# Patient Record
Sex: FEMALE | Race: WHITE | NOT HISPANIC OR LATINO | Employment: UNEMPLOYED | URBAN - METROPOLITAN AREA
[De-identification: names, ages, dates, MRNs, and addresses within clinical notes are randomized per-mention and may not be internally consistent; named-entity substitution may affect disease eponyms.]

---

## 2017-08-31 ENCOUNTER — HOSPITAL ENCOUNTER (OUTPATIENT)
Facility: MEDICAL CENTER | Age: 22
End: 2017-08-31
Attending: OBSTETRICS & GYNECOLOGY
Payer: COMMERCIAL

## 2017-08-31 PROCEDURE — 83520 IMMUNOASSAY QUANT NOS NONAB: CPT

## 2017-09-02 LAB — MIS SERPL-MCNC: 1.5 NG/ML (ref 0.4–16.02)

## 2018-04-24 ENCOUNTER — EH NON-PROVIDER (OUTPATIENT)
Dept: OCCUPATIONAL MEDICINE | Facility: CLINIC | Age: 23
End: 2018-04-24

## 2018-04-24 DIAGNOSIS — Z02.1 PRE-EMPLOYMENT DRUG SCREENING: ICD-10-CM

## 2018-04-24 LAB
AMP AMPHETAMINE: NORMAL
BAR BARBITURATES: NORMAL
BZO BENZODIAZEPINES: NORMAL
COC COCAINE: NORMAL
INT CON NEG: NORMAL
INT CON POS: NORMAL
MDMA ECSTASY: NORMAL
MET METHAMPHETAMINES: NORMAL
MTD METHADONE: NORMAL
OPI OPIATES: NORMAL
OXY OXYCODONE: NORMAL
PCP PHENCYCLIDINE: NORMAL
POC URINE DRUG SCREEN OCDRS: NORMAL
THC: NORMAL

## 2018-04-24 PROCEDURE — 80305 DRUG TEST PRSMV DIR OPT OBS: CPT | Performed by: PREVENTIVE MEDICINE

## 2018-05-04 ENCOUNTER — EMPLOYEE HEALTH (OUTPATIENT)
Dept: OCCUPATIONAL MEDICINE | Facility: CLINIC | Age: 23
End: 2018-05-04

## 2018-05-04 ENCOUNTER — HOSPITAL ENCOUNTER (OUTPATIENT)
Facility: MEDICAL CENTER | Age: 23
End: 2018-05-04
Attending: PREVENTIVE MEDICINE
Payer: COMMERCIAL

## 2018-05-04 ENCOUNTER — EH NON-PROVIDER (OUTPATIENT)
Dept: OCCUPATIONAL MEDICINE | Facility: CLINIC | Age: 23
End: 2018-05-04

## 2018-05-04 VITALS
BODY MASS INDEX: 35.31 KG/M2 | HEIGHT: 67 IN | OXYGEN SATURATION: 98 % | DIASTOLIC BLOOD PRESSURE: 78 MMHG | RESPIRATION RATE: 14 BRPM | SYSTOLIC BLOOD PRESSURE: 116 MMHG | WEIGHT: 225 LBS | HEART RATE: 84 BPM | TEMPERATURE: 98 F

## 2018-05-04 DIAGNOSIS — Z02.1 PHYSICAL EXAM, PRE-EMPLOYMENT: ICD-10-CM

## 2018-05-04 DIAGNOSIS — Z02.89 ENCOUNTER FOR OCCUPATIONAL HEALTH EXAMINATION: Primary | ICD-10-CM

## 2018-05-04 DIAGNOSIS — Z02.89 ENCOUNTER FOR OCCUPATIONAL HEALTH EXAMINATION: ICD-10-CM

## 2018-05-04 PROCEDURE — 86480 TB TEST CELL IMMUN MEASURE: CPT | Performed by: PREVENTIVE MEDICINE

## 2018-05-04 PROCEDURE — 90716 VAR VACCINE LIVE SUBQ: CPT | Performed by: PREVENTIVE MEDICINE

## 2018-05-04 PROCEDURE — 8915 PR COMPREHENSIVE PHYSICAL: Performed by: PREVENTIVE MEDICINE

## 2018-05-04 ASSESSMENT — VISUAL ACUITY
OS_CC: 20/25
OD_CC: 20/15

## 2018-05-06 LAB
M TB TUBERC IFN-G BLD QL: NEGATIVE
M TB TUBERC IFN-G/MITOGEN IGNF BLD: 0.03
M TB TUBERC IGNF/MITOGEN IGNF CONTROL: 46.84 [IU]/ML
MITOGEN IGNF BCKGRD COR BLD-ACNC: 0.08 [IU]/ML

## 2018-05-07 ENCOUNTER — DOCUMENTATION (OUTPATIENT)
Dept: OCCUPATIONAL MEDICINE | Facility: CLINIC | Age: 23
End: 2018-05-07

## 2018-06-16 ENCOUNTER — HOSPITAL ENCOUNTER (EMERGENCY)
Facility: MEDICAL CENTER | Age: 23
End: 2018-06-17
Attending: EMERGENCY MEDICINE
Payer: COMMERCIAL

## 2018-06-16 DIAGNOSIS — R10.11 RIGHT UPPER QUADRANT ABDOMINAL PAIN: ICD-10-CM

## 2018-06-16 PROCEDURE — 85025 COMPLETE CBC W/AUTO DIFF WBC: CPT

## 2018-06-16 PROCEDURE — 83690 ASSAY OF LIPASE: CPT

## 2018-06-16 PROCEDURE — 80053 COMPREHEN METABOLIC PANEL: CPT

## 2018-06-16 PROCEDURE — 36415 COLL VENOUS BLD VENIPUNCTURE: CPT

## 2018-06-16 PROCEDURE — 99285 EMERGENCY DEPT VISIT HI MDM: CPT

## 2018-06-16 PROCEDURE — 81003 URINALYSIS AUTO W/O SCOPE: CPT

## 2018-06-16 PROCEDURE — 84703 CHORIONIC GONADOTROPIN ASSAY: CPT

## 2018-06-16 PROCEDURE — 85379 FIBRIN DEGRADATION QUANT: CPT

## 2018-06-16 RX ORDER — ONDANSETRON 4 MG/1
4 TABLET, ORALLY DISINTEGRATING ORAL ONCE
Status: COMPLETED | OUTPATIENT
Start: 2018-06-17 | End: 2018-06-17

## 2018-06-16 RX ORDER — OXYCODONE HYDROCHLORIDE AND ACETAMINOPHEN 5; 325 MG/1; MG/1
1 TABLET ORAL ONCE
Status: COMPLETED | OUTPATIENT
Start: 2018-06-17 | End: 2018-06-17

## 2018-06-16 ASSESSMENT — PAIN SCALES - GENERAL: PAINLEVEL_OUTOF10: 7

## 2018-06-17 ENCOUNTER — HOSPITAL ENCOUNTER (OUTPATIENT)
Dept: RADIOLOGY | Facility: MEDICAL CENTER | Age: 23
End: 2018-06-17
Attending: EMERGENCY MEDICINE

## 2018-06-17 ENCOUNTER — APPOINTMENT (OUTPATIENT)
Dept: RADIOLOGY | Facility: MEDICAL CENTER | Age: 23
End: 2018-06-17
Attending: EMERGENCY MEDICINE
Payer: COMMERCIAL

## 2018-06-17 VITALS
HEART RATE: 72 BPM | RESPIRATION RATE: 19 BRPM | WEIGHT: 230 LBS | HEIGHT: 67 IN | DIASTOLIC BLOOD PRESSURE: 62 MMHG | SYSTOLIC BLOOD PRESSURE: 110 MMHG | OXYGEN SATURATION: 100 % | BODY MASS INDEX: 36.1 KG/M2 | TEMPERATURE: 96.8 F

## 2018-06-17 LAB
ALBUMIN SERPL BCP-MCNC: 3.4 G/DL (ref 3.2–4.9)
ALBUMIN/GLOB SERPL: 0.9 G/DL
ALP SERPL-CCNC: 51 U/L (ref 30–99)
ALT SERPL-CCNC: 20 U/L (ref 2–50)
ANION GAP SERPL CALC-SCNC: 6 MMOL/L (ref 0–11.9)
APPEARANCE UR: CLEAR
AST SERPL-CCNC: 19 U/L (ref 12–45)
BASOPHILS # BLD AUTO: 0.5 % (ref 0–1.8)
BASOPHILS # BLD: 0.04 K/UL (ref 0–0.12)
BILIRUB SERPL-MCNC: 0.3 MG/DL (ref 0.1–1.5)
BILIRUB UR QL STRIP.AUTO: NEGATIVE
BUN SERPL-MCNC: 12 MG/DL (ref 8–22)
CALCIUM SERPL-MCNC: 8.7 MG/DL (ref 8.4–10.2)
CHLORIDE SERPL-SCNC: 106 MMOL/L (ref 96–112)
CO2 SERPL-SCNC: 22 MMOL/L (ref 20–33)
COLOR UR: YELLOW
CREAT SERPL-MCNC: 0.6 MG/DL (ref 0.5–1.4)
DEPRECATED D DIMER PPP IA-ACNC: <200 NG/ML(D-DU)
EOSINOPHIL # BLD AUTO: 0.2 K/UL (ref 0–0.51)
EOSINOPHIL NFR BLD: 2.5 % (ref 0–6.9)
ERYTHROCYTE [DISTWIDTH] IN BLOOD BY AUTOMATED COUNT: 47.3 FL (ref 35.9–50)
GLOBULIN SER CALC-MCNC: 3.7 G/DL (ref 1.9–3.5)
GLUCOSE SERPL-MCNC: 85 MG/DL (ref 65–99)
GLUCOSE UR STRIP.AUTO-MCNC: NEGATIVE MG/DL
HCG SERPL QL: NEGATIVE
HCT VFR BLD AUTO: 37.7 % (ref 37–47)
HGB BLD-MCNC: 11.9 G/DL (ref 12–16)
IMM GRANULOCYTES # BLD AUTO: 0.01 K/UL (ref 0–0.11)
IMM GRANULOCYTES NFR BLD AUTO: 0.1 % (ref 0–0.9)
KETONES UR STRIP.AUTO-MCNC: NEGATIVE MG/DL
LEUKOCYTE ESTERASE UR QL STRIP.AUTO: NEGATIVE
LIPASE SERPL-CCNC: 28 U/L (ref 7–58)
LYMPHOCYTES # BLD AUTO: 3.93 K/UL (ref 1–4.8)
LYMPHOCYTES NFR BLD: 48.6 % (ref 22–41)
MCH RBC QN AUTO: 26 PG (ref 27–33)
MCHC RBC AUTO-ENTMCNC: 31.6 G/DL (ref 33.6–35)
MCV RBC AUTO: 82.5 FL (ref 81.4–97.8)
MICRO URNS: NORMAL
MONOCYTES # BLD AUTO: 0.61 K/UL (ref 0–0.85)
MONOCYTES NFR BLD AUTO: 7.5 % (ref 0–13.4)
NEUTROPHILS # BLD AUTO: 3.3 K/UL (ref 2–7.15)
NEUTROPHILS NFR BLD: 40.8 % (ref 44–72)
NITRITE UR QL STRIP.AUTO: NEGATIVE
NRBC # BLD AUTO: 0 K/UL
NRBC BLD-RTO: 0 /100 WBC
PH UR STRIP.AUTO: 5 [PH]
PLATELET # BLD AUTO: 292 K/UL (ref 164–446)
PMV BLD AUTO: 10.3 FL (ref 9–12.9)
POTASSIUM SERPL-SCNC: 3.5 MMOL/L (ref 3.6–5.5)
PROT SERPL-MCNC: 7.1 G/DL (ref 6–8.2)
PROT UR QL STRIP: NEGATIVE MG/DL
RBC # BLD AUTO: 4.57 M/UL (ref 4.2–5.4)
RBC UR QL AUTO: NEGATIVE
SODIUM SERPL-SCNC: 134 MMOL/L (ref 135–145)
SP GR UR REFRACTOMETRY: 1.03
WBC # BLD AUTO: 8.1 K/UL (ref 4.8–10.8)

## 2018-06-17 PROCEDURE — 700102 HCHG RX REV CODE 250 W/ 637 OVERRIDE(OP): Performed by: EMERGENCY MEDICINE

## 2018-06-17 PROCEDURE — 700111 HCHG RX REV CODE 636 W/ 250 OVERRIDE (IP): Performed by: EMERGENCY MEDICINE

## 2018-06-17 PROCEDURE — 74018 RADEX ABDOMEN 1 VIEW: CPT

## 2018-06-17 PROCEDURE — 76705 ECHO EXAM OF ABDOMEN: CPT

## 2018-06-17 PROCEDURE — A9270 NON-COVERED ITEM OR SERVICE: HCPCS | Performed by: EMERGENCY MEDICINE

## 2018-06-17 RX ORDER — POLYETHYLENE GLYCOL 3350 17 G/17G
17 POWDER, FOR SOLUTION ORAL DAILY
Qty: 10 EACH | Refills: 0 | Status: SHIPPED | OUTPATIENT
Start: 2018-06-17 | End: 2018-09-10

## 2018-06-17 RX ADMIN — OXYCODONE HYDROCHLORIDE AND ACETAMINOPHEN 1 TABLET: 5; 325 TABLET ORAL at 00:20

## 2018-06-17 RX ADMIN — ONDANSETRON 4 MG: 4 TABLET, ORALLY DISINTEGRATING ORAL at 00:00

## 2018-06-17 ASSESSMENT — PAIN SCALES - GENERAL: PAINLEVEL_OUTOF10: 7

## 2018-06-17 NOTE — ED NOTES
Pt resting mildly comfortably  Pain continued 4-5/10 after PO med   Aware of POC  Waiting for re-evaluation from MD

## 2018-06-17 NOTE — ED PROVIDER NOTES
ED Provider Note    CHIEF COMPLAINT  Chief Complaint   Patient presents with   • RUQ Pain     Started as back pain on right side on Thursday, became worst and more localized to RUQ today. Pt denies fevers, denies vomiting or diarrhea.    • Nausea       HPI  Diana Hill is a 22 y.o. female who presents with a chief complaint of right upper quadrant pain. The patient reports that she was in her normal, baseline state of health until approximately 4 days ago when she developed sharp pain in her right axilla. The pain radiated to her right upper back. It was constant. Nothing made it better or worse although she did try Tylenol and ibuprofen. Today, the pain radiated down into her right upper quadrant and then diffusely throughout her entire abdomen. It was associated with nausea but no vomiting. No dysuria, hematuria, vaginal discharge. A shoulder diarrhea. Denies chest pain or shortness of breath. No fevers. The patient reports that eating makes her more nauseated but does nothing for her pain. There is no pleuritic component to the pain. No hemoptysis. No lower extremity edema. She is on birth control pills. No history of DVT or PE. Denies recent surgeries or long travels.    REVIEW OF SYSTEMS  See HPI for further details. Back pain. Right axilla pain. Right upper quadrant pain. Nausea. All other systems are negative.     PAST MEDICAL HISTORY   has a past medical history of Celiac disease and Insulin resistance.    SOCIAL HISTORY  Social History     Social History Main Topics   • Smoking status: Never Smoker   • Smokeless tobacco: Never Used   • Alcohol use No   • Drug use: No   • Sexual activity: Not on file       SURGICAL HISTORY  patient denies any surgical history    CURRENT MEDICATIONS  Home Medications    **Home medications have not yet been reviewed for this encounter**         ALLERGIES  Allergies   Allergen Reactions   • Gluten Meal        PHYSICAL EXAM  VITAL SIGNS: /70   Pulse (!) 59   Temp 36.7  "°C (98 °F)   Resp 18   Ht 1.702 m (5' 7\")   Wt 104.3 kg (230 lb)   LMP 05/16/2018 (Approximate)   SpO2 99%   BMI 36.02 kg/m²    Pulse ox interpretation:  interpret this pulse ox as normal.  Constitutional: Alert in no apparent distress.  HENT: No signs of trauma, Bilateral external ears normal, Nose normal.   Eyes: Pupils are equal and reactive, Conjunctiva normal, Non-icteric.   Neck: Normal range of motion, No tenderness, Supple, No stridor.   Lymphatic: No lymphadenopathy noted.   Cardiovascular: Regular rate and rhythm, no murmurs.   Thorax & Lungs: Normal breath sounds, No respiratory distress, No wheezing, right axillary tenderness to palpation.   Abdomen: Bowel sounds normal, Soft, right upper quadrant tenderness to palpation with a negative Villasenor sign, negative Rovsing's No masses, No pulsatile masses. No peritoneal signs.  Skin: Warm, Dry, No erythema, No rash. Tenderness to light touch over the right back, right axilla, right breast, and along the right mid axillary line. No erythema along the breast. No obvious signs of infection surrounding bilateral nipple piercings.  Back: No bony tenderness, No CVA tenderness.   Extremities: Intact distal pulses, No edema, No tenderness, No cyanosis.  Musculoskeletal: Good range of motion in all major joints. No tenderness to palpation or major deformities noted.   Neurologic: Alert , Normal motor function, Normal sensory function, No focal deficits noted.   Psychiatric: Affect normal, Judgment normal, Mood normal.       DIAGNOSTIC STUDIES / PROCEDURES    LABS  Lipase  HCG  Urinalysis  CMP  CBC    RADIOLOGY  AXR  Gallbladder ultrasound    COURSE & MEDICAL DECISION MAKING  Pertinent Labs & Imaging studies reviewed. (See chart for details)  This is a 22-year-old female here with right asked for pain radiating into the right back which is now moved to the right upper quadrant. Differential diagnosis includes, but is not limited to, cholecystitis, cholelithiasis, " choledocholithiasis, gastritis, pancreatitis, hepatitis, GERD, muscle strain, constipation, shingles, less likely PE or pregnancy. Patient arrives afebrile with normal vital signs. She appears well-hydrated and nontoxic. Physical exam reveals tenderness to palpation in the right axilla, right back, and right upper quadrant along with hyperesthesia of the skin concerning for potential shingles infection. There is no obvious rash nor is there erythema to suggest a cellulitis. She has a negative Villasenor sign. Negative McBurney's sign, negative Rovsing sign. Abdomen is soft without peritoneal signs. I have a low suspicion for intra-abdominal infection especially given her normal vital signs and lack of fever. No lower extremity edema but the patient currently is on birth control pills. There is no pleuritic component to her pain, there is no chest pain, there is no shortness of breath, she is not tachycardic or hypoxic, she has no lower extremity edema, no hemoptysis, no history of DVT or PE, no recent travel or surgeries. Very low suspicion for PE. My greatest suspicion at this time is symptomatic cholelithiasis. Plan for by mouth pain and nausea control with Percocet and Zofran. We will draw labs, get abdominal x-ray to evaluate stool burden, and ultrasound of the gallbladder.    Labs without leukocytosis. Very mild hyponatremia and hypokalemia. Blood glucose within normal limits. D-dimer is reassuring less than 200. Urinalysis does not suggest infection. HCG is negative.    Abdominal x-ray reveals moderate stool burden consistent with constipation. Ultrasound of the gallbladder revealed sludge and does not suggest cholecystitis.    On reevaluation, patient reports continued but mild pain in the right upper quadrant and right axilla. I feel at this point additional imaging is not indicated. I have an incredibly low suspicion for PE. No tachycardia, O2 sats 99% on room air. Plan for discharge with close outpatient  follow-up. Will provide prescription for MiraLAX, Tylenol and ibuprofen as directed on the bottle for pain control. Increase water intake as well as fruits and vegetables. Follow-up with primary care physician within the next 24-48 hours for recheck. Patient discharged in improved condition with strict return precautions.    The patient will return for worsening symptoms and is stable at the time of discharge. The patient verbalizes understanding and will comply.    FINAL IMPRESSION  1. Abdominal pain  2. Constipation  3. Gallbladder sludge         Electronically signed by: Issa Batista, 6/16/2018 11:23 PM

## 2018-06-17 NOTE — ED NOTES
"Chief Complaint   Patient presents with   • RUQ Pain     Started as back pain on right side on Thursday, became worst and more localized to RUQ today. Pt denies fevers, denies vomiting or diarrhea.    • Nausea     /70   Pulse (!) 59   Temp 36.7 °C (98 °F)   Resp 18   Ht 1.702 m (5' 7\")   Wt 104.3 kg (230 lb)   SpO2 99%   BMI 36.02 kg/m²     "

## 2018-06-17 NOTE — DISCHARGE INSTRUCTIONS
You were seen in the ER for abdominal pain. Your labs and imaging suggests constipation as the cause of your pain. I have given you a prescription for MiraLAX which is a stool softener and I would like you to take 1 every day until you start having soft stools. I would also like you to increase your fruit and vegetable intake as well as water which can help with constipation. Please follow up with her primary care physician within the next 24-48 hours for a recheck. You may take Tylenol and ibuprofen as directed on the bottle for pain control. Return to the ER with any new or worsening symptoms.    Abdominal Pain (Nonspecific)  Your exam might not show the exact reason you have abdominal pain. Since there are many different causes of abdominal pain, another checkup and more tests may be needed. It is very important to follow up for lasting (persistent) or worsening symptoms. A possible cause of abdominal pain in any person who still has his or her appendix is acute appendicitis. Appendicitis is often hard to diagnose. Normal blood tests, urine tests, ultrasound, and CT scans do not completely rule out early appendicitis or other causes of abdominal pain. Sometimes, only the changes that happen over time will allow appendicitis and other causes of abdominal pain to be determined. Other potential problems that may require surgery may also take time to become more apparent. Because of this, it is important that you follow all of the instructions below.  HOME CARE INSTRUCTIONS   · Rest as much as possible.   · Do not eat solid food until your pain is gone.   · While adults or children have pain: A diet of water, weak decaffeinated tea, broth or bouillon, gelatin, oral rehydration solutions (ORS), frozen ice pops, or ice chips may be helpful.   · When pain is gone in adults or children: Start a light diet (dry toast, crackers, applesauce, or white rice). Increase the diet slowly as long as it does not bother you. Eat no  dairy products (including cheese and eggs) and no spicy, fatty, fried, or high-fiber foods.   · Use no alcohol, caffeine, or cigarettes.   · Take your regular medicines unless your caregiver told you not to.   · Take any prescribed medicine as directed.   · Only take over-the-counter or prescription medicines for pain, discomfort, or fever as directed by your caregiver. Do not give aspirin to children.   If your caregiver has given you a follow-up appointment, it is very important to keep that appointment. Not keeping the appointment could result in a permanent injury and/or lasting (chronic) pain and/or disability. If there is any problem keeping the appointment, you must call to reschedule.   SEEK IMMEDIATE MEDICAL CARE IF:   · Your pain is not gone in 24 hours.   · Your pain becomes worse, changes location, or feels different.   · You or your child has an oral temperature above 102° F (38.9° C), not controlled by medicine.   · Your baby is older than 3 months with a rectal temperature of 102° F (38.9° C) or higher.   · Your baby is 3 months old or younger with a rectal temperature of 100.4° F (38° C) or higher.   · You have shaking chills.   · You keep throwing up (vomiting) or cannot drink liquids.   · There is blood in your vomit or you see blood in your bowel movements.   · Your bowel movements become dark or black.   · You have frequent bowel movements.   · Your bowel movements stop (become blocked) or you cannot pass gas.   · You have bloody, frequent, or painful urination.   · You have yellow discoloration in the skin or whites of the eyes.   · Your stomach becomes bloated or bigger.   · You have dizziness or fainting.   · You have chest or back pain.   MAKE SURE YOU:   · Understand these instructions.   · Will watch your condition.   · Will get help right away if you are not doing well or get worse.   Document Released: 12/18/2006 Document Revised: 03/11/2013 Document Reviewed: 11/15/2010  ExitCare®  Patient Information ©2013 [facility].

## 2018-06-17 NOTE — ED NOTES
Pt cleared for d/c  dischg instructions given to pt  Verbally understands  D/c'ed to home in NAD w/ Rx miralax given aware to get fill later today  D/c'ed to home in NAD w/ mother driving home

## 2018-08-28 ENCOUNTER — HOSPITAL ENCOUNTER (OUTPATIENT)
Dept: LAB | Facility: MEDICAL CENTER | Age: 23
End: 2018-08-28
Attending: FAMILY MEDICINE
Payer: COMMERCIAL

## 2018-08-28 ENCOUNTER — HOSPITAL ENCOUNTER (OUTPATIENT)
Dept: LAB | Facility: MEDICAL CENTER | Age: 23
End: 2018-08-28
Attending: OBSTETRICS & GYNECOLOGY
Payer: COMMERCIAL

## 2018-08-28 LAB
25(OH)D3 SERPL-MCNC: 30 NG/ML (ref 30–100)
ALBUMIN SERPL BCP-MCNC: 3.7 G/DL (ref 3.2–4.9)
ALBUMIN/GLOB SERPL: 1.1 G/DL
ALP SERPL-CCNC: 57 U/L (ref 30–99)
ALT SERPL-CCNC: 15 U/L (ref 2–50)
ANION GAP SERPL CALC-SCNC: 10 MMOL/L (ref 0–11.9)
AST SERPL-CCNC: 15 U/L (ref 12–45)
BASOPHILS # BLD AUTO: 0.6 % (ref 0–1.8)
BASOPHILS # BLD: 0.04 K/UL (ref 0–0.12)
BILIRUB SERPL-MCNC: 0.2 MG/DL (ref 0.1–1.5)
BUN SERPL-MCNC: 14 MG/DL (ref 8–22)
CALCIUM SERPL-MCNC: 8.9 MG/DL (ref 8.5–10.5)
CHLORIDE SERPL-SCNC: 105 MMOL/L (ref 96–112)
CO2 SERPL-SCNC: 24 MMOL/L (ref 20–33)
CREAT SERPL-MCNC: 0.75 MG/DL (ref 0.5–1.4)
CRP SERPL HS-MCNC: 13.3 MG/L (ref 0–7.5)
DHEA-S SERPL-MCNC: 88.6 UG/DL (ref 148–407)
EOSINOPHIL # BLD AUTO: 0.16 K/UL (ref 0–0.51)
EOSINOPHIL NFR BLD: 2.4 % (ref 0–6.9)
ERYTHROCYTE [DISTWIDTH] IN BLOOD BY AUTOMATED COUNT: 46.1 FL (ref 35.9–50)
EST. AVERAGE GLUCOSE BLD GHB EST-MCNC: 117 MG/DL
ESTRADIOL SERPL-MCNC: 33 PG/ML
FSH SERPL-ACNC: 3 MIU/ML
GLOBULIN SER CALC-MCNC: 3.5 G/DL (ref 1.9–3.5)
GLUCOSE SERPL-MCNC: 92 MG/DL (ref 65–99)
HBA1C MFR BLD: 5.7 % (ref 0–5.6)
HCT VFR BLD AUTO: 40 % (ref 37–47)
HGB BLD-MCNC: 12.3 G/DL (ref 12–16)
IMM GRANULOCYTES # BLD AUTO: 0.02 K/UL (ref 0–0.11)
IMM GRANULOCYTES NFR BLD AUTO: 0.3 % (ref 0–0.9)
LH SERPL-ACNC: 1 IU/L
LYMPHOCYTES # BLD AUTO: 2.93 K/UL (ref 1–4.8)
LYMPHOCYTES NFR BLD: 43.2 % (ref 22–41)
MCH RBC QN AUTO: 26.1 PG (ref 27–33)
MCHC RBC AUTO-ENTMCNC: 30.8 G/DL (ref 33.6–35)
MCV RBC AUTO: 84.7 FL (ref 81.4–97.8)
MONOCYTES # BLD AUTO: 0.42 K/UL (ref 0–0.85)
MONOCYTES NFR BLD AUTO: 6.2 % (ref 0–13.4)
NEUTROPHILS # BLD AUTO: 3.22 K/UL (ref 2–7.15)
NEUTROPHILS NFR BLD: 47.3 % (ref 44–72)
NRBC # BLD AUTO: 0 K/UL
NRBC BLD-RTO: 0 /100 WBC
PLATELET # BLD AUTO: 303 K/UL (ref 164–446)
PMV BLD AUTO: 11.3 FL (ref 9–12.9)
POTASSIUM SERPL-SCNC: 4 MMOL/L (ref 3.6–5.5)
PROGEST SERPL-MCNC: 0.27 NG/ML
PROLACTIN SERPL-MCNC: 7.84 NG/ML (ref 2.8–26)
PROT SERPL-MCNC: 7.2 G/DL (ref 6–8.2)
RBC # BLD AUTO: 4.72 M/UL (ref 4.2–5.4)
SODIUM SERPL-SCNC: 139 MMOL/L (ref 135–145)
T3FREE SERPL-MCNC: 3.16 PG/ML (ref 2.4–4.2)
T4 FREE SERPL-MCNC: 0.61 NG/DL (ref 0.53–1.43)
THYROPEROXIDASE AB SERPL-ACNC: 0.5 IU/ML (ref 0–9)
TSH SERPL DL<=0.005 MIU/L-ACNC: 2.91 UIU/ML (ref 0.38–5.33)
WBC # BLD AUTO: 6.8 K/UL (ref 4.8–10.8)

## 2018-08-28 PROCEDURE — 86663 EPSTEIN-BARR ANTIBODY: CPT

## 2018-08-28 PROCEDURE — 86800 THYROGLOBULIN ANTIBODY: CPT

## 2018-08-28 PROCEDURE — 82627 DEHYDROEPIANDROSTERONE: CPT

## 2018-08-28 PROCEDURE — 80053 COMPREHEN METABOLIC PANEL: CPT

## 2018-08-28 PROCEDURE — 83036 HEMOGLOBIN GLYCOSYLATED A1C: CPT

## 2018-08-28 PROCEDURE — 84443 ASSAY THYROID STIM HORMONE: CPT

## 2018-08-28 PROCEDURE — 85025 COMPLETE CBC W/AUTO DIFF WBC: CPT

## 2018-08-28 PROCEDURE — 86376 MICROSOMAL ANTIBODY EACH: CPT

## 2018-08-28 PROCEDURE — 83090 ASSAY OF HOMOCYSTEINE: CPT

## 2018-08-28 PROCEDURE — 84270 ASSAY OF SEX HORMONE GLOBUL: CPT

## 2018-08-28 PROCEDURE — 83001 ASSAY OF GONADOTROPIN (FSH): CPT

## 2018-08-28 PROCEDURE — 83002 ASSAY OF GONADOTROPIN (LH): CPT

## 2018-08-28 PROCEDURE — 82679 ASSAY OF ESTRONE: CPT

## 2018-08-28 PROCEDURE — 84481 FREE ASSAY (FT-3): CPT

## 2018-08-28 PROCEDURE — 83525 ASSAY OF INSULIN: CPT

## 2018-08-28 PROCEDURE — 86664 EPSTEIN-BARR NUCLEAR ANTIGEN: CPT

## 2018-08-28 PROCEDURE — 86141 C-REACTIVE PROTEIN HS: CPT

## 2018-08-28 PROCEDURE — 84144 ASSAY OF PROGESTERONE: CPT

## 2018-08-28 PROCEDURE — 84146 ASSAY OF PROLACTIN: CPT

## 2018-08-28 PROCEDURE — 36415 COLL VENOUS BLD VENIPUNCTURE: CPT

## 2018-08-28 PROCEDURE — 84482 T3 REVERSE: CPT

## 2018-08-28 PROCEDURE — 84140 ASSAY OF PREGNENOLONE: CPT

## 2018-08-28 PROCEDURE — 86665 EPSTEIN-BARR CAPSID VCA: CPT | Mod: 91

## 2018-08-28 PROCEDURE — 82670 ASSAY OF TOTAL ESTRADIOL: CPT

## 2018-08-28 PROCEDURE — 84439 ASSAY OF FREE THYROXINE: CPT

## 2018-08-28 PROCEDURE — 82306 VITAMIN D 25 HYDROXY: CPT

## 2018-08-28 PROCEDURE — 84305 ASSAY OF SOMATOMEDIN: CPT

## 2018-08-28 PROCEDURE — 84403 ASSAY OF TOTAL TESTOSTERONE: CPT

## 2018-08-29 LAB — HCYS SERPL-SCNC: 9.42 UMOL/L

## 2018-08-30 LAB
EBV EA-D IGG SER-ACNC: <5 U/ML (ref 0–10.9)
EBV NA IGG SER IA-ACNC: 167 U/ML (ref 0–21.9)
EBV VCA IGG SER IA-ACNC: 18.8 U/ML (ref 0–21.9)
EBV VCA IGM SER IA-ACNC: <10 U/ML (ref 0–43.9)
IGF-I SERPL-MCNC: 188 NG/ML (ref 107–351)
IGF-I Z-SCORE SERPL: -0.5
INSULIN P FAST SERPL-ACNC: 29 UIU/ML (ref 3–19)
THYROGLOB AB SERPL-ACNC: <0.9 IU/ML (ref 0–4)

## 2018-09-01 LAB
ESTRONE SERPL-MCNC: 27.8 PG/ML
SHBG SERPL-SCNC: 354 NMOL/L (ref 30–135)
T3REVERSE SERPL-MCNC: 10.5 NG/DL (ref 9–27)
TESTOST FREE SERPL-MCNC: 0.9 PG/ML (ref 0.8–7.4)
TESTOST SERPL-MCNC: 34 NG/DL (ref 9–55)

## 2018-09-02 LAB — PREG SERPL-MCNC: 39 NG/DL (ref 15–132)

## 2018-09-07 ENCOUNTER — APPOINTMENT (OUTPATIENT)
Dept: ADMISSIONS | Facility: MEDICAL CENTER | Age: 23
End: 2018-09-07
Payer: COMMERCIAL

## 2018-09-10 ENCOUNTER — APPOINTMENT (OUTPATIENT)
Dept: ADMISSIONS | Facility: MEDICAL CENTER | Age: 23
End: 2018-09-10
Attending: SURGERY
Payer: COMMERCIAL

## 2018-09-10 RX ORDER — MULTIVITAMIN WITH IRON
TABLET ORAL DAILY
COMMUNITY
End: 2019-10-24

## 2018-09-11 ENCOUNTER — DOCUMENTATION (OUTPATIENT)
Dept: OCCUPATIONAL MEDICINE | Facility: CLINIC | Age: 23
End: 2018-09-11

## 2018-09-13 ENCOUNTER — HOSPITAL ENCOUNTER (OUTPATIENT)
Facility: MEDICAL CENTER | Age: 23
End: 2018-09-13
Attending: SURGERY | Admitting: SURGERY
Payer: COMMERCIAL

## 2018-09-13 VITALS
HEIGHT: 67 IN | TEMPERATURE: 97.9 F | WEIGHT: 249.21 LBS | RESPIRATION RATE: 16 BRPM | BODY MASS INDEX: 39.11 KG/M2 | OXYGEN SATURATION: 96 % | SYSTOLIC BLOOD PRESSURE: 112 MMHG | HEART RATE: 100 BPM | DIASTOLIC BLOOD PRESSURE: 72 MMHG

## 2018-09-13 DIAGNOSIS — K80.50 BILIARY COLIC: ICD-10-CM

## 2018-09-13 LAB
B-HCG FREE SERPL-ACNC: <5 MIU/ML
IHCGL IHCGL: NEGATIVE MIU/ML
PATHOLOGY CONSULT NOTE: NORMAL

## 2018-09-13 PROCEDURE — 160002 HCHG RECOVERY MINUTES (STAT): Performed by: SURGERY

## 2018-09-13 PROCEDURE — 160048 HCHG OR STATISTICAL LEVEL 1-5: Performed by: SURGERY

## 2018-09-13 PROCEDURE — 302135 SEQUENTIAL COMPRESSION MACHINE: Performed by: SURGERY

## 2018-09-13 PROCEDURE — 500800 HCHG LAPAROSCOPIC J/L HOOK: Performed by: SURGERY

## 2018-09-13 PROCEDURE — 160035 HCHG PACU - 1ST 60 MINS PHASE I: Performed by: SURGERY

## 2018-09-13 PROCEDURE — A6402 STERILE GAUZE <= 16 SQ IN: HCPCS | Performed by: SURGERY

## 2018-09-13 PROCEDURE — A9270 NON-COVERED ITEM OR SERVICE: HCPCS

## 2018-09-13 PROCEDURE — 160028 HCHG SURGERY MINUTES - 1ST 30 MINS LEVEL 3: Performed by: SURGERY

## 2018-09-13 PROCEDURE — 160046 HCHG PACU - 1ST 60 MINS PHASE II: Performed by: SURGERY

## 2018-09-13 PROCEDURE — 700111 HCHG RX REV CODE 636 W/ 250 OVERRIDE (IP)

## 2018-09-13 PROCEDURE — 84702 CHORIONIC GONADOTROPIN TEST: CPT

## 2018-09-13 PROCEDURE — 501838 HCHG SUTURE GENERAL: Performed by: SURGERY

## 2018-09-13 PROCEDURE — 160039 HCHG SURGERY MINUTES - EA ADDL 1 MIN LEVEL 3: Performed by: SURGERY

## 2018-09-13 PROCEDURE — 700105 HCHG RX REV CODE 258: Performed by: SURGERY

## 2018-09-13 PROCEDURE — 501577 HCHG TROCAR, STEP 11MM: Performed by: SURGERY

## 2018-09-13 PROCEDURE — 502571 HCHG PACK, LAP CHOLE: Performed by: SURGERY

## 2018-09-13 PROCEDURE — 160036 HCHG PACU - EA ADDL 30 MINS PHASE I: Performed by: SURGERY

## 2018-09-13 PROCEDURE — 501583 HCHG TROCAR, THRD CAN&SEAL 5X100: Performed by: SURGERY

## 2018-09-13 PROCEDURE — 501570 HCHG TROCAR, SEPARATOR: Performed by: SURGERY

## 2018-09-13 PROCEDURE — 501399 HCHG SPECIMAN BAG, ENDO CATC: Performed by: SURGERY

## 2018-09-13 PROCEDURE — 160025 RECOVERY II MINUTES (STATS): Performed by: SURGERY

## 2018-09-13 PROCEDURE — 700102 HCHG RX REV CODE 250 W/ 637 OVERRIDE(OP)

## 2018-09-13 PROCEDURE — 160009 HCHG ANES TIME/MIN: Performed by: SURGERY

## 2018-09-13 PROCEDURE — 88304 TISSUE EXAM BY PATHOLOGIST: CPT

## 2018-09-13 PROCEDURE — 700101 HCHG RX REV CODE 250

## 2018-09-13 RX ORDER — HYDROCODONE BITARTRATE AND ACETAMINOPHEN 5; 325 MG/1; MG/1
1-2 TABLET ORAL EVERY 6 HOURS PRN
Qty: 10 TAB | Refills: 0 | Status: SHIPPED | OUTPATIENT
Start: 2018-09-13 | End: 2018-09-20

## 2018-09-13 RX ORDER — SODIUM CHLORIDE, SODIUM LACTATE, POTASSIUM CHLORIDE, CALCIUM CHLORIDE 600; 310; 30; 20 MG/100ML; MG/100ML; MG/100ML; MG/100ML
1000 INJECTION, SOLUTION INTRAVENOUS
Status: DISCONTINUED | OUTPATIENT
Start: 2018-09-13 | End: 2018-09-13 | Stop reason: HOSPADM

## 2018-09-13 RX ORDER — BUPIVACAINE HYDROCHLORIDE AND EPINEPHRINE 5; 5 MG/ML; UG/ML
INJECTION, SOLUTION EPIDURAL; INTRACAUDAL; PERINEURAL
Status: DISCONTINUED | OUTPATIENT
Start: 2018-09-13 | End: 2018-09-13 | Stop reason: HOSPADM

## 2018-09-13 RX ORDER — OXYCODONE HYDROCHLORIDE AND ACETAMINOPHEN 5; 325 MG/1; MG/1
TABLET ORAL
Status: COMPLETED
Start: 2018-09-13 | End: 2018-09-13

## 2018-09-13 RX ORDER — HYDROCODONE BITARTRATE AND ACETAMINOPHEN 5; 325 MG/1; MG/1
1-2 TABLET ORAL EVERY 6 HOURS PRN
Qty: 10 TAB | Refills: 0 | Status: SHIPPED | OUTPATIENT
Start: 2018-09-13 | End: 2018-09-13

## 2018-09-13 RX ORDER — HYDROCODONE BITARTRATE AND ACETAMINOPHEN 5; 325 MG/1; MG/1
1-2 TABLET ORAL EVERY 6 HOURS PRN
Status: DISCONTINUED | OUTPATIENT
Start: 2018-09-13 | End: 2018-09-13 | Stop reason: HOSPADM

## 2018-09-13 RX ORDER — MEPERIDINE HYDROCHLORIDE 25 MG/ML
INJECTION INTRAMUSCULAR; INTRAVENOUS; SUBCUTANEOUS
Status: COMPLETED
Start: 2018-09-13 | End: 2018-09-13

## 2018-09-13 RX ADMIN — FENTANYL CITRATE 50 MCG: 50 INJECTION, SOLUTION INTRAMUSCULAR; INTRAVENOUS at 08:58

## 2018-09-13 RX ADMIN — FENTANYL CITRATE 25 MCG: 50 INJECTION, SOLUTION INTRAMUSCULAR; INTRAVENOUS at 09:34

## 2018-09-13 RX ADMIN — MEPERIDINE HYDROCHLORIDE 12.5 MG: 25 INJECTION INTRAMUSCULAR; INTRAVENOUS; SUBCUTANEOUS at 09:10

## 2018-09-13 RX ADMIN — OXYCODONE HYDROCHLORIDE AND ACETAMINOPHEN 1 TABLET: 5; 325 TABLET ORAL at 09:20

## 2018-09-13 RX ADMIN — SODIUM CHLORIDE, POTASSIUM CHLORIDE, SODIUM LACTATE AND CALCIUM CHLORIDE 1000 ML: 600; 310; 30; 20 INJECTION, SOLUTION INTRAVENOUS at 07:00

## 2018-09-13 RX ADMIN — FENTANYL CITRATE 50 MCG: 50 INJECTION, SOLUTION INTRAMUSCULAR; INTRAVENOUS at 09:05

## 2018-09-13 ASSESSMENT — PAIN SCALES - GENERAL
PAINLEVEL_OUTOF10: 8
PAINLEVEL_OUTOF10: 3
PAINLEVEL_OUTOF10: 4
PAINLEVEL_OUTOF10: 4
PAINLEVEL_OUTOF10: 0
PAINLEVEL_OUTOF10: 4

## 2018-09-13 NOTE — DISCHARGE INSTRUCTIONS
ACTIVITY: Rest and take it easy for the first 24 hours.  A responsible adult is recommended to remain with you during that time.  It is normal to feel sleepy.  We encourage you to not do anything that requires balance, judgment or coordination.    MILD FLU-LIKE SYMPTOMS ARE NORMAL. YOU MAY EXPERIENCE GENERALIZED MUSCLE ACHES, THROAT IRRITATION, HEADACHE AND/OR SOME NAUSEA.    FOR 24 HOURS DO NOT:  Drive, operate machinery or run household appliances.  Drink beer or alcoholic beverages.   Make important decisions or sign legal documents.    SPECIAL INSTRUCTIONS:   . DIET: Upon discharge from the hospital you may resume your normal preoperative diet. Depending on how you are feeling and whether you have nausea or not, you may wish to stay with a bland diet for the first few days. However, you can advance this as quickly as you feel ready.     2. ACTIVITIES: After discharge from the hospital, you may resume full routine activities. However, there should be no heavy lifting (greater than 15 pounds) and no strenuous activities until after your follow-up visit. Otherwise, routine activities of daily living are acceptable.     3. DRIVING: You may drive whenever you are off pain medications and are able to perform the activities needed to drive, i.e. turning, bending, twisting, etc.     4. BATHING: You may get the wound wet at any time after leaving the hospital. You may shower, but do not submerge in a bath for at least a week. Dressings may come off after 48 hours.     5. BOWEL FUNCTION: A few patients, after this operation, will develop either frequent or loose stools after meals. This usually corrects itself after a few days, to a few weeks. If this occurs, do not worry; it is not unusual and will resolve. Much more common than loose stools, is constipation. The combination of pain medication and decreased activity level can cause constipation in otherwise normal patients. If you feel this is occurring, take a  laxative (Milk of Magnesia, Ex-Lax, Senokot, etc.) until the problem has resolved.     6. PAIN MEDICATION: You will be given a prescription for pain medication at discharge. Please take these as directed. It is important to remember not to take medications on an empty stomach as this may cause nausea.     7.CALL IF YOU HAVE: (1) Fevers to more than 1010 F, (2) Unusual chest or leg pain, (3) Drainage or fluid from incision that may be foul smelling, increased tenderness or soreness at the wound or the wound edges are no longer together, redness or swelling at the incision site. Please do not hesitate to call with any other questions.     8. APPOINTMENT: Contact our office at [phone] for a follow-up appointment in 1 to 2 weeks following your procedure.     If you have any additional questions, please do not hesitate to call the office and speak to either myself or the physician on call.     Office addresses:   [address] [place] [address], [address] [place] [address]     Hayden Wilhelm MD   [facility]   [phone]    DIET: To avoid nausea, slowly advance diet as tolerated, avoiding spicy or greasy foods for the first day.  Add more substantial food to your diet according to your physician's instructions.  Babies can be fed formula or breast milk as soon as they are hungry.  INCREASE FLUIDS AND FIBER TO AVOID CONSTIPATION.      You should CALL YOUR PHYSICIAN if you develop:  Fever greater than 101 degrees F.  Pain not relieved by medication, or persistent nausea or vomiting.  Excessive bleeding (blood soaking through dressing) or unexpected drainage from the wound.  Extreme redness or swelling around the incision site, drainage of pus or foul smelling drainage.  Inability to urinate or empty your bladder within 8 hours.  Problems with breathing or chest pain.    You should call 911 if you develop problems with breathing or chest pain.  If you are unable to contact your doctor or surgical center, you should go to  the nearest emergency room or urgent care center.  Dr Wilhelm's telephone #: [phone]    If any questions arise, call your doctor.  If your doctor is not available, please feel free to call the Surgical Center at [phone].  The Center is open Monday through Friday from 7AM to 7PM.  You can also call the HEALTH HOTLINE open 24 hours/day, 7 days/week and speak to a nurse at [phone], or toll free at [phone].    A registered nurse may call you a few days after your surgery to see how you are doing after your procedure.    MEDICATIONS: Resume taking daily medication.  Take prescribed pain medication with food.  If no medication is prescribed, you may take non-aspirin pain medication if needed.  PAIN MEDICATION CAN BE VERY CONSTIPATING.  Take a stool softener or laxative such as senokot, pericolace, or milk of magnesia if needed.    Prescription given for Norco.  Last pain medication given at 0920. Percocet 5/325mg    If your physician has prescribed pain medication that includes Acetaminophen (Tylenol), do not take additional Acetaminophen (Tylenol) while taking the prescribed medication.    Depression / Suicide Risk    As you are discharged from this [facility], it is important to learn how to keep safe from harming yourself.    Recognize the warning signs:  · Abrupt changes in personality, positive or negative- including increase in energy   · Giving away possessions  · Change in eating patterns- significant weight changes-  positive or negative  · Change in sleeping patterns- unable to sleep or sleeping all the time   · Unwillingness or inability to communicate  · Depression  · Unusual sadness, discouragement and loneliness  · Talk of wanting to die  · Neglect of personal appearance   · Rebelliousness- reckless behavior  · Withdrawal from people/activities they love  · Confusion- inability to concentrate     If you or a loved one observes any of these behaviors or has concerns about  self-harm, here's what you can do:  · Talk about it- your feelings and reasons for harming yourself  · Remove any means that you might use to hurt yourself (examples: pills, rope, extension cords, firearm)  · Get professional help from the community (Mental Health, Substance Abuse, psychological counseling)  · Do not be alone:Call your Safe Contact- someone whom you trust who will be there for you.  · Call your local CRISIS HOTLINE 237-0472 or 553-769-3827  · Call your local Children's Mobile Crisis Response Team [place] (782) 231-2698 or www.[facility]  · Call the toll free National Suicide Prevention Hotlines   · National Suicide Prevention Lifeline 949-787-BAWH (3612)  · National Hope Line Network 800-SUICIDE (281-1148)

## 2018-09-13 NOTE — OR NURSING
0930 Patients mom notified of patients disposition.  0934 Patient medicated intravenously for pain.  0951 Patient meets criteria for stage 2.

## 2018-09-13 NOTE — OR NURSING
1005- patient to stage II. Minimal pain noted by patient and patient denies nausea. Dressings to abdomen clean and intact.     1033- Discharge instructions discussed with patient and family at chair side and each state that discharge instruction understood.

## 2018-09-13 NOTE — OR NURSING
0848 Patient to recovery via cart. Placed on monitors. Oral airway remains in place. Spontaneous respirations.  0853 Oral airway removed. Patient awake.  0858 Patient complains of pain. Medicated intravenously.  0905 Patient medicated intravenously for pain.  0907 Report to April RN to assume care of patient.

## 2018-09-13 NOTE — OP REPORT
Operative Report    Date: 9/13/2018    Surgeon: Hayden Wilhelm MD    Assistant: JOURDAN Aviles    Preoperative Diagnosis: biliary colic    Postoperative Diagnosis: biliary colic, chlolelithiaisis    Procedure Performed: Laparoscopic cholecystectomy     Indications: 23 year old female with chronic abdominal pain and an ultrasound showing gallbladder sludge.    An extensive PARQ conference was held with the patient, in regard to the surgical treatment of biliary colic. The patient was counseled regarding the benefits of the operation, namely, laparoscopic VS open cholecystectomy. The patient was made aware of the alternatives, including operative and non-operative management. The risks of bleeding, infection, damage to surrounding structures, need for reoperation, bile duct injury, stroke, MI, and death were discussed with the patient. The patient was given a chance to ask questions, and all his questions were answered. The patient demonstrated adequate understanding, seemed pleased with the plan, and wished to proceed.     Findings: thin walled gallbladder. High take off of right hepatic artery.     Procedure in detail: The patient was brought to the operating room and was placed in the supine position where general endotracheal anesthesia was induced. SCDs were in place and functioning. Preoperative antibiotics of ancef were given before incision time. The patient's abdomen was prepped and draped in the usual sterile fashion.    After infiltration of local anesthetic, a skin incision was made to accommodate a 5 mm port infra-umbilically. We then used the Veress needle to access the peritoneum, and after saline drop test, we insufflated to 15 mmHg. We then placed the 5mm 30 degree camera and inspected the abdomen for evidence of trauma secondary to Veress needle or port placement, and found none.    Utilizing the 30-degree laparoscope with the patient in the reverse Trendelenburg position, and after  infiltration of local anesthetic, an additional 11-mm port was inserted into the epigastrium just to the right of the midline. Then two 5-mm ports were inserted in the midclavicular and anterior axillary lines, in the right upper quadrant, all under direct visualization.    The gallbladder was identified, it appeared thin walled. The gallbladder fundus was retracted cephalad and the infundibulum was retracted laterally using gallbladder graspers. Using the [place], we were able to peel the overlying peritoneum off the cystic duct, and circumferentially dissect it. We used electrocautery to futher remove the peritoneum off the gallbladder. We then were able to further dissect Calot's triangle until we identified the cystic artery, which was circumferrentially dissected. We were careful to dissect the cystic artery away from the right hepatic artery, which bifurcated from the cystic artery high in Calot's triangle.    We then doubly clipped the cystic duct distally and divided it. We then doubly clipped the cystic artery  and divided it.Then, using electrocautery, we removed the gallbladder from the liver bed. After ensuring gallbladder bed hemostasis with cautery, we removed the gallbladder and placed it in an endocatch bag, and removed it from the epigastric port site.    We inspected the cystic duct and artery stumps, and found them to be intact. The liver bed was hemostatic.    The trocars were removed under direct visualization.    The fascia on the all port sites >10 mm were closed with Vicryl sutures. The skin of all incisions was closed with running subcuticular suture.    All sponge, needle, and instrument counts were reported as correct at the end of the procedure. The patient tolerated the procedure well and left the operating room for the recovery room in stable and satisfactory condition.    Estimated Blood Loss: 5 mLs     Specimens: gallbladder for permanent pathology    Complications: none apparent      Drains: none     Disposition: home     Hayden Wilhelm MD  [facility]  [phone]

## 2018-10-02 ENCOUNTER — IMMUNIZATION (OUTPATIENT)
Dept: OCCUPATIONAL MEDICINE | Facility: CLINIC | Age: 23
End: 2018-10-02

## 2018-10-02 DIAGNOSIS — Z23 NEED FOR VACCINATION: ICD-10-CM

## 2018-10-02 PROCEDURE — 90686 IIV4 VACC NO PRSV 0.5 ML IM: CPT | Performed by: PREVENTIVE MEDICINE

## 2019-08-10 ENCOUNTER — OFFICE VISIT (OUTPATIENT)
Dept: URGENT CARE | Facility: MEDICAL CENTER | Age: 24
End: 2019-08-10
Payer: COMMERCIAL

## 2019-08-10 VITALS
OXYGEN SATURATION: 98 % | DIASTOLIC BLOOD PRESSURE: 88 MMHG | RESPIRATION RATE: 16 BRPM | TEMPERATURE: 97.8 F | BODY MASS INDEX: 39.31 KG/M2 | HEART RATE: 86 BPM | SYSTOLIC BLOOD PRESSURE: 120 MMHG | WEIGHT: 251 LBS
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
## 2019-08-10 PROCEDURE — 99203 OFFICE O/P NEW LOW 30 MIN: CPT | Performed by: PHYSICIAN ASSISTANT

## 2019-08-10 RX ORDER — METHYLPREDNISOLONE 4 MG/1
TABLET ORAL
Qty: 21 TAB | Refills: 0 | Status: SHIPPED | OUTPATIENT
Start: 2019-08-10 | End: 2019-08-20

## 2019-08-10 ASSESSMENT — ENCOUNTER SYMPTOMS
CHILLS: 0
DIZZINESS: 0
SHORTNESS OF BREATH: 0
HEADACHES: 0
VOMITING: 0
FEVER: 0
COUGH: 0
NAUSEA: 0
MYALGIAS: 0
DIARRHEA: 0
PALPITATIONS: 0

## 2019-08-10 NOTE — PROGRESS NOTES
Subjective:      Diana Hill is a 23 y.o. female who presents with Bug Bite (poss misquitos, swelling bites, itchy, over last weekend- [place])            The patient is here today with complaints of bug bites all over her legs. She believes she was bit by mosquitoes about 1 week ago. She has noticed increased swelling and itching over the last week. She denies fever or chills. She has no body aches,  Nausea, vomiting or diarrhea. She has tried OTC Benadryl and hydrocortisone cream with little relief.    Past Medical History:   Diagnosis Date   • Anemia     hx of borderline anemia   • Bowel habit changes 09/10/2018    constipation   • Celiac disease    • Heart burn    • Insulin resistance    • Psychiatric problem 09/10/2018    depression/anxiety       Past Surgical History:   Procedure Laterality Date   • MATTHIAS BY LAPAROSCOPY N/A 9/13/2018    Procedure: MATTHIAS BY LAPAROSCOPY;  Surgeon: Hayden Wilhelm M.D.;  Location: SURGERY [facility];  Service: General       History reviewed. No pertinent family history.\    Allergies   Allergen Reactions   • Gluten Meal        Medications, Allergies, and current problem list reviewed today in Epic    Review of Systems   Constitutional: Negative for chills, fever and malaise/fatigue.   Respiratory: Negative for cough and shortness of breath.    Cardiovascular: Negative for chest pain, palpitations and leg swelling.   Gastrointestinal: Negative for diarrhea, nausea and vomiting.   Musculoskeletal: Negative for myalgias.   Skin: Positive for itching.        Bug bites with swelling all over legs   Neurological: Negative for dizziness and headaches.   All other systems reviewed and are negative.        Objective:     /88 (BP Location: Left arm, Patient Position: Sitting, BP Cuff Size: Adult)   Pulse 86   Temp 36.6 °C (97.8 °F) (Temporal)   Resp 16   Wt 113.9 kg (251 lb)   SpO2 98%   BMI 39.31 kg/m²      Physical Exam   Constitutional: She is oriented  to person, place, and time. She appears well-developed and well-nourished. No distress.   HENT:   Head: Normocephalic.   Mouth/Throat: Oropharynx is clear and moist.   Eyes: Conjunctivae are normal. No scleral icterus.   Cardiovascular: Normal rate.   Pulmonary/Chest: Effort normal. No respiratory distress.   Musculoskeletal: Normal range of motion.   Neurological: She is alert and oriented to person, place, and time. No cranial nerve deficit.   Skin: Skin is warm and dry.   Several large welts with moderate edema noted all over legs bilaterally. Skin intact. No streaking or drainage.   Psychiatric: She has a normal mood and affect. Her behavior is normal. Judgment and thought content normal.               Assessment/Plan:     1. Bug bite, initial encounter      Current Outpatient Medications:   •  methylPREDNISolone (MEDROL DOSEPAK) 4 MG Tablet Therapy Pack, Take as directed on package. 1 pack., Disp: 21 Tab, Rfl: 0     Differential diagnoses, Supportive care, and indications for immediate follow-up discussed with patient.   Instructed to return to clinic or nearest emergency department for any change in condition, further concerns, or worsening of symptoms.    The patient demonstrated a good understanding and agreed with the treatment plan.    Verónica Denis P.A.-C.

## 2019-08-20 ENCOUNTER — OFFICE VISIT (OUTPATIENT)
Dept: URGENT CARE | Facility: CLINIC | Age: 24
End: 2019-08-20
Payer: COMMERCIAL

## 2019-08-20 VITALS
TEMPERATURE: 97.6 F | WEIGHT: 253 LBS | RESPIRATION RATE: 16 BRPM | HEIGHT: 67 IN | BODY MASS INDEX: 39.71 KG/M2 | OXYGEN SATURATION: 97 % | DIASTOLIC BLOOD PRESSURE: 80 MMHG | HEART RATE: 94 BPM | SYSTOLIC BLOOD PRESSURE: 124 MMHG

## 2019-08-20 DIAGNOSIS — R30.0 DYSURIA: ICD-10-CM

## 2019-08-20 LAB
APPEARANCE UR: CLEAR
BILIRUB UR STRIP-MCNC: NEGATIVE MG/DL
COLOR UR AUTO: YELLOW
GLUCOSE UR STRIP.AUTO-MCNC: NEGATIVE MG/DL
KETONES UR STRIP.AUTO-MCNC: NEGATIVE MG/DL
LEUKOCYTE ESTERASE UR QL STRIP.AUTO: NEGATIVE
NITRITE UR QL STRIP.AUTO: NEGATIVE
PH UR STRIP.AUTO: 6 [PH] (ref 5–8)
PROT UR QL STRIP: NEGATIVE MG/DL
RBC UR QL AUTO: NEGATIVE
SP GR UR STRIP.AUTO: 1.03
UROBILINOGEN UR STRIP-MCNC: NEGATIVE MG/DL

## 2019-08-20 PROCEDURE — 81002 URINALYSIS NONAUTO W/O SCOPE: CPT | Performed by: FAMILY MEDICINE

## 2019-08-20 PROCEDURE — 99214 OFFICE O/P EST MOD 30 MIN: CPT | Performed by: FAMILY MEDICINE

## 2019-08-20 RX ORDER — PHENAZOPYRIDINE HYDROCHLORIDE 200 MG/1
200 TABLET, FILM COATED ORAL 3 TIMES DAILY
Qty: 6 TAB | Refills: 0 | Status: SHIPPED | OUTPATIENT
Start: 2019-08-20 | End: 2019-08-22

## 2019-08-20 RX ORDER — NITROFURANTOIN 25; 75 MG/1; MG/1
100 CAPSULE ORAL EVERY 12 HOURS
Qty: 10 CAP | Refills: 0 | Status: SHIPPED | OUTPATIENT
Start: 2019-08-20 | End: 2019-08-25

## 2019-08-21 NOTE — PROGRESS NOTES
"Subjective:      Diana Hill is a 24 y.o. female who presents with Urinary Frequency      - This is a pleasant and non toxic appearing 24 y.o. female with c/o urinary freq/burn x 1 day, no NVFC            ALLERGIES:  Gluten meal     PMH:  Past Medical History:   Diagnosis Date   • Anemia     hx of borderline anemia   • Bowel habit changes 09/10/2018    constipation   • Celiac disease    • Heart burn    • Insulin resistance    • Psychiatric problem 09/10/2018    depression/anxiety        PSH:  Past Surgical History:   Procedure Laterality Date   • MATTHIAS BY LAPAROSCOPY N/A 9/13/2018    Procedure: MATTHIAS BY LAPAROSCOPY;  Surgeon: Hayden Wilhelm M.D.;  Location: SURGERY [facility];  Service: General       MEDS:    Current Outpatient Medications:   •  phenazopyridine (PYRIDIUM) 200 MG Tab, Take 1 Tab by mouth 3 times a day for 2 days., Disp: 6 Tab, Rfl: 0  •  nitrofurantoin monohyd macro (MACROBID) 100 MG Cap, Take 1 Cap by mouth every 12 hours for 5 days., Disp: 10 Cap, Rfl: 0  •  Magnesium 250 MG Tab, Take  by mouth every day., Disp: , Rfl:   •  Cholecalciferol (VITAMIN D-3) 5000 units Tab, Take  by mouth every 48 hours., Disp: , Rfl:   •  citalopram (CELEXA) 20 MG Tab, Take 30 mg by mouth every day., Disp: , Rfl:   •  Norethindrone-Eth Estradiol (ZENCHENT PO), Take  by mouth., Disp: , Rfl:     ** I have documented what I find to be significant in regards to past medical, social, family and surgical history  in my HPI or under PMH/PSH/FH review section, otherwise it is contributory **           HPI    Review of Systems   Genitourinary: Positive for dysuria and frequency.   All other systems reviewed and are negative.         Objective:     /80   Pulse 94   Temp 36.4 °C (97.6 °F)   Resp 16   Ht 1.702 m (5' 7\")   Wt 114.8 kg (253 lb)   SpO2 97%   BMI 39.63 kg/m²      Physical Exam   Constitutional: She appears well-developed and well-nourished. No distress.   HENT:   Head: Normocephalic " and atraumatic.   Eyes: Conjunctivae are normal.   Cardiovascular: Normal heart sounds.   No murmur heard.  Pulmonary/Chest: Effort normal. No respiratory distress.   Abdominal: Soft. There is no tenderness.   Neurological: She is alert. She exhibits normal muscle tone.   Skin: Skin is warm and dry. She is not diaphoretic.   Psychiatric: She has a normal mood and affect. Judgment normal.   Nursing note and vitals reviewed.              Assessment/Plan:         1. Dysuria  POCT Urinalysis [[id number]]    phenazopyridine (PYRIDIUM) 200 MG Tab    nitrofurantoin monohyd macro (MACROBID) 100 MG Cap       - rest/hydrate       Dx & d/c instructions discussed w/ patient and/or family members.     Follow up with PCP (or here if PCP unavailable) in 2-3 days if symptoms not improving, ER if feeling/getting worse.    Any realistic and/or common medication side effects that may have been given today(i.e. Rash, GI upset/constipation, sedation, elevation of BP or blood sugars) reviewed.     Patient left in stable condition

## 2019-09-26 ENCOUNTER — IMMUNIZATION (OUTPATIENT)
Dept: OCCUPATIONAL MEDICINE | Facility: CLINIC | Age: 24
End: 2019-09-26

## 2019-09-26 DIAGNOSIS — Z23 NEED FOR VACCINATION: ICD-10-CM

## 2019-09-26 PROCEDURE — 90686 IIV4 VACC NO PRSV 0.5 ML IM: CPT | Performed by: PREVENTIVE MEDICINE

## 2019-10-18 ENCOUNTER — EH NON-PROVIDER (OUTPATIENT)
Dept: OCCUPATIONAL MEDICINE | Facility: CLINIC | Age: 24
End: 2019-10-18

## 2019-10-18 DIAGNOSIS — Z02.89 ENCOUNTER FOR OCCUPATIONAL HEALTH EXAMINATION INVOLVING RESPIRATOR: Primary | ICD-10-CM

## 2019-10-18 PROCEDURE — 94375 RESPIRATORY FLOW VOLUME LOOP: CPT | Performed by: NURSE PRACTITIONER

## 2019-10-24 ENCOUNTER — OFFICE VISIT (OUTPATIENT)
Dept: URGENT CARE | Facility: CLINIC | Age: 24
End: 2019-10-24
Payer: COMMERCIAL

## 2019-10-24 VITALS
HEART RATE: 84 BPM | TEMPERATURE: 97.8 F | RESPIRATION RATE: 16 BRPM | DIASTOLIC BLOOD PRESSURE: 68 MMHG | WEIGHT: 260 LBS | SYSTOLIC BLOOD PRESSURE: 120 MMHG | BODY MASS INDEX: 40.81 KG/M2 | OXYGEN SATURATION: 98 % | HEIGHT: 67 IN

## 2019-10-24 DIAGNOSIS — L30.9 DERMATITIS: ICD-10-CM

## 2019-10-24 DIAGNOSIS — Z87.2 HISTORY OF ECZEMA: ICD-10-CM

## 2019-10-24 PROCEDURE — 99214 OFFICE O/P EST MOD 30 MIN: CPT | Performed by: NURSE PRACTITIONER

## 2019-10-24 RX ORDER — TRIAMCINOLONE ACETONIDE 5 MG/G
1 OINTMENT TOPICAL 2 TIMES DAILY
Qty: 1 TUBE | Refills: 1 | Status: SHIPPED | OUTPATIENT
Start: 2019-10-24 | End: 2019-12-03

## 2019-10-24 ASSESSMENT — ENCOUNTER SYMPTOMS
PALPITATIONS: 0
FEVER: 0
NAIL CHANGES: 0
CHILLS: 0
WHEEZING: 0
SHORTNESS OF BREATH: 0

## 2019-10-25 NOTE — PROGRESS NOTES
"Subjective:   Diana Hill is a 24 y.o. female who presents for Rash (skin problem, itchy, blisters on the right middle finger started started over a month ago)        Rash   This is a new problem. The current episode started more than 1 month ago. The problem is unchanged. The affected locations include the left hand and right hand. The rash is characterized by dryness, redness and itchiness. Associated with: States with hx of gluten allergy and will make a rash appear on her hands. Hx of eczema. Has been using hand  at work and makes it worse. Pertinent negatives include no fever, nail changes or shortness of breath. Past treatments include anti-itch cream and topical steroids (Hydrocortisone and lotions). The treatment provided mild relief. Her past medical history is significant for eczema.        Review of Systems   Constitutional: Negative for chills and fever.   Respiratory: Negative for shortness of breath and wheezing.    Cardiovascular: Negative for chest pain and palpitations.   Skin: Positive for itching and rash. Negative for nail changes.     Patient's PMH, SocHx, SurgHx, FamHx, Drug allergies and medications reviewed.     Objective:   /68   Pulse 84   Temp 36.6 °C (97.8 °F)   Resp 16   Ht 1.702 m (5' 7\")   Wt 117.9 kg (260 lb)   SpO2 98%   BMI 40.72 kg/m²   Physical Exam   Constitutional: She appears well-developed and well-nourished. No distress.   HENT:   Head: Normocephalic.   Right Ear: Hearing normal.   Left Ear: Hearing normal.   Nose: Nose normal.   Eyes: Pupils are equal, round, and reactive to light. Conjunctivae, EOM and lids are normal.   Neck: Normal range of motion.   Cardiovascular: Normal rate, regular rhythm and normal heart sounds.   Pulmonary/Chest: Effort normal and breath sounds normal. No respiratory distress. She has no decreased breath sounds. She has no wheezes.   Neurological: She is alert.   Skin: Skin is warm. She is not diaphoretic. There is " erythema.   Bilateral hands - right middle finger worsened - with flaking, dryness and peeling. Mild erythema. No vesicles or pustules noted.   Psychiatric: She has a normal mood and affect. Her speech is normal and behavior is normal. Judgment and thought content normal.   Vitals reviewed.        Assessment/Plan:   Assessment    1. Dermatitis  - triamcinolone (ARISTOCORT) 0.5 % ointment; Apply 1 Application to affected area(s) 2 Times a Day.  Dispense: 1 Tube; Refill: 1    2. History of eczema  - triamcinolone (ARISTOCORT) 0.5 % ointment; Apply 1 Application to affected area(s) 2 Times a Day.  Dispense: 1 Tube; Refill: 1    May apply OTC lotions to the area PRN.    Differential diagnosis, natural history, supportive care, and indications for immediate follow-up discussed.     **Please note that all invasive procedures during this visit were performed by myself and/or the Medical Assistant under the supervision of the PA or MD in office**

## 2019-11-04 ENCOUNTER — HOSPITAL ENCOUNTER (OUTPATIENT)
Dept: LAB | Facility: MEDICAL CENTER | Age: 24
End: 2019-11-04
Attending: PEDIATRICS
Payer: COMMERCIAL

## 2019-11-04 LAB
25(OH)D3 SERPL-MCNC: 15 NG/ML (ref 30–100)
BASOPHILS # BLD AUTO: 0.5 % (ref 0–1.8)
BASOPHILS # BLD: 0.03 K/UL (ref 0–0.12)
EOSINOPHIL # BLD AUTO: 0.37 K/UL (ref 0–0.51)
EOSINOPHIL NFR BLD: 6 % (ref 0–6.9)
ERYTHROCYTE [DISTWIDTH] IN BLOOD BY AUTOMATED COUNT: 46.3 FL (ref 35.9–50)
HCT VFR BLD AUTO: 40 % (ref 37–47)
HGB BLD-MCNC: 12.5 G/DL (ref 12–16)
IMM GRANULOCYTES # BLD AUTO: 0.01 K/UL (ref 0–0.11)
IMM GRANULOCYTES NFR BLD AUTO: 0.2 % (ref 0–0.9)
LYMPHOCYTES # BLD AUTO: 2.29 K/UL (ref 1–4.8)
LYMPHOCYTES NFR BLD: 37.1 % (ref 22–41)
MCH RBC QN AUTO: 27.3 PG (ref 27–33)
MCHC RBC AUTO-ENTMCNC: 31.3 G/DL (ref 33.6–35)
MCV RBC AUTO: 87.3 FL (ref 81.4–97.8)
MONOCYTES # BLD AUTO: 0.53 K/UL (ref 0–0.85)
MONOCYTES NFR BLD AUTO: 8.6 % (ref 0–13.4)
NEUTROPHILS # BLD AUTO: 2.94 K/UL (ref 2–7.15)
NEUTROPHILS NFR BLD: 47.6 % (ref 44–72)
NRBC # BLD AUTO: 0 K/UL
NRBC BLD-RTO: 0 /100 WBC
PLATELET # BLD AUTO: 286 K/UL (ref 164–446)
PMV BLD AUTO: 10.9 FL (ref 9–12.9)
RBC # BLD AUTO: 4.58 M/UL (ref 4.2–5.4)
T4 FREE SERPL-MCNC: 0.67 NG/DL (ref 0.53–1.43)
TSH SERPL DL<=0.005 MIU/L-ACNC: 1.65 UIU/ML (ref 0.38–5.33)
WBC # BLD AUTO: 6.2 K/UL (ref 4.8–10.8)

## 2019-11-04 PROCEDURE — 85025 COMPLETE CBC W/AUTO DIFF WBC: CPT

## 2019-11-04 PROCEDURE — 80061 LIPID PANEL: CPT

## 2019-11-04 PROCEDURE — 83525 ASSAY OF INSULIN: CPT

## 2019-11-04 PROCEDURE — 80053 COMPREHEN METABOLIC PANEL: CPT

## 2019-11-04 PROCEDURE — 82306 VITAMIN D 25 HYDROXY: CPT

## 2019-11-04 PROCEDURE — 36415 COLL VENOUS BLD VENIPUNCTURE: CPT

## 2019-11-04 PROCEDURE — 84439 ASSAY OF FREE THYROXINE: CPT

## 2019-11-04 PROCEDURE — 83036 HEMOGLOBIN GLYCOSYLATED A1C: CPT

## 2019-11-04 PROCEDURE — 84443 ASSAY THYROID STIM HORMONE: CPT

## 2019-11-05 LAB
ALBUMIN SERPL BCP-MCNC: 3.6 G/DL (ref 3.2–4.9)
ALBUMIN/GLOB SERPL: 1.2 G/DL
ALP SERPL-CCNC: 65 U/L (ref 30–99)
ALT SERPL-CCNC: 20 U/L (ref 2–50)
ANION GAP SERPL CALC-SCNC: 10 MMOL/L (ref 0–11.9)
AST SERPL-CCNC: 20 U/L (ref 12–45)
BILIRUB SERPL-MCNC: 0.3 MG/DL (ref 0.1–1.5)
BUN SERPL-MCNC: 12 MG/DL (ref 8–22)
CALCIUM SERPL-MCNC: 9 MG/DL (ref 8.5–10.5)
CHLORIDE SERPL-SCNC: 107 MMOL/L (ref 96–112)
CHOLEST SERPL-MCNC: 185 MG/DL (ref 100–199)
CO2 SERPL-SCNC: 23 MMOL/L (ref 20–33)
CREAT SERPL-MCNC: 0.69 MG/DL (ref 0.5–1.4)
EST. AVERAGE GLUCOSE BLD GHB EST-MCNC: 117 MG/DL
FASTING STATUS PATIENT QL REPORTED: NORMAL
GLOBULIN SER CALC-MCNC: 3.1 G/DL (ref 1.9–3.5)
GLUCOSE SERPL-MCNC: 85 MG/DL (ref 65–99)
HBA1C MFR BLD: 5.7 % (ref 0–5.6)
HDLC SERPL-MCNC: 67 MG/DL
LDLC SERPL CALC-MCNC: 86 MG/DL
POTASSIUM SERPL-SCNC: 4.2 MMOL/L (ref 3.6–5.5)
PROT SERPL-MCNC: 6.7 G/DL (ref 6–8.2)
SODIUM SERPL-SCNC: 140 MMOL/L (ref 135–145)
TRIGL SERPL-MCNC: 161 MG/DL (ref 0–149)

## 2019-11-06 LAB
FASTING STATUS PATIENT QL REPORTED: NORMAL
INSULIN P FAST SERPL-ACNC: 20 UIU/ML (ref 3–19)

## 2019-11-18 ENCOUNTER — TELEPHONE (OUTPATIENT)
Dept: SCHEDULING | Facility: IMAGING CENTER | Age: 24
End: 2019-11-18

## 2019-11-19 ENCOUNTER — OFFICE VISIT (OUTPATIENT)
Dept: MEDICAL GROUP | Facility: PHYSICIAN GROUP | Age: 24
End: 2019-11-19
Payer: COMMERCIAL

## 2019-11-19 VITALS
RESPIRATION RATE: 16 BRPM | BODY MASS INDEX: 40.34 KG/M2 | HEART RATE: 80 BPM | TEMPERATURE: 98.1 F | DIASTOLIC BLOOD PRESSURE: 76 MMHG | SYSTOLIC BLOOD PRESSURE: 124 MMHG | WEIGHT: 257 LBS | HEIGHT: 67 IN | OXYGEN SATURATION: 98 %

## 2019-11-19 DIAGNOSIS — E78.1 HIGH TRIGLYCERIDES: ICD-10-CM

## 2019-11-19 DIAGNOSIS — Z23 NEED FOR VACCINATION: ICD-10-CM

## 2019-11-19 DIAGNOSIS — E28.2 PCOS (POLYCYSTIC OVARIAN SYNDROME): ICD-10-CM

## 2019-11-19 DIAGNOSIS — E55.9 VITAMIN D DEFICIENCY: ICD-10-CM

## 2019-11-19 PROBLEM — F41.9 ANXIETY AND DEPRESSION: Status: ACTIVE | Noted: 2019-11-19

## 2019-11-19 PROBLEM — F32.A ANXIETY AND DEPRESSION: Status: ACTIVE | Noted: 2019-11-19

## 2019-11-19 PROBLEM — K80.50 BILIARY COLIC: Status: RESOLVED | Noted: 2018-09-13 | Resolved: 2019-11-19

## 2019-11-19 PROCEDURE — 90471 IMMUNIZATION ADMIN: CPT | Performed by: NURSE PRACTITIONER

## 2019-11-19 PROCEDURE — 90621 MENB-FHBP VACC 2/3 DOSE IM: CPT | Performed by: NURSE PRACTITIONER

## 2019-11-19 PROCEDURE — 99214 OFFICE O/P EST MOD 30 MIN: CPT | Mod: 25 | Performed by: NURSE PRACTITIONER

## 2019-11-19 SDOH — HEALTH STABILITY: MENTAL HEALTH
STRESS IS WHEN SOMEONE FEELS TENSE, NERVOUS, ANXIOUS, OR CAN'T SLEEP AT NIGHT BECAUSE THEIR MIND IS TROUBLED. HOW STRESSED ARE YOU?: TO SOME EXTENT

## 2019-11-19 ASSESSMENT — ENCOUNTER SYMPTOMS
SHORTNESS OF BREATH: 0
CHILLS: 0
FEVER: 0
BLOOD IN STOOL: 0
ABDOMINAL PAIN: 0

## 2019-11-19 ASSESSMENT — PATIENT HEALTH QUESTIONNAIRE - PHQ9
CLINICAL INTERPRETATION OF PHQ2 SCORE: 3
SUM OF ALL RESPONSES TO PHQ QUESTIONS 1-9: 12
5. POOR APPETITE OR OVEREATING: 2 - MORE THAN HALF THE DAYS

## 2019-11-19 NOTE — PROGRESS NOTES
Diana Hill is a 24 y.o. female here to establish care. Her previous PCP was [facility]. She presents with the following concerns:    HPI:      PCOS (polycystic ovarian syndrome)  This is a chronic issue.  She is managed by [facility] Dr. Markell Rubio.    High triglycerides  Labs drawn on 11/4/2019 showed triglyceride level of 161.  All remaining lipid panel levels were within normal limits.  Patient does admit to regularly consuming desserts and fried foods.  She does not engage in regular physical activity.    Vitamin D deficiency  Lab results drawn on 11/4/2019 show vitamin D level of 15.  She is not currently taking supplementation    Current medicines (including changes today)  Current Outpatient Medications   Medication Sig Dispense Refill   • tetanus-dipth-acell pertussis (ADACEL) 5-2-15.5 LF-MCG/0.5 Suspension 0.5 mL by Intramuscular route Once PRN for up to 1 dose. 0.5 mL 0   • citalopram (CELEXA) 20 MG Tab Take 30 mg by mouth every day.     • triamcinolone (ARISTOCORT) 0.5 % ointment Apply 1 Application to affected area(s) 2 Times a Day. (Patient not taking: Reported on 11/19/2019) 1 Tube 1   • Norethindrone-Eth Estradiol (ZENCHENT PO) Take  by mouth.       No current facility-administered medications for this visit.      She  has a past medical history of Anemia, Anxiety, Celiac disease, Depression, Insulin resistance, Migraine, and PCOS (polycystic ovarian syndrome).  She  has a past surgical history that includes nadeen by laparoscopy (N/A, 9/13/2018).  Social History     Tobacco Use   • Smoking status: Never Smoker   • Smokeless tobacco: Never Used   Substance Use Topics   • Alcohol use: Yes     Comment: occasionally   • Drug use: No     Social History     Patient does not qualify to have social determinant information on file (likely too young).   Social History Narrative   • Not on file     Family History   Problem Relation Age of Onset   • Thyroid Mother    •  "Depression Father    • Alcohol abuse Father    • No Known Problems Maternal Grandmother    • Heart Attack Maternal Grandfather 66   • Cancer Paternal Grandmother         Breast , esophageal   • Alcohol abuse Paternal Grandfather    • Lung Disease Paternal Grandfather    • Sleep Apnea Brother      No family status information on file.       Review of Systems   Constitutional: Negative for chills, fever and malaise/fatigue.   Respiratory: Negative for shortness of breath.    Cardiovascular: Negative for chest pain.   Gastrointestinal: Negative for abdominal pain and blood in stool.       All other systems reviewed and are negative       Objective:     /76   Pulse 80   Temp 36.7 °C (98.1 °F) (Temporal)   Resp 16   Ht 1.702 m (5' 7\")   Wt 116.6 kg (257 lb)   SpO2 98%  Body mass index is 40.25 kg/m².    Physical Exam:  Constitutional: Oriented to person, place, and time and well-developed, well-nourished, and in no distress.   HENT:   Head: Normocephalic and atraumatic.   Right Ear: Tympanic membrane and external ear normal.   Left Ear: Tympanic membrane and external ear normal.   Mouth/Throat: Oropharynx is clear and moist and mucous membranes are normal. No oropharyngeal exudate or posterior oropharyngeal erythema.   Eyes: Conjunctivae and EOM are normal. Pupils are equal, round, and reactive to light.   Neck: Normal range of motion. Neck supple. No thyromegaly present.   Cardiovascular: Normal rate, regular rhythm, normal heart sounds. Radial pulses intact. Exam reveals no friction rub. No murmur heard.  Pulmonary/Chest: Effort normal and breath sounds normal. No respiratory distress or use of accessory muscles. No wheezes, rhonchi, or rales.   Abdominal: Soft. Bowel sounds are normal. Exhibits no distension and no mass. There is no tenderness. No hepatosplenomegaly.    Musculoskeletal: Full range of motion. No deformity or swelling of joints. DTRs intact.   Neurological: Alert and oriented to person, " place, and time. Gait normal.   Skin: Skin is warm and dry. No cyanosis. No edema.  Psychiatric: Mood, memory, affect and judgment normal.     Assessment and Plan:   The following treatment plan was discussed    1. Vitamin D deficiency  I recommend OTC Vit D3 supplement of 2000 units daily. Advised to take this supplement with healthy fat to aid in absorption.    2. High triglycerides  Encouraged low in saturated fats and high in fiber, as well as regular physical activity. I also recommend OTC Omega 3 fatty acid 1000-2000mg daily.    3. PCOS (polycystic ovarian syndrome)  She is managed by OB/GYN specialist.    4. Need for vaccination  I have placed the below orders and discussed them with an approved delegating provider. The MA is performing the below orders under the direction of Dr. Cancino.  - tetanus-dipth-acell pertussis (ADACEL) 5-2-15.5 LF-MCG/0.5 Suspension; 0.5 mL by Intramuscular route Once PRN for up to 1 dose.  Dispense: 0.5 mL; Refill: 0  - Meningococcal (IM) Group B    Records requested.    Followup: Return in about 2 weeks (around 12/3/2019), or if symptoms worsen or fail to improve, for For follow-up on headaches.

## 2019-11-19 NOTE — ASSESSMENT & PLAN NOTE
Labs drawn on 11/4/2019 showed triglyceride level of 161.  All remaining lipid panel levels were within normal limits.  Patient does admit to regularly consuming desserts and fried foods.  She does not engage in regular physical activity.

## 2019-11-19 NOTE — ASSESSMENT & PLAN NOTE
Lab results drawn on 11/4/2019 show vitamin D level of 15.  She is not currently taking supplementation

## 2019-11-19 NOTE — ASSESSMENT & PLAN NOTE
This is a chronic issue.  She is managed by [facility] Dr. Markell Rubio.

## 2019-12-03 ENCOUNTER — OCCUPATIONAL MEDICINE (OUTPATIENT)
Dept: OCCUPATIONAL MEDICINE | Facility: CLINIC | Age: 24
End: 2019-12-03
Payer: COMMERCIAL

## 2019-12-03 ENCOUNTER — OFFICE VISIT (OUTPATIENT)
Dept: MEDICAL GROUP | Facility: PHYSICIAN GROUP | Age: 24
End: 2019-12-03
Payer: COMMERCIAL

## 2019-12-03 VITALS
SYSTOLIC BLOOD PRESSURE: 110 MMHG | HEART RATE: 84 BPM | OXYGEN SATURATION: 98 % | BODY MASS INDEX: 41.91 KG/M2 | WEIGHT: 267 LBS | HEIGHT: 67 IN | DIASTOLIC BLOOD PRESSURE: 84 MMHG | TEMPERATURE: 98.7 F

## 2019-12-03 VITALS
SYSTOLIC BLOOD PRESSURE: 110 MMHG | TEMPERATURE: 98.4 F | WEIGHT: 257 LBS | OXYGEN SATURATION: 97 % | HEART RATE: 102 BPM | HEIGHT: 67 IN | RESPIRATION RATE: 19 BRPM | DIASTOLIC BLOOD PRESSURE: 82 MMHG | BODY MASS INDEX: 40.34 KG/M2

## 2019-12-03 DIAGNOSIS — Z02.1 PRE-EMPLOYMENT DRUG SCREENING: ICD-10-CM

## 2019-12-03 DIAGNOSIS — G43.109 MIGRAINE WITH AURA AND WITHOUT STATUS MIGRAINOSUS, NOT INTRACTABLE: ICD-10-CM

## 2019-12-03 DIAGNOSIS — L24.89 IRRITANT CONTACT DERMATITIS DUE TO OTHER AGENTS: Primary | ICD-10-CM

## 2019-12-03 LAB
AMP AMPHETAMINE: NORMAL
BAR BARBITURATES: NORMAL
BREATH ALCOHOL COMMENT: NORMAL
BZO BENZODIAZEPINES: NORMAL
COC COCAINE: NORMAL
INT CON NEG: NORMAL
INT CON POS: NORMAL
MDMA ECSTASY: NORMAL
MET METHAMPHETAMINES: NORMAL
MTD METHADONE: NORMAL
OPI OPIATES: NORMAL
OXY OXYCODONE: NORMAL
PCP PHENCYCLIDINE: NORMAL
POC BREATHALIZER: 0 PERCENT (ref 0–0.01)
POC URINE DRUG SCREEN OCDRS: NEGATIVE
THC: NORMAL

## 2019-12-03 PROCEDURE — 82075 ASSAY OF BREATH ETHANOL: CPT | Performed by: PREVENTIVE MEDICINE

## 2019-12-03 PROCEDURE — 80305 DRUG TEST PRSMV DIR OPT OBS: CPT | Performed by: PREVENTIVE MEDICINE

## 2019-12-03 PROCEDURE — 99214 OFFICE O/P EST MOD 30 MIN: CPT | Performed by: NURSE PRACTITIONER

## 2019-12-03 PROCEDURE — 99204 OFFICE O/P NEW MOD 45 MIN: CPT | Performed by: PREVENTIVE MEDICINE

## 2019-12-03 RX ORDER — SUMATRIPTAN 25 MG/1
25-100 TABLET, FILM COATED ORAL
Qty: 10 TAB | Refills: 1 | Status: SHIPPED | OUTPATIENT
Start: 2019-12-03 | End: 2020-03-03 | Stop reason: SDUPTHER

## 2019-12-03 RX ORDER — TRIAMCINOLONE ACETONIDE 1 MG/G
1 OINTMENT TOPICAL 3 TIMES DAILY PRN
Qty: 80 G | Refills: 0 | Status: SHIPPED | OUTPATIENT
Start: 2019-12-03 | End: 2020-03-03 | Stop reason: SDUPTHER

## 2019-12-03 ASSESSMENT — ENCOUNTER SYMPTOMS
VOMITING: 1
SHORTNESS OF BREATH: 0
PHOTOPHOBIA: 1
BLURRED VISION: 0
NAUSEA: 1
MIGRAINE HEADACHES: 1
HEADACHES: 1

## 2019-12-03 NOTE — PROGRESS NOTES
"Subjective:      Diana Hill is a 24 y.o. female who presents with Other (WC DOI 10/10/19 rt hand allergy, room 18)      DOI 10/10/2019: 25 yo female presents with bilateral rash on the hands.  She states that around October she developed a rash on the hands.  She noted worsening symptoms after using hand .  She does have a history of gluten sensitivity with associated rash as well as eczema.  She was seen in urgent care about a month ago and received triamcinolone ointment for treatment.  Patient states that overall rash is been improving over last few weeks and just has a little bit of a rash left.  Overall happy with improvement.  She states she has been using the triamcinolone ointment at night.  Denies any numbness or tingling.  Denies open wounds.     HPI    ROS  ROS: All systems were reviewed on intake form, form was reviewed and signed. See scanned documents in media. Pertinent positives and negatives included in HPI.    PMH: History of eczema on the hands  MEDS: Medications were reviewed in Epic  ALLERGIES:   Allergies   Allergen Reactions   • Gluten Meal      SOCHX: Works as a CNA apprentice at [employer]   FH: No pertinent family history to this problem     Objective:     /84   Pulse 84   Temp 37.1 °C (98.7 °F)   Ht 1.702 m (5' 7\")   Wt 121.1 kg (267 lb)   SpO2 98%   BMI 41.82 kg/m²      Physical Exam  Constitutional:       Appearance: Normal appearance.   HENT:      Head: Normocephalic and atraumatic.      Nose: Nose normal.   Eyes:      Extraocular Movements: Extraocular movements intact.      Conjunctiva/sclera: Conjunctivae normal.   Cardiovascular:      Rate and Rhythm: Normal rate.   Pulmonary:      Effort: Pulmonary effort is normal.   Skin:     General: Skin is warm and dry.   Neurological:      General: No focal deficit present.      Mental Status: She is alert and oriented to person, place, and time.   Psychiatric:         Mood and Affect: Mood normal.         Thought " Content: Thought content normal.         Judgment: Judgment normal.         Hands: Small amount of eczematous rash the dorsum of the third MCP bilaterally as well as on the web spacing's of the third and fourth digit on the left.  No open wounds, swelling or drainage.  No heat to the lesions.       Assessment/Plan:       1. Irritant contact dermatitis due to other agents  - triamcinolone acetonide (KENALOG) 0.1 % Ointment; Apply 1 Application to affected area(s) 3 times a day as needed.  Dispense: 80 g; Refill: 0    Prescribed triamcinolone 0.1% ointment, was given 0.5% triamcinolone.  May use the 0.5% ointment at night as needed for flareups in the 0.1% ointment 2-3 times per day as needed for flareups  Recommend Aquaphor or other thick lotion for eczema after most handwashing's  Released from care  Full duty  Follow-up as needed

## 2019-12-03 NOTE — LETTER
"EMPLOYEE’S CLAIM FOR COMPENSATION/ REPORT OF INITIAL TREATMENT  FORM C-4    EMPLOYEE’S CLAIM - PROVIDE ALL INFORMATION REQUESTED   First Name  Diana Last Name  Sergio Birthdate                    1995                Sex  female Claim Number   Home Address  [address] Age  24 y.o. Height  1.702 m (5' 7\") Weight  121.1 kg (267 lb) [facility]     [facility] Zip  [address] Telephone  [phone] (home)    Mailing Address  [address] 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 (if applicable)  CNA Apprentice    How did this injury or occupational disease occur? (Be specific an answer in detail. Use additional sheet if necessary)  Hand  causing blistering, skin splitting, irritation went to urgent care in Oct.   If you believe that you have an occupational disease, when did you first have knowledge of the disability and it relationship to your employment?  n/a Witnesses to the Accident  n/a      Nature of Injury or Occupational Disease  Dermatitis  Part(s) of Body Injured or Affected  Hand (L), Hand (R), Finger (L)    I certify that the above is true and correct to the best of my knowledge and that I have provided this information in order to obtain the benefits of [place] " [place]  I hereby authorize any physician, chiropractor, surgeon, practitioner, or other person, any hospital, including [facility] or [facility] hospital, any medical service organization, any insurance company, or other institution or organization to release to each other, any medical or other information, including benefits paid or payable, pertinent to this injury or disease, except information relative to diagnosis, treatment and/or counseling for AIDS, psychological conditions, alcohol or controlled substances, for which I must give specific authorization.  A Photostat of this authorization shall be as valid as the original.     Date   Place   Employee’s Signature   THIS REPORT MUST BE COMPLETED AND MAILED WITHIN 3 WORKING DAYS OF TREATMENT   Place  [facility]  Name of [facility]   Date  12/3/2019 Diagnosis  (L24.89) Irritant contact dermatitis due to other agents  (primary encounter diagnosis)  (Z02.1) Pre-employment drug screening Is there evidence the injured employee was under the influence of alcohol and/or another controlled substance at the time of accident?   Hour  2:50 PM Description of Injury or Disease  The primary encounter diagnosis was Irritant contact dermatitis due to other agents. A diagnosis of Pre-employment drug screening was also pertinent to this visit. No   Treatment  Triamcinolone ointment  Have you advised the patient to remain off work five days or more? No   X-Ray Findings      If Yes   From Date  To Date      From information given by the employee, together with medical evidence, can you directly connect this injury or occupational disease as job incurred?  No If No Full Duty Yes Modified Duty      Is additional medical care by a physician indicated?  No If Modified Duty, Specify any Limitations / Restrictions      Do you know of any previous injury or  "disease contributing to this condition or occupational disease?                            Yes  Comments:Pre-existing eczema and gluten sensitivity   Date  12/4/2019 Print Doctor’s Name Julian Adler D.O. I certify the employer’s copy of  this form was mailed on:   Address  [address] [place] [address],   [address] Insurer’s Use Only     [facility] Zip  [address]    Provider’s Tax ID Number  [id number] Telephone  Dept: [phone]        e-SignTAYLOR, JULIAN PEREZ D.O.   e-Signature: Dr. Emil Arias,   Medical Director Degree  MD        ORIGINAL-TREATING PHYSICIAN OR CHIROPRACTOR    PAGE 2-INSURER/TPA    PAGE 3-EMPLOYER    PAGE 4-EMPLOYEE             Form C-4 (rev.10/07)              BRIEF DESCRIPTION OF RIGHTS AND BENEFITS  (Pursuant to [place]RS 616C.050)    Notice of Injury or Occupational Disease (Incident Report Form C-1): If an injury or occupational disease (OD) arises out of and in the course of employment, you must provide written notice to your employer as soon as practicable, but no later than 7 days after the accident or OD. Your employer shall maintain a sufficient supply of the required forms.    Claim for Compensation (Form C-4): If medical treatment is sought, the form C-4 is available at the place of initial treatment. A completed \"Claim for Compensation\" (Form C-4) must be filed within 90 days after an accident or OD. The treating physician or chiropractor must, within 3 working days after treatment, complete and mail to the employer, the employer's insurer and third-party , the Claim for Compensation.    Medical Treatment: If you require medical treatment for your on-the-job injury or OD, you may be required to select a physician or chiropractor from a list provided by your workers’ compensation insurer, if it has contracted with an Organization for Managed Care (MCO) or Preferred Provider Organization (PPO) or providers of health care. If your employer has not entered into a contract with an MCO " or PPO, you may select a physician or chiropractor from the Panel of Physicians and Chiropractors. Any medical costs related to your industrial injury or OD will be paid by your insurer.    Temporary Total Disability (TTD): If your doctor has certified that you are unable to work for a period of at least 5 consecutive days, or 5 cumulative days in a 20-day period, or places restrictions on you that your employer does not accommodate, you may be entitled to TTD compensation.    Temporary Partial Disability (TPD): If the wage you receive upon reemployment is less than the compensation for TTD to which you are entitled, the insurer may be required to pay you TPD compensation to make up the difference. TPD can only be paid for a maximum of 24 months.    Permanent Partial Disability (PPD): When your medical condition is stable and there is an indication of a PPD as a result of your injury or OD, within 30 days, your insurer must arrange for an evaluation by a rating physician or chiropractor to determine the degree of your PPD. The amount of your PPD award depends on the date of injury, the results of the PPD evaluation and your age and wage.    Permanent Total Disability (PTD): If you are medically certified by a treating physician or chiropractor as permanently and totally disabled and have been granted a PTD status by your insurer, you are entitled to receive monthly benefits not to exceed 66 2/3% of your average monthly wage. The amount of your PTD payments is subject to reduction if you previously received a PPD award.    Vocational Rehabilitation Services: You may be eligible for vocational rehabilitation services if you are unable to return to the job due to a permanent physical impairment or permanent restrictions as a result of your injury or occupational disease.    Transportation and Per Nargis Reimbursement: You may be eligible for travel expenses and per nargis associated with medical treatment.    Reopening:  You may be able to reopen your claim if your condition worsens after claim closure.    Appeal Process: If you disagree with a written determination issued by the insurer or the insurer does not respond to your request, you may appeal to the Department of Administration, , by following the instructions contained in your determination letter. You must appeal the determination within 70 days from the date of the determination letter at [address], [address], [place], [place] [address], or [address], [address], [place], [place] [address]. If you disagree with the  decision, you may appeal to the Department of Administration, . You must file your appeal within 30 days from the date of the  decision letter at [address], [address], [place], [place] [address], or [address], [address], [place], [place] [address]. If you disagree with a decision of an , you may file a petition for judicial review with the District Court. You must do so within 30 days of the Appeal Officer’s decision. You may be represented by an  at your own expense or you may contact the [facility] for possible representation.    [place]  for Injured Workers (NAIW): If you disagree with a  decision, you may request that NAIW represent you without charge at an  Hearing. For information regarding denial of benefits, you may contact the [facility] at: [address], [address], [place] [address], [phone], or [address], [address], [place] [address], [phone]    To File a Complaint with the Division: If you wish to file a complaint with the  of the Division of Industrial Relations (DIR),  please contact the Workers’ Compensation Section, [address], [address], [place], [place] [address], telephone [phone]) [phone], or [address] [address], [facility]  [place], [place] [address], telephone [phone].    For assistance with Workers’ Compensation Issues: You may contact the Office of the Governor Consumer Health Assistance, [address] [place] [address], [address], [place], [place] [address], Toll Free 1-340.322.2089, Web site: [email or web address]., E-mail [email or web address]                   __________________________________________________________________                                                     _________        Employee Name / Signature                                                                                                                                              Date                                                                                                                                                                                                     D-2 (rev. 06/18)

## 2019-12-03 NOTE — LETTER
[address],   [address] [place] [address]  Phone:  [phone] - Fax:  [phone]   Occupational Health [facility] Progress Report and Disability Certification  Date of Service: 12/3/2019   No Show:  No  Date / Time of Next Visit:  Release from care   Claim Information   Patient Name: Diana Hill  Claim Number:     Employer: [employer]  Date of Injury: 10/10/2019     Insurer / TPA: Workers Choice  ID / SSN:     Occupation: CNA apprentice  Diagnosis: The encounter diagnosis was Irritant contact dermatitis due to other agents.    Medical Information   Related to Industrial Injury? No    Subjective Complaints:  DOI 10/10/2019: 25 yo female presents with bilateral rash on the hands.  She states that around October she developed a rash on the hands.  She noted worsening symptoms after using hand .  She does have a history of gluten sensitivity with associated rash as well as eczema.  She was seen in urgent care about a month ago and received triamcinolone ointment for treatment.  Patient states that overall rash is been improving over last few weeks and just has a little bit of a rash left.  Overall happy with improvement.  She states she has been using the triamcinolone ointment at night.  Denies any numbness or tingling.  Denies open wounds.   Objective Findings: Hands: Small amount of eczematous rash the dorsum of the third MCP bilaterally as well as on the web spacing's of the third and fourth digit on the left.  No open wounds, swelling or drainage.  No heat to the lesions.   Pre-Existing Condition(s):     Assessment:   Initial Visit    Status: Discharged /  MMI  Permanent Disability:No    Plan:      Diagnostics:      Comments:  Prescribed triamcinolone 0.1% ointment, was given 0.5% triamcinolone.  May use the 0.5% ointment at night as needed for flareups in the 0.1% ointment 2-3 times per day as needed for flareups  Recommend Aquaphor or  other thick lotion for eczema after   most handwashing's  Released from care  Full duty  Follow-up as needed    Disability Information   Status: Released to Full Duty    From:  12/3/2019  Through:   Restrictions are:     Physical Restrictions   Sitting:    Standing:    Stooping:    Bending:      Squatting:    Walking:    Climbing:    Pushing:      Pulling:    Other:    Reaching Above Shoulder (L):   Reaching Above Shoulder (R):       Reaching Below Shoulder (L):    Reaching Below Shoulder (R):      Not to exceed Weight Limits   Carrying(hrs):   Weight Limit(lb):   Lifting(hrs):   Weight  Limit(lb):     Comments:      Repetitive Actions   Hands: i.e. Fine Manipulations from Grasping:     Feet: i.e. Operating Foot Controls:     Driving / Operate Machinery:     Physician Name: Julian Adler D.O. Physician Signature: JULIAN Ashton D.O. e-Signature: Dr. Emil Arias, Medical Director   Clinic Name / Location: [address],   [address] [place] [address] Clinic Phone Number: Dept: [phone]   Appointment Time: 2:45 Pm Visit Start Time: 2:50 PM   Check-In Time:  2:40 Pm Visit Discharge Time: 3:32 pm    Original-Treating Physician or Chiropractor    Page 2-Insurer/TPA    Page 3-Employer    Page 4-Employee

## 2019-12-03 NOTE — PROGRESS NOTES
Subjective:   Diana Hill is a 24 y.o. female here today for concerns of migraines.     Headache    This is a chronic problem. The current episode started more than 1 year ago. The problem occurs intermittently. The problem has been gradually worsening (Symptoms are occuring more frequently; 1-2 episodes per week which last 24 hours). The pain is located in the retro-orbital, left unilateral and parietal region. The pain radiates to the right neck and left neck. The pain quality is similar to prior headaches. The quality of the pain is described as sharp and throbbing. The pain is at a severity of 8/10. Associated symptoms include nausea, phonophobia, photophobia and vomiting. Pertinent negatives include no blurred vision. Exacerbated by: eating gluten foods or missing dose of antidepressant, weather. She has tried acetaminophen and NSAIDs for the symptoms. The treatment provided mild relief. Her past medical history is significant for migraine headaches.       Current medicines (including changes today)  Current Outpatient Medications   Medication Sig Dispense Refill   • SUMAtriptan (IMITREX) 25 MG Tab tablet Take 1-4 Tabs by mouth Once PRN for Migraine (May repeat dose after 2 hours, max daily dose 200mg) for up to 1 dose. 10 Tab 1   • citalopram (CELEXA) 20 MG Tab Take 30 mg by mouth every day.       No current facility-administered medications for this visit.      She  has a past medical history of Anemia, Anxiety, Celiac disease, Depression, Insulin resistance, Migraine, and PCOS (polycystic ovarian syndrome).    Social History     Socioeconomic History   • Marital status: Single     Spouse name: Not on file   • Number of children: Not on file   • Years of education: Not on file   • Highest education level: Not on file   Occupational History     Comment: CNA ; Nursing student   Social Needs   • Financial resource strain: Not on file   • Food insecurity:     Worry: Not on file     Inability: Not on file  "  • Transportation needs:     Medical: Not on file     Non-medical: Not on file   Tobacco Use   • Smoking status: Never Smoker   • Smokeless tobacco: Never Used   Substance and Sexual Activity   • Alcohol use: Yes     Comment: occasionally   • Drug use: No   • Sexual activity: Never     Partners: Male, Female   Lifestyle   • Physical activity:     Days per week: Not on file     Minutes per session: Not on file   • Stress: To some extent   Relationships   • Social connections:     Talks on phone: Not on file     Gets together: Not on file     Attends Gnosticism service: Not on file     Active member of club or organization: Not on file     Attends meetings of clubs or organizations: Not on file     Relationship status: Not on file   • Intimate partner violence:     Fear of current or ex partner: Not on file     Emotionally abused: Not on file     Physically abused: Not on file     Forced sexual activity: Not on file   Other Topics Concern   • Not on file   Social History Narrative   • Not on file       Review of Systems   Eyes: Positive for photophobia. Negative for blurred vision.   Respiratory: Negative for shortness of breath.    Cardiovascular: Negative for chest pain.   Gastrointestinal: Positive for nausea and vomiting.   Neurological: Positive for headaches.      Objective:     /82   Pulse (!) 102   Temp 36.9 °C (98.4 °F) (Temporal)   Resp 19   Ht 1.702 m (5' 7\")   Wt 116.6 kg (257 lb)   SpO2 97%  Body mass index is 40.25 kg/m².     Physical Exam:  Constitutional: Oriented to person, place, and time and well-developed, well-nourished, and in no distress.   HENT:   Head: Normocephalic and atraumatic.   Eyes: Conjunctivae and EOM are normal. Pupils are equal, round, and reactive to light.   Neck: Normal range of motion. Neck supple.   Cardiovascular: Normal rate, regular rhythm, normal heart sounds. Exam reveals no friction rub. No murmur heard.  Pulmonary/Chest: Effort normal and breath sounds " normal. No respiratory distress or use of accessory muscles. No wheezes, rhonchi, or rales.     Musculoskeletal: Full range of motion. No deformity or swelling of joints.   Neurological: Alert and oriented to person, place, and time. Gait normal. CN I- CN XII intact.   Skin: Skin is warm and dry. No cyanosis. No edema.  Psychiatric: Mood, memory, affect and judgment normal.       Assessment and Plan:   The following treatment plan was discussed    1. Migraine with aura and without status migrainosus, not intractable  Uncontrolled. Will treat with Imitrex prn. She was referred to neurology for further evaluation of her symptoms. I did encourage that she follow anti-inflammatory diet and avoid gluten. I also suggest keeping journal to identify triggers. I also suggest taking OTC magnesium supplement 500 mg daily.   - REFERRAL TO NEUROLOGY  - SUMAtriptan (IMITREX) 25 MG Tab tablet; Take 1-4 Tabs by mouth Once PRN for Migraine (May repeat dose after 2 hours, max daily dose 200mg) for up to 1 dose.  Dispense: 10 Tab; Refill: 1      Followup: Return if symptoms worsen or fail to improve.

## 2019-12-04 ENCOUNTER — TELEPHONE (OUTPATIENT)
Dept: OCCUPATIONAL MEDICINE | Facility: CLINIC | Age: 24
End: 2019-12-04

## 2019-12-05 ENCOUNTER — APPOINTMENT (OUTPATIENT)
Dept: RADIOLOGY | Facility: IMAGING CENTER | Age: 24
End: 2019-12-05
Attending: INTERNAL MEDICINE
Payer: COMMERCIAL

## 2019-12-05 ENCOUNTER — OFFICE VISIT (OUTPATIENT)
Dept: URGENT CARE | Facility: CLINIC | Age: 24
End: 2019-12-05
Payer: COMMERCIAL

## 2019-12-05 VITALS
WEIGHT: 267 LBS | TEMPERATURE: 98 F | SYSTOLIC BLOOD PRESSURE: 112 MMHG | BODY MASS INDEX: 41.91 KG/M2 | RESPIRATION RATE: 16 BRPM | OXYGEN SATURATION: 97 % | HEART RATE: 97 BPM | HEIGHT: 67 IN | DIASTOLIC BLOOD PRESSURE: 72 MMHG

## 2019-12-05 DIAGNOSIS — J22 LOWER RESPIRATORY INFECTION (E.G., BRONCHITIS, PNEUMONIA, PNEUMONITIS, PULMONITIS): ICD-10-CM

## 2019-12-05 PROCEDURE — 71046 X-RAY EXAM CHEST 2 VIEWS: CPT | Mod: TC | Performed by: INTERNAL MEDICINE

## 2019-12-05 PROCEDURE — 99214 OFFICE O/P EST MOD 30 MIN: CPT | Performed by: INTERNAL MEDICINE

## 2019-12-05 RX ORDER — AZITHROMYCIN 250 MG/1
TABLET, FILM COATED ORAL
Qty: 6 TAB | Refills: 0 | Status: SHIPPED | OUTPATIENT
Start: 2019-12-05 | End: 2020-03-03

## 2019-12-05 ASSESSMENT — ENCOUNTER SYMPTOMS
IRREGULAR HEARTBEAT: 0
SHORTNESS OF BREATH: 1
COUGH: 0
FOCAL WEAKNESS: 0
NECK PAIN: 1
SEIZURES: 0
SENSORY CHANGE: 0
MYALGIAS: 1
DIAPHORESIS: 0
FEVER: 0
HEADACHES: 1
SPEECH CHANGE: 0
BACK PAIN: 0
DIZZINESS: 0
WEAKNESS: 1
ABDOMINAL PAIN: 0

## 2019-12-05 NOTE — PROGRESS NOTES
"Subjective:   Diana Hill is a 24 y.o. female who presents for Chest Pain (tightness of chest, pain, body aches going on for a month now.)        Chest Pain    This is a new problem. The current episode started yesterday. The onset quality is gradual. The problem occurs constantly. The problem has been waxing and waning. The pain is present in the substernal region. The pain is mild. The quality of the pain is described as stabbing. Associated symptoms include headaches (mild), malaise/fatigue, shortness of breath and weakness (generalised). Pertinent negatives include no abdominal pain, back pain, cough, diaphoresis, dizziness, fever or irregular heartbeat. The pain is aggravated by coughing and deep breathing.   Pertinent negatives for past medical history include no seizures.     Review of Systems   Constitutional: Positive for malaise/fatigue. Negative for diaphoresis and fever.   Respiratory: Positive for shortness of breath. Negative for cough.    Cardiovascular: Positive for chest pain.   Gastrointestinal: Negative for abdominal pain.   Musculoskeletal: Positive for myalgias and neck pain. Negative for back pain.   Neurological: Positive for weakness (generalised) and headaches (mild). Negative for dizziness, sensory change, speech change, focal weakness and seizures.   All other systems reviewed and are negative.    Allergies   Allergen Reactions   • Gluten Meal       Objective:   /72   Pulse 97   Temp 36.7 °C (98 °F)   Resp 16   Ht 1.702 m (5' 7\")   Wt 121.1 kg (267 lb)   SpO2 97%   BMI 41.82 kg/m²   Physical Exam  Constitutional:       General: She is not in acute distress.     Appearance: She is well-developed.   HENT:      Head: Normocephalic and atraumatic.   Eyes:      Conjunctiva/sclera: Conjunctivae normal.      Pupils: Pupils are equal, round, and reactive to light.   Neck:      Musculoskeletal: Normal range of motion. Neck rigidity (mild) and muscular tenderness present. "   Cardiovascular:      Rate and Rhythm: Normal rate and regular rhythm.      Heart sounds: No murmur.   Pulmonary:      Effort: Pulmonary effort is normal. No respiratory distress.      Breath sounds: Normal breath sounds.   Abdominal:      General: There is no distension.      Palpations: Abdomen is soft.      Tenderness: There is no tenderness.   Skin:     General: Skin is warm and dry.      Capillary Refill: Capillary refill takes less than 2 seconds.   Neurological:      Mental Status: She is alert and oriented to person, place, and time.      Sensory: No sensory deficit.      Deep Tendon Reflexes: Reflexes are normal and symmetric.           Assessment/Plan:   1. Lower respiratory infection (e.g., bronchitis, pneumonia, pneumonitis, pulmonitis)  - DX-CHEST-2 VIEWS; Future  - azithromycin (ZITHROMAX) 250 MG Tab; 2 tab on day one and 1 tab for next 4 days  Dispense: 6 Tab; Refill: 0    Chest x-ray was negative and flu was negative and patient's neck stiffness was better she still has pain when she moves her neck but is not as stiff as before but patient was advised to go to the ER to make sure start meningitis to get some blood work done in and and if needed a LP she was also given a prescription for a Z-Sheng and told to take it after she has been to the ER and  after they have ruled out meningitis  Differential diagnosis, natural history, supportive care, and indications for immediate follow-up discussed.

## 2019-12-05 NOTE — LETTER
December 5, 2019       Patient: Diana Hill   YOB: 1995   Date of Visit: 12/5/2019         To Whom It May Concern:    It is my medical opinion that Diana Hill remain out of work until 12/9/19.    If you have any questions or concerns, please don't hesitate to call [phone]          Sincerely,          Sha Wheeler M.D.  Electronically Signed

## 2020-02-13 RX ORDER — CITALOPRAM 20 MG/1
30 TABLET ORAL DAILY
Qty: 30 TAB | OUTPATIENT
Start: 2020-02-13

## 2020-03-03 ENCOUNTER — OFFICE VISIT (OUTPATIENT)
Dept: MEDICAL GROUP | Facility: PHYSICIAN GROUP | Age: 25
End: 2020-03-03
Payer: COMMERCIAL

## 2020-03-03 VITALS
WEIGHT: 260 LBS | SYSTOLIC BLOOD PRESSURE: 122 MMHG | OXYGEN SATURATION: 98 % | HEART RATE: 78 BPM | TEMPERATURE: 97.8 F | RESPIRATION RATE: 16 BRPM | BODY MASS INDEX: 40.81 KG/M2 | HEIGHT: 67 IN | DIASTOLIC BLOOD PRESSURE: 82 MMHG

## 2020-03-03 DIAGNOSIS — F32.A ANXIETY AND DEPRESSION: ICD-10-CM

## 2020-03-03 DIAGNOSIS — F41.9 ANXIETY AND DEPRESSION: ICD-10-CM

## 2020-03-03 DIAGNOSIS — R42 VERTIGO: ICD-10-CM

## 2020-03-03 DIAGNOSIS — G43.109 MIGRAINE WITH AURA AND WITHOUT STATUS MIGRAINOSUS, NOT INTRACTABLE: ICD-10-CM

## 2020-03-03 DIAGNOSIS — L24.89 IRRITANT CONTACT DERMATITIS DUE TO OTHER AGENTS: ICD-10-CM

## 2020-03-03 PROCEDURE — 99214 OFFICE O/P EST MOD 30 MIN: CPT | Performed by: NURSE PRACTITIONER

## 2020-03-03 RX ORDER — MECLIZINE HYDROCHLORIDE 25 MG/1
25 TABLET ORAL 3 TIMES DAILY PRN
Qty: 90 TAB | Refills: 0 | Status: SHIPPED | OUTPATIENT
Start: 2020-03-03 | End: 2020-04-28

## 2020-03-03 RX ORDER — SUMATRIPTAN 25 MG/1
25-100 TABLET, FILM COATED ORAL
Qty: 10 TAB | Refills: 1 | Status: SHIPPED | OUTPATIENT
Start: 2020-03-03 | End: 2020-04-30

## 2020-03-03 RX ORDER — TRIAMCINOLONE ACETONIDE 1 MG/G
1 OINTMENT TOPICAL 3 TIMES DAILY PRN
Qty: 80 G | Refills: 1 | Status: SHIPPED | OUTPATIENT
Start: 2020-03-03 | End: 2020-08-13

## 2020-03-03 RX ORDER — CITALOPRAM 20 MG/1
30 TABLET ORAL DAILY
Qty: 135 TAB | Refills: 1 | Status: SHIPPED | OUTPATIENT
Start: 2020-03-03 | End: 2020-08-26

## 2020-03-03 RX ORDER — FLUTICASONE PROPIONATE 50 MCG
1 SPRAY, SUSPENSION (ML) NASAL DAILY
Qty: 16 G | Refills: 1 | Status: SHIPPED | OUTPATIENT
Start: 2020-03-03 | End: 2020-04-07

## 2020-03-03 ASSESSMENT — ENCOUNTER SYMPTOMS
FEVER: 0
CHILLS: 0
DIZZINESS: 1
PALPITATIONS: 0
SHORTNESS OF BREATH: 0

## 2020-03-03 ASSESSMENT — FIBROSIS 4 INDEX: FIB4 SCORE: 0.34

## 2020-03-03 NOTE — ASSESSMENT & PLAN NOTE
This is a recurrent issue. Typically occur once per year, however she has been experiencing more frequent of symptoms over the past month. Her symptoms include spinning sensation at rest.  Associated symptoms include sinus congestion. She has not tried anything over-the-counter to treat her symptoms.  She does have history of seasonal allergies which she is not currently treating with medication.

## 2020-03-03 NOTE — PROGRESS NOTES
Subjective:   Diana Hill is a 24 y.o. female here today for symptoms of vertigo.    Vertigo  This is a recurrent issue. Typically occur once per year, however she has been experiencing more frequent of symptoms over the past month. Her symptoms include spinning sensation at rest.  Associated symptoms include sinus congestion. She has not tried anything over-the-counter to treat her symptoms.  She does have history of seasonal allergies which she is not currently treating with medication.         Current medicines (including changes today)  Current Outpatient Medications   Medication Sig Dispense Refill   • citalopram (CELEXA) 20 MG Tab Take 1.5 Tabs by mouth every day. 135 Tab 1   • SUMAtriptan (IMITREX) 25 MG Tab tablet Take 1-4 Tabs by mouth Once PRN for Migraine (May repeat dose after 2 hours, max daily dose 200mg) for up to 1 dose. 10 Tab 1   • triamcinolone acetonide (KENALOG) 0.1 % Ointment Apply 1 Application to affected area(s) 3 times a day as needed. 80 g 1   • meclizine (ANTIVERT) 25 MG Tab Take 1 Tab by mouth 3 times a day as needed. 90 Tab 0   • fluticasone (FLONASE) 50 MCG/ACT nasal spray Spray 1 Spray in nose every day. 16 g 1     No current facility-administered medications for this visit.      She  has a past medical history of Anemia, Anxiety, Celiac disease, Depression, Insulin resistance, Migraine, and PCOS (polycystic ovarian syndrome).    Social History     Socioeconomic History   • Marital status: Single     Spouse name: Not on file   • Number of children: Not on file   • Years of education: Not on file   • Highest education level: Not on file   Occupational History     Comment: CNA ; Nursing student   Social Needs   • Financial resource strain: Not on file   • Food insecurity     Worry: Not on file     Inability: Not on file   • Transportation needs     Medical: Not on file     Non-medical: Not on file   Tobacco Use   • Smoking status: Never Smoker   • Smokeless tobacco: Never Used  "  Substance and Sexual Activity   • Alcohol use: Yes     Comment: occasionally   • Drug use: No   • Sexual activity: Never     Partners: Male, Female   Lifestyle   • Physical activity     Days per week: Not on file     Minutes per session: Not on file   • Stress: To some extent   Relationships   • Social connections     Talks on phone: Not on file     Gets together: Not on file     Attends Oriental orthodox service: Not on file     Active member of club or organization: Not on file     Attends meetings of clubs or organizations: Not on file     Relationship status: Not on file   • Intimate partner violence     Fear of current or ex partner: Not on file     Emotionally abused: Not on file     Physically abused: Not on file     Forced sexual activity: Not on file   Other Topics Concern   • Not on file   Social History Narrative   • Not on file       Review of Systems   Constitutional: Negative for chills and fever.   HENT: Positive for congestion.    Respiratory: Negative for shortness of breath.    Cardiovascular: Negative for chest pain and palpitations.   Neurological: Positive for dizziness.          Objective:     /82 (BP Location: Right arm, Patient Position: Sitting)   Pulse 78   Temp 36.6 °C (97.8 °F)   Resp 16   Ht 1.702 m (5' 7\")   Wt 117.9 kg (260 lb)   SpO2 98%  Body mass index is 40.72 kg/m².     Physical Exam:  Constitutional: Oriented to person, place, and time and well-developed, well-nourished, and in no distress.   HENT:   Head: Normocephalic and atraumatic.   Right Ear: Tympanic membrane bulging, no erythema or purulent drainage noted.  External ear normal.   Left Ear: Tympanic membrane and external ear normal.   Eyes: Conjunctivae and EOM are normal. Pupils are equal, round, and reactive to light.   Neck: Normal range of motion. Neck supple.   Cardiovascular: Normal rate, regular rhythm, normal heart sounds. Exam reveals no friction rub. No murmur heard.  Pulmonary/Chest: Effort normal and " breath sounds normal. No respiratory distress or use of accessory muscles. No wheezes, rhonchi, or rales.   Neurological: Alert and oriented to person, place, and time. Gait normal.   Psychiatric: Mood, memory, affect and judgment normal.       Assessment and Plan:   The following treatment plan was discussed    1. Vertigo  She was referred to ENT specialist for further evaluation of her symptoms. Treat with meclizine prn. Her symptoms may be exacerbated by uncontrolled allergies. I did suggest taking flonase and OTC non-drowsy antihistamine to help with symptoms.  - meclizine (ANTIVERT) 25 MG Tab; Take 1 Tab by mouth 3 times a day as needed.  Dispense: 90 Tab; Refill: 0  - REFERRAL TO ENT  - fluticasone (FLONASE) 50 MCG/ACT nasal spray; Spray 1 Spray in nose every day.  Dispense: 16 g; Refill: 1    2. Migraine with aura and without status migrainosus, not intractable  Refill medication.  - SUMAtriptan (IMITREX) 25 MG Tab tablet; Take 1-4 Tabs by mouth Once PRN for Migraine (May repeat dose after 2 hours, max daily dose 200mg) for up to 1 dose.  Dispense: 10 Tab; Refill: 1    3. Irritant contact dermatitis due to other agents  Refill medication.  - triamcinolone acetonide (KENALOG) 0.1 % Ointment; Apply 1 Application to affected area(s) 3 times a day as needed.  Dispense: 80 g; Refill: 1    4. Anxiety and depression  Refill medication.  - citalopram (CELEXA) 20 MG Tab; Take 1.5 Tabs by mouth every day.  Dispense: 135 Tab; Refill: 1        Followup: Return in about 6 months (around 9/3/2020), or if symptoms worsen or fail to improve.

## 2020-03-03 NOTE — LETTER
March 3, 2020         Patient: Diana Hill   YOB: 1995   Date of Visit: 3/3/2020           To Whom it May Concern:    Diana Hill was seen in my clinic on 3/3/2020. Please excuse her absence from work on 2/25/20.    If you have any questions or concerns, please don't hesitate to call.        Sincerely,           MARKY Oro.  Electronically Signed

## 2020-03-19 ENCOUNTER — OFFICE VISIT (OUTPATIENT)
Dept: URGENT CARE | Facility: CLINIC | Age: 25
End: 2020-03-19
Payer: COMMERCIAL

## 2020-03-19 VITALS
HEART RATE: 103 BPM | SYSTOLIC BLOOD PRESSURE: 132 MMHG | DIASTOLIC BLOOD PRESSURE: 80 MMHG | OXYGEN SATURATION: 97 % | HEIGHT: 67 IN | WEIGHT: 260 LBS | TEMPERATURE: 98.7 F | BODY MASS INDEX: 40.81 KG/M2 | RESPIRATION RATE: 16 BRPM

## 2020-03-19 DIAGNOSIS — J06.9 VIRAL URI: ICD-10-CM

## 2020-03-19 DIAGNOSIS — R05.9 COUGH: ICD-10-CM

## 2020-03-19 LAB
FLUAV+FLUBV AG SPEC QL IA: NORMAL
INT CON NEG: NORMAL
INT CON POS: NORMAL

## 2020-03-19 PROCEDURE — 99214 OFFICE O/P EST MOD 30 MIN: CPT | Performed by: NURSE PRACTITIONER

## 2020-03-19 PROCEDURE — 87804 INFLUENZA ASSAY W/OPTIC: CPT | Performed by: NURSE PRACTITIONER

## 2020-03-19 ASSESSMENT — ENCOUNTER SYMPTOMS
FEVER: 1
DIARRHEA: 0
EYE DISCHARGE: 0
SPUTUM PRODUCTION: 0
SHORTNESS OF BREATH: 1
COUGH: 1
ABDOMINAL PAIN: 0
CHILLS: 1
HEADACHES: 0
NAUSEA: 0
WHEEZING: 0
EYE REDNESS: 0
SORE THROAT: 1
MYALGIAS: 1
VOMITING: 0
STRIDOR: 0

## 2020-03-19 ASSESSMENT — FIBROSIS 4 INDEX: FIB4 SCORE: 0.34

## 2020-03-19 NOTE — LETTER
March 19, 2020         Patient: Diana Hill   YOB: 1995   Date of Visit: 3/19/2020           To Whom it May Concern:    Diana Hill was seen in my clinic on 3/19/2020. She {Return to school/sport/work:92389}.    If you have any questions or concerns, please don't hesitate to call.        Sincerely,           MARKY Leone.  Electronically Signed

## 2020-03-19 NOTE — PROGRESS NOTES
"Subjective:   Diana Hill is a 24 y.o. female who presents for Generalized Body Aches (cough, sore throat)        Cough   This is a new problem. The current episode started yesterday. The problem has been unchanged. The cough is non-productive. Associated symptoms include chills, a fever, myalgias, nasal congestion, postnasal drip, a sore throat and shortness of breath. Pertinent negatives include no chest pain, ear pain, eye redness, headaches, rash or wheezing. The symptoms are aggravated by lying down. Treatments tried: Ibuprofen. The treatment provided mild relief. There is no history of asthma or pneumonia.      Denies recent travel history or contact with COVID-19 positive patient.    Review of Systems   Constitutional: Positive for chills, fever and malaise/fatigue.   HENT: Positive for congestion, postnasal drip and sore throat. Negative for ear discharge and ear pain.    Eyes: Negative for discharge and redness.   Respiratory: Positive for cough and shortness of breath. Negative for sputum production, wheezing and stridor.    Cardiovascular: Negative for chest pain.   Gastrointestinal: Negative for abdominal pain, diarrhea, nausea and vomiting.   Musculoskeletal: Positive for myalgias.   Skin: Negative for itching and rash.   Neurological: Negative for headaches.   All other systems reviewed and are negative.    PMH:  has a past medical history of Anemia, Anxiety, Celiac disease, Depression, Insulin resistance, Migraine, and PCOS (polycystic ovarian syndrome).  ALLERGIES:   Allergies   Allergen Reactions   • Gluten Meal        Patient's PMH, SocHx, SurgHx, FamHx, Drug allergies and medications reviewed.     Objective:   /80 (BP Location: Right arm, Patient Position: Sitting)   Pulse (!) 103   Temp 37.1 °C (98.7 °F)   Resp 16   Ht 1.702 m (5' 7\")   Wt 117.9 kg (260 lb)   SpO2 97%   BMI 40.72 kg/m²   Physical Exam  Vitals signs reviewed.   Constitutional:       Appearance: She is " well-developed.   HENT:      Head: Normocephalic.      Right Ear: Tympanic membrane and ear canal normal. No middle ear effusion. Tympanic membrane is not perforated or erythematous.      Left Ear: Tympanic membrane and ear canal normal.  No middle ear effusion. Tympanic membrane is not perforated or erythematous.      Nose: Congestion present. No rhinorrhea.      Right Sinus: No maxillary sinus tenderness or frontal sinus tenderness.      Left Sinus: No maxillary sinus tenderness or frontal sinus tenderness.      Mouth/Throat:      Lips: Pink.      Mouth: Mucous membranes are moist.      Pharynx: Oropharynx is clear. Uvula midline. Posterior oropharyngeal erythema present. No oropharyngeal exudate or uvula swelling.      Tonsils: No tonsillar exudate.   Eyes:      General: Lids are normal.      Extraocular Movements: Extraocular movements intact.      Conjunctiva/sclera: Conjunctivae normal.      Pupils: Pupils are equal, round, and reactive to light.   Neck:      Musculoskeletal: Normal range of motion.      Thyroid: No thyromegaly.   Cardiovascular:      Rate and Rhythm: Normal rate and regular rhythm.      Heart sounds: Normal heart sounds.   Pulmonary:      Effort: Pulmonary effort is normal. No tachypnea, bradypnea, accessory muscle usage, prolonged expiration or respiratory distress.      Breath sounds: Normal breath sounds. No wheezing.   Lymphadenopathy:      Head:      Right side of head: No submandibular or tonsillar adenopathy.      Left side of head: Submandibular and tonsillar adenopathy present.   Skin:     General: Skin is warm and dry.      Findings: No rash.   Neurological:      Mental Status: She is alert and oriented to person, place, and time.   Psychiatric:         Mood and Affect: Mood normal.         Speech: Speech normal.         Behavior: Behavior normal. Behavior is cooperative.         Thought Content: Thought content normal.         Judgment: Judgment normal.           Assessment/Plan:    Assessment    1. Cough  POCT Influenza A/B   2. Viral URI       Flu negative  It was explained to the patient today that due to the viral nature of the patient's illness, we will treat symptomatically. Encouraged OTC supportive meds PRN. Humidification and increase fluids.     Differential diagnosis, natural history, supportive care, and indications for immediate follow-up discussed.     **Please note that all invasive procedures during this visit were performed by myself and/or the Medical Assistant under the supervision of the PA or MD in office**

## 2020-03-19 NOTE — LETTER
March 19, 2020         Patient: Diana Hill   YOB: 1995   Date of Visit: 3/19/2020           To Whom it May Concern:    Diana Hill was seen in my clinic on 3/19/2020. Please excuse her from work 3/19/2020 through 3/21/2020. She may return on 3/22/2020 if she is without a fever.    If you have any questions or concerns, please don't hesitate to call.        Sincerely,           MARKY Leone.  Electronically Signed

## 2020-03-25 ENCOUNTER — OFFICE VISIT (OUTPATIENT)
Dept: URGENT CARE | Facility: CLINIC | Age: 25
End: 2020-03-25
Payer: COMMERCIAL

## 2020-03-25 ENCOUNTER — APPOINTMENT (OUTPATIENT)
Dept: RADIOLOGY | Facility: IMAGING CENTER | Age: 25
End: 2020-03-25
Attending: FAMILY MEDICINE
Payer: COMMERCIAL

## 2020-03-25 VITALS
HEIGHT: 67 IN | RESPIRATION RATE: 14 BRPM | DIASTOLIC BLOOD PRESSURE: 76 MMHG | SYSTOLIC BLOOD PRESSURE: 104 MMHG | HEART RATE: 99 BPM | OXYGEN SATURATION: 96 % | BODY MASS INDEX: 40.49 KG/M2 | WEIGHT: 258 LBS | TEMPERATURE: 98.8 F

## 2020-03-25 DIAGNOSIS — R06.02 SHORTNESS OF BREATH: ICD-10-CM

## 2020-03-25 DIAGNOSIS — R05.9 COUGH: ICD-10-CM

## 2020-03-25 PROCEDURE — 99214 OFFICE O/P EST MOD 30 MIN: CPT | Performed by: FAMILY MEDICINE

## 2020-03-25 PROCEDURE — 71046 X-RAY EXAM CHEST 2 VIEWS: CPT | Mod: TC | Performed by: FAMILY MEDICINE

## 2020-03-25 RX ORDER — ALBUTEROL SULFATE 90 UG/1
2 AEROSOL, METERED RESPIRATORY (INHALATION) EVERY 4 HOURS PRN
Qty: 1 INHALER | Refills: 0 | Status: SHIPPED | OUTPATIENT
Start: 2020-03-25 | End: 2024-02-27 | Stop reason: SDUPTHER

## 2020-03-25 ASSESSMENT — ENCOUNTER SYMPTOMS
EYE DISCHARGE: 0
EYE REDNESS: 0
VOMITING: 0
MYALGIAS: 0
WEIGHT LOSS: 0
NAUSEA: 0

## 2020-03-25 ASSESSMENT — FIBROSIS 4 INDEX: FIB4 SCORE: 0.34

## 2020-03-25 NOTE — LETTER
March 25, 2020         Patient: Diana Hill   YOB: 1995   Date of Visit: 3/25/2020           To Whom it May Concern:    Diana Hill was seen in my clinic on 3/25/2020. Please excuse per sef-isolation protocol handout.     Sincerely,           Shun Sánchez M.D.  Electronically Signed

## 2020-03-26 NOTE — PROGRESS NOTES
"Subjective:      Diana Hill is a 24 y.o. female who presents with Shortness of Breath (body aches)            1 mostly dry cough, chills, SOB. Possible mild wheeze. No PMH asthma/copd/pneumonia. +nasal congestion. Mild ST. OTC tylenol with improved symptoms. Seen here previously with negative influenza. No other aggravating or alleviating factors.        Review of Systems   Constitutional: Negative for malaise/fatigue and weight loss.   Eyes: Negative for discharge and redness.   Gastrointestinal: Negative for nausea and vomiting.   Musculoskeletal: Negative for joint pain and myalgias.   Skin: Negative for itching and rash.   .  Medications, Allergies, and current problem list reviewed today in Epic         Objective:     /76 (BP Location: Right arm, Patient Position: Sitting)   Pulse 99   Temp 37.1 °C (98.8 °F)   Resp 14   Ht 1.702 m (5' 7\")   Wt 117 kg (258 lb)   SpO2 96%   BMI 40.41 kg/m²      Physical Exam  Constitutional:       General: She is not in acute distress.     Appearance: She is well-developed.   HENT:      Head: Normocephalic and atraumatic.      Right Ear: Tympanic membrane normal.      Left Ear: Tympanic membrane normal.      Nose: Congestion present. No rhinorrhea.      Mouth/Throat:      Pharynx: No oropharyngeal exudate or posterior oropharyngeal erythema.   Eyes:      Conjunctiva/sclera: Conjunctivae normal.   Cardiovascular:      Rate and Rhythm: Normal rate and regular rhythm.      Heart sounds: Normal heart sounds. No murmur.   Pulmonary:      Effort: Pulmonary effort is normal.      Breath sounds: Normal breath sounds. No wheezing.   Musculoskeletal:      Right lower leg: No edema.      Left lower leg: No edema.   Skin:     General: Skin is warm and dry.      Findings: No rash.   Neurological:      Mental Status: She is alert and oriented to person, place, and time.                 Assessment/Plan:   CXR: no acute cardiopulmonary process per radiology    1. Cough  " DX-CHEST-2 VIEWS   2. Shortness of breath  DX-CHEST-2 VIEWS    albuterol 108 (90 Base) MCG/ACT Aero Soln inhalation aerosol       Differential diagnosis, natural history, supportive care, and indications for immediate follow-up discussed at length.     Self isolation per covid19 protocol

## 2020-04-07 ENCOUNTER — TELEPHONE (OUTPATIENT)
Dept: HEALTH INFORMATION MANAGEMENT | Facility: OTHER | Age: 25
End: 2020-04-07

## 2020-04-07 ENCOUNTER — OFFICE VISIT (OUTPATIENT)
Dept: URGENT CARE | Facility: CLINIC | Age: 25
End: 2020-04-07
Payer: COMMERCIAL

## 2020-04-07 VITALS
DIASTOLIC BLOOD PRESSURE: 62 MMHG | RESPIRATION RATE: 18 BRPM | SYSTOLIC BLOOD PRESSURE: 124 MMHG | BODY MASS INDEX: 40.59 KG/M2 | HEART RATE: 76 BPM | TEMPERATURE: 97.6 F | HEIGHT: 67 IN | WEIGHT: 258.6 LBS | OXYGEN SATURATION: 97 %

## 2020-04-07 DIAGNOSIS — J02.9 PHARYNGITIS, UNSPECIFIED ETIOLOGY: Primary | ICD-10-CM

## 2020-04-07 DIAGNOSIS — J06.9 URI WITH COUGH AND CONGESTION: ICD-10-CM

## 2020-04-07 LAB
HETEROPH AB SER QL LA: NEGATIVE
INT CON NEG: NORMAL
INT CON NEG: NORMAL
INT CON POS: NORMAL
INT CON POS: NORMAL
S PYO AG THROAT QL: NEGATIVE

## 2020-04-07 PROCEDURE — 86308 HETEROPHILE ANTIBODY SCREEN: CPT | Performed by: PHYSICIAN ASSISTANT

## 2020-04-07 PROCEDURE — 87880 STREP A ASSAY W/OPTIC: CPT | Performed by: PHYSICIAN ASSISTANT

## 2020-04-07 PROCEDURE — 99214 OFFICE O/P EST MOD 30 MIN: CPT | Performed by: PHYSICIAN ASSISTANT

## 2020-04-07 RX ORDER — METHYLPREDNISOLONE 4 MG/1
TABLET ORAL
Qty: 21 TAB | Refills: 0 | Status: SHIPPED | OUTPATIENT
Start: 2020-04-07 | End: 2020-04-28

## 2020-04-07 RX ORDER — AZITHROMYCIN 250 MG/1
TABLET, FILM COATED ORAL
Qty: 6 TAB | Refills: 0 | Status: SHIPPED | OUTPATIENT
Start: 2020-04-07 | End: 2020-04-28

## 2020-04-07 ASSESSMENT — FIBROSIS 4 INDEX: FIB4 SCORE: 0.34

## 2020-04-07 NOTE — PROGRESS NOTES
Subjective:      Pt is a 24 y.o. female who presents with Pharyngitis (x3 weeks, sore throat, fatigue, headache)            HPI  This is a new problem. PT presents to UC clinic today complaining of sore throat, pressure in ears, cough, fatigue, runny nose. PT denies CP, SOB, NVD, abdominal pain, joint pain. PT states these symptoms began around 3 weeks ago. Pt has been seen in the UC once a week x 3 weeks and noted all times viral. PT states the pain is a 5/10, aching in nature and worse at night.  Pt has not taken any RX medications for this condition. The pt's medication list, problem list, and allergies have been evaluated and reviewed during today's visit.      PMH:  Past Medical History:   Diagnosis Date   • Anemia     hx of borderline anemia   • Anxiety    • Celiac disease    • Depression    • Insulin resistance    • Migraine    • PCOS (polycystic ovarian syndrome)        PSH:  Past Surgical History:   Procedure Laterality Date   • MATTHIAS BY LAPAROSCOPY N/A 2018    Procedure: MATTHIAS BY LAPAROSCOPY;  Surgeon: Hayden Wilhelm M.D.;  Location: SURGERY [facility];  Service: General       Fam Hx:    family history includes Alcohol abuse in her father and paternal grandfather; Cancer in her paternal grandmother; Depression in her father; Heart Attack (age of onset: 66) in her maternal grandfather; Lung Disease in her paternal grandfather; No Known Problems in her maternal grandmother; Sleep Apnea in her brother; Thyroid in her mother.  Family Status   Relation Name Status   • Mo  Alive   • Fa  Alive   • Bro  Alive   • MGMo     • MGFa     • PGMo     • PGFa     • Bro  Alive   • Bro  Alive       Soc HX:  Social History     Socioeconomic History   • Marital status: Single     Spouse name: Not on file   • Number of children: Not on file   • Years of education: Not on file   • Highest education level: Not on file   Occupational History     Comment: CNA ; Nursing student    Social Needs   • Financial resource strain: Not on file   • Food insecurity     Worry: Not on file     Inability: Not on file   • Transportation needs     Medical: Not on file     Non-medical: Not on file   Tobacco Use   • Smoking status: Never Smoker   • Smokeless tobacco: Never Used   Substance and Sexual Activity   • Alcohol use: Yes     Comment: occasionally   • Drug use: No   • Sexual activity: Never     Partners: Male, Female   Lifestyle   • Physical activity     Days per week: Not on file     Minutes per session: Not on file   • Stress: To some extent   Relationships   • Social connections     Talks on phone: Not on file     Gets together: Not on file     Attends Christian service: Not on file     Active member of club or organization: Not on file     Attends meetings of clubs or organizations: Not on file     Relationship status: Not on file   • Intimate partner violence     Fear of current or ex partner: Not on file     Emotionally abused: Not on file     Physically abused: Not on file     Forced sexual activity: Not on file   Other Topics Concern   • Not on file   Social History Narrative   • Not on file         Medications:    Current Outpatient Medications:   •  azithromycin (ZITHROMAX) 250 MG Tab, Z-pack: use as directed, Disp: 6 Tab, Rfl: 0  •  methylPREDNISolone (MEDROL DOSEPAK) 4 MG Tablet Therapy Pack, Follow schedule on package instructions., Disp: 21 Tab, Rfl: 0  •  albuterol 108 (90 Base) MCG/ACT Aero Soln inhalation aerosol, Inhale 2 Puffs by mouth every four hours as needed for Shortness of Breath., Disp: 1 Inhaler, Rfl: 0  •  SUMAtriptan (IMITREX) 25 MG Tab tablet, Take 1-4 Tabs by mouth Once PRN for Migraine (May repeat dose after 2 hours, max daily dose 200mg) for up to 1 dose., Disp: 10 Tab, Rfl: 1  •  citalopram (CELEXA) 20 MG Tab, Take 1.5 Tabs by mouth every day., Disp: 135 Tab, Rfl: 1  •  triamcinolone acetonide (KENALOG) 0.1 % Ointment, Apply 1 Application to affected area(s) 3 times  "a day as needed., Disp: 80 g, Rfl: 1  •  meclizine (ANTIVERT) 25 MG Tab, Take 1 Tab by mouth 3 times a day as needed., Disp: 90 Tab, Rfl: 0      Allergies:  Gluten meal    ROS    Review of Systems   Constitutional: Positive for malaise/fatigue. Negative for fever.   HENT: Positive for congestion and sore throat from postnasal drip with associated sinus pressure and fullness.    Eyes: Negative for blurred vision, double vision and photophobia.   Respiratory: Positive for cough and sputum production. Negative for hemoptysis, shortness of breath and wheezing.    Cardiovascular: Negative for chest pain and palpitations.   Gastrointestinal: Negative for nausea, vomiting, abdominal pain, diarrhea and constipation.   Genitourinary: Negative for dysuria and flank pain.   Musculoskeletal: Negative for joint pain and myalgias.   Skin: Negative for itching and rash.   Neurological: Positive for headaches. Negative for dizziness and tingling.   Endo/Heme/Allergies: Does not bruise/bleed easily.   Psychiatric/Behavioral: Negative for depression. The patient is not nervous/anxious.         Objective:     /62   Pulse 76   Temp 36.4 °C (97.6 °F) (Temporal)   Resp 18   Ht 1.702 m (5' 7\")   Wt 117.3 kg (258 lb 9.6 oz)   SpO2 97%   BMI 40.50 kg/m²      Physical Exam         Physical Exam   Constitutional: Pt is oriented to person, place, and time. Pt appears well-developed and well-nourished. No distress.   HENT:   Head: Normocephalic and atraumatic.   Right Ear: Hearing, tympanic membrane, external ear and ear canal normal.   Left Ear: Hearing, tympanic membrane, external ear and ear canal normal.   Nose: Mucosal edema, rhinorrhea and sinus tenderness present. No nose lacerations, nasal deformity, septal deviation or nasal septal hematoma. No epistaxis.  No foreign bodies. Right sinus exhibits maxillary sinus tenderness and frontal sinus tenderness. Left sinus exhibits maxillary sinus tenderness and frontal sinus " tenderness.   Mouth/Throat: Oropharynx is clear and moist. No oropharyngeal exudate.   Eyes: Conjunctivae normal and EOM are normal. Pupils are equal, round, and reactive to light. Right eye exhibits no discharge. Left eye exhibits no discharge.   Neck: Normal range of motion. Neck supple. No tracheal deviation present. No thyromegaly present.   Cardiovascular: Normal rate, regular rhythm, normal heart sounds and intact distal pulses.  Exam reveals no gallop and no friction rub.    No murmur heard.  Pulmonary/Chest: Effort normal and breath sounds normal. No respiratory distress. Pt has no wheezes. Pt has no rales. Pt exhibits no tenderness.   Abdominal: Soft. Bowel sounds are normal. Pt exhibits no distension and no mass. There is no tenderness. There is no rebound and no guarding.   Musculoskeletal: Normal range of motion. Pt exhibits no edema and no tenderness.   Lymphadenopathy:     Pt has no cervical adenopathy.   Neurological: Pt is alert and oriented to person, place, and time. Pt has normal reflexes. Pt displays normal reflexes. No cranial nerve deficit. Pt exhibits normal muscle tone. Coordination normal.   Skin: Skin is warm and dry. No rash noted. No erythema.   Psychiatric: Pt has a normal mood and affect. Pt behavior is normal. Judgment and thought content normal.          Assessment/Plan:       1. Pharyngitis, unspecified etiology    - POCT Rapid Strep A--.NEG  - POCT Mononucleosis (mono)-->NEG  - azithromycin (ZITHROMAX) 250 MG Tab; Z-pack: use as directed  Dispense: 6 Tab; Refill: 0  - methylPREDNISolone (MEDROL DOSEPAK) 4 MG Tablet Therapy Pack; Follow schedule on package instructions.  Dispense: 21 Tab; Refill: 0    2. URI with cough and congestion    - methylPREDNISolone (MEDROL DOSEPAK) 4 MG Tablet Therapy Pack; Follow schedule on package instructions.  Dispense: 21 Tab; Refill: 0      Concern for worsening symptoms of URI which shortly could transition to pneumonia and worsening sinus congestion  and infection with powerful cough keeping pt up at night as they must sleep upright to avoid coughing fits.  Diff DX: Bronchitis, Sinusitis, Pneumonia, Influenza, Viral URI, Allergies  Rest, fluids encouraged.  OTC decongestant for congestion/cough  Note given for work.  AVS with medical info given.  Pt was in full understanding and agreement with the plan.  Differential diagnosis, natural history, supportive care, and indications for immediate follow-up discussed. All questions answered. Patient agrees with the plan of care.  Follow-up as needed if symptoms worsen or fail to improve to PCP, Urgent care or Emergency Room.

## 2020-04-07 NOTE — TELEPHONE ENCOUNTER
1. Caller Name: Diana                        Call Back Number: cell  [facility] PCP or Specialty Provider: Yes Candy Randolph        2.  Does patient have any active symptoms of respiratory illness (fever OR cough OR shortness of breath OR sore throat)? Yes, the patient reports the following respiratory symptoms: Sore throat and headache, going on for weeks, tested negative for Covid, no cough, no fever, no sob.    3.  Does patient have any comoribidities? None  Celiac, Insulin Resistant    4.  Has the patient traveled in the last 14 days OR had any known contact with someone who is suspected or confirmed to have COVID-19?  No.    5. Disposition: Advised to go to  for rapid mono test. Patient was making an appointment with PCP for a mono test. Unable to clear due to sore throat.    Note routed to [facility] Provider: FREDA only.

## 2020-04-07 NOTE — LETTER
April 7, 2020       Patient: Diana Hill   YOB: 1995   Date of Visit: 4/7/2020         To Whom It May Concern:    It is my medical opinion that Diana Hill may be excused from work for the dates of 4/7/20-4/21/20 due to URI quarantine of 14 days.      If you have any questions or concerns, please don't hesitate to call [phone]          Sincerely,          Aden Arias P.A.-C.  Electronically Signed

## 2020-04-28 ENCOUNTER — OFFICE VISIT (OUTPATIENT)
Dept: MEDICAL GROUP | Facility: PHYSICIAN GROUP | Age: 25
End: 2020-04-28
Payer: COMMERCIAL

## 2020-04-28 ENCOUNTER — TELEPHONE (OUTPATIENT)
Dept: MEDICAL GROUP | Facility: PHYSICIAN GROUP | Age: 25
End: 2020-04-28

## 2020-04-28 VITALS
BODY MASS INDEX: 40.62 KG/M2 | HEIGHT: 67 IN | OXYGEN SATURATION: 97 % | RESPIRATION RATE: 20 BRPM | HEART RATE: 94 BPM | SYSTOLIC BLOOD PRESSURE: 106 MMHG | WEIGHT: 258.8 LBS | DIASTOLIC BLOOD PRESSURE: 70 MMHG | TEMPERATURE: 98.7 F

## 2020-04-28 DIAGNOSIS — R42 VERTIGO: ICD-10-CM

## 2020-04-28 PROCEDURE — 99213 OFFICE O/P EST LOW 20 MIN: CPT | Performed by: NURSE PRACTITIONER

## 2020-04-28 ASSESSMENT — ENCOUNTER SYMPTOMS
FEVER: 0
CHILLS: 0
DIZZINESS: 1
SHORTNESS OF BREATH: 0

## 2020-04-28 ASSESSMENT — FIBROSIS 4 INDEX: FIB4 SCORE: 0.34

## 2020-04-28 NOTE — TELEPHONE ENCOUNTER
Phone Number Called: [phone] (home)       Call outcome: Left detailed message for patient. Informed to call back with any additional questions.    Message: Left a message for the pt to call us back with the name of the ENT that she is seeing currently

## 2020-04-28 NOTE — PROGRESS NOTES
Subjective:   Diana Hill is a 24 y.o. female here today for follow up on vertigo.     Vertigo  This is a chronic issue. Her symptoms are intermittent in nature, however occur weekly. Describes as dizziness with spinning sensation. She was referred to ENT specialist at last OV March 2020. She has EEG study scheduled for next Tuesday. She has tried Imitrex and meclizine which have provided no relief. She reports having to call out from work due to her symptoms. She is requesting [facility] paperwork.      Current medicines (including changes today)  Current Outpatient Medications   Medication Sig Dispense Refill   • albuterol 108 (90 Base) MCG/ACT Aero Soln inhalation aerosol Inhale 2 Puffs by mouth every four hours as needed for Shortness of Breath. 1 Inhaler 0   • citalopram (CELEXA) 20 MG Tab Take 1.5 Tabs by mouth every day. 135 Tab 1   • SUMAtriptan (IMITREX) 25 MG Tab tablet Take 1-4 Tabs by mouth Once PRN for Migraine (May repeat dose after 2 hours, max daily dose 200mg) for up to 1 dose. 10 Tab 1   • triamcinolone acetonide (KENALOG) 0.1 % Ointment Apply 1 Application to affected area(s) 3 times a day as needed. 80 g 1     No current facility-administered medications for this visit.      She  has a past medical history of Anemia, Anxiety, Celiac disease, Depression, Insulin resistance, Migraine, and PCOS (polycystic ovarian syndrome).    Social History     Socioeconomic History   • Marital status: Single     Spouse name: Not on file   • Number of children: Not on file   • Years of education: Not on file   • Highest education level: Not on file   Occupational History     Comment: CNA ; Nursing student   Social Needs   • Financial resource strain: Not on file   • Food insecurity     Worry: Not on file     Inability: Not on file   • Transportation needs     Medical: Not on file     Non-medical: Not on file   Tobacco Use   • Smoking status: Never Smoker   • Smokeless tobacco: Never Used   Substance and Sexual  "Activity   • Alcohol use: Not Currently     Comment: occasionally   • Drug use: No   • Sexual activity: Never     Partners: Male, Female   Lifestyle   • Physical activity     Days per week: Not on file     Minutes per session: Not on file   • Stress: To some extent   Relationships   • Social connections     Talks on phone: Not on file     Gets together: Not on file     Attends Zoroastrianism service: Not on file     Active member of club or organization: Not on file     Attends meetings of clubs or organizations: Not on file     Relationship status: Not on file   • Intimate partner violence     Fear of current or ex partner: Not on file     Emotionally abused: Not on file     Physically abused: Not on file     Forced sexual activity: Not on file   Other Topics Concern   • Not on file   Social History Narrative   • Not on file       Review of Systems   Constitutional: Negative for chills and fever.   HENT: Negative for ear pain, hearing loss and tinnitus.    Respiratory: Negative for shortness of breath.    Cardiovascular: Negative for chest pain.   Neurological: Positive for dizziness.        Objective:     /70 (BP Location: Left arm, Patient Position: Sitting, BP Cuff Size: Adult)   Pulse 94   Temp 37.1 °C (98.7 °F) (Temporal)   Resp 20   Ht 1.702 m (5' 7\")   Wt 117.4 kg (258 lb 12.8 oz)   SpO2 97%  Body mass index is 40.53 kg/m².     Physical Exam:  Constitutional: Oriented to person, place, and time and well-developed, well-nourished, and in no distress.   HENT:   Head: Normocephalic and atraumatic.   Eyes: Conjunctivae and EOM are normal. Pupils are equal, round, and reactive to light.   Neck: Normal range of motion. Neck supple.   Neurological: Alert and oriented to person, place, and time. Gait normal.   Skin: Skin is warm and dry. No cyanosis. No edema.  Psychiatric: Mood, memory, affect and judgment normal.       Assessment and Plan:   The following treatment plan was discussed    1. " Vertigo  Uncontrolled. Request records from ENT specialist to determine necessity of short-disability/FMLA paperwork.       Followup: Return in about 1 month (around 5/28/2020), or if symptoms worsen or fail to improve, for Establish care.

## 2020-04-28 NOTE — ASSESSMENT & PLAN NOTE
This is a chronic issue. Her symptoms are intermittent in nature, however occur weekly. Describes as dizziness with spinning sensation. She was referred to ENT specialist at last OV March 2020. She has EEG study scheduled for next Tuesday. She has tried Imitrex and meclizine which have provided no relief. She reports having to call out from work due to her symptoms. She is requesting [facility] paperwork.

## 2020-04-30 DIAGNOSIS — G43.109 MIGRAINE WITH AURA AND WITHOUT STATUS MIGRAINOSUS, NOT INTRACTABLE: ICD-10-CM

## 2020-04-30 RX ORDER — SUMATRIPTAN 25 MG/1
TABLET, FILM COATED ORAL
Qty: 9 TAB | Refills: 2 | Status: SHIPPED | OUTPATIENT
Start: 2020-04-30 | End: 2021-04-27

## 2020-05-04 ENCOUNTER — OFFICE VISIT (OUTPATIENT)
Dept: URGENT CARE | Facility: CLINIC | Age: 25
End: 2020-05-04
Payer: COMMERCIAL

## 2020-05-04 VITALS
RESPIRATION RATE: 16 BRPM | HEART RATE: 100 BPM | SYSTOLIC BLOOD PRESSURE: 118 MMHG | WEIGHT: 252 LBS | TEMPERATURE: 99.2 F | HEIGHT: 67 IN | DIASTOLIC BLOOD PRESSURE: 82 MMHG | OXYGEN SATURATION: 96 % | BODY MASS INDEX: 39.55 KG/M2

## 2020-05-04 DIAGNOSIS — L30.9 HAND DERMATITIS: ICD-10-CM

## 2020-05-04 PROCEDURE — 99213 OFFICE O/P EST LOW 20 MIN: CPT | Performed by: PHYSICIAN ASSISTANT

## 2020-05-04 ASSESSMENT — ENCOUNTER SYMPTOMS
CHILLS: 0
CARDIOVASCULAR NEGATIVE: 1
FEVER: 0
NEUROLOGICAL NEGATIVE: 1

## 2020-05-04 ASSESSMENT — FIBROSIS 4 INDEX: FIB4 SCORE: 0.34

## 2020-05-05 NOTE — PROGRESS NOTES
"Subjective:      Diana Hill is a 24 y.o. female who presents with Rash (bilateral hand)        Rash   This is a new problem. Pertinent negatives include no fever.   Patient thinks she may have contact dermatitis.     Patient is a 24-year-old female who presents with an irritation and dermatitis to her bilateral hands.  This is a chronic intermittent or waxing and waning complaint that has been occurring since October 2019.  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  She states she also washes her hands a lot at work.  She describes as an irritated, cracking, and dry.  She states she has not tried using different gloves such as nitrile gloves.  She works 3 days a week.  She did go to occupational health in October 2019 and prescribed triamcinolone cream a low-dose and high-dose.  She rarely uses the high-dose.  She has been using the low-dose 4-5 times per day while she is working, and 1 or 2 times when she is not working.  She denies any pain to the area.  Occasionally they itch.  She does have a history of eczema.  History of gluten allergy.  She denies any other pertinent past medical history        Review of Systems   Constitutional: Negative for chills and fever.   HENT: Negative.    Cardiovascular: Negative.    Skin: Positive for itching and rash.   Neurological: Negative.           Objective:     /82 (BP Location: Right arm, Patient Position: Sitting)   Pulse 100   Temp 37.3 °C (99.2 °F)   Resp 16   Ht 1.702 m (5' 7\")   Wt 114.3 kg (252 lb)   SpO2 96%   BMI 39.47 kg/m²      Physical Exam  Vitals signs reviewed.   Constitutional:       General: She is not in acute distress.     Appearance: Normal appearance. She is not ill-appearing or toxic-appearing.   HENT:      Mouth/Throat:      Mouth: Mucous membranes are moist.      Pharynx: Oropharynx is clear. No oropharyngeal exudate or posterior oropharyngeal erythema.   Eyes: "      Conjunctiva/sclera: Conjunctivae normal.   Cardiovascular:      Rate and Rhythm: Normal rate and regular rhythm.      Heart sounds: Normal heart sounds.   Pulmonary:      Effort: Pulmonary effort is normal. No respiratory distress.      Breath sounds: Normal breath sounds. No wheezing, rhonchi or rales.   Skin:     General: Skin is warm and dry.      Comments: Bilateral hands: Mild flesh-colored to light brown/pinkish dry macular rash with occasional fissuring located throughout hands. Minimal swelling. No pain, fluctuance, drainage, or erythema.   Well-defined border area of discontinuation of rash at the wrist.   Sensation intact.   Cap refill < 2 sec.    Neurological:      General: No focal deficit present.      Mental Status: She is alert and oriented to person, place, and time.   Psychiatric:         Mood and Affect: Mood normal.         Behavior: Behavior normal.       Past Medical History:   Diagnosis Date   • Anemia     hx of borderline anemia   • Anxiety    • Celiac disease    • Depression    • Insulin resistance    • Migraine    • PCOS (polycystic ovarian syndrome)       Past Surgical History:   Procedure Laterality Date   • MATTHIAS BY LAPAROSCOPY N/A 9/13/2018    Procedure: MATTHIAS BY LAPAROSCOPY;  Surgeon: Hayden Wilhelm M.D.;  Location: SURGERY [facility];  Service: General      Social History     Socioeconomic History   • Marital status: Single     Spouse name: Not on file   • Number of children: Not on file   • Years of education: Not on file   • Highest education level: Not on file   Occupational History     Comment: CNA ; Nursing student   Social Needs   • Financial resource strain: Not on file   • Food insecurity     Worry: Not on file     Inability: Not on file   • Transportation needs     Medical: Not on file     Non-medical: Not on file   Tobacco Use   • Smoking status: Never Smoker   • Smokeless tobacco: Never Used   Substance and Sexual Activity   • Alcohol use: Not Currently      Comment: occasionally   • Drug use: No   • Sexual activity: Never     Partners: Male, Female   Lifestyle   • Physical activity     Days per week: Not on file     Minutes per session: Not on file   • Stress: To some extent   Relationships   • Social connections     Talks on phone: Not on file     Gets together: Not on file     Attends Amish service: Not on file     Active member of club or organization: Not on file     Attends meetings of clubs or organizations: Not on file     Relationship status: Not on file   • Intimate partner violence     Fear of current or ex partner: Not on file     Emotionally abused: Not on file     Physically abused: Not on file     Forced sexual activity: Not on file   Other Topics Concern   • Not on file   Social History Narrative   • Not on file    Gluten meal          Assessment/Plan:     1. Hand dermatitis    - REFERRAL TO DERMATOLOGY    Discussed with patient her signs and symptoms most likely a irritant contact dermatitis versus allergic contact dermatitis.     Patient states she does not need a refill on her triamcinolone cream.  Continue high-dose triamcinolone cream topical twice per day.  Discontinue low-dose for now.  Cetaphil emollient for hydration of hands.  Keep hands clean.  Apply lotion especially after drying hands after washing them.      Patient's hand dermatitis has well-defined borders especially at the wrist.  Discussed possibility of irritant or allergic dermatitis caused by latex gloves she has been using.  Avoid using latex gloves and try nitrile gloves while at work.   Offered short course of oral steroids, however, patient declined because she does not like the way that they make her feel.  Agreed not to prescribe oral steroids.  Discharge instructions on dermatitis and rash handed to patient.    Patient would like referral to dermatology due to this chronic issue and also history of eczema.    Supportive care, differential diagnoses, and indications for  immediate follow-up discussed with patient.    Pathogenesis of diagnosis discussed including typical length and natural progression. Patient expresses understanding and agrees to plan.    Please note that this dictation was created using voice recognition software. I have made every reasonable attempt to correct obvious errors, but I expect that there are errors of grammar and possibly content that I did not discover before finalizing the note.

## 2020-05-05 NOTE — PATIENT INSTRUCTIONS
Rash  Introduction  A rash is a change in the color of the skin. A rash can also change the way your skin feels. There are many different conditions and factors that can cause a rash.  Follow these instructions at home:  Pay attention to any changes in your symptoms. Follow these instructions to help with your condition:  Medicine   Take or apply over-the-counter and prescription medicines only as told by your doctor. These may include:  · Corticosteroid cream.  · Anti-itch lotions.  · Oral antihistamines.  Skin Care  · Put cool compresses on the affected areas.  · Try taking a bath with:  ¨ Epsom salts. Follow the instructions on the packaging. You can get these at your local pharmacy or grocery store.  ¨ Baking soda. Pour a small amount into the bath as told by your doctor.  ¨ Colloidal oatmeal. Follow the instructions on the packaging. You can get this at your local pharmacy or grocery store.  · Try putting baking soda paste onto your skin. Stir water into baking soda until it gets like a paste.  · Do not scratch or rub your skin.  · Avoid covering the rash. Make sure the rash is exposed to air as much as possible.  General instructions  · Avoid hot showers or baths, which can make itching worse. A cold shower may help.  · Avoid scented soaps, detergents, and perfumes. Use gentle soaps, detergents, perfumes, and other cosmetic products.  · Avoid anything that causes your rash. Keep a journal to help track what causes your rash. Write down:  ¨ What you eat.  ¨ What cosmetic products you use.  ¨ What you drink.  ¨ What you wear. This includes jewelry.  · Keep all follow-up visits as told by your doctor. This is important.  Contact a doctor if:  · You sweat at night.  · You lose weight.  · You pee (urinate) more than normal.  · You feel weak.  · You throw up (vomit).  · Your skin or the whites of your eyes look yellow (jaundice).  · Your skin:  ¨ Tingles.  ¨ Is numb.  · Your rash:  ¨ Does not go away after a few  days.  ¨ Gets worse.  · You are:  ¨ More thirsty than normal.  ¨ More tired than normal.  · You have:  ¨ New symptoms.  ¨ Pain in your belly (abdomen).  ¨ A fever.  ¨ Watery poop (diarrhea).  Get help right away if:  · Your rash covers all or most of your body. The rash may or may not be painful.  · You have blisters that:  ¨ Are on top of the rash.  ¨ Grow larger.  ¨ Grow together.  ¨ Are painful.  ¨ Are inside your nose or mouth.  · You have a rash that:  ¨ Looks like purple pinprick-sized spots all over your body.  ¨ Has a “bull's eye” or looks like a target.  ¨ Is red and painful, causes your skin to peel, and is not from being in the sun too long.  This information is not intended to replace advice given to you by your health care provider. Make sure you discuss any questions you have with your health care provider.  Document Released: 06/05/2009 Document Revised: 05/25/2017 Document Reviewed: 05/04/2016  © 2017 [employer]  Contact Dermatitis  Introduction  Dermatitis is redness, soreness, and swelling (inflammation) of the skin. Contact dermatitis is a reaction to certain substances that touch the skin. There are two types of contact dermatitis:  · Irritant contact dermatitis. This type is caused by something that irritates your skin, such as dry hands from washing them too much. This type does not require previous exposure to the substance for a reaction to occur. This type is more common.  · Allergic contact dermatitis. This type is caused by a substance that you are allergic to, such as a nickel allergy or poison ivy. This type only occurs if you have been exposed to the substance (allergen) before. Upon a repeat exposure, your body reacts to the substance. This type is less common.  What are the causes?  Many different substances can cause contact dermatitis. Irritant contact dermatitis is most commonly caused by exposure to:  · Makeup.  · Soaps.  · Detergents.  · Bleaches.  · Acids.  · Metal salts, such as  nickel.  Allergic contact dermatitis is most commonly caused by exposure to:  · Poisonous plants.  · Chemicals.  · Jewelry.  · Latex.  · Medicines.  · Preservatives in products, such as clothing.  What increases the risk?  This condition is more likely to develop in:  · People who have jobs that expose them to irritants or allergens.  · People who have certain medical conditions, such as asthma or eczema.  What are the signs or symptoms?  Symptoms of this condition may occur anywhere on your body where the irritant has touched you or is touched by you. Symptoms include:  · Dryness or flaking.  · Redness.  · Cracks.  · Itching.  · Pain or a burning feeling.  · Blisters.  · Drainage of small amounts of blood or clear fluid from skin cracks.  With allergic contact dermatitis, there may also be swelling in areas such as the eyelids, mouth, or genitals.  How is this diagnosed?  This condition is diagnosed with a medical history and physical exam. A patch skin test may be performed to help determine the cause. If the condition is related to your job, you may need to see an occupational medicine specialist.  How is this treated?  Treatment for this condition includes figuring out what caused the reaction and protecting your skin from further contact. Treatment may also include:  · Steroid creams or ointments. Oral steroid medicines may be needed in more severe cases.  · Antibiotics or antibacterial ointments, if a skin infection is present.  · Antihistamine lotion or an antihistamine taken by mouth to ease itching.  · A bandage (dressing).  Follow these instructions at home:  Skin Care  · Moisturize your skin as needed.  · Apply cool compresses to the affected areas.  · Try taking a bath with:  ¨ Epsom salts. Follow the instructions on the packaging. You can get these at your local pharmacy or grocery store.  ¨ Baking soda. Pour a small amount into the bath as directed by your health care provider.  ¨ Colloidal oatmeal.  Follow the instructions on the packaging. You can get this at your local pharmacy or grocery store.  · Try applying baking soda paste to your skin. Stir water into baking soda until it reaches a paste-like consistency.  · Do not scratch your skin.  · Bathe less frequently, such as every other day.  · Bathe in lukewarm water. Avoid using hot water.  Medicines  · Take or apply over-the-counter and prescription medicines only as told by your health care provider.  · If you were prescribed an antibiotic medicine, take or apply your antibiotic as told by your health care provider. Do not stop using the antibiotic even if your condition starts to improve.  General instructions  · Keep all follow-up visits as told by your health care provider. This is important.  · Avoid the substance that caused your reaction. If you do not know what caused it, keep a journal to try to track what caused it. Write down:  ¨ What you eat.  ¨ What cosmetic products you use.  ¨ What you drink.  ¨ What you wear in the affected area. This includes jewelry.  · If you were given a dressing, take care of it as told by your health care provider. This includes when to change and remove it.  Contact a health care provider if:  · Your condition does not improve with treatment.  · Your condition gets worse.  · You have signs of infection such as swelling, tenderness, redness, soreness, or warmth in the affected area.  · You have a fever.  · You have new symptoms.  Get help right away if:  · You have a severe headache, neck pain, or neck stiffness.  · You vomit.  · You feel very sleepy.  · You notice red streaks coming from the affected area.  · Your bone or joint underneath the affected area becomes painful after the skin has healed.  · The affected area turns darker.  · You have difficulty breathing.  This information is not intended to replace advice given to you by your health care provider. Make sure you discuss any questions you have with your  health care provider.  Document Released: 12/15/2001 Document Revised: 05/25/2017 Document Reviewed: 05/04/2016  © 2017 [employer]  Hand Dermatitis  Introduction  Hand dermatitis is a skin condition. It causes small, itchy, raised dots or fluid-filled blisters to form on the palms of the hands. This condition may also be called hand eczema.  Follow these instructions at home:  · Take or apply over-the-counter and prescription medicines only as told by your doctor.  · If you were prescribed an antibiotic medicine, use it as told by your doctor. Do not stop using the antibiotic even if you start to feel better.  · Avoid washing your hands more often than you need to.  · Avoid using harsh chemicals on your hands.  · Wear gloves that protect your hands when you handle products that can bother (irritate) your skin.  · Keep all follow-up visits as told by your doctor. This is important.  Contact a doctor if:  · Your rash is not better after one week of treatment.  · Your rash is red.  · Your rash is tender.  · Your rash has pus coming from it.  · Your rash spreads.  This information is not intended to replace advice given to you by your health care provider. Make sure you discuss any questions you have with your health care provider.  Document Released: 03/14/2011 Document Revised: 05/25/2017 Document Reviewed: 07/01/2016  © 2017 [employer]

## 2020-05-19 ENCOUNTER — HOSPITAL ENCOUNTER (EMERGENCY)
Facility: MEDICAL CENTER | Age: 25
End: 2020-05-19
Attending: EMERGENCY MEDICINE
Payer: COMMERCIAL

## 2020-05-19 ENCOUNTER — APPOINTMENT (OUTPATIENT)
Dept: RADIOLOGY | Facility: MEDICAL CENTER | Age: 25
End: 2020-05-19
Attending: EMERGENCY MEDICINE
Payer: COMMERCIAL

## 2020-05-19 VITALS
DIASTOLIC BLOOD PRESSURE: 88 MMHG | HEIGHT: 67 IN | SYSTOLIC BLOOD PRESSURE: 126 MMHG | BODY MASS INDEX: 40.51 KG/M2 | TEMPERATURE: 97.9 F | WEIGHT: 258.1 LBS | HEART RATE: 61 BPM | RESPIRATION RATE: 16 BRPM | OXYGEN SATURATION: 98 %

## 2020-05-19 DIAGNOSIS — R10.31 RIGHT LOWER QUADRANT ABDOMINAL PAIN: ICD-10-CM

## 2020-05-19 LAB
ALBUMIN SERPL BCP-MCNC: 3.9 G/DL (ref 3.2–4.9)
ALBUMIN/GLOB SERPL: 1.4 G/DL
ALP SERPL-CCNC: 72 U/L (ref 30–99)
ALT SERPL-CCNC: 22 U/L (ref 2–50)
AMORPH CRY #/AREA URNS HPF: PRESENT /HPF
ANION GAP SERPL CALC-SCNC: 11 MMOL/L (ref 7–16)
APPEARANCE UR: ABNORMAL
AST SERPL-CCNC: 17 U/L (ref 12–45)
BACTERIA #/AREA URNS HPF: ABNORMAL /HPF
BASOPHILS # BLD AUTO: 0.5 % (ref 0–1.8)
BASOPHILS # BLD: 0.04 K/UL (ref 0–0.12)
BILIRUB SERPL-MCNC: 0.2 MG/DL (ref 0.1–1.5)
BILIRUB UR QL STRIP.AUTO: NEGATIVE
BUN SERPL-MCNC: 9 MG/DL (ref 8–22)
CALCIUM SERPL-MCNC: 8.7 MG/DL (ref 8.4–10.2)
CHLORIDE SERPL-SCNC: 110 MMOL/L (ref 96–112)
CO2 SERPL-SCNC: 23 MMOL/L (ref 20–33)
COLOR UR: YELLOW
CREAT SERPL-MCNC: 0.63 MG/DL (ref 0.5–1.4)
EOSINOPHIL # BLD AUTO: 0.15 K/UL (ref 0–0.51)
EOSINOPHIL NFR BLD: 1.9 % (ref 0–6.9)
EPI CELLS #/AREA URNS HPF: ABNORMAL /HPF
ERYTHROCYTE [DISTWIDTH] IN BLOOD BY AUTOMATED COUNT: 47.2 FL (ref 35.9–50)
GLOBULIN SER CALC-MCNC: 2.7 G/DL (ref 1.9–3.5)
GLUCOSE SERPL-MCNC: 99 MG/DL (ref 65–99)
GLUCOSE UR STRIP.AUTO-MCNC: NEGATIVE MG/DL
HCG UR QL: NEGATIVE
HCT VFR BLD AUTO: 40.7 % (ref 37–47)
HGB BLD-MCNC: 12.8 G/DL (ref 12–16)
IMM GRANULOCYTES # BLD AUTO: 0.02 K/UL (ref 0–0.11)
IMM GRANULOCYTES NFR BLD AUTO: 0.3 % (ref 0–0.9)
KETONES UR STRIP.AUTO-MCNC: NEGATIVE MG/DL
LEUKOCYTE ESTERASE UR QL STRIP.AUTO: NEGATIVE
LIPASE SERPL-CCNC: 25 U/L (ref 7–58)
LYMPHOCYTES # BLD AUTO: 2.82 K/UL (ref 1–4.8)
LYMPHOCYTES NFR BLD: 35.8 % (ref 22–41)
MCH RBC QN AUTO: 26.8 PG (ref 27–33)
MCHC RBC AUTO-ENTMCNC: 31.4 G/DL (ref 33.6–35)
MCV RBC AUTO: 85.1 FL (ref 81.4–97.8)
MICRO URNS: ABNORMAL
MONOCYTES # BLD AUTO: 0.7 K/UL (ref 0–0.85)
MONOCYTES NFR BLD AUTO: 8.9 % (ref 0–13.4)
MUCOUS THREADS #/AREA URNS HPF: ABNORMAL /HPF
NEUTROPHILS # BLD AUTO: 4.14 K/UL (ref 2–7.15)
NEUTROPHILS NFR BLD: 52.6 % (ref 44–72)
NITRITE UR QL STRIP.AUTO: NEGATIVE
NRBC # BLD AUTO: 0 K/UL
NRBC BLD-RTO: 0 /100 WBC
PH UR STRIP.AUTO: 7.5 [PH] (ref 5–8)
PLATELET # BLD AUTO: 258 K/UL (ref 164–446)
PMV BLD AUTO: 10.6 FL (ref 9–12.9)
POTASSIUM SERPL-SCNC: 4.3 MMOL/L (ref 3.6–5.5)
PROT SERPL-MCNC: 6.6 G/DL (ref 6–8.2)
PROT UR QL STRIP: NEGATIVE MG/DL
RBC # BLD AUTO: 4.78 M/UL (ref 4.2–5.4)
RBC # URNS HPF: ABNORMAL /HPF
RBC UR QL AUTO: ABNORMAL
SODIUM SERPL-SCNC: 144 MMOL/L (ref 135–145)
SP GR UR STRIP.AUTO: 1.02
WBC # BLD AUTO: 7.9 K/UL (ref 4.8–10.8)
WBC #/AREA URNS HPF: ABNORMAL /HPF

## 2020-05-19 PROCEDURE — 81025 URINE PREGNANCY TEST: CPT

## 2020-05-19 PROCEDURE — 96374 THER/PROPH/DIAG INJ IV PUSH: CPT

## 2020-05-19 PROCEDURE — 81001 URINALYSIS AUTO W/SCOPE: CPT

## 2020-05-19 PROCEDURE — 99284 EMERGENCY DEPT VISIT MOD MDM: CPT

## 2020-05-19 PROCEDURE — 700111 HCHG RX REV CODE 636 W/ 250 OVERRIDE (IP)

## 2020-05-19 PROCEDURE — 85025 COMPLETE CBC W/AUTO DIFF WBC: CPT

## 2020-05-19 PROCEDURE — 80053 COMPREHEN METABOLIC PANEL: CPT

## 2020-05-19 PROCEDURE — 76856 US EXAM PELVIC COMPLETE: CPT

## 2020-05-19 PROCEDURE — 83690 ASSAY OF LIPASE: CPT

## 2020-05-19 RX ORDER — KETOROLAC TROMETHAMINE 30 MG/ML
15 INJECTION, SOLUTION INTRAMUSCULAR; INTRAVENOUS ONCE
Status: COMPLETED | OUTPATIENT
Start: 2020-05-19 | End: 2020-05-19

## 2020-05-19 RX ORDER — KETOROLAC TROMETHAMINE 30 MG/ML
INJECTION, SOLUTION INTRAMUSCULAR; INTRAVENOUS
Status: COMPLETED
Start: 2020-05-19 | End: 2020-05-19

## 2020-05-19 RX ADMIN — KETOROLAC TROMETHAMINE 15 MG: 30 INJECTION, SOLUTION INTRAMUSCULAR at 22:06

## 2020-05-19 RX ADMIN — KETOROLAC TROMETHAMINE 15 MG: 30 INJECTION, SOLUTION INTRAMUSCULAR; INTRAVENOUS at 22:06

## 2020-05-19 ASSESSMENT — ENCOUNTER SYMPTOMS
NECK PAIN: 0
FEVER: 0
BACK PAIN: 0
SEIZURES: 0
NAUSEA: 1
HEADACHES: 0
FOCAL WEAKNESS: 0
SORE THROAT: 0
BLURRED VISION: 0
DIARRHEA: 1
ABDOMINAL PAIN: 1
VOMITING: 0
SHORTNESS OF BREATH: 0
EYE REDNESS: 0
COUGH: 0
CHILLS: 0

## 2020-05-19 ASSESSMENT — FIBROSIS 4 INDEX: FIB4 SCORE: 0.34

## 2020-05-20 NOTE — ED NOTES
Pt report acute onset of right lower abd pain. Pt reports no urinary issues. Pt took tylenol and motrin pta

## 2020-05-20 NOTE — ED TRIAGE NOTES
Pt. Educated on triage process, instructed to notify staff of any changes or needs while waiting.

## 2020-05-20 NOTE — DISCHARGE INSTRUCTIONS
You were seen in the Emergency Department for pelvic pain.    Labs, urine test, ultrasound were completed without significant acute abnormalities.    Please use 1,000mg of tylenol or 600mg of ibuprofen every 6 hours as needed for pain.    Please follow up with your primary care physician.    Return to the Emergency Department with worsening pain, fevers, vomiting, or other concerns.

## 2020-05-20 NOTE — ED PROVIDER NOTES
"ED Provider Note    CHIEF COMPLAINT  Chief Complaint   Patient presents with   • Abdominal Pain     Pt. reports \"I was getting out of the car today and I felt like something popped, like my ovary, and it's been really painful since\". Denies vomiting or fevers.   • Diarrhea     Reports appro 3-4 episodes today. COVID screen negative.       HPI  Diana Hill is a 24 y.o. female with past medical history of PCOS, depression/anxiety, celiac disease who presents with abdominal pain.  The patient states that she started her menstrual cycle earlier this morning and she typically does have painful periods.  However states that this afternoon she developed acute onset of severe right lower quadrant pain.  She states that she felt a \"coughing\" inside of her.  She has had similar pain in the right lower quadrant before I however has not had diagnosis of ovarian cyst.  She states that she has never been sexually active therefore denies chance of pregnancy or sexually transmitted disease.  Denies associated fevers, vomiting, dysuria.  She has had a few episodes of diarrhea today however states that is normal with her periods.  She has had a history of cholecystectomy however no other abdominal surgeries.      REVIEW OF SYSTEMS  See HPI for further details.   Review of Systems   Constitutional: Negative for chills and fever.   HENT: Negative for sore throat.    Eyes: Negative for blurred vision and redness.   Respiratory: Negative for cough and shortness of breath.    Cardiovascular: Negative for chest pain and leg swelling.   Gastrointestinal: Positive for abdominal pain, diarrhea and nausea. Negative for vomiting.   Genitourinary: Negative for dysuria and urgency.        Vaginal bleeding   Musculoskeletal: Negative for back pain and neck pain.   Skin: Negative for rash.   Neurological: Negative for focal weakness, seizures and headaches.   Psychiatric/Behavioral: Negative for suicidal ideas.         PAST MEDICAL " "HISTORY   has a past medical history of Anemia, Anxiety, Celiac disease, Depression, Insulin resistance, Migraine, PCOS (polycystic ovarian syndrome), and Vertigo.    SOCIAL HISTORY  Social History     Tobacco Use   • Smoking status: Never Smoker   • Smokeless tobacco: Never Used   Substance and Sexual Activity   • Alcohol use: Not Currently     Comment: occasionally   • Drug use: No   • Sexual activity: Never     Partners: Male, Female       SURGICAL HISTORY   has a past surgical history that includes nadeen by laparoscopy (N/A, 9/13/2018).    CURRENT MEDICATIONS  Home Medications    **Home medications have not yet been reviewed for this encounter**         ALLERGIES  Allergies   Allergen Reactions   • Gluten Meal        PHYSICAL EXAM   VITAL SIGNS: /88   Pulse 61   Temp 36.6 °C (97.9 °F) (Temporal)   Resp 16   Ht 1.702 m (5' 7\")   Wt 117.1 kg (258 lb 1.6 oz)   SpO2 98%   BMI 40.42 kg/m²      Physical Exam   Constitutional: She is oriented to person, place, and time and well-developed, well-nourished, and in no distress. No distress.   Nontoxic-appearing obese female   HENT:   Head: Normocephalic and atraumatic.   Eyes: Pupils are equal, round, and reactive to light. Conjunctivae are normal.   Neck: Normal range of motion. Neck supple.   Cardiovascular: Normal rate, regular rhythm and normal heart sounds.   Pulmonary/Chest: Effort normal and breath sounds normal. No respiratory distress.   Abdominal: Soft. She exhibits no distension. There is abdominal tenderness.   RLQ tenderness to palpation, no rebound or guarding   Musculoskeletal: Normal range of motion.         General: No tenderness or edema.   Neurological: She is alert and oriented to person, place, and time.   Moving all extremities spontaneously   Skin: Skin is warm and dry.   Psychiatric: Affect normal.         DIAGNOSTIC STUDIES    LABS  Personally reviewed by me  Labs Reviewed   URINALYSIS,CULTURE IF INDICATED - Abnormal; Notable for the " "following components:       Result Value    Character Hazy (*)     Occult Blood Large (*)     All other components within normal limits   URINE MICROSCOPIC (W/UA) - Abnormal; Notable for the following components:    RBC 10-20 (*)     Bacteria Few (*)     All other components within normal limits   CBC WITH DIFFERENTIAL - Abnormal; Notable for the following components:    MCH 26.8 (*)     MCHC 31.4 (*)     All other components within normal limits   HCG QUALITATIVE UR   COMP METABOLIC PANEL   LIPASE   ESTIMATED GFR           RADIOLOGY  Personally reviewed by me  US-PELVIC COMPLETE (TRANSABDOMINAL/TRANSVAGINAL) (COMBO)   Final Result      Unremarkable transvaginal appearance of the pelvis.          ED COURSE  Vitals:    05/19/20 2114 05/19/20 2250   BP: 126/88    Pulse: 83 61   Resp: 16    Temp: 36.6 °C (97.9 °F)    TempSrc: Temporal    SpO2: 97% 98%   Weight: 117.1 kg (258 lb 1.6 oz)    Height: 1.702 m (5' 7\")          Medications administered:  Medications   ketorolac (TORADOL) injection 15 mg (15 mg Intravenous Given 5/19/20 2206)       MEDICAL DECISION MAKING  Patient with history of PCOS presents with right lower quadrant pain in the setting of starting her menstrual cycle today.  She is afebrile with normal vital signs on arrival and nontoxic-appearing.  Abdominal exam demonstrates tenderness in the right lower quadrant however no concern for underlying surgical process such as obstruction or perforation.  Appendicitis is less likely due to acute onset of pain.  She does have a history of prior cholecystectomy.  Labs are reassuring without leukocytosis, transaminitis, or elevated lipase concerning for pancreatitis.  Patient is not pregnant.  Urinalysis is negative for infection.  She has never been sexually active therefore STDs is not a concern.  Pelvic ultrasound shows follicles on bilateral ovaries which is consistent with PCOS however no signs of ovarian torsion or cyst.    11:45 PM - Upon reassessment, " patient is resting comfortably with normal vital signs.  No new complaints at this time.  Discussed results with patient and/or family as well as importance of primary care and OB/GYN follow up.  She has been off of birth control for the last year therefore we will try again to see if it helps with dysmenorrhea.  Patient understands plan of care and strict return precautions for new or changing symptoms.       The patient is referred to a primary physician for blood pressure management, diabetic screening, and for all other preventative health concerns.      DISPOSITION:  Patient will be discharged home in stable condition.    FOLLOW UP:  DANIELE Oro  [address] [facility] [address]  [facility] [id number] [phone]    Schedule an appointment as soon as possible for a visit       [facility], Emergency Dept  [address]  [place] [id number] [phone]    If symptoms worsen      OUTPATIENT MEDICATIONS:  Discharge Medication List as of 5/19/2020 11:48 PM      START taking these medications    Details   norethindrone-ethinyl estradiol (OVCON) 0.4-35 MG-MCG per tablet Take 1 Tab by mouth every day., Disp-28 Tab,R-6, Print Rx Paper               IMPRESSION  (R10.31) Right lower quadrant abdominal pain      Results, diagnoses, and treatment options were discussed with the patient and/or family. Patient verbalized understanding of plan of care.    Patient referred to primary care provider for monitoring and treatment of blood pressure.      Electronically signed by: Lady White M.D., 5/19/2020 10:03 PM

## 2020-06-17 ENCOUNTER — OFFICE VISIT (OUTPATIENT)
Dept: URGENT CARE | Facility: CLINIC | Age: 25
End: 2020-06-17
Payer: COMMERCIAL

## 2020-06-17 VITALS
OXYGEN SATURATION: 98 % | HEIGHT: 67 IN | HEART RATE: 81 BPM | TEMPERATURE: 98 F | DIASTOLIC BLOOD PRESSURE: 78 MMHG | BODY MASS INDEX: 39.85 KG/M2 | SYSTOLIC BLOOD PRESSURE: 122 MMHG | WEIGHT: 253.9 LBS

## 2020-06-17 DIAGNOSIS — H92.03 OTALGIA OF BOTH EARS: ICD-10-CM

## 2020-06-17 PROCEDURE — 99213 OFFICE O/P EST LOW 20 MIN: CPT | Performed by: PHYSICIAN ASSISTANT

## 2020-06-17 ASSESSMENT — ENCOUNTER SYMPTOMS
SHORTNESS OF BREATH: 0
NAUSEA: 0
FEVER: 0
SORE THROAT: 1
RHINORRHEA: 0
MYALGIAS: 0
VOMITING: 0
COUGH: 0
EYE REDNESS: 0
HEADACHES: 0
EYE DISCHARGE: 0

## 2020-06-17 ASSESSMENT — FIBROSIS 4 INDEX: FIB4 SCORE: 0.31

## 2020-06-17 NOTE — PROGRESS NOTES
Subjective:      Diana Hill is a 24 y.o. female who presents with Ear Pain ((R) ear pain x 3 days; (L) pounding like a drum x 3 days )        Otalgia    There is pain in both ears. This is a new problem. Episode onset: x 3 days ago. The problem occurs constantly. The problem has been unchanged. There has been no fever. The pain is mild. Associated symptoms include a sore throat (The patient reports a slight sore throat at the onset of symptoms, now resolved.). Pertinent negatives include no coughing, ear discharge, headaches, rash, rhinorrhea or vomiting. She has tried NSAIDs (and Claritin) for the symptoms.     PMH:  has a past medical history of Anemia, Anxiety, Celiac disease, Depression, Insulin resistance, Migraine, PCOS (polycystic ovarian syndrome), and Vertigo.  MEDS:   Current Outpatient Medications:   •  norethindrone-ethinyl estradiol (OVCON) 0.4-35 MG-MCG per tablet, Take 1 Tab by mouth every day., Disp: 28 Tab, Rfl: 6  •  SUMAtriptan (IMITREX) 25 MG Tab tablet, TAKE 1 TO 4 TABLETS BY MOUTH ONCE AS NEEDED FOR MIGRAINE (MAY REPEAT DOSE AFTER 2 HOURS, MAX DAILY DOSE 200MG) FOR UP TO 1 DOSE., Disp: 9 Tab, Rfl: 2  •  albuterol 108 (90 Base) MCG/ACT Aero Soln inhalation aerosol, Inhale 2 Puffs by mouth every four hours as needed for Shortness of Breath., Disp: 1 Inhaler, Rfl: 0  •  citalopram (CELEXA) 20 MG Tab, Take 1.5 Tabs by mouth every day., Disp: 135 Tab, Rfl: 1  •  triamcinolone acetonide (KENALOG) 0.1 % Ointment, Apply 1 Application to affected area(s) 3 times a day as needed., Disp: 80 g, Rfl: 1  ALLERGIES:   Allergies   Allergen Reactions   • Gluten Meal      SURGHX:   Past Surgical History:   Procedure Laterality Date   • MATTHIAS BY LAPAROSCOPY N/A 9/13/2018    Procedure: MATTHIAS BY LAPAROSCOPY;  Surgeon: Hayden Wilhelm M.D.;  Location: SURGERY [facility];  Service: General     SOCHX:  reports that she has never smoked. She has never used smokeless tobacco. She reports previous  "alcohol use. She reports that she does not use drugs.  FH: Family history was reviewed, no pertinent findings to report    Review of Systems   Constitutional: Negative for fever.   HENT: Positive for congestion, ear pain and sore throat (The patient reports a slight sore throat at the onset of symptoms, now resolved.). Negative for ear discharge and rhinorrhea.    Eyes: Negative for discharge and redness.   Respiratory: Negative for cough and shortness of breath.    Cardiovascular: Negative for chest pain and leg swelling.   Gastrointestinal: Negative for nausea and vomiting.   Musculoskeletal: Negative for myalgias.   Skin: Negative for rash.   Neurological: Negative for headaches.          Objective:     /78 (BP Location: Right arm, Patient Position: Sitting)   Pulse 81   Temp 36.7 °C (98 °F) (Temporal)   Ht 1.702 m (5' 7\")   Wt 115.2 kg (253 lb 14.4 oz)   SpO2 98%   BMI 39.77 kg/m²      Physical Exam  Constitutional:       General: She is not in acute distress.     Appearance: Normal appearance. She is not ill-appearing.   HENT:      Head: Normocephalic and atraumatic.      Right Ear: Tympanic membrane, ear canal and external ear normal.      Left Ear: Tympanic membrane, ear canal and external ear normal.      Nose: Nose normal.      Mouth/Throat:      Mouth: Mucous membranes are moist.      Pharynx: Oropharynx is clear. Uvula midline. No posterior oropharyngeal erythema.      Tonsils: No tonsillar exudate.   Eyes:      Extraocular Movements: Extraocular movements intact.      Conjunctiva/sclera: Conjunctivae normal.   Neck:      Musculoskeletal: Normal range of motion and neck supple.   Cardiovascular:      Rate and Rhythm: Normal rate and regular rhythm.      Heart sounds: Normal heart sounds.   Pulmonary:      Effort: Pulmonary effort is normal. No respiratory distress.      Breath sounds: Normal breath sounds. No wheezing.   Musculoskeletal: Normal range of motion.   Skin:     General: Skin is " warm and dry.   Neurological:      Mental Status: She is alert and oriented to person, place, and time.                 Assessment/Plan:     1. Otalgia of both ears    The patient's presenting symptoms and physical exam findings are consistent with otalgia of the bilateral ears.  On physical exam, the patient's bilateral TMs were clear without erythema or bulging, indicating the patient symptoms are unlikely due to an acute otitis media.  The remainder the patient's physical exam today in clinic was normal.  The patient's bilateral ear pain may be related to seasonal allergies and/or eustachian tube dysfunction.  Recommend OTC medications and supportive care for symptomatic management.  Recommend the patient follow-up with her PCP.  Discussed return precautions with the patient, and she verbalized understanding.    Differential diagnoses, supportive care, and indications for immediate follow-up discussed with patient.   Instructed to return to clinic or nearest emergency department for any change in condition, further concerns, or worsening of symptoms.    OTC Tylenol or Motrin for fever/discomfort.  OTC antihistamines for symptomatic relief  OTC Flonase for symptomatic relief  OTC oral decongestant for symptomatic relief  Drink plenty of fluids  Follow-up with PCP  AVS printed  Return to clinic or go to the ED if symptoms worsen or fail to improve, or if the patient did develop worsening/increasing ear pain, drainage from the affected ear, cough, congestion, sore throat, fever/chills, and/or any concerning symptoms.    Discussed plan with the patient, and she agrees to the above.     Please note that this dictation was created using voice recognition software. I have made every reasonable attempt to correct obvious errors, but I expect that there may be errors of grammar and possibly content that I did not discover before finalizing the note.

## 2020-08-12 ENCOUNTER — OFFICE VISIT (OUTPATIENT)
Dept: MEDICAL GROUP | Facility: PHYSICIAN GROUP | Age: 25
End: 2020-08-12
Payer: COMMERCIAL

## 2020-08-12 VITALS
SYSTOLIC BLOOD PRESSURE: 114 MMHG | WEIGHT: 258 LBS | HEIGHT: 67 IN | BODY MASS INDEX: 40.49 KG/M2 | HEART RATE: 88 BPM | DIASTOLIC BLOOD PRESSURE: 78 MMHG | OXYGEN SATURATION: 97 % | TEMPERATURE: 97.2 F

## 2020-08-12 DIAGNOSIS — R21 RASH OF HANDS: ICD-10-CM

## 2020-08-12 DIAGNOSIS — R06.83 SNORING: ICD-10-CM

## 2020-08-12 PROCEDURE — 99214 OFFICE O/P EST MOD 30 MIN: CPT | Performed by: NURSE PRACTITIONER

## 2020-08-12 RX ORDER — DIAZEPAM 5 MG/1
TABLET ORAL
COMMUNITY
Start: 2020-05-15 | End: 2021-11-17

## 2020-08-12 RX ORDER — RIZATRIPTAN BENZOATE 10 MG/1
TABLET ORAL
COMMUNITY
Start: 2020-06-26 | End: 2020-08-31

## 2020-08-12 ASSESSMENT — FIBROSIS 4 INDEX: FIB4 SCORE: 0.32

## 2020-08-12 NOTE — ASSESSMENT & PLAN NOTE
New to me.  New problem.  Rash on hands for the last 2 months, comes and goes.  Worse after wearing gloves at work.  Has tried OTC medications, rest does not go away completely.  No rash on any other parts of the body.  No bleeding, itching or pain.

## 2020-08-12 NOTE — ASSESSMENT & PLAN NOTE
New to me.  This is a chronic problem.  Experiencing daytime fatigue.  She denies CP, palpitations, dyspnea.

## 2020-08-13 DIAGNOSIS — L24.89 IRRITANT CONTACT DERMATITIS DUE TO OTHER AGENTS: ICD-10-CM

## 2020-08-13 RX ORDER — TRIAMCINOLONE ACETONIDE 1 MG/G
1 OINTMENT TOPICAL 3 TIMES DAILY PRN
Qty: 80 G | Refills: 1 | Status: SHIPPED | OUTPATIENT
Start: 2020-08-13 | End: 2020-09-15 | Stop reason: SDUPTHER

## 2020-08-21 ENCOUNTER — EH NON-PROVIDER (OUTPATIENT)
Dept: OCCUPATIONAL MEDICINE | Facility: CLINIC | Age: 25
End: 2020-08-21

## 2020-08-21 DIAGNOSIS — Z02.89 ENCOUNTER FOR OCCUPATIONAL HEALTH EXAMINATION INVOLVING RESPIRATOR: ICD-10-CM

## 2020-08-21 PROCEDURE — 94375 RESPIRATORY FLOW VOLUME LOOP: CPT | Performed by: NURSE PRACTITIONER

## 2020-08-26 DIAGNOSIS — F32.A ANXIETY AND DEPRESSION: ICD-10-CM

## 2020-08-26 DIAGNOSIS — F41.9 ANXIETY AND DEPRESSION: ICD-10-CM

## 2020-08-26 RX ORDER — CITALOPRAM 20 MG/1
TABLET ORAL
Qty: 135 TAB | Refills: 1 | Status: SHIPPED | OUTPATIENT
Start: 2020-08-26 | End: 2021-03-11

## 2020-08-31 ENCOUNTER — HOSPITAL ENCOUNTER (EMERGENCY)
Facility: MEDICAL CENTER | Age: 25
End: 2020-08-31
Attending: EMERGENCY MEDICINE
Payer: COMMERCIAL

## 2020-08-31 VITALS
BODY MASS INDEX: 40.73 KG/M2 | HEART RATE: 61 BPM | DIASTOLIC BLOOD PRESSURE: 66 MMHG | HEIGHT: 67 IN | RESPIRATION RATE: 16 BRPM | OXYGEN SATURATION: 99 % | WEIGHT: 259.48 LBS | TEMPERATURE: 98.5 F | SYSTOLIC BLOOD PRESSURE: 111 MMHG

## 2020-08-31 DIAGNOSIS — R25.1 TREMULOUSNESS: ICD-10-CM

## 2020-08-31 DIAGNOSIS — R53.1 WEAKNESS: ICD-10-CM

## 2020-08-31 DIAGNOSIS — R42 LIGHTHEADEDNESS: ICD-10-CM

## 2020-08-31 LAB
ALBUMIN SERPL BCP-MCNC: 3.9 G/DL (ref 3.2–4.9)
ALBUMIN/GLOB SERPL: 1.3 G/DL
ALP SERPL-CCNC: 75 U/L (ref 30–99)
ALT SERPL-CCNC: 20 U/L (ref 2–50)
ANION GAP SERPL CALC-SCNC: 7 MMOL/L (ref 7–16)
APPEARANCE UR: ABNORMAL
AST SERPL-CCNC: 18 U/L (ref 12–45)
BACTERIA #/AREA URNS HPF: ABNORMAL /HPF
BASOPHILS # BLD AUTO: 0.5 % (ref 0–1.8)
BASOPHILS # BLD: 0.04 K/UL (ref 0–0.12)
BILIRUB SERPL-MCNC: <0.2 MG/DL (ref 0.1–1.5)
BILIRUB UR QL STRIP.AUTO: NEGATIVE
BUN SERPL-MCNC: 11 MG/DL (ref 8–22)
CALCIUM SERPL-MCNC: 9 MG/DL (ref 8.4–10.2)
CHLORIDE SERPL-SCNC: 105 MMOL/L (ref 96–112)
CO2 SERPL-SCNC: 26 MMOL/L (ref 20–33)
COLOR UR: YELLOW
CREAT SERPL-MCNC: 0.66 MG/DL (ref 0.5–1.4)
EKG IMPRESSION: NORMAL
EOSINOPHIL # BLD AUTO: 0.27 K/UL (ref 0–0.51)
EOSINOPHIL NFR BLD: 3.2 % (ref 0–6.9)
EPI CELLS #/AREA URNS HPF: ABNORMAL /HPF
ERYTHROCYTE [DISTWIDTH] IN BLOOD BY AUTOMATED COUNT: 42.8 FL (ref 35.9–50)
GLOBULIN SER CALC-MCNC: 3.1 G/DL (ref 1.9–3.5)
GLUCOSE SERPL-MCNC: 84 MG/DL (ref 65–99)
GLUCOSE UR STRIP.AUTO-MCNC: NEGATIVE MG/DL
HCG UR QL: NEGATIVE
HCT VFR BLD AUTO: 38.1 % (ref 37–47)
HGB BLD-MCNC: 11.9 G/DL (ref 12–16)
IMM GRANULOCYTES # BLD AUTO: 0.03 K/UL (ref 0–0.11)
IMM GRANULOCYTES NFR BLD AUTO: 0.4 % (ref 0–0.9)
KETONES UR STRIP.AUTO-MCNC: NEGATIVE MG/DL
LEUKOCYTE ESTERASE UR QL STRIP.AUTO: NEGATIVE
LYMPHOCYTES # BLD AUTO: 3.38 K/UL (ref 1–4.8)
LYMPHOCYTES NFR BLD: 40.4 % (ref 22–41)
MCH RBC QN AUTO: 25.9 PG (ref 27–33)
MCHC RBC AUTO-ENTMCNC: 31.2 G/DL (ref 33.6–35)
MCV RBC AUTO: 82.8 FL (ref 81.4–97.8)
MICRO URNS: ABNORMAL
MONOCYTES # BLD AUTO: 0.69 K/UL (ref 0–0.85)
MONOCYTES NFR BLD AUTO: 8.2 % (ref 0–13.4)
MUCOUS THREADS #/AREA URNS HPF: ABNORMAL /HPF
NEUTROPHILS # BLD AUTO: 3.96 K/UL (ref 2–7.15)
NEUTROPHILS NFR BLD: 47.3 % (ref 44–72)
NITRITE UR QL STRIP.AUTO: NEGATIVE
NRBC # BLD AUTO: 0 K/UL
NRBC BLD-RTO: 0 /100 WBC
PH UR STRIP.AUTO: 7 [PH] (ref 5–8)
PLATELET # BLD AUTO: 310 K/UL (ref 164–446)
PMV BLD AUTO: 10.5 FL (ref 9–12.9)
POTASSIUM SERPL-SCNC: 4.5 MMOL/L (ref 3.6–5.5)
PROT SERPL-MCNC: 7 G/DL (ref 6–8.2)
PROT UR QL STRIP: NEGATIVE MG/DL
RBC # BLD AUTO: 4.6 M/UL (ref 4.2–5.4)
RBC # URNS HPF: ABNORMAL /HPF
RBC UR QL AUTO: ABNORMAL
SODIUM SERPL-SCNC: 138 MMOL/L (ref 135–145)
SP GR UR STRIP.AUTO: 1.02
T4 FREE SERPL-MCNC: 0.87 NG/DL (ref 0.93–1.7)
TSH SERPL DL<=0.005 MIU/L-ACNC: 2.25 UIU/ML (ref 0.38–5.33)
UNIDENT CRYS URNS QL MICRO: ABNORMAL /HPF
WBC # BLD AUTO: 8.4 K/UL (ref 4.8–10.8)
WBC #/AREA URNS HPF: ABNORMAL /HPF

## 2020-08-31 PROCEDURE — 85025 COMPLETE CBC W/AUTO DIFF WBC: CPT

## 2020-08-31 PROCEDURE — 93005 ELECTROCARDIOGRAM TRACING: CPT | Performed by: EMERGENCY MEDICINE

## 2020-08-31 PROCEDURE — 81025 URINE PREGNANCY TEST: CPT

## 2020-08-31 PROCEDURE — 84439 ASSAY OF FREE THYROXINE: CPT

## 2020-08-31 PROCEDURE — 84443 ASSAY THYROID STIM HORMONE: CPT

## 2020-08-31 PROCEDURE — 80053 COMPREHEN METABOLIC PANEL: CPT

## 2020-08-31 PROCEDURE — 81001 URINALYSIS AUTO W/SCOPE: CPT

## 2020-08-31 PROCEDURE — 99283 EMERGENCY DEPT VISIT LOW MDM: CPT

## 2020-08-31 RX ORDER — AMITRIPTYLINE HYDROCHLORIDE 10 MG/1
10 TABLET, FILM COATED ORAL NIGHTLY
Status: SHIPPED | COMMUNITY
End: 2020-09-18

## 2020-08-31 ASSESSMENT — FIBROSIS 4 INDEX: FIB4 SCORE: 0.32

## 2020-09-01 NOTE — ED TRIAGE NOTES
"Pt comes in c/o \"not feeling right\"  The last couple of days has been feeling \"shakey and heart beats irregular\"  Feels like ''something isn't right \" c/o lightheadedness and weakness  This is not normal for pt    "

## 2020-09-01 NOTE — ED PROVIDER NOTES
"ED Provider Note    CHIEF COMPLAINT  Chief Complaint   Patient presents with   • Weakness   • Shaking     it feels like it's shaking inside    • Lightheadedness   • Irregular Heart Beat     at times feels weird          HPI  Diana Hill is a 25 y.o. female who presents to the ED secondary to generally \"not feeling good.  The patient states she has been feeling tremulous over the last 4 days, lightheaded.  Patient was started on verapamil about a month ago.  Patient does have multiple medical issues including vertigo.  She does not feel like her mood is decreased.  Patient is having a difficulty sleeping but this is been an extended period time.  Generally the patient is just not feeling well.  She attempted take amitriptyline for her migraine headaches, she took it 3 times however she got tingling on her face.  This happened about 10 days ago, last dose 10 days ago.  The reason the patient came in tonight is because she was feeling lightheaded.    REVIEW OF SYSTEMS  See HPI for further details. All other systems reviewed and are negative.     PAST MEDICAL HISTORY  Past Medical History:   Diagnosis Date   • Anemia     hx of borderline anemia   • Anxiety    • Celiac disease    • Depression    • Insulin resistance    • Migraine    • PCOS (polycystic ovarian syndrome)    • Vertigo        FAMILY HISTORY  Family History   Problem Relation Age of Onset   • Thyroid Mother    • Depression Father    • Alcohol abuse Father    • No Known Problems Maternal Grandmother    • Heart Attack Maternal Grandfather 66   • Cancer Paternal Grandmother         Breast , esophageal   • Stroke Paternal Grandmother 66   • Alcohol abuse Paternal Grandfather    • Lung Disease Paternal Grandfather    • Sleep Apnea Brother        SOCIAL HISTORY  Social History     Socioeconomic History   • Marital status: Single     Spouse name: Not on file   • Number of children: Not on file   • Years of education: Not on file   • Highest education level: " Not on file   Occupational History   • Occupation: [employer]     Comment: CNA ; Nursing student   Social Needs   • Financial resource strain: Not on file   • Food insecurity     Worry: Not on file     Inability: Not on file   • Transportation needs     Medical: Not on file     Non-medical: Not on file   Tobacco Use   • Smoking status: Never Smoker   • Smokeless tobacco: Never Used   Substance and Sexual Activity   • Alcohol use: Not Currently     Comment: occasionally   • Drug use: No   • Sexual activity: Never     Partners: Male, Female   Lifestyle   • Physical activity     Days per week: Not on file     Minutes per session: Not on file   • Stress: To some extent   Relationships   • Social connections     Talks on phone: Not on file     Gets together: Not on file     Attends Latter day service: Not on file     Active member of club or organization: Not on file     Attends meetings of clubs or organizations: Not on file     Relationship status: Not on file   • Intimate partner violence     Fear of current or ex partner: Not on file     Emotionally abused: Not on file     Physically abused: Not on file     Forced sexual activity: Not on file   Other Topics Concern   • Not on file   Social History Narrative   • Not on file       SURGICAL HISTORY  Past Surgical History:   Procedure Laterality Date   • MATTHIAS BY LAPAROSCOPY N/A 9/13/2018    Procedure: MATTHIAS BY LAPAROSCOPY;  Surgeon: Hayden Wilhelm M.D.;  Location: SURGERY [facility];  Service: General       CURRENT MEDICATIONS  Home Medications     Reviewed by Stacy Squires R.N. (Registered Nurse) on 08/31/20 at 2116  Med List Status: Partial   Medication Last Dose Status   albuterol 108 (90 Base) MCG/ACT Aero Soln inhalation aerosol prn Active   amitriptyline (ELAVIL) 10 MG Tab 8/22/2020 Active   citalopram (CELEXA) 20 MG Tab 8/30/2020 Active   diazePAM (VALIUM) 5 MG Tab prn Active   SUMAtriptan (IMITREX) 25 MG Tab tablet prn Active   triamcinolone  "acetonide (KENALOG) 0.1 % Ointment prn Active   verapamil SR (CALAN-SR) 120 MG CR tablet 8/30/2020 Active                ALLERGIES  Allergies   Allergen Reactions   • Gluten Meal        PHYSICAL EXAM  VITAL SIGNS: /66   Pulse 61   Temp 36.9 °C (98.5 °F) (Temporal)   Resp 16   Ht 1.702 m (5' 7\")   Wt 117.7 kg (259 lb 7.7 oz)   LMP 08/28/2020   SpO2 99%   BMI 40.64 kg/m²   Constitutional: Well developed, Well nourished, in mild distress, Non-toxic appearance.   HENT: Normocephalic, Atraumatic, Bilateral external ears normal, Oropharynx moist, No oral exudates.   Eyes: PERRLA, EOMI, Conjunctiva normal, No discharge.   Neck: No tenderness, Supple, No stridor.   Lymphatic: No lymphadenopathy noted.   Cardiovascular: Normal heart rate, Normal rhythm.   Thorax & Lungs: Clear to auscultation bilaterally, No respiratory distress, No wheezing, No crackles.   Abdomen: Soft, No tenderness, No masses, No pulsatile masses.   Skin: Warm, Dry, No erythema, No rash.   Back: No tenderness, No CVA tenderness.   Extremities: Intact distal pulses, No edema, No tenderness, No cyanosis.   Musculoskeletal: No tenderness to palpation or major deformities noted.   Neurologic: Awake, alert. Moves all extremities spontaneously.  Psychiatric: Affect normal, Judgment normal, Mood normal.     Results for orders placed or performed during the hospital encounter of 08/31/20   CBC w/ Differential   Result Value Ref Range    WBC 8.4 4.8 - 10.8 K/uL    RBC 4.60 4.20 - 5.40 M/uL    Hemoglobin 11.9 (L) 12.0 - 16.0 g/dL    Hematocrit 38.1 37.0 - 47.0 %    MCV 82.8 81.4 - 97.8 fL    MCH 25.9 (L) 27.0 - 33.0 pg    MCHC 31.2 (L) 33.6 - 35.0 g/dL    RDW 42.8 35.9 - 50.0 fL    Platelet Count 310 164 - 446 K/uL    MPV 10.5 9.0 - 12.9 fL    Neutrophils-Polys 47.30 44.00 - 72.00 %    Lymphocytes 40.40 22.00 - 41.00 %    Monocytes 8.20 0.00 - 13.40 %    Eosinophils 3.20 0.00 - 6.90 %    Basophils 0.50 0.00 - 1.80 %    Immature Granulocytes 0.40 0.00 " - 0.90 %    Nucleated RBC 0.00 /100 WBC    Neutrophils (Absolute) 3.96 2.00 - 7.15 K/uL    Lymphs (Absolute) 3.38 1.00 - 4.80 K/uL    Monos (Absolute) 0.69 0.00 - 0.85 K/uL    Eos (Absolute) 0.27 0.00 - 0.51 K/uL    Baso (Absolute) 0.04 0.00 - 0.12 K/uL    Immature Granulocytes (abs) 0.03 0.00 - 0.11 K/uL    NRBC (Absolute) 0.00 K/uL   Complete Metabolic Panel (CMP)   Result Value Ref Range    Sodium 138 135 - 145 mmol/L    Potassium 4.5 3.6 - 5.5 mmol/L    Chloride 105 96 - 112 mmol/L    Co2 26 20 - 33 mmol/L    Anion Gap 7.0 7.0 - 16.0    Glucose 84 65 - 99 mg/dL    Bun 11 8 - 22 mg/dL    Creatinine 0.66 0.50 - 1.40 mg/dL    Calcium 9.0 8.4 - 10.2 mg/dL    AST(SGOT) 18 12 - 45 U/L    ALT(SGPT) 20 2 - 50 U/L    Alkaline Phosphatase 75 30 - 99 U/L    Total Bilirubin <0.2 0.1 - 1.5 mg/dL    Albumin 3.9 3.2 - 4.9 g/dL    Total Protein 7.0 6.0 - 8.2 g/dL    Globulin 3.1 1.9 - 3.5 g/dL    A-G Ratio 1.3 g/dL   URINALYSIS,CULTURE IF INDICATED    Specimen: Urine   Result Value Ref Range    Color Yellow     Character Hazy (A)     Specific Gravity 1.020 <1.035    Ph 7.0 5.0 - 8.0    Glucose Negative Negative mg/dL    Ketones Negative Negative mg/dL    Protein Negative Negative mg/dL    Bilirubin Negative Negative    Nitrite Negative Negative    Leukocyte Esterase Negative Negative    Occult Blood Large (A) Negative    Micro Urine Req Microscopic    BETA-HCG QUALITATIVE URINE   Result Value Ref Range    Beta-Hcg Urine Negative Negative   TSH   Result Value Ref Range    TSH 2.250 0.380 - 5.330 uIU/mL   FREE THYROXINE   Result Value Ref Range    Free T-4 0.87 (L) 0.93 - 1.70 ng/dL   ESTIMATED GFR   Result Value Ref Range    GFR If African American >60 >60 mL/min/1.73 m 2    GFR If Non African American >60 >60 mL/min/1.73 m 2   URINE MICROSCOPIC (W/UA)   Result Value Ref Range    WBC 0-2 /hpf    RBC 10-20 (A) /hpf    Bacteria Few (A) None /hpf    Epithelial Cells Few Few /hpf    Mucous Threads Moderate /hpf    Urine Crystals Few  Amorphous /hpf   EKG   Result Value Ref Range    Report       [facility]    Test Date:  2020  Pt Name:    JOSE MÁRQUEZ                 Department: [facility]  MRN:        [id number]                      Room:       -ROOM 13  Gender:     Female                       Technician: JENNIFER  :        [id number]                   Requested By:WALLY MELO  Order #:    [id number]                    Reading MD: WALLY MELO MD    Measurements  Intervals                                Axis  Rate:       63                           P:          62  MS:         132                          QRS:        50  QRSD:       89                           T:          52  QT:         403  QTc:        413    Interpretive Statements  Sinus rhythm  No previous ECG available for comparison  Electronically Signed On 2020 22:16:29 PDT by WALLY MELO MD            COURSE & MEDICAL DECISION MAKING  Pertinent Labs & Imaging studies reviewed. (See chart for details)  Patient is coming in secondary to nondescript symptoms of feeling shaky and lightheaded.  To the patient's recent medication changes I will check electrolytes, CBC, differential includes low potassium, anemia, abnormal heart rhythm, thyroid  Laboratory tests are unremarkable, the patient's thyroid is slightly low but her TSH is normal, I will not replace it at this point time.  I have referred the patient to neurology, believe a second opinion from a neurologist whether the patient needs to get an MRI of her brain again to determine if this could possibly be MS would be appropriate.  Patient will follow-up with a primary care physician, return with any other concerns.    FINAL IMPRESSION  1. Tremulousness    2. Weakness    3. Lightheadedness        Patient referred to primary care provider for blood pressure management     This dictation was created using voice recognition software. The accuracy of the dictation is limited  to the abilities of the software. I expect there may be some errors of grammar and possibly content. The nursing notes were reviewed and certain aspects of this information were incorporated into this note.     Electronically signed by: Subhash Cesar M.D., 8/31/2020 10:16 PM

## 2020-09-01 NOTE — ED NOTES
Pt given discharge instructions; verbalized understanding of instructions.  Ambulated out with mother

## 2020-09-02 ENCOUNTER — OFFICE VISIT (OUTPATIENT)
Dept: MEDICAL GROUP | Facility: PHYSICIAN GROUP | Age: 25
End: 2020-09-02
Payer: COMMERCIAL

## 2020-09-02 VITALS
OXYGEN SATURATION: 99 % | TEMPERATURE: 98.2 F | DIASTOLIC BLOOD PRESSURE: 72 MMHG | WEIGHT: 259 LBS | RESPIRATION RATE: 12 BRPM | HEART RATE: 89 BPM | BODY MASS INDEX: 40.65 KG/M2 | SYSTOLIC BLOOD PRESSURE: 110 MMHG | HEIGHT: 67 IN

## 2020-09-02 DIAGNOSIS — R79.89 DECREASED THYROXINE (T4) LEVEL: ICD-10-CM

## 2020-09-02 DIAGNOSIS — F32.A ANXIETY AND DEPRESSION: ICD-10-CM

## 2020-09-02 DIAGNOSIS — Z09 HOSPITAL DISCHARGE FOLLOW-UP: ICD-10-CM

## 2020-09-02 DIAGNOSIS — R42 VERTIGO: ICD-10-CM

## 2020-09-02 DIAGNOSIS — Z83.49 FAMILY HISTORY OF HASHIMOTO THYROIDITIS: ICD-10-CM

## 2020-09-02 DIAGNOSIS — R53.1 WEAKNESS: ICD-10-CM

## 2020-09-02 DIAGNOSIS — F41.9 ANXIETY AND DEPRESSION: ICD-10-CM

## 2020-09-02 PROCEDURE — 99214 OFFICE O/P EST MOD 30 MIN: CPT | Performed by: NURSE PRACTITIONER

## 2020-09-02 ASSESSMENT — FIBROSIS 4 INDEX: FIB4 SCORE: 0.3

## 2020-09-02 NOTE — ASSESSMENT & PLAN NOTE
Chronic problem.  Currently on citalopram 20 mg, helpful.  However due to multiple physical symptoms without definitive diagnosis, patient is requesting referral to therapy, states wants somebody to talk to.  Denies suicidal ideation.

## 2020-09-02 NOTE — ASSESSMENT & PLAN NOTE
On 8/31/2020 evaluated at [facility] for weakness, lightheadedness, migraines and just not feeling good.  She was referred to neurology for prefers to see Dr. Murray.  Labs unremarkable except for slightly decreased T4.  Urine pregnancy test was negative and transvaginal ultrasound was negative as well.  Patient was referred to neurology for further evaluation, repeat MRI and work-up for MS will be considered.  Patient is also requesting referral to endocrinology and neurotology evaluation.  She would like a work-up for lupus.  She does not have a classic facial butterfly, but she does struggle with fatigue.  She has family history of Hashimoto's, will obtain antibody immunology work-up.

## 2020-09-02 NOTE — PROGRESS NOTES
Chief Complaint   Patient presents with   • Follow-Up       HISTORY OF PRESENT ILLNESS: Patient is a 25 y.o. female, established patient who presents today to discuss medical problems as listed below:    Hospital discharge follow-up  On 8/31/2020 evaluated at [facility] for weakness, lightheadedness, migraines and just not feeling good.  She was referred to neurology for prefers to see Dr. Murray.  Labs unremarkable except for slightly decreased T4.  Urine pregnancy test was negative and transvaginal ultrasound was negative as well.  Patient was referred to neurology for further evaluation, repeat MRI and work-up for MS will be considered.  Patient is also requesting referral to endocrinology and neurotology evaluation.  She would like a work-up for lupus.  She does not have a classic facial butterfly, but she does struggle with fatigue.  She has family history of Hashimoto's, will obtain antibody immunology work-up.      Anxiety and depression  Chronic problem.  Currently on citalopram 20 mg, helpful.  However due to multiple physical symptoms without definitive diagnosis, patient is requesting referral to therapy, states wants somebody to talk to.  Denies suicidal ideation.      Patient Active Problem List    Diagnosis Date Noted   • Hospital discharge follow-up 09/02/2020   • Rash of hands 08/12/2020   • Snoring 08/12/2020   • Vertigo 03/03/2020   • PCOS (polycystic ovarian syndrome) 11/19/2019   • Vitamin D deficiency 11/19/2019   • High triglycerides 11/19/2019   • Anxiety and depression 11/19/2019        Allergies: Gluten meal    Current Outpatient Medications   Medication Sig Dispense Refill   • amitriptyline (ELAVIL) 10 MG Tab Take 10 mg by mouth every evening.     • citalopram (CELEXA) 20 MG Tab TAKE 1 AND 1/2 TABLETS BY MOUTH EVERY  Tab 1   • triamcinolone acetonide (KENALOG) 0.1 % Ointment APPLY 1 APPLICATION TO AFFECTED AREA(S) 3 TIMES A DAY AS NEEDED. 80 g 1   • verapamil SR (CALAN-SR) 120 MG CR  tablet Take 120 mg by mouth every day.     • diazePAM (VALIUM) 5 MG Tab TAKE 1 TABLET BY MOUTH TWICE A DAY AS NEEDED FOR VERTIGO FOR 30 DAYS     • SUMAtriptan (IMITREX) 25 MG Tab tablet TAKE 1 TO 4 TABLETS BY MOUTH ONCE AS NEEDED FOR MIGRAINE (MAY REPEAT DOSE AFTER 2 HOURS, MAX DAILY DOSE 200MG) FOR UP TO 1 DOSE. 9 Tab 2   • albuterol 108 (90 Base) MCG/ACT Aero Soln inhalation aerosol Inhale 2 Puffs by mouth every four hours as needed for Shortness of Breath. 1 Inhaler 0     No current facility-administered medications for this visit.        Social History     Tobacco Use   • Smoking status: Never Smoker   • Smokeless tobacco: Never Used   Substance Use Topics   • Alcohol use: Not Currently     Comment: occasionally   • Drug use: No     Social History     Social History Narrative   • Not on file       Family History   Problem Relation Age of Onset   • Thyroid Mother    • Depression Father    • Alcohol abuse Father    • No Known Problems Maternal Grandmother    • Heart Attack Maternal Grandfather 66   • Cancer Paternal Grandmother         Breast , esophageal   • Stroke Paternal Grandmother 66   • Alcohol abuse Paternal Grandfather    • Lung Disease Paternal Grandfather    • Sleep Apnea Brother        Allergies, past medical history, past surgical history, family history, social history reviewed and updated.    Review of Systems:     - Constitutional: Intermittent weakness and lightheadedness.  Negative for fever, chills, unexpected weight change,      - HEENT: Migraines    - Respiratory: Negative for cough, sputum production, chest congestion, dyspnea, wheezing, and crackles.      - Cardiovascular: Negative for chest pain, palpitations, orthopnea, and bilateral lower extremity edema.     - Psychiatric/Behavioral: Negative for depression, suicidal/homicidal ideation and memory loss.      All other systems reviewed and are negative    Exam:    /72 (BP Location: Left arm, Patient Position: Sitting, BP Cuff Size:  "Large adult)   Pulse 89   Temp 36.8 °C (98.2 °F) (Temporal)   Resp 12   Ht 1.702 m (5' 7\")   Wt 117.5 kg (259 lb)   LMP 08/28/2020   SpO2 99%   BMI 40.57 kg/m²  Body mass index is 40.57 kg/m².    Physical Exam:  Constitutional: Well-developed and well-nourished. Not diaphoretic. No distress.   Cardiovascular: Regular rate and rhythm, S1 and S2 without murmur, rubs, or gallops.    Chest: Effort normal. Clear to auscultation throughout. No adventitious sounds.  Neurological: Alert and oriented x 3.   Psychiatric:  Behavior, mood, and affect are appropriate.  MA/nursing note and vitals reviewed.    LABS: 8/31/20  results reviewed and discussed with the patient, questions answered.    Patient was seen for 25 minutes face to face of which > 50% of appointment time was spent on counseling and coordination of care regarding the above.     Assessment/Plan:  1. Vertigo  - REFERRAL TO ENDOCRINOLOGY  - JALEN REFLEXIVE PROFILE; Future  - DSDNA AB, IGG W/RFLX TO IFA TITER; Future  - LUPUS COMPREHENSIVE PANEL; Future    2. Weakness  - REFERRAL TO ENDOCRINOLOGY  - ANTITHYROGLOBULIN AB; Future  - THYROID PEROXIDASE  (TPO) AB; Future  - TRIIDOTHYRONINE; Future  - TSI; Future  - JALEN REFLEXIVE PROFILE; Future  - DSDNA AB, IGG W/RFLX TO IFA TITER; Future  - LUPUS COMPREHENSIVE PANEL; Future    3. Family history of Hashimoto thyroiditis  - ANTITHYROGLOBULIN AB; Future  - THYROID PEROXIDASE  (TPO) AB; Future  - TRIIDOTHYRONINE; Future  - TSI; Future    4. Decreased thyroxine (T4) level  - ANTITHYROGLOBULIN AB; Future  - THYROID PEROXIDASE  (TPO) AB; Future  - TRIIDOTHYRONINE; Future  - TSI; Future    5. Anxiety and depression  Uncontrolled, stable.  Believe patient with significantly benefit from CBT therapy.  Also discussed nonpharmacological stress anxiety control such as meditation, therapeutic journaling, distraction with steroid activities, deep breathing etc.  Referral to behavioral health placed.  - REFERRAL TO BEHAVIORAL " HEALTH    6. Hospital discharge follow-up       Discussed with patient possible alternative diagnoses, pt is to take all medications as prescribed. If symptoms persist FU w/PCP, if symptoms worsen go to emergency room. If experiencing any side effects from prescribed medications reports to the office immediately or go to emergency room.  Reviewed indication, dosage, usage and potential adverse effects of prescribed medications. Reviewed risks and benefits of treatment plan. Patient verbalizes understanding of all instruction and verbally agrees to plan.    Return if symptoms worsen or fail to improve.

## 2020-09-15 ENCOUNTER — OFFICE VISIT (OUTPATIENT)
Dept: MEDICAL GROUP | Facility: PHYSICIAN GROUP | Age: 25
End: 2020-09-15
Payer: COMMERCIAL

## 2020-09-15 ENCOUNTER — HOSPITAL ENCOUNTER (OUTPATIENT)
Dept: LAB | Facility: MEDICAL CENTER | Age: 25
End: 2020-09-15
Attending: NURSE PRACTITIONER
Payer: COMMERCIAL

## 2020-09-15 VITALS
RESPIRATION RATE: 20 BRPM | HEIGHT: 67 IN | OXYGEN SATURATION: 97 % | BODY MASS INDEX: 40.24 KG/M2 | TEMPERATURE: 98.5 F | SYSTOLIC BLOOD PRESSURE: 118 MMHG | HEART RATE: 88 BPM | DIASTOLIC BLOOD PRESSURE: 60 MMHG | WEIGHT: 256.4 LBS

## 2020-09-15 DIAGNOSIS — R21 RASH OF HANDS: ICD-10-CM

## 2020-09-15 DIAGNOSIS — Z83.49 FAMILY HISTORY OF HASHIMOTO THYROIDITIS: ICD-10-CM

## 2020-09-15 DIAGNOSIS — L23.9 ALLERGIC DERMATITIS: ICD-10-CM

## 2020-09-15 DIAGNOSIS — R79.89 DECREASED THYROXINE (T4) LEVEL: ICD-10-CM

## 2020-09-15 DIAGNOSIS — L24.89 IRRITANT CONTACT DERMATITIS DUE TO OTHER AGENTS: ICD-10-CM

## 2020-09-15 DIAGNOSIS — R21 SKIN RASH: ICD-10-CM

## 2020-09-15 DIAGNOSIS — Z02.9 ADMINISTRATIVE ENCOUNTER: ICD-10-CM

## 2020-09-15 DIAGNOSIS — R53.1 WEAKNESS: ICD-10-CM

## 2020-09-15 DIAGNOSIS — R42 VERTIGO: ICD-10-CM

## 2020-09-15 LAB
T3 SERPL-MCNC: 121 NG/DL (ref 60–181)
THYROPEROXIDASE AB SERPL-ACNC: <9 IU/ML (ref 0–9)

## 2020-09-15 PROCEDURE — 86225 DNA ANTIBODY NATIVE: CPT

## 2020-09-15 PROCEDURE — 84480 ASSAY TRIIODOTHYRONINE (T3): CPT

## 2020-09-15 PROCEDURE — 84445 ASSAY OF TSI GLOBULIN: CPT

## 2020-09-15 PROCEDURE — 86800 THYROGLOBULIN ANTIBODY: CPT

## 2020-09-15 PROCEDURE — 86160 COMPLEMENT ANTIGEN: CPT | Mod: 91

## 2020-09-15 PROCEDURE — 86376 MICROSOMAL ANTIBODY EACH: CPT

## 2020-09-15 PROCEDURE — 36415 COLL VENOUS BLD VENIPUNCTURE: CPT

## 2020-09-15 PROCEDURE — 99214 OFFICE O/P EST MOD 30 MIN: CPT | Performed by: NURSE PRACTITIONER

## 2020-09-15 PROCEDURE — 86431 RHEUMATOID FACTOR QUANT: CPT

## 2020-09-15 PROCEDURE — 86038 ANTINUCLEAR ANTIBODIES: CPT

## 2020-09-15 RX ORDER — TRIAMCINOLONE ACETONIDE 1 MG/G
1 OINTMENT TOPICAL 3 TIMES DAILY PRN
Qty: 80 G | Refills: 1 | Status: SHIPPED | OUTPATIENT
Start: 2020-09-15 | End: 2020-10-19

## 2020-09-15 ASSESSMENT — FIBROSIS 4 INDEX: FIB4 SCORE: 0.3

## 2020-09-15 NOTE — ASSESSMENT & PLAN NOTE
Pt requiring a letter from PCP stating she is taking personal leave from work for the dates of September 9, 2020 - December 9, 2020. As pt is taking this time to follow up with specialists for her current problems.

## 2020-09-15 NOTE — ASSESSMENT & PLAN NOTE
Chronic problem.  Acute on chronic intermittent itching rash on both hands.  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  Rash is not spreading to any part of the body.  Has tried OTC emollients, somewhat improved condition.  She is not sure if she is allergic to gluten, but has noticed worsening skin after consuming gluten.  No other symptoms.

## 2020-09-15 NOTE — PROGRESS NOTES
Chief Complaint   Patient presents with   • Paperwork   • Hand Burn     blisters; red; itchy; x1 week        HISTORY OF PRESENT ILLNESS: Patient is a 25 y.o. female, established patient who presents today to discuss medical problems as listed below:    Administrative encounter  Pt requiring a letter from PCP stating she is taking personal leave from work for the dates of September 9, 2020 - December 9, 2020. As pt is taking this time to follow up with specialists for her current problems.    Rash of hands  Chronic problem.  Acute on chronic intermittent itching rash on both hands.  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  Rash is not spreading to any part of the body.  Has tried OTC emollients, somewhat improved condition.  She is not sure if she is allergic to gluten, but has noticed worsening skin after consuming gluten.  No other symptoms.      Patient Active Problem List    Diagnosis Date Noted   • Skin rash 09/15/2020   • Administrative encounter 09/15/2020   • Hospital discharge follow-up 09/02/2020   • Rash of hands 08/12/2020   • Snoring 08/12/2020   • Vertigo 03/03/2020   • PCOS (polycystic ovarian syndrome) 11/19/2019   • Vitamin D deficiency 11/19/2019   • High triglycerides 11/19/2019   • Anxiety and depression 11/19/2019        Allergies: Gluten meal    Current Outpatient Medications   Medication Sig Dispense Refill   • nystatin/triamcinolone (MYCOLOG) 317065-3.1 UNIT/GM-% Cream Apply on effected skin twice per day. Dispense 1 tube 1 g 1   • citalopram (CELEXA) 20 MG Tab TAKE 1 AND 1/2 TABLETS BY MOUTH EVERY  Tab 1   • triamcinolone acetonide (KENALOG) 0.1 % Ointment APPLY 1 APPLICATION TO AFFECTED AREA(S) 3 TIMES A DAY AS NEEDED. 80 g 1   • verapamil SR (CALAN-SR) 120 MG CR tablet Take 120 mg by mouth every day.     • diazePAM (VALIUM) 5 MG Tab TAKE 1 TABLET BY MOUTH TWICE A DAY AS NEEDED FOR VERTIGO FOR 30 DAYS     • albuterol 108 (90 Base)  MCG/ACT Aero Soln inhalation aerosol Inhale 2 Puffs by mouth every four hours as needed for Shortness of Breath. 1 Inhaler 0   • amitriptyline (ELAVIL) 10 MG Tab Take 10 mg by mouth every evening.     • SUMAtriptan (IMITREX) 25 MG Tab tablet TAKE 1 TO 4 TABLETS BY MOUTH ONCE AS NEEDED FOR MIGRAINE (MAY REPEAT DOSE AFTER 2 HOURS, MAX DAILY DOSE 200MG) FOR UP TO 1 DOSE. (Patient not taking: Reported on 9/15/2020) 9 Tab 2     No current facility-administered medications for this visit.        Social History     Tobacco Use   • Smoking status: Never Smoker   • Smokeless tobacco: Never Used   Substance Use Topics   • Alcohol use: Not Currently     Comment: occasionally   • Drug use: No     Social History     Social History Narrative   • Not on file       Family History   Problem Relation Age of Onset   • Thyroid Mother    • Depression Father    • Alcohol abuse Father    • No Known Problems Maternal Grandmother    • Heart Attack Maternal Grandfather 66   • Cancer Paternal Grandmother         Breast , esophageal   • Stroke Paternal Grandmother 66   • Alcohol abuse Paternal Grandfather    • Lung Disease Paternal Grandfather    • Sleep Apnea Brother        Allergies, past medical history, past surgical history, family history, social history reviewed and updated.    Review of Systems:     - Constitutional: Negative for fever, chills, unexpected weight change, and fatigue/generalized weakness.     - Respiratory: Negative for cough, sputum production, chest congestion, dyspnea, wheezing, and crackles.      - Cardiovascular: Negative for chest pain, palpitations, orthopnea, and bilateral lower extremity edema.    - Skin: skin rash on both hands    - Psychiatric/Behavioral: Negative for depression, suicidal/homicidal ideation and memory loss.      All other systems reviewed and are negative    Exam:    /60 (BP Location: Left arm, Patient Position: Sitting, BP Cuff Size: Adult)   Pulse 88   Temp 36.9 °C (98.5 °F)  "(Temporal)   Resp 20   Ht 1.702 m (5' 7\")   Wt 116.3 kg (256 lb 6.4 oz)   LMP 08/28/2020   SpO2 97%   BMI 40.16 kg/m²  Body mass index is 40.16 kg/m².    Physical Exam:  Constitutional: Well-developed and well-nourished. Not diaphoretic. No distress.   Skin: scattered red rash on top of both hands, non bleeding.   Cardiovascular: Regular rate and rhythm, S1 and S2 without murmur, rubs, or gallops.    Chest: Effort normal. Clear to auscultation throughout. No adventitious sounds.   Neurological: Alert and oriented x 3.  Psychiatric:  Behavior, mood, and affect are appropriate.  MA/nursing note and vitals reviewed.    Patient was seen for  minutes face to face of which > 50% of appointment time was spent on counseling and coordination of care regarding the above.     Assessment/Plan:  1. Skin rash  Uncontrolled, stable.  Trial of triamcinolone.  Also recommend soaking hands and lukewarm water with 1 scoop of baking soda, okay to add small cup of bleach to the mixture.  Keep both hands clean and moisturized.  Find a telemetry unit aging, recommend aloe gel.  Pending referral to dermatology.  - nystatin/triamcinolone (MYCOLOG) 854688-3.1 UNIT/GM-% Cream; Apply on effected skin twice per day. Dispense 1 tube  Dispense: 1 g; Refill: 1    2. Administrative encounter  Letter written and given to pt    3. Rash of hands  As discussed in 1       Discussed with patient possible alternative diagnoses, pt is to take all medications as prescribed. If symptoms persist FU w/PCP, if symptoms worsen go to emergency room. If experiencing any side effects from prescribed medications reports to the office immediately or go to emergency room.  Reviewed indication, dosage, usage and potential adverse effects of prescribed medications. Reviewed risks and benefits of treatment plan. Patient verbalizes understanding of all instruction and verbally agrees to plan.    Return if symptoms worsen or fail to improve.  "

## 2020-09-15 NOTE — LETTER
September 15, 2020       Patient: Diana Hill   YOB: 1995   Date of Visit: 9/15/2020         To Whom It May Concern:    This letter is in response to patient's request from PCP stating patient is taking personal leave from work from September 9, 2020 through December 9, 2020.  Patient would like to utilize this time to follow-up with medical specialist for her medical issues.      If you have any questions or concerns, please don't hesitate to call [phone]          Sincerely,          DANIELE Resendiz  Electronically Signed

## 2020-09-17 LAB
C3 SERPL-MCNC: 152 MG/DL (ref 88–201)
C4 SERPL-MCNC: 27 MG/DL (ref 10–40)
DSDNA AB TITR SER CLIF: NORMAL {TITER}
NUCLEAR IGG SER QL IA: NORMAL
RHEUMATOID FACT SER NEPH-ACNC: <10 IU/ML (ref 0–14)
THYROGLOB AB SERPL-ACNC: <0.9 IU/ML (ref 0–4)

## 2020-09-17 NOTE — PROGRESS NOTES
"[facility] NEUROLOGY  NEW PATIENT VISIT    Referral source: Subhash Cesar MD    CC: weakness    HISTORY OF ILLNESS:  Diana Hill is a 25 y.o. woman with a history most notable for PCOS and vertigo.  Today, she was accompanied by her mother.  Diana provided the following history:    2006/2007:  Diana first experienced symptoms of \"vertigo\" when she was 11 or 12 years old.  She recalls feeling as if she were \"spinning\" while the room was stationary.  Later, she experienced similar episodes when the room was spinning and she was stationary.  She recalls that symptoms worsen when she moves her head.    ~2008:  Diana's brother shook her head, and this caused a particularly severe bout of vertigo.    ~2010:  At age 15 MRI that was normal.    For many years there were very few symptoms.    2019:  About a year ago the symptoms returned.  A Federico-Hallpike maneuver was reportedly positive.  She was given maneuvers to perform at home.  Since incorporating the Epley maneuver she has not had a severe episode of vertigo.    ENG reportedly showed that the right ear was \"weaker\" than the left.    Lately they have become more frequent.  Sometimes the symptoms are so severe that they wake her up from sleep.  Last night for example she noticed dyspnea sensation when she lay down.  Rolling over in bed will provoke symptoms.  Usually she will sleep sitting propped up against pillows.  Vertigo sensations will be an issue for \"days,\" but during this time there are periods of symptom relief.  There has been no ringing in the ears, no hearing loss, no ear pain, and no headaches.    8/2020:  In August 2020 Diana developed a sensation of \"internal trembling.\"  She was working at the time when she felt very shaky.  This symptom was generalized and did not affect any particular part of the body.  She ate some food, but that did not seem to help.    Lately her upper and lower extremities will feel \"heavy.\"  This seems to " "fluctuate along with the trembling sensation.    She also developed a \"tingling\" sensation involving the cheeks, mouth, arms, and legs.  There was no loss of sensation.  She was prescribed amitriptyline but had a \"weird reaction\" to this.  She also developed a \"burning\" sensation over the anterior and lateral thighs.  This occurs sporadically, and episodes last a few hours at a time.    On Sunday she went to work and her whole body started tingling.  She also felt faint as if she might pass out.  She has had random bouts of lightheadedness which lasted to 15 minutes of time and then resolved with rest.    MDICAL AND SURGICAL HISTORY:  Past Medical History:   Diagnosis Date   • Anemia     hx of borderline anemia   • Anxiety    • Celiac disease    • Depression    • Insulin resistance    • Migraine    • PCOS (polycystic ovarian syndrome)    • Vertigo      Past Surgical History:   Procedure Laterality Date   • MATTHIAS BY LAPAROSCOPY N/A 9/13/2018    Procedure: MATTHIAS BY LAPAROSCOPY;  Surgeon: Hayden Wilhelm M.D.;  Location: SURGERY [facility];  Service: General     MEDICATIONS:  Current Outpatient Medications   Medication Sig   • triamcinolone acetonide (KENALOG) 0.1 % Ointment Apply 1 Application to affected area(s) 3 times a day as needed.   • amitriptyline (ELAVIL) 10 MG Tab Take 10 mg by mouth every evening.   • citalopram (CELEXA) 20 MG Tab TAKE 1 AND 1/2 TABLETS BY MOUTH EVERY DAY   • verapamil SR (CALAN-SR) 120 MG CR tablet Take 120 mg by mouth every day.   • diazePAM (VALIUM) 5 MG Tab TAKE 1 TABLET BY MOUTH TWICE A DAY AS NEEDED FOR VERTIGO FOR 30 DAYS   • SUMAtriptan (IMITREX) 25 MG Tab tablet TAKE 1 TO 4 TABLETS BY MOUTH ONCE AS NEEDED FOR MIGRAINE (MAY REPEAT DOSE AFTER 2 HOURS, MAX DAILY DOSE 200MG) FOR UP TO 1 DOSE. (Patient not taking: Reported on 9/15/2020)   • albuterol 108 (90 Base) MCG/ACT Aero Soln inhalation aerosol Inhale 2 Puffs by mouth every four hours as needed for Shortness of Breath. "     SOCIAL HISTORY:  Social History     Tobacco Use   • Smoking status: Never Smoker   • Smokeless tobacco: Never Used   Substance Use Topics   • Alcohol use: Not Currently     Comment: occasionally     Social History     Social History Narrative   • Not on file     FAMILY HISTORY:  Family History   Problem Relation Age of Onset   • Thyroid Mother    • Depression Father    • Alcohol abuse Father    • No Known Problems Maternal Grandmother    • Heart Attack Maternal Grandfather 66   • Cancer Paternal Grandmother         Breast , esophageal   • Stroke Paternal Grandmother 66   • Alcohol abuse Paternal Grandfather    • Lung Disease Paternal Grandfather    • Sleep Apnea Brother      REVIEW OF SYSTEMS:  A ROS was completed.  Pertinent positives and negatives were included in the HPI, above.  All other systems were reviewed and are negative.    PHYSICAL EXAM:  General/Medical:  - NAD  - eczematous rash over the dorsal hands  - neck was supple without Lhermitte's phenomenon  - heart rate and rhythm were regular    Neuro:  MENTAL STATUS: awake and alert; no deficits of speech or language; fully oriented; affect was appropriate to situation; pleasant, cooperative    CRANIAL NERVES:    II: acuity was: J1+/J1+; discs sharp; pupils 3/3 to 2/2 without a relative afferent pupillary defect; fields intact to confrontation    III/IV/VI: versions intact without nystagmus; pursuits were smooth    V: facial sensation symmetric to light touch    VII: facial expression symmetric    VIII: hearing intact to finger rub    IX/X: palate elevates symmetrically    XI: shoulder shrug symmetric    XII: tongue midline    MOTOR:  - bulk and tone were normal throughout  Upper Extremity Strength  (R/L)    5/5   Elbow flexion 5/5   Elbow extension 5/5   Shoulder abduction 5/5     Lower Extremity Strength  (R/L)   Hip flexion 5/5   Knee extension 5/5   Knee flexion 5/5   Ankle plantarflexion 5/5   Ankle dorsiflexion 5/5     - can walk on toes and  heels  - can stand from a seated position with arms crossed  - no pronator drift; no abnormal movements    SENSATION:  - light touch: intact over the upper and lower extremities  - vibration (R/L, seconds): 15/16 at the great toes  - pinprick: NT  - proprioception: NT  - Romberg was: NT    COORDINATION:  - finger to nose was normal, no ataxia on exam  - finger tapping was rapid and accurate, bilaterally    REFLEXES:  Reflex Right Left   BR 3+ 3+   Biceps 3+ 3+   Triceps 3+ 3+   Patellae 3+ 3+   Achilles 2+ 2+   Toes down down     GAIT:  - narrow base and normal; intact heel-raised, toe-raised, and tandem gait    REVIEW OF IMAGING STUDIES:  No neuroimaging available for review.    REVIEW OF LABORATORY STUDIES:  8/31/2020:  - UA: unremarkable  - thyroid studies: WNL  - CBC W/ diff: WNL  - CMP: WNL    ASSESSMENT:  Diana Hill is a 25 y.o. woman with a history most notable for PCOS and vertigo.  The description of vertigo is fairly consistent with benign paroxysmal positional vertigo (BPPV).  I reeducated Diana and her mother about the pathogenesis of this symptom and the recommended treatment.  She still has the instructions at home on how to perform the modified Epley maneuver.  She will reincorporate this into her routine if and when symptoms return.  The remainder of Diana's symptoms do not seem to suggest any specific underlying neurologic diagnosis.  Her neurologic exam was completely normal.  The generalized trembling, tingling, and lightheadedness are probably attributable to an underlying metabolic disturbance; however, blood work from late August was within normal limits.  I considered imaging the head; however, in light of the nonspecific history and nonfocal exam we agreed to defer this for now.  García will follow up with her endocrinologist.  She will contact the clinic if her symptoms worsen or if new neurologic symptoms develop.    PLAN:  BPPV:  - home Epley maneuver PRN  - no additional workup  recommended at this time    - contact the clinic if symptoms worsen or if new neurologic symptoms develop    Signed: Luther Fang M.D. at 4:39 PM on 09/17/20    BILLING DOCUMENTATION:   This visit lasted 60 minutes.  Over 50% of this time was spent face-to-face with the patient counseling and/or coordination of care wtih the patient and/or family.  Please see assessment above for details of our discussion.

## 2020-09-18 ENCOUNTER — OFFICE VISIT (OUTPATIENT)
Dept: NEUROLOGY | Facility: MEDICAL CENTER | Age: 25
End: 2020-09-18
Payer: COMMERCIAL

## 2020-09-18 VITALS
WEIGHT: 258.6 LBS | OXYGEN SATURATION: 97 % | HEART RATE: 98 BPM | BODY MASS INDEX: 40.5 KG/M2 | DIASTOLIC BLOOD PRESSURE: 78 MMHG | TEMPERATURE: 96.8 F | SYSTOLIC BLOOD PRESSURE: 120 MMHG

## 2020-09-18 DIAGNOSIS — R42 VERTIGO: ICD-10-CM

## 2020-09-18 LAB — TSI SER-ACNC: <0.1 IU/L

## 2020-09-18 PROCEDURE — 99205 OFFICE O/P NEW HI 60 MIN: CPT | Performed by: PSYCHIATRY & NEUROLOGY

## 2020-09-18 ASSESSMENT — FIBROSIS 4 INDEX: FIB4 SCORE: 0.3

## 2020-10-07 ENCOUNTER — OFFICE VISIT (OUTPATIENT)
Dept: MEDICAL GROUP | Facility: PHYSICIAN GROUP | Age: 25
End: 2020-10-07
Payer: COMMERCIAL

## 2020-10-07 VITALS
HEIGHT: 67 IN | OXYGEN SATURATION: 93 % | BODY MASS INDEX: 40.49 KG/M2 | WEIGHT: 258 LBS | TEMPERATURE: 97.7 F | SYSTOLIC BLOOD PRESSURE: 102 MMHG | DIASTOLIC BLOOD PRESSURE: 78 MMHG | RESPIRATION RATE: 14 BRPM | HEART RATE: 85 BPM

## 2020-10-07 DIAGNOSIS — F41.9 ANXIETY AND DEPRESSION: ICD-10-CM

## 2020-10-07 DIAGNOSIS — F32.A ANXIETY AND DEPRESSION: ICD-10-CM

## 2020-10-07 DIAGNOSIS — E66.01 MORBID OBESITY (HCC): ICD-10-CM

## 2020-10-07 DIAGNOSIS — Z23 NEED FOR VACCINATION: ICD-10-CM

## 2020-10-07 DIAGNOSIS — Z02.9 ADMINISTRATIVE ENCOUNTER: ICD-10-CM

## 2020-10-07 PROCEDURE — 99214 OFFICE O/P EST MOD 30 MIN: CPT | Mod: 25 | Performed by: NURSE PRACTITIONER

## 2020-10-07 PROCEDURE — 90471 IMMUNIZATION ADMIN: CPT | Performed by: NURSE PRACTITIONER

## 2020-10-07 PROCEDURE — 90686 IIV4 VACC NO PRSV 0.5 ML IM: CPT | Performed by: NURSE PRACTITIONER

## 2020-10-07 ASSESSMENT — FIBROSIS 4 INDEX: FIB4 SCORE: 0.3

## 2020-10-07 NOTE — PROGRESS NOTES
Chief Complaint   Patient presents with   • Paperwork       HISTORY OF PRESENT ILLNESS: Patient is a 25 y.o. female, established patient who presents today to discuss medical problems as listed below:    Administrative encounter  Patient is here requiring administrative paperwork, abnormally filled out for 9/1/2020 through the end of December of this year.  Patient has referral to neurology and endocrinology.  Patient has a scheduled appointment with endocrinology on 11/23/2020.  She is also scheduled to see pulmonology to evaluate for sleep apnea on 12/23/2020.  Appointment with neurology is pending.  He is also been referred to behavioral health for which she has to read a few months to be seen.     Anxiety and depression  Chronic problem.  Symptoms are well controlled on citalopram 20 mg.  Referral to behavioral health was placed last visit, he is going to be few months before patient will be able to get an appointment.  She wants someone to talk to about her physical symptoms she is experiencing.  Otherwise she denies excessive anxiety or depression symptoms, denies SI.    Morbid obesity (HCC)  Current BMI 40.41.  Patient has been consistent.  Has had weight issues all her life.  Tried lifestyle modifications without success.      Patient Active Problem List    Diagnosis Date Noted   • Morbid obesity (HCC) 10/07/2020   • Skin rash 09/15/2020   • Administrative encounter 09/15/2020   • Hospital discharge follow-up 09/02/2020   • Rash of hands 08/12/2020   • Snoring 08/12/2020   • Vertigo 03/03/2020   • PCOS (polycystic ovarian syndrome) 11/19/2019   • Vitamin D deficiency 11/19/2019   • High triglycerides 11/19/2019   • Anxiety and depression 11/19/2019        Allergies: Gluten meal    Current Outpatient Medications   Medication Sig Dispense Refill   • triamcinolone acetonide (KENALOG) 0.1 % Ointment Apply 1 Application to affected area(s) 3 times a day as needed. 80 g 1   • citalopram (CELEXA) 20 MG Tab TAKE 1  AND 1/2 TABLETS BY MOUTH EVERY  Tab 1   • verapamil SR (CALAN-SR) 120 MG CR tablet Take 120 mg by mouth every day.     • diazePAM (VALIUM) 5 MG Tab TAKE 1 TABLET BY MOUTH TWICE A DAY AS NEEDED FOR VERTIGO FOR 30 DAYS     • SUMAtriptan (IMITREX) 25 MG Tab tablet TAKE 1 TO 4 TABLETS BY MOUTH ONCE AS NEEDED FOR MIGRAINE (MAY REPEAT DOSE AFTER 2 HOURS, MAX DAILY DOSE 200MG) FOR UP TO 1 DOSE. 9 Tab 2   • albuterol 108 (90 Base) MCG/ACT Aero Soln inhalation aerosol Inhale 2 Puffs by mouth every four hours as needed for Shortness of Breath. 1 Inhaler 0     No current facility-administered medications for this visit.        Social History     Tobacco Use   • Smoking status: Never Smoker   • Smokeless tobacco: Never Used   Substance Use Topics   • Alcohol use: Not Currently     Comment: occasionally   • Drug use: No     Social History     Social History Narrative   • Not on file       Family History   Problem Relation Age of Onset   • Thyroid Mother    • Depression Father    • Alcohol abuse Father    • No Known Problems Maternal Grandmother    • Heart Attack Maternal Grandfather 66   • Cancer Paternal Grandmother         Breast , esophageal   • Stroke Paternal Grandmother 66   • Alcohol abuse Paternal Grandfather    • Lung Disease Paternal Grandfather    • Sleep Apnea Brother        Allergies, past medical history, past surgical history, family history, social history reviewed and updated.    Review of Systems:     - Constitutional: Negative for fever, chills, unexpected weight change, and fatigue/generalized weakness.     - Respiratory: Negative for cough, sputum production, chest congestion, dyspnea, wheezing, and crackles.      - Cardiovascular: Negative for chest pain, palpitations, orthopnea, and bilateral lower extremity edema.     - Psychiatric/Behavioral: Negative for depression, suicidal/homicidal ideation and memory loss.      All other systems reviewed and are negative    Exam:    /78 (BP Location:  "Left arm, Patient Position: Sitting, BP Cuff Size: Large adult)   Pulse 85   Temp 36.5 °C (97.7 °F) (Temporal)   Resp 14   Ht 1.702 m (5' 7\")   Wt 117 kg (258 lb)   SpO2 93%   BMI 40.41 kg/m²  Body mass index is 40.41 kg/m².    Physical Exam:  Constitutional: Well-developed and well-nourished. Not diaphoretic. No distress.   Cardiovascular: Regular rate and rhythm, S1 and S2 without murmur, rubs, or gallops.    Chest: Effort normal. Clear to auscultation throughout. No adventitious sounds.   Neurological: Alert and oriented x 3.   Psychiatric:  Behavior, mood, and affect are appropriate.  MA/nursing note and vitals reviewed.    Patient was seen for 25 minutes face to face of which > 50% of appointment time was spent on counseling and coordination of care regarding the above.     Assessment/Plan:  1. Need for vaccination  - Influenza Vaccine Quad Injection (PF)    2. Administrative encounter  For work filled out, scanned into the chart and given to patient.    3. Anxiety and depression  Stable on current regimen.  No refills needed today.    4. Morbid obesity (HCC)  Uncontrolled.  Discussed etiology and risk factors associated with increased BMI.  Discussed healthy lifestyle such as daily exercise and healthy nutrition, avoiding excessive simple carbohydrates.  Recommend adequate daily fiber, protein and healthy fats.  Nutrition choices discussed in great detail.  Patient reports feeling fatigued which impairs her ability to exercise at this time.  Declines HIP referral at this time.  - OBESITY COUNSELING (No Charge): Patient identified as having weight management issue.  Appropriate orders and counseling given.       Discussed with patient possible alternative diagnoses, pt is to take all medications as prescribed. If symptoms persist FU w/PCP, if symptoms worsen go to emergency room. If experiencing any side effects from prescribed medications reports to the office immediately or go to emergency " room.  Reviewed indication, dosage, usage and potential adverse effects of prescribed medications. Reviewed risks and benefits of treatment plan. Patient verbalizes understanding of all instruction and verbally agrees to plan.    Return if symptoms worsen or fail to improve.

## 2020-10-07 NOTE — ASSESSMENT & PLAN NOTE
Patient is here requiring administrative paperwork, abnormally filled out for 9/1/2020 through the end of December of this year.  Patient has referral to neurology and endocrinology.  Patient has a scheduled appointment with endocrinology on 11/23/2020.  She is also scheduled to see pulmonology to evaluate for sleep apnea on 12/23/2020.  Appointment with neurology is pending.  He is also been referred to behavioral health for which she has to read a few months to be seen.

## 2020-10-07 NOTE — ASSESSMENT & PLAN NOTE
Chronic problem.  Symptoms are well controlled on citalopram 20 mg.  Referral to behavioral health was placed last visit, he is going to be few months before patient will be able to get an appointment.  She wants someone to talk to about her physical symptoms she is experiencing.  Otherwise she denies excessive anxiety or depression symptoms, denies SI.

## 2020-10-07 NOTE — ASSESSMENT & PLAN NOTE
Current BMI 40.41.  Patient has been consistent.  Has had weight issues all her life.  Tried lifestyle modifications without success.

## 2020-10-19 ENCOUNTER — OFFICE VISIT (OUTPATIENT)
Dept: URGENT CARE | Facility: CLINIC | Age: 25
End: 2020-10-19
Payer: COMMERCIAL

## 2020-10-19 VITALS
HEART RATE: 70 BPM | HEIGHT: 67 IN | RESPIRATION RATE: 14 BRPM | WEIGHT: 261 LBS | DIASTOLIC BLOOD PRESSURE: 68 MMHG | TEMPERATURE: 98.7 F | SYSTOLIC BLOOD PRESSURE: 102 MMHG | BODY MASS INDEX: 40.97 KG/M2 | OXYGEN SATURATION: 99 %

## 2020-10-19 DIAGNOSIS — L30.1 DYSHIDROTIC ECZEMA: Primary | ICD-10-CM

## 2020-10-19 PROCEDURE — 99214 OFFICE O/P EST MOD 30 MIN: CPT | Performed by: PHYSICIAN ASSISTANT

## 2020-10-19 RX ORDER — PREDNISONE 20 MG/1
TABLET ORAL
Qty: 23 TAB | Refills: 0 | Status: SHIPPED | OUTPATIENT
Start: 2020-10-19 | End: 2021-03-06

## 2020-10-19 RX ORDER — TRIAMCINOLONE ACETONIDE 5 MG/G
2 OINTMENT TOPICAL 2 TIMES DAILY
Qty: 60 G | Refills: 0 | Status: SHIPPED | OUTPATIENT
Start: 2020-10-19 | End: 2020-12-01

## 2020-10-19 ASSESSMENT — FIBROSIS 4 INDEX: FIB4 SCORE: 0.3

## 2020-10-20 NOTE — PROGRESS NOTES
Subjective:      Pt is a 25 y.o. female who presents with Rash            HPI  This is a chronic issue. Pt has been seen in the UC and PCP for almost a year with this recurring palmar rash initially attributed to chemicals in a WC case, as irritant dermatitis, then labeled hand dermatitis, but now notes she has not been to work and the rash occurred x 5 days ago with peeling and cracking of skin and oozing serous fluid. Pt denies new detergents, soaps, make-up, hygiene products, medications, foods, exposure to chemicals.  Pt has not taken any Rx medications for this recent outbreak. Pt states the pain is a 5/10, aching in nature and worse at night. Pt denies CP, SOB, NVD, paresthesias, headaches, dizziness, change in vision, hives, or other joint pain. The pt's medication list, problem list, and allergies have been evaluated and reviewed during today's visit.    PMH:  Past Medical History:   Diagnosis Date   • Anemia     hx of borderline anemia   • Anxiety    • Celiac disease    • Depression    • Insulin resistance    • Migraine    • PCOS (polycystic ovarian syndrome)    • Vertigo        PSH:  Past Surgical History:   Procedure Laterality Date   • MATTHIAS BY LAPAROSCOPY N/A 2018    Procedure: MATTHIAS BY LAPAROSCOPY;  Surgeon: Hayden Wilhelm M.D.;  Location: SURGERY [facility];  Service: General       Fam Hx:    family history includes Alcohol abuse in her father and paternal grandfather; Cancer in her paternal grandmother; Depression in her father; Heart Attack (age of onset: 66) in her maternal grandfather; Lung Disease in her paternal grandfather; No Known Problems in her maternal grandmother; Sleep Apnea in her brother; Stroke (age of onset: 66) in her paternal grandmother; Thyroid in her mother.  Family Status   Relation Name Status   • Mo  Alive   • Fa  Alive   • Bro  Alive   • MGMo     • MGFa     • PGMo     • PGFa     • Bro  Alive   • Bro  Alive       Soc  HX:  Social History     Socioeconomic History   • Marital status: Single     Spouse name: Not on file   • Number of children: Not on file   • Years of education: Not on file   • Highest education level: Not on file   Occupational History   • Occupation: [employer]     Comment: CNA ; Nursing student   Social Needs   • Financial resource strain: Not on file   • Food insecurity     Worry: Not on file     Inability: Not on file   • Transportation needs     Medical: Not on file     Non-medical: Not on file   Tobacco Use   • Smoking status: Never Smoker   • Smokeless tobacco: Never Used   Substance and Sexual Activity   • Alcohol use: Not Currently     Comment: occasionally   • Drug use: No   • Sexual activity: Never     Partners: Male, Female   Lifestyle   • Physical activity     Days per week: Not on file     Minutes per session: Not on file   • Stress: To some extent   Relationships   • Social connections     Talks on phone: Not on file     Gets together: Not on file     Attends Spiritism service: Not on file     Active member of club or organization: Not on file     Attends meetings of clubs or organizations: Not on file     Relationship status: Not on file   • Intimate partner violence     Fear of current or ex partner: Not on file     Emotionally abused: Not on file     Physically abused: Not on file     Forced sexual activity: Not on file   Other Topics Concern   • Not on file   Social History Narrative   • Not on file         Medications:    Current Outpatient Medications:   •  predniSONE (DELTASONE) 20 MG Tab, Take 3 tabs at once PO daily x 5 days, then take 2 tabs at once daily x 3 days, then take 1 tab PO daily x 2 days, Disp: 23 Tab, Rfl: 0  •  triamcinolone (ARISTOCORT) 0.5 % ointment, Apply 2 g to affected area(s) 2 times a day., Disp: 60 g, Rfl: 0  •  citalopram (CELEXA) 20 MG Tab, TAKE 1 AND 1/2 TABLETS BY MOUTH EVERY DAY, Disp: 135 Tab, Rfl: 1  •  verapamil SR (CALAN-SR) 120 MG CR tablet, Take 120 mg by  "mouth every day., Disp: , Rfl:   •  diazePAM (VALIUM) 5 MG Tab, TAKE 1 TABLET BY MOUTH TWICE A DAY AS NEEDED FOR VERTIGO FOR 30 DAYS, Disp: , Rfl:   •  SUMAtriptan (IMITREX) 25 MG Tab tablet, TAKE 1 TO 4 TABLETS BY MOUTH ONCE AS NEEDED FOR MIGRAINE (MAY REPEAT DOSE AFTER 2 HOURS, MAX DAILY DOSE 200MG) FOR UP TO 1 DOSE., Disp: 9 Tab, Rfl: 2  •  albuterol 108 (90 Base) MCG/ACT Aero Soln inhalation aerosol, Inhale 2 Puffs by mouth every four hours as needed for Shortness of Breath., Disp: 1 Inhaler, Rfl: 0      Allergies:  Gluten meal       ROS  Constitutional: Negative for fever, chills and malaise/fatigue.   HENT: Negative for congestion and sore throat.    Eyes: Negative for blurred vision, double vision and photophobia.   Respiratory: Negative for cough and shortness of breath.  Cardiovascular: Negative for chest pain and palpitations.   Gastrointestinal: Negative for heartburn, nausea, vomiting, abdominal pain, diarrhea and constipation.   Genitourinary: Negative for dysuria and flank pain.   Musculoskeletal: Negative for joint pain and myalgias.   Skin: +for itching and rash B/L hands.   Neurological: Negative for dizziness, tingling and headaches.   Endo/Heme/Allergies: Does not bruise/bleed easily.   Psychiatric/Behavioral: Negative for depression. The patient is not nervous/anxious.           Objective:     /68 (BP Location: Left arm, Patient Position: Sitting)   Pulse 70   Temp 37.1 °C (98.7 °F)   Resp 14   Ht 1.702 m (5' 7\")   Wt 118.4 kg (261 lb)   SpO2 99%   BMI 40.88 kg/m²      Physical Exam  Skin:     General: Skin is warm.      Capillary Refill: Capillary refill takes less than 2 seconds.      Findings: Rash present. Rash is macular, papular and scaling.      Nails: There is no clubbing.                    Constitutional: PT is oriented to person, place, and time. PT appears well-developed and well-nourished. No distress.   HENT:   Head: Normocephalic and atraumatic.   Mouth/Throat: " Oropharynx is clear and moist. No oropharyngeal exudate.   Eyes: Conjunctivae normal and EOM are normal. Pupils are equal, round, and reactive to light.   Neck: Normal range of motion. Neck supple. No thyromegaly present.   Cardiovascular: Normal rate, regular rhythm, normal heart sounds and intact distal pulses.  Exam reveals no gallop and no friction rub.    No murmur heard.  Pulmonary/Chest: Effort normal and breath sounds normal. No respiratory distress. PT has no wheezes. PT has no rales. Pt exhibits no tenderness.   Abdominal: Soft. Bowel sounds are normal. PT exhibits no distension and no mass. There is no tenderness. There is no rebound and no guarding.   Musculoskeletal: Normal range of motion. SEE SKIN  Neurological: PT is alert and oriented to person, place, and time. PT has normal reflexes. No cranial nerve deficit.       Psychiatric: PT has a normal mood and affect. PT behavior is normal. Judgment and thought content normal.          Assessment/Plan:        1. Dyshidrotic eczema    - predniSONE (DELTASONE) 20 MG Tab; Take 3 tabs at once PO daily x 5 days, then take 2 tabs at once daily x 3 days, then take 1 tab PO daily x 2 days  Dispense: 23 Tab; Refill: 0  - triamcinolone (ARISTOCORT) 0.5 % ointment; Apply 2 g to affected area(s) 2 times a day.  Dispense: 60 g; Refill: 0      Ref to Derm placed  I called and set pt up with Derm appt on 11/24/20 at 9:30am with [facility]  RICE therapy discussed  Gentle ROM exercises discussed  WBAT BUE  Ice/heat therapy discussed  OTC ibuprofen for pain control prn  Rest, fluids encouraged.  AVS with medical info given.  Pt was in full understanding and agreement with the plan.  Follow-up as needed if symptoms worsen or fail to improve.

## 2020-10-20 NOTE — PATIENT INSTRUCTIONS
Dyshidrotic Eczema  Dyshidrotic eczema (pompholyx) is a type of eczema that causes very itchy (pruritic), fluid-filled blisters (vesicles) to form on the hands and feet. It can affect people of any age, but is more common before the age of 40. There is no cure, but treatment and certain lifestyle changes can help relieve symptoms.  What are the causes?  The cause of this condition is not known.  What increases the risk?  You are more likely to develop this condition if:  · You wash your hands frequently.  · You have a personal history or family history of eczema, allergies, asthma, or hay fever.  · You are allergic to metals such as nickel or cobalt.  · You work with cement.  · You smoke.  What are the signs or symptoms?  Symptoms of this condition may affect the hands, feet, or both. Symptoms may come and go (recur), and may include:  · Severe itching, which may happen before blisters appear.  · Blisters. These may form suddenly.  ? In the early stages, blisters may form near the fingertips.  ? In severe cases, blisters may grow to large blister masses (bullae).  ? Blisters resolve in 2-3 weeks without bursting. This is followed by a dry phase in which itching eases.  · Pain and swelling.  · Cracks or long, narrow openings (fissures) in the skin.  · Severe dryness.  · Ridges on the nails.  How is this diagnosed?  This condition may be diagnosed based on:  · A physical exam.  · Your symptoms.  · Your medical history.  · Skin scrapings to rule out a fungal infection.  · Testing a swab of fluid for bacteria (culture).  · Removing and checking a small piece of skin (biopsy) in order to test for infection or to rule out other conditions.  · Skin patch tests. These tests involve taking patches that contain possible allergens and placing them on your back. Your health care provider will wait a few days and then check to see if an allergic reaction occurred. These tests may be done if your health care provider suspects  allergic reactions, or to rule out other types of eczema.  You may be referred to a health care provider who specializes in the skin (dermatologist) to help diagnose and treat this condition.  How is this treated?  There is no cure for this condition, but treatment can help relieve symptoms. Depending on how many blisters you have and how severe they are, your health care provider may suggest:  · Avoiding allergens, irritants, or triggers that worsen symptoms. This may involve lifestyle changes such as:  ? Using different lotions or soaps.  ? Avoiding hot weather or places that will cause you to sweat a lot.  ? Managing stress with coping techniques such as relaxation and exercise, and asking for help when you need it.  ? Diet changes as recommended by your health care provider.  · Using a clean, damp towel (cool compress) to relieve symptoms.  · Soaking in a bath that contains a type of salt that relieves irritation (aluminum acetate soaks).  · Medicine taken by mouth to reduce itching (oral antihistamines).  · Medicine applied to the skin to reduce swelling and irritation (topical corticosteroids).  · Medicine that reduces the activity of the body's disease-fighting system (immunosuppressants) to treat inflammation. This may be given in severe cases.  · Antibiotic medicines to treat bacterial infection.  · Light therapy (phototherapy). This involves shining ultraviolet (UV) light on affected skin in order to reduce itchiness and inflammation.  Follow these instructions at home:  Bathing and skin care    · Wash skin gently. After bathing or washing your hands, pat your skin dry. Avoid rubbing your skin.  · Remove all jewelry before bathing. If the skin under the jewelry stays wet, blisters may form or get worse.  · Apply cool compresses as told by your health care provider:  ? Soak a clean towel in cool water.  ? Wring out excess water until towel is damp.  ? Place the towel over affected skin. Leave the towel on  for 20 minutes at a time, 2-3 times a day.  · Use mild soaps, cleansers, and lotions that do not contain dyes, perfumes, or other irritants.  · Keep your skin hydrated. To do this:  ? Avoid very hot water. Take lukewarm baths or showers.  ? Apply moisturizer within three minutes of bathing. This locks in moisture.  Medicines  · Take and apply over-the-counter and prescription medicines only as told by your health care provider.  · If you were prescribed antibiotic medicine, take or apply it as told by your health care provider. Do not stop using the antibiotic even if you start to feel better.  General instructions  · Identify and avoid triggers and allergens.  · Keep fingernails short to avoid breaking open the skin while scratching.  · Use waterproof gloves to protect your hands when doing work that keeps your hands wet for a long time.  · Wear socks to keep your feet dry.  · Do not use any products that contain nicotine or tobacco, such as cigarettes and e-cigarettes. If you need help quitting, ask your health care provider.  · Keep all follow-up visits as told by your health care provider. This is important.  Contact a health care provider if:  · You have symptoms that do not go away.  · You have signs of infection, such as:  ? Crusting, pus, or a bad smell.  ? More redness, swelling, or pain.  ? Increased warmth in the affected area.  Summary  · Dyshidrotic eczema (pompholyx) is a type of eczema that causes very itchy (pruritic), fluid-filled blisters (vesicles) to form on the hands and feet.  · The cause of this condition is not known.  · There is no cure for this condition, but treatment can help relieve symptoms. Treatment depends on how many blisters you have and how severe they are.  · Use mild soaps, cleansers, and lotions that do not contain dyes, perfumes, or other irritants. Keep your skin hydrated.  This information is not intended to replace advice given to you by your health care provider. Make sure  you discuss any questions you have with your health care provider.  Document Released: 05/03/2018 Document Revised: 04/08/2020 Document Reviewed: 05/03/2018  Elsevier Patient Education © 2020 [employer]

## 2020-11-13 ENCOUNTER — OFFICE VISIT (OUTPATIENT)
Dept: URGENT CARE | Facility: CLINIC | Age: 25
End: 2020-11-13
Payer: COMMERCIAL

## 2020-11-13 VITALS
TEMPERATURE: 97.6 F | SYSTOLIC BLOOD PRESSURE: 114 MMHG | HEIGHT: 67 IN | OXYGEN SATURATION: 99 % | HEART RATE: 86 BPM | BODY MASS INDEX: 42.38 KG/M2 | RESPIRATION RATE: 16 BRPM | DIASTOLIC BLOOD PRESSURE: 72 MMHG | WEIGHT: 270 LBS

## 2020-11-13 DIAGNOSIS — L30.9 ACUTE ECZEMA OF HAND: ICD-10-CM

## 2020-11-13 PROCEDURE — 99214 OFFICE O/P EST MOD 30 MIN: CPT | Performed by: NURSE PRACTITIONER

## 2020-11-13 RX ORDER — PREDNISONE 20 MG/1
TABLET ORAL
Qty: 15 TAB | Refills: 0 | Status: SHIPPED | OUTPATIENT
Start: 2020-11-13 | End: 2020-11-23

## 2020-11-13 ASSESSMENT — ENCOUNTER SYMPTOMS
CHILLS: 0
CONSTITUTIONAL NEGATIVE: 1
FEVER: 0

## 2020-11-13 ASSESSMENT — VISUAL ACUITY: OU: 1

## 2020-11-13 ASSESSMENT — FIBROSIS 4 INDEX: FIB4 SCORE: 0.3

## 2020-11-14 NOTE — PROGRESS NOTES
Subjective:     Diana Hill is a 25 y.o. female who presents for Eczema (eczema on both hands and wanted steroids)       Rash  This is a new problem. The problem has been gradually worsening since onset. The affected locations include the left hand and right hand. The rash is characterized by dryness, redness, scaling and itchiness. She was exposed to nothing. Pertinent negatives include no fever.     Patient presents to urgent care with reoccurrence of bilateral hand redness, itching, and scaling.  Patient was seen in urgent care on 10/19/2020 with similar symptoms.  Patient was started on a course of oral prednisone as well as topical triamcinolone.  Patient was referred to dermatology.  Patient reports she does not have an appointment until December.  Has been using Benadryl with minimal relief of the symptoms.  Has tried using hypoallergenic soaps and lotions with no improvement in the symptoms.  Reports an episode of similar symptoms about 1 year ago.    Patient was screened prior to rooming and denied COVID-19 diagnosis or contact with a person who has been diagnosed or is suspected to have COVID-19. During this visit, appropriate PPE was worn, hand hygiene was performed, and the patient and any visitors were masked.     PMH:  has a past medical history of Anemia, Anxiety, Celiac disease, Depression, Insulin resistance, Migraine, PCOS (polycystic ovarian syndrome), and Vertigo.    MEDS:   Current Outpatient Medications:   •  predniSONE (DELTASONE) 20 MG Tab, Take 3 tabs daily x 3 days, then 2 tabs daily x 2 days, then 1 tab x 2 days., Disp: 15 Tab, Rfl: 0  •  citalopram (CELEXA) 20 MG Tab, TAKE 1 AND 1/2 TABLETS BY MOUTH EVERY DAY, Disp: 135 Tab, Rfl: 1  •  verapamil SR (CALAN-SR) 120 MG CR tablet, Take 120 mg by mouth every day., Disp: , Rfl:   •  predniSONE (DELTASONE) 20 MG Tab, Take 3 tabs at once PO daily x 5 days, then take 2 tabs at once daily x 3 days, then take 1 tab PO daily x 2 days (Patient  "not taking: Reported on 11/13/2020), Disp: 23 Tab, Rfl: 0  •  triamcinolone (ARISTOCORT) 0.5 % ointment, Apply 2 g to affected area(s) 2 times a day. (Patient not taking: Reported on 11/13/2020), Disp: 60 g, Rfl: 0  •  diazePAM (VALIUM) 5 MG Tab, TAKE 1 TABLET BY MOUTH TWICE A DAY AS NEEDED FOR VERTIGO FOR 30 DAYS, Disp: , Rfl:   •  SUMAtriptan (IMITREX) 25 MG Tab tablet, TAKE 1 TO 4 TABLETS BY MOUTH ONCE AS NEEDED FOR MIGRAINE (MAY REPEAT DOSE AFTER 2 HOURS, MAX DAILY DOSE 200MG) FOR UP TO 1 DOSE. (Patient not taking: Reported on 11/13/2020), Disp: 9 Tab, Rfl: 2  •  albuterol 108 (90 Base) MCG/ACT Aero Soln inhalation aerosol, Inhale 2 Puffs by mouth every four hours as needed for Shortness of Breath. (Patient not taking: Reported on 11/13/2020), Disp: 1 Inhaler, Rfl: 0    ALLERGIES:   Allergies   Allergen Reactions   • Gluten Meal      SURGHX:   Past Surgical History:   Procedure Laterality Date   • MATTHIAS BY LAPAROSCOPY N/A 9/13/2018    Procedure: MATTHIAS BY LAPAROSCOPY;  Surgeon: Hayden Wilhelm M.D.;  Location: SURGERY [facility];  Service: General     SOCHX:  reports that she has never smoked. She has never used smokeless tobacco. She reports previous alcohol use. She reports that she does not use drugs.     FH: Reviewed with patient, not pertinent to this visit.    Review of Systems   Constitutional: Negative.  Negative for chills, fever and malaise/fatigue.   Skin: Positive for itching and rash.   All other systems reviewed and are negative.    Additional details per HPI.      Objective:     /72   Pulse 86   Temp 36.4 °C (97.6 °F)   Resp 16   Ht 1.702 m (5' 7\")   Wt 122.5 kg (270 lb)   SpO2 99%   BMI 42.29 kg/m²     Physical Exam  Vitals signs reviewed.   Constitutional:       General: She is not in acute distress.     Appearance: She is well-developed. She is not toxic-appearing.   HENT:      Head: Normocephalic.      Right Ear: External ear normal.      Left Ear: External ear normal. "      Nose: Nose normal.   Eyes:      General: Vision grossly intact.      Extraocular Movements: Extraocular movements intact.      Conjunctiva/sclera: Conjunctivae normal.   Neck:      Musculoskeletal: Normal range of motion.   Cardiovascular:      Rate and Rhythm: Normal rate.   Pulmonary:      Effort: Pulmonary effort is normal. No respiratory distress.   Musculoskeletal: Normal range of motion.         General: No deformity.   Skin:     General: Skin is warm and dry.      Coloration: Skin is not pale.      Findings: Rash (Bilateral hands, dryness, cracking, inflammation) present. Rash is macular, papular and scaling.   Neurological:      Mental Status: She is alert and oriented to person, place, and time.      Sensory: No sensory deficit.      Motor: No weakness.      Coordination: Coordination normal.   Psychiatric:         Behavior: Behavior normal. Behavior is cooperative.       Assessment/Plan:     1. Acute eczema of hand  - predniSONE (DELTASONE) 20 MG Tab; Take 3 tabs daily x 3 days, then 2 tabs daily x 2 days, then 1 tab x 2 days.  Dispense: 15 Tab; Refill: 0    Rx as above sent electronically.  Advised to continue with triamcinolone cream as needed.  Continue with antihistamine as needed.  Continue to use hypoallergenic skin products.  Follow-up with dermatology.    Differential diagnosis, natural history, supportive care, over-the-counter symptom management per 's instructions, close monitoring, and indications for immediate follow-up discussed.     Patient advised to: Return for 1) Symptoms that worsen/don't improve, or go to ER, 2) Follow up with primary care in 7-10 days.    All questions answered. Patient agrees with the plan of care.    Discharge summary provided.

## 2020-11-23 ENCOUNTER — NON-PROVIDER VISIT (OUTPATIENT)
Dept: ENDOCRINOLOGY | Facility: MEDICAL CENTER | Age: 25
End: 2020-11-23
Attending: INTERNAL MEDICINE
Payer: COMMERCIAL

## 2020-11-23 VITALS — WEIGHT: 250 LBS | BODY MASS INDEX: 39.24 KG/M2 | HEIGHT: 67 IN

## 2020-11-23 DIAGNOSIS — D64.9 ANEMIA, UNSPECIFIED TYPE: ICD-10-CM

## 2020-11-23 DIAGNOSIS — E55.9 VITAMIN D DEFICIENCY: ICD-10-CM

## 2020-11-23 DIAGNOSIS — E03.8 SUBCLINICAL HYPOTHYROIDISM: ICD-10-CM

## 2020-11-23 DIAGNOSIS — E88.819 INSULIN RESISTANCE: ICD-10-CM

## 2020-11-23 PROCEDURE — 99204 OFFICE O/P NEW MOD 45 MIN: CPT | Mod: 95,CR | Performed by: INTERNAL MEDICINE

## 2020-11-23 RX ORDER — MULTIVITAMIN WITH IRON
TABLET ORAL
COMMUNITY
End: 2021-08-17

## 2020-11-23 RX ORDER — METFORMIN HYDROCHLORIDE 500 MG/1
1000 TABLET, EXTENDED RELEASE ORAL 2 TIMES DAILY
Qty: 120 TAB | Refills: 5 | Status: SHIPPED | OUTPATIENT
Start: 2020-11-23 | End: 2021-08-17 | Stop reason: SDUPTHER

## 2020-11-23 ASSESSMENT — FIBROSIS 4 INDEX: FIB4 SCORE: 0.3

## 2020-11-24 ENCOUNTER — HOSPITAL ENCOUNTER (OUTPATIENT)
Dept: LAB | Facility: MEDICAL CENTER | Age: 25
End: 2020-11-24
Attending: STUDENT IN AN ORGANIZED HEALTH CARE EDUCATION/TRAINING PROGRAM
Payer: COMMERCIAL

## 2020-11-24 DIAGNOSIS — D64.9 ANEMIA, UNSPECIFIED TYPE: ICD-10-CM

## 2020-11-24 DIAGNOSIS — E03.8 SUBCLINICAL HYPOTHYROIDISM: ICD-10-CM

## 2020-11-24 DIAGNOSIS — E55.9 VITAMIN D DEFICIENCY: ICD-10-CM

## 2020-11-24 LAB
25(OH)D3 SERPL-MCNC: 43 NG/ML (ref 30–100)
FERRITIN SERPL-MCNC: 8.8 NG/ML (ref 10–291)
IRON SATN MFR SERPL: 14 % (ref 15–55)
IRON SERPL-MCNC: 58 UG/DL (ref 40–170)
TIBC SERPL-MCNC: 404 UG/DL (ref 250–450)
TSH SERPL DL<=0.005 MIU/L-ACNC: 2.8 UIU/ML (ref 0.38–5.33)
UIBC SERPL-MCNC: 346 UG/DL (ref 110–370)
VIT B12 SERPL-MCNC: 595 PG/ML (ref 211–911)

## 2020-11-24 PROCEDURE — 83540 ASSAY OF IRON: CPT

## 2020-11-24 PROCEDURE — 83550 IRON BINDING TEST: CPT

## 2020-11-24 PROCEDURE — 82306 VITAMIN D 25 HYDROXY: CPT

## 2020-11-24 PROCEDURE — 36415 COLL VENOUS BLD VENIPUNCTURE: CPT

## 2020-11-24 PROCEDURE — 82607 VITAMIN B-12: CPT

## 2020-11-24 PROCEDURE — 82728 ASSAY OF FERRITIN: CPT

## 2020-11-24 PROCEDURE — 84443 ASSAY THYROID STIM HORMONE: CPT

## 2020-11-24 NOTE — PROGRESS NOTES
"Chief Complaint: Consult requested by DANIELE Resendiz for evaluation of Hyperthyroidism  Patient was presented for a telehealth consultation via secure and encrypted videoconferencing technology. This encounter was conducted via Zoom . Verbal consent was obtained. Patient's identity was verified.    HPI:     Diana Hill is a 25 y.o. female with history of PCOS, migraines, vitamin D deficiency who presents from her PCP for multiple nonspecific symptoms and abnormal thyroid studies.      Her symptoms include fatigue, weakness, internal trembling, postural vertigo, getting jolted, migraines, shortness of breath, hair loss, dry skin.  She has previously seen a neurologist and states they \"ruled out\" a neurologic source and so therefore was referred to endocrinology for evaluation of her symptoms.    Her thyroid studies showed on 8/2020:  TSH: 2.250  Free T4: 0.87  Anti-TPO antibodies: Negative    She reports a family history of Hypothyroidism and Hashimoto's disease with her mother.   She denies taking thyroid hormone or biotin.  She denies any recent IV contrast exposure.   She denies any recent URI or having neck pain.    She states she has a strong personal history of autoimmune disease and was diagnosed with celiac disease sometime ago.  Interestingly, her antibodies were negative, I do not see a biopsy on record and I am unclear on how she was diagnosed.  She states she is being worked up for lupus however her JALEN was negative and other rheumatologic disorders such as psoriasis.    She also has a history of PCOS and was previously on metformin however this was stopped due to intolerance from diarrhea.  She stopped this before age 20 and was told she could not do a trial of the Metformin extended release because of her age.  She is willing to restart the Metformin.    Patient's medications, allergies, and social histories were reviewed and updated as appropriate.    ROS:     CONS:     No fever, no " chills, no weight loss, no fatigue   EYES:      No diplopia, no blurry vision, no redness of eyes, no swelling of eyelids   ENT:    No hearing loss, No ear pain, No sore throat, no dysphagia, no neck swelling   CV:     No chest pain, no palpitations, no claudication, no orthopnea, no PND   PULM:    No SOB, no cough, no hemoptysis, no wheezing    GI:   No nausea, no vomiting, no diarrhea, no constipation, no bloody stools   :  Passing urine well, no dysuria, no hematuria   ENDO:   No polyuria, no polydipsia, no heat intolerance, no cold intolerance   NEURO: No headaches, no dizziness, no convulsions, no tremors   MUSC:  No joint swellings, no arthralgias, no myalgias, no weakness   SKIN:   No rash, no ulcers, no dry skin   PSYCH:   No depression, no anxiety, no difficulty sleeping       Past Medical History:  Patient Active Problem List    Diagnosis Date Noted   • Subclinical hypothyroidism 11/23/2020   • Insulin resistance 11/23/2020   • Anemia 11/23/2020   • Morbid obesity (HCC) 10/07/2020   • Skin rash 09/15/2020   • Administrative encounter 09/15/2020   • Hospital discharge follow-up 09/02/2020   • Rash of hands 08/12/2020   • Snoring 08/12/2020   • Vertigo 03/03/2020   • PCOS (polycystic ovarian syndrome) 11/19/2019   • Vitamin D deficiency 11/19/2019   • High triglycerides 11/19/2019   • Anxiety and depression 11/19/2019       Past Surgical History:  Past Surgical History:   Procedure Laterality Date   • MATTHIAS BY LAPAROSCOPY N/A 9/13/2018    Procedure: MATTHIAS BY LAPAROSCOPY;  Surgeon: Hayden Wilhelm M.D.;  Location: SURGERY [facility];  Service: General        Allergies:  Gluten meal     Current Medications:    Current Outpatient Medications:   •  VITAMIN D PO, Take 6,000 Units by mouth every day., Disp: , Rfl:   •  Magnesium 250 MG Tab, Take  by mouth., Disp: , Rfl:   •  Selenium (SELENICAPS-200 PO), Take  by mouth., Disp: , Rfl:   •  metFORMIN ER (GLUCOPHAGE XR) 500 MG TABLET SR 24 HR, Take 2  Tabs by mouth 2 times a day., Disp: 120 Tab, Rfl: 5  •  triamcinolone (ARISTOCORT) 0.5 % ointment, Apply 2 g to affected area(s) 2 times a day., Disp: 60 g, Rfl: 0  •  citalopram (CELEXA) 20 MG Tab, TAKE 1 AND 1/2 TABLETS BY MOUTH EVERY DAY, Disp: 135 Tab, Rfl: 1  •  verapamil SR (CALAN-SR) 120 MG CR tablet, Take 120 mg by mouth every day., Disp: , Rfl:   •  diazePAM (VALIUM) 5 MG Tab, TAKE 1 TABLET BY MOUTH TWICE A DAY AS NEEDED FOR VERTIGO FOR 30 DAYS, Disp: , Rfl:   •  SUMAtriptan (IMITREX) 25 MG Tab tablet, TAKE 1 TO 4 TABLETS BY MOUTH ONCE AS NEEDED FOR MIGRAINE (MAY REPEAT DOSE AFTER 2 HOURS, MAX DAILY DOSE 200MG) FOR UP TO 1 DOSE., Disp: 9 Tab, Rfl: 2  •  albuterol 108 (90 Base) MCG/ACT Aero Soln inhalation aerosol, Inhale 2 Puffs by mouth every four hours as needed for Shortness of Breath., Disp: 1 Inhaler, Rfl: 0  •  predniSONE (DELTASONE) 20 MG Tab, Take 3 tabs at once PO daily x 5 days, then take 2 tabs at once daily x 3 days, then take 1 tab PO daily x 2 days (Patient not taking: Reported on 11/13/2020), Disp: 23 Tab, Rfl: 0    Social History:  Social History     Socioeconomic History   • Marital status: Single     Spouse name: Not on file   • Number of children: Not on file   • Years of education: Not on file   • Highest education level: Not on file   Occupational History   • Occupation: CNA [employer]     Comment: CNA ; Nursing student   Social Needs   • Financial resource strain: Not on file   • Food insecurity     Worry: Not on file     Inability: Not on file   • Transportation needs     Medical: Not on file     Non-medical: Not on file   Tobacco Use   • Smoking status: Never Smoker   • Smokeless tobacco: Never Used   Substance and Sexual Activity   • Alcohol use: Not Currently     Comment: occasionally   • Drug use: No   • Sexual activity: Never     Partners: Male, Female   Lifestyle   • Physical activity     Days per week: Not on file     Minutes per session: Not on file   • Stress: To some extent  "  Relationships   • Social connections     Talks on phone: Not on file     Gets together: Not on file     Attends Tenriism service: Not on file     Active member of club or organization: Not on file     Attends meetings of clubs or organizations: Not on file     Relationship status: Not on file   • Intimate partner violence     Fear of current or ex partner: Not on file     Emotionally abused: Not on file     Physically abused: Not on file     Forced sexual activity: Not on file   Other Topics Concern   • Not on file   Social History Narrative   • Not on file        Family History:   Family History   Problem Relation Age of Onset   • Thyroid Mother    • Depression Father    • Alcohol abuse Father    • No Known Problems Maternal Grandmother    • Heart Attack Maternal Grandfather 66   • Cancer Paternal Grandmother         Breast , esophageal   • Stroke Paternal Grandmother 66   • Alcohol abuse Paternal Grandfather    • Lung Disease Paternal Grandfather    • Sleep Apnea Brother      PHYSICAL EXAM:   Vital signs: Ht 1.702 m (5' 7\")   Wt 113.4 kg (250 lb)   BMI 39.16 kg/m²   Ht 1.702 m (5' 7\")   Wt 113.4 kg (250 lb)   BMI 39.16 kg/m²   GENERAL: Well-developed, well-nourished in no apparent distress.   EYE:  No ocular asymmetry, PERRLA  HENT: Pink, moist mucous membranes.    NECK: No thyromegaly.   CARDIOVASCULAR: Normal precordial impulse seen with normal carotid pulsation  LUNGS: Symmetrical chest expansion with normal phonation of voice   ABDOMEN: Obese abdomen with no visible organomegaly  EXTREMITIES: No clubbing, cyanosis, or edema.   NEUROLOGICAL: No gross focal motor abnormalities   LYMPH: No cervical adenopathy seen.   SKIN: No rashes, lesions.   Labs:  Lab Results   Component Value Date/Time    WBC 8.4 08/31/2020 09:33 PM    RBC 4.60 08/31/2020 09:33 PM    HEMOGLOBIN 11.9 (L) 08/31/2020 09:33 PM    MCV 82.8 08/31/2020 09:33 PM    MCH 25.9 (L) 08/31/2020 09:33 PM    MCHC 31.2 (L) 08/31/2020 09:33 PM    RDW " 42.8 08/31/2020 09:33 PM    MPV 10.5 08/31/2020 09:33 PM       Lab Results   Component Value Date/Time    SODIUM 138 08/31/2020 09:33 PM    POTASSIUM 4.5 08/31/2020 09:33 PM    CHLORIDE 105 08/31/2020 09:33 PM    CO2 26 08/31/2020 09:33 PM    ANION 7.0 08/31/2020 09:33 PM    GLUCOSE 84 08/31/2020 09:33 PM    BUN 11 08/31/2020 09:33 PM    CREATININE 0.66 08/31/2020 09:33 PM    CALCIUM 9.0 08/31/2020 09:33 PM    ASTSGOT 18 08/31/2020 09:33 PM    ALTSGPT 20 08/31/2020 09:33 PM    TBILIRUBIN <0.2 08/31/2020 09:33 PM    ALBUMIN 3.9 08/31/2020 09:33 PM    TOTPROTEIN 7.0 08/31/2020 09:33 PM    GLOBULIN 3.1 08/31/2020 09:33 PM    AGRATIO 1.3 08/31/2020 09:33 PM     Lab Results   Component Value Date/Time    TSHULTRASEN 2.250 08/31/2020 2133     Lab Results   Component Value Date/Time    FREET4 0.87 (L) 08/31/2020 2133     Lab Results   Component Value Date/Time    FREET3 3.16 08/28/2018 1120     ASSESSMENT/PLAN:     1. Subclinical hypothyroidism  TSH normal, free T4 slightly low  -We will repeat thyroid studies but with a dialysis free T4  -Discussed the possibility of doing a trial of low-dose levothyroxine however patient declined at this time.  We will revisit revisit in the future with lab results    2. Insulin resistance  History of PCOS  -Restart Metformin ER for better tolerance, start at 500 mg daily and titrate up to a maximum dose of 2 g daily if tolerated.  -Advised to increase regular exercise, low-carb low calorie diet    3. Vitamin D deficiency  -Continue vitamin D supplementation  -Recheck 25 hydroxy vitamin D    4. Anemia, unspecified type  -Mild, patient states heavy menstrual cycles, history of celiac disease  -Iron studies and B12    Return in about 6 months (around 5/23/2021).    This patient during there office visit was started on new medication.  Side effects of new medications were discussed with the patient today in the office. The patient was supplied paperwork on this new medication.    Thank you  kindly for allowing me to participate in the thyroid care plan for this patient.    11/23/20    CC:   DANIELE Resenidz

## 2020-12-01 ENCOUNTER — OFFICE VISIT (OUTPATIENT)
Dept: DERMATOLOGY | Facility: IMAGING CENTER | Age: 25
End: 2020-12-01
Payer: COMMERCIAL

## 2020-12-01 VITALS — WEIGHT: 250 LBS | BODY MASS INDEX: 39.24 KG/M2 | HEIGHT: 67 IN | TEMPERATURE: 98.8 F

## 2020-12-01 DIAGNOSIS — L30.9 HAND ECZEMA: ICD-10-CM

## 2020-12-01 PROCEDURE — 99214 OFFICE O/P EST MOD 30 MIN: CPT | Performed by: NURSE PRACTITIONER

## 2020-12-01 RX ORDER — BETAMETHASONE DIPROPIONATE 0.05 %
OINTMENT (GRAM) TOPICAL
COMMUNITY
Start: 2020-11-24 | End: 2020-12-01

## 2020-12-01 RX ORDER — CLOBETASOL PROPIONATE 0.5 MG/G
1 OINTMENT TOPICAL 2 TIMES DAILY
Qty: 45 G | Refills: 1 | Status: SHIPPED | OUTPATIENT
Start: 2020-12-01 | End: 2021-04-27

## 2020-12-01 ASSESSMENT — ENCOUNTER SYMPTOMS
CHILLS: 0
FEVER: 0

## 2020-12-01 ASSESSMENT — FIBROSIS 4 INDEX: FIB4 SCORE: 0.3

## 2020-12-01 NOTE — PROGRESS NOTES
Dermatology New Patient Visit    Chief Complaint   Patient presents with   • Rash       Subjective:     HPI:   Diana Hill is a 25 y.o. female presenting for    HPI:Rash b/l hands  Onset: 2-3 months  Symptoms: blistering, cracking skin on both hands  Aggravating factors: Soaps  Alleviating factors: oral steroids  New creams/topicals: No  New medications (up to last 6 months): No  New travel: No  Other exposures: No  Treatments: [place], Dermaced        Past Medical History:   Diagnosis Date   • Anemia     hx of borderline anemia   • Anxiety    • Celiac disease    • Depression    • Insulin resistance    • Migraine    • PCOS (polycystic ovarian syndrome)    • Vertigo    • Vitamin D deficiency        Current Outpatient Medications on File Prior to Visit   Medication Sig Dispense Refill   • VITAMIN D PO Take 6,000 Units by mouth every day.     • Magnesium 250 MG Tab Take  by mouth.     • Selenium (SELENICAPS-200 PO) Take  by mouth.     • metFORMIN ER (GLUCOPHAGE XR) 500 MG TABLET SR 24 HR Take 2 Tabs by mouth 2 times a day. 120 Tab 5   • predniSONE (DELTASONE) 20 MG Tab Take 3 tabs at once PO daily x 5 days, then take 2 tabs at once daily x 3 days, then take 1 tab PO daily x 2 days (Patient not taking: Reported on 11/13/2020) 23 Tab 0   • citalopram (CELEXA) 20 MG Tab TAKE 1 AND 1/2 TABLETS BY MOUTH EVERY  Tab 1   • verapamil SR (CALAN-SR) 120 MG CR tablet Take 120 mg by mouth every day.     • diazePAM (VALIUM) 5 MG Tab TAKE 1 TABLET BY MOUTH TWICE A DAY AS NEEDED FOR VERTIGO FOR 30 DAYS     • SUMAtriptan (IMITREX) 25 MG Tab tablet TAKE 1 TO 4 TABLETS BY MOUTH ONCE AS NEEDED FOR MIGRAINE (MAY REPEAT DOSE AFTER 2 HOURS, MAX DAILY DOSE 200MG) FOR UP TO 1 DOSE. 9 Tab 2   • albuterol 108 (90 Base) MCG/ACT Aero Soln inhalation aerosol Inhale 2 Puffs by mouth every four hours as needed for Shortness of Breath. 1 Inhaler 0     No current facility-administered medications on file prior to visit.        Allergies    Allergen Reactions   • Gluten Meal        Family History   Problem Relation Age of Onset   • Thyroid Mother    • Depression Father    • Alcohol abuse Father    • No Known Problems Maternal Grandmother    • Heart Attack Maternal Grandfather 66   • Cancer Paternal Grandmother         Breast , esophageal   • Stroke Paternal Grandmother 66   • Alcohol abuse Paternal Grandfather    • Lung Disease Paternal Grandfather    • Sleep Apnea Brother        Social History     Socioeconomic History   • Marital status: Single     Spouse name: Not on file   • Number of children: Not on file   • Years of education: Not on file   • Highest education level: Not on file   Occupational History   • Occupation: [employer]     Comment: CNA ; Nursing student   Social Needs   • Financial resource strain: Not on file   • Food insecurity     Worry: Not on file     Inability: Not on file   • Transportation needs     Medical: Not on file     Non-medical: Not on file   Tobacco Use   • Smoking status: Never Smoker   • Smokeless tobacco: Never Used   Substance and Sexual Activity   • Alcohol use: Not Currently     Comment: occasionally   • Drug use: No   • Sexual activity: Never     Partners: Male, Female   Lifestyle   • Physical activity     Days per week: Not on file     Minutes per session: Not on file   • Stress: To some extent   Relationships   • Social connections     Talks on phone: Not on file     Gets together: Not on file     Attends Catholic service: Not on file     Active member of club or organization: Not on file     Attends meetings of clubs or organizations: Not on file     Relationship status: Not on file   • Intimate partner violence     Fear of current or ex partner: Not on file     Emotionally abused: Not on file     Physically abused: Not on file     Forced sexual activity: Not on file   Other Topics Concern   • Not on file   Social History Narrative   • Not on file       Review of Systems   Constitutional: Negative for  "chills and fever.   Skin: Positive for itching and rash.        Objective:     A focused cutaneous exam was completed including: left and right upper extremities (including hands/digits and fingernails) with the following pertinent findings listed below. Remaining above-listed examined areas within normal limits / negative for rashes or lesions.          Temp 37.1 °C (98.8 °F)   Ht 1.702 m (5' 7\")   Wt 113.4 kg (250 lb)     Physical Exam   Constitutional: She is oriented to person, place, and time and well-developed, well-nourished, and in no distress. No distress.   HENT:   Head: Normocephalic.   Pulmonary/Chest: Effort normal.   Musculoskeletal:        Hands:    Neurological: She is alert and oriented to person, place, and time.   Skin: Skin is warm and dry. Rash noted. There is erythema.   Psychiatric: Mood normal.       DATA: none applicable to review    Assessment and Plan:     1. Hand eczema  Educated patient about diagnosis, management options, and expectations of treatment.    Start clobetasol bid to hands then layer over with moisturizer for 4 weeks    - side effects of topical steroids discussed, including minimal systemic absorption, skin thinning, appearance of stretch marks (striae), easy bruising, telangiectasias, and possible increased hair growth     - clobetasol (TEMOVATE) 0.05 % Ointment; Apply 1 Application topically 2 times a day. To hands for 4 weeks  Dispense: 45 g; Refill: 1      Followup: Return in about 4 weeks (around 12/29/2020) for re-check and new problem of acne to discuss.    MARKY Phan.        "

## 2020-12-02 ENCOUNTER — OFFICE VISIT (OUTPATIENT)
Dept: MEDICAL GROUP | Facility: PHYSICIAN GROUP | Age: 25
End: 2020-12-02
Payer: COMMERCIAL

## 2020-12-02 VITALS
RESPIRATION RATE: 16 BRPM | WEIGHT: 270 LBS | OXYGEN SATURATION: 97 % | HEIGHT: 67 IN | DIASTOLIC BLOOD PRESSURE: 66 MMHG | TEMPERATURE: 99.1 F | HEART RATE: 108 BPM | SYSTOLIC BLOOD PRESSURE: 102 MMHG | BODY MASS INDEX: 42.38 KG/M2

## 2020-12-02 DIAGNOSIS — Z02.9 ADMINISTRATIVE ENCOUNTER: ICD-10-CM

## 2020-12-02 DIAGNOSIS — D64.9 ANEMIA, UNSPECIFIED TYPE: ICD-10-CM

## 2020-12-02 PROCEDURE — 99214 OFFICE O/P EST MOD 30 MIN: CPT | Performed by: NURSE PRACTITIONER

## 2020-12-02 ASSESSMENT — FIBROSIS 4 INDEX: FIB4 SCORE: 0.3

## 2020-12-02 NOTE — ASSESSMENT & PLAN NOTE
Results for JOSE MÁRQUEZ (MRN [id number]) as of 12/2/2020 11:59   Ref. Range 11/24/2020 10:49   Iron Latest Ref Range: 40 - 170 ug/dL 58   Total Iron Binding Latest Ref Range: 250 - 450 ug/dL 404   % Saturation Latest Ref Range: 15 - 55 % 14 (L)   Unsat Iron Binding Latest Ref Range: 110 - 370 ug/dL 346   Results for JOSE MÁRQUEZ (MRN [id number]) as of 12/2/2020 11:59   Ref. Range 11/24/2020 10:49   Ferritin Latest Ref Range: 10.0 - 291.0 ng/mL 8.8 (L)   Vitamin B12 -True Cobalamin Latest Ref Range: 211 - 911 pg/mL 595   Patient reports heavy menstrual cycles a few months ago.  Hemoglobin from August was 11.8.  Patient is followed by endocrinology.  She reports history of celiac.  She is not taking a thyroid supplement before however would like to start on a prescription dose of iron today due to her chronic fatigue symptoms.

## 2020-12-02 NOTE — ASSESSMENT & PLAN NOTE
Patient is requesting a letter off from work from November 9, 2020 through January 1, 2021 to give her enough time for evaluation and adjust to management of her current symptoms.  Patient was evaluated by neurology,  on 9/18/2020 and was cleared.  She is currently following up with endocrinology, Dr. Cm.

## 2020-12-02 NOTE — PROGRESS NOTES
Chief Complaint   Patient presents with   • Follow-Up     talk about iron       HISTORY OF PRESENT ILLNESS: Patient is a 25 y.o. female, established patient who presents today to discuss medical problems as listed below:    Health Maintenance:  COMPLETED     Anemia  Results for JOSE MÁRQUEZ (MRN [id number]) as of 12/2/2020 11:59   Ref. Range 11/24/2020 10:49   Iron Latest Ref Range: 40 - 170 ug/dL 58   Total Iron Binding Latest Ref Range: 250 - 450 ug/dL 404   % Saturation Latest Ref Range: 15 - 55 % 14 (L)   Unsat Iron Binding Latest Ref Range: 110 - 370 ug/dL 346   Results for JOSE MÁRQUEZ (MRN [id number]) as of 12/2/2020 11:59   Ref. Range 11/24/2020 10:49   Ferritin Latest Ref Range: 10.0 - 291.0 ng/mL 8.8 (L)   Vitamin B12 -True Cobalamin Latest Ref Range: 211 - 911 pg/mL 595   Patient reports heavy menstrual cycles a few months ago.  Hemoglobin from August was 11.8.  Patient is followed by endocrinology.  She reports history of celiac.  She is not taking a thyroid supplement before however would like to start on a prescription dose of iron today due to her chronic fatigue symptoms.    Administrative encounter  Patient is requesting a letter off from work from November 9, 2020 through January 1, 2021 to give her enough time for evaluation and adjust to management of her current symptoms.  Patient was evaluated by neurology,  on 9/18/2020 and was cleared.  She is currently following up with endocrinology, Dr. Cm.      Patient Active Problem List    Diagnosis Date Noted   • Subclinical hypothyroidism 11/23/2020   • Insulin resistance 11/23/2020   • Anemia 11/23/2020   • Morbid obesity (HCC) 10/07/2020   • Skin rash 09/15/2020   • Administrative encounter 09/15/2020   • Hospital discharge follow-up 09/02/2020   • Rash of hands 08/12/2020   • Snoring 08/12/2020   • Vertigo 03/03/2020   • PCOS (polycystic ovarian syndrome) 11/19/2019   • Vitamin D deficiency 11/19/2019   • High triglycerides  11/19/2019   • Anxiety and depression 11/19/2019        Allergies: Gluten meal    Current Outpatient Medications   Medication Sig Dispense Refill   • ferrous sulfate (FEOSOL) 220 (44 Fe) MG/5ML Elixir Take 5 mL by mouth every day for 30 days. 473 mL 1   • clobetasol (TEMOVATE) 0.05 % Ointment Apply 1 Application topically 2 times a day. To hands for 4 weeks 45 g 1   • VITAMIN D PO Take 6,000 Units by mouth every day.     • Magnesium 250 MG Tab Take  by mouth.     • Selenium (SELENICAPS-200 PO) Take  by mouth.     • metFORMIN ER (GLUCOPHAGE XR) 500 MG TABLET SR 24 HR Take 2 Tabs by mouth 2 times a day. 120 Tab 5   • predniSONE (DELTASONE) 20 MG Tab Take 3 tabs at once PO daily x 5 days, then take 2 tabs at once daily x 3 days, then take 1 tab PO daily x 2 days (Patient not taking: Reported on 11/13/2020) 23 Tab 0   • citalopram (CELEXA) 20 MG Tab TAKE 1 AND 1/2 TABLETS BY MOUTH EVERY  Tab 1   • verapamil SR (CALAN-SR) 120 MG CR tablet Take 120 mg by mouth every day.     • diazePAM (VALIUM) 5 MG Tab TAKE 1 TABLET BY MOUTH TWICE A DAY AS NEEDED FOR VERTIGO FOR 30 DAYS     • SUMAtriptan (IMITREX) 25 MG Tab tablet TAKE 1 TO 4 TABLETS BY MOUTH ONCE AS NEEDED FOR MIGRAINE (MAY REPEAT DOSE AFTER 2 HOURS, MAX DAILY DOSE 200MG) FOR UP TO 1 DOSE. 9 Tab 2   • albuterol 108 (90 Base) MCG/ACT Aero Soln inhalation aerosol Inhale 2 Puffs by mouth every four hours as needed for Shortness of Breath. 1 Inhaler 0     No current facility-administered medications for this visit.        Social History     Tobacco Use   • Smoking status: Never Smoker   • Smokeless tobacco: Never Used   Substance Use Topics   • Alcohol use: Not Currently     Comment: occasionally   • Drug use: No     Social History     Social History Narrative   • Not on file       Family History   Problem Relation Age of Onset   • Thyroid Mother    • Depression Father    • Alcohol abuse Father    • No Known Problems Maternal Grandmother    • Heart Attack Maternal  "Grandfather 66   • Cancer Paternal Grandmother         Breast , esophageal   • Stroke Paternal Grandmother 66   • Alcohol abuse Paternal Grandfather    • Lung Disease Paternal Grandfather    • Sleep Apnea Brother        Allergies, past medical history, past surgical history, family history, social history reviewed and updated.    Review of Systems:     - Constitutional: Negative for fever, chills, unexpected weight change, and fatigue/generalized weakness.     - Respiratory: Negative for cough, sputum production, chest congestion, dyspnea, wheezing, and crackles.      - Cardiovascular: Negative for chest pain, palpitations, orthopnea, and bilateral lower extremity edema.     - Psychiatric/Behavioral: Negative for depression, suicidal/homicidal ideation and memory loss.      All other systems reviewed and are negative    Exam:    /66 (BP Location: Left arm)   Pulse (!) 108   Temp 37.3 °C (99.1 °F) (Temporal)   Resp 16   Ht 1.702 m (5' 7\")   Wt 122.5 kg (270 lb)   SpO2 97%   BMI 42.29 kg/m²  Body mass index is 42.29 kg/m².    Physical Exam:  Constitutional: Well-developed and well-nourished. Not diaphoretic. No distress.   Cardiovascular: Regular rate and rhythm, S1 and S2 without murmur, rubs, or gallops.    Chest: Effort normal. Clear to auscultation throughout. No adventitious sounds.   Neurological: Alert and oriented x 3.   Psychiatric:  Behavior, mood, and affect are appropriate.  MA/nursing note and vitals reviewed.    LABS:  11/24 results reviewed and discussed with the patient, questions answered.    Patient was seen for 25 minutes face to face of which > 50% of appointment time was spent on counseling and coordination of care regarding the above.     Assessment/Plan:  1. Anemia, unspecified type  Uncontrolled, stable.  Will initiate iron, advised to take it with vitamin C rich foods for better absorbency.  If experiencing nausea abdominal discomfort, recommend taking iron with food.   We will " recheck labs in 4 months.  Patient also would like to check her A1c and she restarted her Metformin.  - ferrous sulfate (FEOSOL) 220 (44 Fe) MG/5ML Elixir; Take 5 mL by mouth every day for 30 days.  Dispense: 473 mL; Refill: 1  - CBC WITH DIFFERENTIAL; Future  - HEMOGLOBIN A1C; Future  - IRON/TOTAL IRON BIND; Future  - FERRITIN; Future    2. Administrative encounter  Per patient request, completed a letter requesting patient to stay off work till January 1, 2021 to give her enough time to complete her evaluations and attempt to management of her symptoms.  Patient works as a CNA at [employer].       Discussed with patient possible alternative diagnoses, pt is to take all medications as prescribed. If symptoms persist FU w/PCP, if symptoms worsen go to emergency room. If experiencing any side effects from prescribed medications reports to the office immediately or go to emergency room.  Reviewed indication, dosage, usage and potential adverse effects of prescribed medications. Reviewed risks and benefits of treatment plan. Patient verbalizes understanding of all instruction and verbally agrees to plan.    Return if symptoms worsen or fail to improve.

## 2020-12-02 NOTE — LETTER
December 2, 2020       Patient: Diana Hill   YOB: 1995   Date of Visit: 12/2/2020         To Whom It May Concern:    Ms. Diana Antonio is an established patient in this clinic, currently undergoing evaluation and treatment seeing multiple specialists for her symptoms.  She would like time off from work from 11/9/2020 through 1/1/2021 to accommodate the time alloted for evaluation and management of her symptoms.    If you have any questions or concerns, please don't hesitate to call [phone]          Sincerely,          MARKY Resendiz.  Electronically Signed

## 2020-12-16 DIAGNOSIS — Z23 NEED FOR VACCINATION: ICD-10-CM

## 2020-12-18 ENCOUNTER — IMMUNIZATION (OUTPATIENT)
Dept: FAMILY PLANNING/WOMEN'S HEALTH CLINIC | Facility: IMMUNIZATION CENTER | Age: 25
End: 2020-12-18

## 2020-12-18 ENCOUNTER — APPOINTMENT (OUTPATIENT)
Dept: FAMILY PLANNING/WOMEN'S HEALTH CLINIC | Facility: IMMUNIZATION CENTER | Age: 25
End: 2020-12-18
Attending: FAMILY MEDICINE
Payer: COMMERCIAL

## 2020-12-18 DIAGNOSIS — Z23 ENCOUNTER FOR VACCINATION: Primary | ICD-10-CM

## 2020-12-18 DIAGNOSIS — Z23 NEED FOR VACCINATION: ICD-10-CM

## 2020-12-18 PROCEDURE — 0001A PFIZER SARS-COV-2 VACCINE: CPT

## 2020-12-18 PROCEDURE — 91300 PFIZER SARS-COV-2 VACCINE: CPT

## 2020-12-23 ENCOUNTER — TELEMEDICINE (OUTPATIENT)
Dept: SLEEP MEDICINE | Facility: MEDICAL CENTER | Age: 25
End: 2020-12-23
Payer: COMMERCIAL

## 2020-12-23 VITALS — HEIGHT: 67 IN | WEIGHT: 265 LBS | BODY MASS INDEX: 41.59 KG/M2

## 2020-12-23 DIAGNOSIS — G47.33 OSA (OBSTRUCTIVE SLEEP APNEA): ICD-10-CM

## 2020-12-23 PROCEDURE — 99243 OFF/OP CNSLTJ NEW/EST LOW 30: CPT | Performed by: INTERNAL MEDICINE

## 2020-12-23 ASSESSMENT — FIBROSIS 4 INDEX: FIB4 SCORE: 0.3

## 2020-12-23 NOTE — PROGRESS NOTES
Telemedicine: New Patient   This evaluation was conducted via [employer] using secure and encrypted videoconferencing technology. The patient's identity was verified.    Subjective:     CC:   Chief Complaint   Patient presents with   • Establish Care     Referred by Mary SOLIMAN for snoring       Diana Hill is a 25 y.o. female presenting to establish care and to discuss the evaluation and management of suspected sleep apnea.  She has developed migraine headaches and vertigo over the past 6 months.  She chronically feels tired.  In terms of sleep habits she goes to bed by 1 AM, with a sleep latency estimated up to 1 hour.  She has been told that she snores and has apneas, however is unaware of symptoms herself. Denies restless legs symptoms.  She sleeps through the night and gets up by 7 AM, feeling tired.  She will wake up with headaches.  She will nap through the day.  BMI is >40.        ROS:As in HPI      Allergies   Allergen Reactions   • Gluten Meal        Current medicines (including changes today)  Current Outpatient Medications   Medication Sig Dispense Refill   • ferrous sulfate (FEOSOL) 220 (44 Fe) MG/5ML Elixir Take 5 mL by mouth every day for 30 days. 473 mL 1   • clobetasol (TEMOVATE) 0.05 % Ointment Apply 1 Application topically 2 times a day. To hands for 4 weeks 45 g 1   • VITAMIN D PO Take 6,000 Units by mouth every day.     • Magnesium 250 MG Tab Take  by mouth.     • Selenium (SELENICAPS-200 PO) Take  by mouth.     • metFORMIN ER (GLUCOPHAGE XR) 500 MG TABLET SR 24 HR Take 2 Tabs by mouth 2 times a day. 120 Tab 5   • citalopram (CELEXA) 20 MG Tab TAKE 1 AND 1/2 TABLETS BY MOUTH EVERY  Tab 1   • verapamil SR (CALAN-SR) 120 MG CR tablet Take 120 mg by mouth every day.     • diazePAM (VALIUM) 5 MG Tab TAKE 1 TABLET BY MOUTH TWICE A DAY AS NEEDED FOR VERTIGO FOR 30 DAYS     • SUMAtriptan (IMITREX) 25 MG Tab tablet TAKE 1 TO 4 TABLETS BY MOUTH ONCE AS NEEDED FOR MIGRAINE (MAY  REPEAT DOSE AFTER 2 HOURS, MAX DAILY DOSE 200MG) FOR UP TO 1 DOSE. 9 Tab 2   • albuterol 108 (90 Base) MCG/ACT Aero Soln inhalation aerosol Inhale 2 Puffs by mouth every four hours as needed for Shortness of Breath. 1 Inhaler 0   • predniSONE (DELTASONE) 20 MG Tab Take 3 tabs at once PO daily x 5 days, then take 2 tabs at once daily x 3 days, then take 1 tab PO daily x 2 days (Patient not taking: Reported on 2020) 23 Tab 0     No current facility-administered medications for this visit.        She  has a past medical history of Anemia, Anxiety, Celiac disease, Daytime sleepiness, Depression, Insomnia, Insulin resistance, Migraine, Morning headache, PCOS (polycystic ovarian syndrome), Snoring, Vertigo, and Vitamin D deficiency. She also has no past medical history of Apnea, sleep, Cough, Gasping for breath, Painful breathing, Shortness of breath, Sputum production, or Wheezing.  She  has a past surgical history that includes nadeen by laparoscopy (N/A, 2018).      Family History   Problem Relation Age of Onset   • Thyroid Mother    • Depression Father    • Alcohol abuse Father    • No Known Problems Maternal Grandmother    • Heart Attack Maternal Grandfather 66   • Cancer Paternal Grandmother         Breast , esophageal   • Stroke Paternal Grandmother 66   • Alcohol abuse Paternal Grandfather    • Lung Disease Paternal Grandfather    • Sleep Apnea Brother      Family Status   Relation Name Status   • Mo  Alive   • Fa  Alive   • Bro  Alive   • MGMo     • MGFa     • PGMo     • PGFa     • Bro  Alive   • Bro  Alive       Patient Active Problem List    Diagnosis Date Noted   • Subclinical hypothyroidism 2020   • Insulin resistance 2020   • Anemia 2020   • Morbid obesity (HCC) 10/07/2020   • Skin rash 09/15/2020   • Administrative encounter 09/15/2020   • Hospital discharge follow-up 2020   • Rash of hands 2020   • Snoring 2020   • Vertigo  "03/03/2020   • PCOS (polycystic ovarian syndrome) 11/19/2019   • Vitamin D deficiency 11/19/2019   • High triglycerides 11/19/2019   • Anxiety and depression 11/19/2019          Objective:   Ht 1.702 m (5' 7\") Comment: per pt  Wt 120.2 kg (265 lb) Comment: per pt  BMI 41.50 kg/m²     Physical ExamAOx3 in NAD.      Assessment and Plan:   The following treatment plan was discussed:     1. JOEY (obstructive sleep apnea)  - Overnight Home Sleep Study; Future    2. BMI 40.0-44.9, adult ([facility])    The patient has snoring, apneas, nonrestorative sleep and daytime somnolence, in the setting of obesity, with high clinical suspicion for obstructive sleep apnea.  She also has insufficient overall sleep time.  We discussed the pathophysiology of sleep apnea and benefits of treatment.  We also discussed that typically 7-8 hours of bedtime is recommended for sufficient sleep time. Good sleep hygiene is encouraged.  We will proceed with polysomnography for diagnosis.  Follow-up: Return for after sleep study.         "

## 2020-12-29 ENCOUNTER — OFFICE VISIT (OUTPATIENT)
Dept: MEDICAL GROUP | Facility: PHYSICIAN GROUP | Age: 25
End: 2020-12-29
Payer: COMMERCIAL

## 2020-12-29 VITALS
HEIGHT: 67 IN | SYSTOLIC BLOOD PRESSURE: 120 MMHG | OXYGEN SATURATION: 97 % | HEART RATE: 95 BPM | DIASTOLIC BLOOD PRESSURE: 68 MMHG | BODY MASS INDEX: 42.91 KG/M2 | WEIGHT: 273.37 LBS | TEMPERATURE: 98.8 F | RESPIRATION RATE: 18 BRPM

## 2020-12-29 DIAGNOSIS — R59.0 CERVICAL LYMPHADENOPATHY: ICD-10-CM

## 2020-12-29 DIAGNOSIS — Z02.9 ADMINISTRATIVE ENCOUNTER: ICD-10-CM

## 2020-12-29 PROCEDURE — 99214 OFFICE O/P EST MOD 30 MIN: CPT | Performed by: NURSE PRACTITIONER

## 2020-12-29 SDOH — HEALTH STABILITY: MENTAL HEALTH: HOW OFTEN DO YOU HAVE A DRINK CONTAINING ALCOHOL?: MONTHLY OR LESS

## 2020-12-29 ASSESSMENT — FIBROSIS 4 INDEX: FIB4 SCORE: 0.3

## 2020-12-29 NOTE — ASSESSMENT & PLAN NOTE
New problem. Pt noticed swollen lymph nodes in posterior b/l neck. Associated symptoms are tenderness to touch. These symptoms last 4-5 days. Denies fever, chills, difficulty swallowing. Had multiple episodes, 2-3 per mos. Pt denies allergies, no URIs, no HA, no CO or dyspnea. No Hx of malignancy.

## 2020-12-29 NOTE — PROGRESS NOTES
Chief Complaint   Patient presents with   • Adenopathy     in her throat, been bothering her for 2 months. Not taking anything for it.       HISTORY OF PRESENT ILLNESS: Patient is a 25 y.o. female, established patient who presents today to discuss medical problems as listed below:    Cervical lymphadenopathy  New problem. Pt noticed swollen lymph nodes in posterior b/l neck. Associated symptoms are tenderness to touch. These symptoms last 4-5 days. Denies fever, chills, difficulty swallowing. Had multiple episodes, 2-3 per mos. Pt denies allergies, no URIs, no HA, no CO or dyspnea. No Hx of malignancy.     Administrative encounter  Patient is requiring work release letter effective tomorrow.      Patient Active Problem List    Diagnosis Date Noted   • Cervical lymphadenopathy 12/29/2020   • Subclinical hypothyroidism 11/23/2020   • Insulin resistance 11/23/2020   • Anemia 11/23/2020   • Morbid obesity (HCC) 10/07/2020   • Skin rash 09/15/2020   • Administrative encounter 09/15/2020   • Hospital discharge follow-up 09/02/2020   • Rash of hands 08/12/2020   • Snoring 08/12/2020   • Vertigo 03/03/2020   • PCOS (polycystic ovarian syndrome) 11/19/2019   • Vitamin D deficiency 11/19/2019   • High triglycerides 11/19/2019   • Anxiety and depression 11/19/2019        Allergies: Gluten meal    Current Outpatient Medications   Medication Sig Dispense Refill   • ferrous sulfate (FEOSOL) 220 (44 Fe) MG/5ML Elixir Take 5 mL by mouth every day for 30 days. 473 mL 1   • clobetasol (TEMOVATE) 0.05 % Ointment Apply 1 Application topically 2 times a day. To hands for 4 weeks 45 g 1   • VITAMIN D PO Take 6,000 Units by mouth every day.     • Magnesium 250 MG Tab Take  by mouth.     • Selenium (SELENICAPS-200 PO) Take  by mouth.     • metFORMIN ER (GLUCOPHAGE XR) 500 MG TABLET SR 24 HR Take 2 Tabs by mouth 2 times a day. 120 Tab 5   • citalopram (CELEXA) 20 MG Tab TAKE 1 AND 1/2 TABLETS BY MOUTH EVERY  Tab 1   • verapamil SR  (CALAN-SR) 120 MG CR tablet Take 120 mg by mouth every day.     • diazePAM (VALIUM) 5 MG Tab TAKE 1 TABLET BY MOUTH TWICE A DAY AS NEEDED FOR VERTIGO FOR 30 DAYS     • SUMAtriptan (IMITREX) 25 MG Tab tablet TAKE 1 TO 4 TABLETS BY MOUTH ONCE AS NEEDED FOR MIGRAINE (MAY REPEAT DOSE AFTER 2 HOURS, MAX DAILY DOSE 200MG) FOR UP TO 1 DOSE. 9 Tab 2   • albuterol 108 (90 Base) MCG/ACT Aero Soln inhalation aerosol Inhale 2 Puffs by mouth every four hours as needed for Shortness of Breath. 1 Inhaler 0   • predniSONE (DELTASONE) 20 MG Tab Take 3 tabs at once PO daily x 5 days, then take 2 tabs at once daily x 3 days, then take 1 tab PO daily x 2 days (Patient not taking: Reported on 11/13/2020) 23 Tab 0     No current facility-administered medications for this visit.        Social History     Tobacco Use   • Smoking status: Never Smoker   • Smokeless tobacco: Never Used   Substance Use Topics   • Alcohol use: Yes     Frequency: Monthly or less     Comment: occasionally   • Drug use: No     Social History     Social History Narrative   • Not on file       Family History   Problem Relation Age of Onset   • Thyroid Mother    • Depression Father    • Alcohol abuse Father    • No Known Problems Maternal Grandmother    • Heart Attack Maternal Grandfather 66   • Cancer Paternal Grandmother         Breast , esophageal   • Stroke Paternal Grandmother 66   • Alcohol abuse Paternal Grandfather    • Lung Disease Paternal Grandfather    • Sleep Apnea Brother        Allergies, past medical history, past surgical history, family history, social history reviewed and updated.    Review of Systems:     - Constitutional: Negative for fever, chills, unexpected weight change, and fatigue/generalized weakness.     - HEENT: Intermittently swollen lymph nodes in posterior neck.  Negative for headaches, vision changes, hearing changes, ear pain, ear discharge, rhinorrhea, sinus congestion, sore throat, and neck pain.      - Respiratory: Negative for  "cough, sputum production, chest congestion, dyspnea, wheezing, and crackles.      - Cardiovascular: Negative for chest pain, palpitations, orthopnea, and bilateral lower extremity edema.       - Psychiatric/Behavioral: Negative for depression, suicidal/homicidal ideation and memory loss.      All other systems reviewed and are negative    Exam:    /68 (BP Location: Left arm, Patient Position: Sitting, BP Cuff Size: Large adult long)   Pulse 95   Temp 37.1 °C (98.8 °F) (Temporal)   Resp 18   Ht 1.702 m (5' 7\")   Wt 124 kg (273 lb 5.9 oz)   LMP 11/29/2020 (Approximate)   SpO2 97%   BMI 42.82 kg/m²  Body mass index is 42.82 kg/m².    Physical Exam:  Constitutional: Well-developed and well-nourished. Not diaphoretic. No distress.   Mouth/Throat: Dentition is normal. Tongue normal. Oropharynx is clear and moist. Posterior pharynx without erythema or exudates.  Neck: No lymphadenopathy--cervical or supraclavicular.  Cardiovascular: Regular rate and rhythm, S1 and S2 without murmur, rubs, or gallops.    Chest: Effort normal. Clear to auscultation throughout. No adventitious sounds.   Neurological: Alert and oriented x 3.   Psychiatric:  Behavior, mood, and affect are appropriate.  MA/nursing note and vitals reviewed.    LABS:  11/24/20 results reviewed and discussed with the patient, questions answered.    Patient was seen for 25 minutes face to face of which > 50% of appointment time was spent on counseling and coordination of care regarding the above.     Assessment/Plan:  1. Cervical lymphadenopathy  Uncontrolled, stable.  Discussed possible etiology as this may be reactive symptoms to intermittent inflammatory processes.  Will obtain ultrasound of the soft tissue and repeat CBC..  - CBC WITH DIFFERENTIAL; Future  - US-SOFT TISSUES OF HEAD - NECK; Future    2. Administrative encounter  Letter written for patient and a printed copy given.       Discussed with patient possible alternative diagnoses, pt is to " take all medications as prescribed. If symptoms persist FU w/PCP, if symptoms worsen go to emergency room. If experiencing any side effects from prescribed medications reports to the office immediately or go to emergency room.  Reviewed indication, dosage, usage and potential adverse effects of prescribed medications. Reviewed risks and benefits of treatment plan. Patient verbalizes understanding of all instruction and verbally agrees to plan.    Return if symptoms worsen or fail to improve.

## 2020-12-29 NOTE — LETTER
December 29, 2020       Patient: Diana Hill   YOB: 1995   Date of Visit: 12/29/2020         To Whom It May Concern:    In my medical opinion, I recommend that Diana Hill return to full duty, no restrictions.    If you have any questions or concerns, please don't hesitate to call [phone]          Sincerely,          MARKY Resendiz.  Electronically Signed

## 2021-01-05 ENCOUNTER — HOSPITAL ENCOUNTER (OUTPATIENT)
Dept: RADIOLOGY | Facility: MEDICAL CENTER | Age: 26
End: 2021-01-05
Attending: NURSE PRACTITIONER
Payer: COMMERCIAL

## 2021-01-05 DIAGNOSIS — R59.0 CERVICAL LYMPHADENOPATHY: ICD-10-CM

## 2021-01-05 PROCEDURE — 76536 US EXAM OF HEAD AND NECK: CPT

## 2021-01-08 ENCOUNTER — IMMUNIZATION (OUTPATIENT)
Dept: FAMILY PLANNING/WOMEN'S HEALTH CLINIC | Facility: IMMUNIZATION CENTER | Age: 26
End: 2021-01-08
Attending: FAMILY MEDICINE
Payer: COMMERCIAL

## 2021-01-08 DIAGNOSIS — Z23 ENCOUNTER FOR VACCINATION: Primary | ICD-10-CM

## 2021-01-08 PROCEDURE — 91300 PFIZER SARS-COV-2 VACCINE: CPT

## 2021-01-08 PROCEDURE — 0002A PFIZER SARS-COV-2 VACCINE: CPT

## 2021-01-19 ENCOUNTER — OFFICE VISIT (OUTPATIENT)
Dept: DERMATOLOGY | Facility: IMAGING CENTER | Age: 26
End: 2021-01-19
Payer: COMMERCIAL

## 2021-01-19 DIAGNOSIS — L70.0 ACNE VULGARIS: ICD-10-CM

## 2021-01-19 DIAGNOSIS — L30.9 HAND ECZEMA: ICD-10-CM

## 2021-01-19 PROCEDURE — 99214 OFFICE O/P EST MOD 30 MIN: CPT | Performed by: NURSE PRACTITIONER

## 2021-01-19 RX ORDER — DOXYCYCLINE HYCLATE 100 MG
TABLET ORAL
Qty: 150 TAB | Refills: 0 | Status: SHIPPED | OUTPATIENT
Start: 2021-01-19 | End: 2021-04-27

## 2021-01-19 RX ORDER — TRETINOIN 0.25 MG/G
1 GEL TOPICAL
Qty: 45 G | Refills: 2 | Status: SHIPPED | OUTPATIENT
Start: 2021-01-19 | End: 2022-01-26

## 2021-01-19 RX ORDER — AMITRIPTYLINE HYDROCHLORIDE 10 MG/1
TABLET, FILM COATED ORAL
COMMUNITY
Start: 2020-12-22 | End: 2021-04-07

## 2021-01-19 RX ORDER — CLINDAMYCIN PHOSPHATE AND BENZOYL PEROXIDE 10; 37.5 MG/G; MG/G
1 GEL TOPICAL DAILY
Qty: 50 G | Refills: 3 | Status: SHIPPED | OUTPATIENT
Start: 2021-01-19 | End: 2022-07-15

## 2021-01-19 ASSESSMENT — ENCOUNTER SYMPTOMS
FEVER: 0
CHILLS: 0

## 2021-01-20 NOTE — PROGRESS NOTES
Dermatology New Patient Visit    Chief Complaint   Patient presents with   • Acne   • Follow-Up     Hand Eczema       Subjective:     HPI:   Diana Hill is a 25 y.o. female presenting for    Patient returns today to follow up hand eczema and new problem of acne    Patient has been using Clobetasol ointment with some resolution of symptoms.    Patient states knuckles are no longer affected, just finger tips and nail bed.    Acne  Started: Since jr. High.  Recently more outbreaks along neck and jawline  Active on: Back, chest and face  Aggravated by: menses  Treatment currently used: Cetaphil cleanser, cerave AM/PM moisturizer  Prior treatments used: CBZ, Curology  Face wash/moisturizer: see above  Family history of scarring acne: No  Contraception: Abstinent  Family h/o breast/uterine/ovarian cancers: Yes, PGM BrCa      From Previous Note:  HPI:Rash b/l hands  Onset: 2-3 months  Symptoms: blistering, cracking skin on both hands  Aggravating factors: Soaps  Alleviating factors: oral steroids  New creams/topicals: No  New medications (up to last 6 months): No  New travel: No  Other exposures: No  Treatments: [place]efes, Dermaced        Past Medical History:   Diagnosis Date   • Anemia     hx of borderline anemia   • Anxiety    • Celiac disease    • Daytime sleepiness    • Depression    • Insomnia    • Insulin resistance    • Migraine    • Morning headache    • PCOS (polycystic ovarian syndrome)    • Snoring    • Vertigo    • Vitamin D deficiency        Current Outpatient Medications on File Prior to Visit   Medication Sig Dispense Refill   • amitriptyline (ELAVIL) 10 MG Tab      • ferrous sulfate (FEOSOL) 220 (44 Fe) MG/5ML Elixir      • clobetasol (TEMOVATE) 0.05 % Ointment Apply 1 Application topically 2 times a day. To hands for 4 weeks 45 g 1   • VITAMIN D PO Take 6,000 Units by mouth every day.     • Magnesium 250 MG Tab Take  by mouth.     • Selenium (SELENICAPS-200 PO) Take  by mouth.     • metFORMIN ER  (GLUCOPHAGE XR) 500 MG TABLET SR 24 HR Take 2 Tabs by mouth 2 times a day. 120 Tab 5   • predniSONE (DELTASONE) 20 MG Tab Take 3 tabs at once PO daily x 5 days, then take 2 tabs at once daily x 3 days, then take 1 tab PO daily x 2 days (Patient not taking: Reported on 11/13/2020) 23 Tab 0   • citalopram (CELEXA) 20 MG Tab TAKE 1 AND 1/2 TABLETS BY MOUTH EVERY  Tab 1   • verapamil SR (CALAN-SR) 120 MG CR tablet Take 120 mg by mouth every day.     • diazePAM (VALIUM) 5 MG Tab TAKE 1 TABLET BY MOUTH TWICE A DAY AS NEEDED FOR VERTIGO FOR 30 DAYS     • SUMAtriptan (IMITREX) 25 MG Tab tablet TAKE 1 TO 4 TABLETS BY MOUTH ONCE AS NEEDED FOR MIGRAINE (MAY REPEAT DOSE AFTER 2 HOURS, MAX DAILY DOSE 200MG) FOR UP TO 1 DOSE. 9 Tab 2   • albuterol 108 (90 Base) MCG/ACT Aero Soln inhalation aerosol Inhale 2 Puffs by mouth every four hours as needed for Shortness of Breath. 1 Inhaler 0     No current facility-administered medications on file prior to visit.        Allergies   Allergen Reactions   • Gluten Meal        Family History   Problem Relation Age of Onset   • Thyroid Mother    • Depression Father    • Alcohol abuse Father    • No Known Problems Maternal Grandmother    • Heart Attack Maternal Grandfather 66   • Cancer Paternal Grandmother         Breast , esophageal   • Stroke Paternal Grandmother 66   • Alcohol abuse Paternal Grandfather    • Lung Disease Paternal Grandfather    • Sleep Apnea Brother        Social History     Socioeconomic History   • Marital status: Single     Spouse name: Not on file   • Number of children: Not on file   • Years of education: Not on file   • Highest education level: Not on file   Occupational History   • Occupation: [employer]     Comment: CNA ; Nursing student   Social Needs   • Financial resource strain: Not on file   • Food insecurity     Worry: Not on file     Inability: Not on file   • Transportation needs     Medical: Not on file     Non-medical: Not on file   Tobacco Use    • Smoking status: Never Smoker   • Smokeless tobacco: Never Used   Substance and Sexual Activity   • Alcohol use: Yes     Frequency: Monthly or less     Comment: occasionally   • Drug use: No   • Sexual activity: Never     Partners: Male, Female   Lifestyle   • Physical activity     Days per week: Not on file     Minutes per session: Not on file   • Stress: To some extent   Relationships   • Social connections     Talks on phone: Not on file     Gets together: Not on file     Attends Mandaen service: Not on file     Active member of club or organization: Not on file     Attends meetings of clubs or organizations: Not on file     Relationship status: Not on file   • Intimate partner violence     Fear of current or ex partner: Not on file     Emotionally abused: Not on file     Physically abused: Not on file     Forced sexual activity: Not on file   Other Topics Concern   • Not on file   Social History Narrative   • Not on file       Review of Systems   Constitutional: Negative for chills and fever.   Skin: Negative for itching and rash.        Objective:     A focused cutaneous exam was completed including: left and right upper extremities (including hands/digits and fingernails) with the following pertinent findings listed below. Remaining above-listed examined areas within normal limits / negative for rashes or lesions.          There were no vitals taken for this visit.    Physical Exam   Constitutional: She is oriented to person, place, and time and well-developed, well-nourished, and in no distress. No distress.   HENT:   Head: Normocephalic.   Pulmonary/Chest: Effort normal.   Musculoskeletal:        Hands:    Neurological: She is alert and oriented to person, place, and time.   Skin: Skin is warm and dry. No rash noted. No erythema.   several erythematous papules, 1-2 pustules, numerous open and closed comedones to face, back and chest     Psychiatric: Mood normal.       DATA: none applicable to  review    Assessment and Plan:     1. Acne vulgaris, comedonal and moderate inflammatory - scarring  - educated patient about diagnosis, management options, and expectations of treatment  - recommended OTC daily face wash (cedaphil or cerave)  - start clindamycin/Benzoyl peroxide 1.2-3.75% gel daily to as a thin film to cover areas on the face/neck. Side effect of irritation, bleaching of clothes/towels discussed  - if skin becomes dry, OTC moisturizers recommended  - start doxycycline 100 mg twice daily for 6 weeks then 100 mg once a day for 6 weeks then stop.  Instructed to take with food and water. Side effects, including GI upset, sun sensitivity, yeast infections discussed.  - start retin-A micro 0.025% gel qHS -- apply a thin film to the entire face. To start every other night for the first 2-3 weeks, then can increase to nightly as tolerated, side effect of irritation discussed  -Start PanOxyl body wash to chest and back.  Side effects discussed.  - tretinoin (RETIN-A) 0.025 % gel; Apply 1 Application topically every bedtime.  Dispense: 45 g; Refill: 2  - Clindamycin Phos-Benzoyl Perox (ONEXTON) 1.2-3.75 % Gel; Apply 1 Application topically every day.  Dispense: 50 g; Refill: 3  - doxycycline (VIBRAMYCIN) 100 MG Tab; 1 tablet twice daily for 6 weeks, then decrease to 1 tablet daily  Dispense: 150 Tab; Refill: 0    2. Hand eczema  Stable on current regimen  May continue prn topical steroid  Advised frequent moisturizer    I have performed a physical exam and reviewed and updated ROS and Plan today (1/19/2021). In review of dermatology visit (12/1/2020), there are no changes except as documented above.         Followup: Return in about 4 months (around 5/19/2021) for acne or sooner for any concerns.    MARKY Phan.

## 2021-02-01 ENCOUNTER — OFFICE VISIT (OUTPATIENT)
Dept: URGENT CARE | Facility: CLINIC | Age: 26
End: 2021-02-01
Payer: COMMERCIAL

## 2021-02-01 VITALS
WEIGHT: 268 LBS | OXYGEN SATURATION: 97 % | BODY MASS INDEX: 42.06 KG/M2 | TEMPERATURE: 98.1 F | DIASTOLIC BLOOD PRESSURE: 88 MMHG | HEART RATE: 97 BPM | SYSTOLIC BLOOD PRESSURE: 124 MMHG | HEIGHT: 67 IN | RESPIRATION RATE: 16 BRPM

## 2021-02-01 DIAGNOSIS — J06.9 UPPER RESPIRATORY TRACT INFECTION, UNSPECIFIED TYPE: ICD-10-CM

## 2021-02-01 LAB
INT CON NEG: NORMAL
INT CON POS: NORMAL
S PYO AG THROAT QL: NEGATIVE

## 2021-02-01 PROCEDURE — 87880 STREP A ASSAY W/OPTIC: CPT | Performed by: PHYSICIAN ASSISTANT

## 2021-02-01 PROCEDURE — 99214 OFFICE O/P EST MOD 30 MIN: CPT | Performed by: PHYSICIAN ASSISTANT

## 2021-02-01 ASSESSMENT — ENCOUNTER SYMPTOMS
CHILLS: 0
EYE REDNESS: 0
SHORTNESS OF BREATH: 0
HEADACHES: 1
EYE PAIN: 0
SORE THROAT: 1
FEVER: 0
DIZZINESS: 1
WHEEZING: 0
EYE DISCHARGE: 0
PALPITATIONS: 0
COUGH: 0
SINUS PAIN: 1

## 2021-02-01 ASSESSMENT — FIBROSIS 4 INDEX: FIB4 SCORE: 0.3

## 2021-02-01 NOTE — LETTER
February 1, 2021         Patient: Diana Hill   YOB: 1995   Date of Visit: 2/1/2021           To Whom it May Concern:    Diana Hill was seen in my clinic on 2/1/2021. Please excuse her from work on this day.  She may return tomorrow if feeling better.    If you have any questions or concerns, please don't hesitate to call.        Sincerely,           Tyrese Xie P.A.-C.  Electronically Signed

## 2021-02-01 NOTE — PROGRESS NOTES
Subjective:   Diana Hill is a 25 y.o. female who presents for Sinus Problem (sinus problem, congestion and headaches started 1 week ago)      Sinus Problem  This is a new problem. The current episode started in the past 7 days. The problem is unchanged. The pain is moderate. Associated symptoms include congestion, headaches and a sore throat. Pertinent negatives include no chills, coughing, ear pain or shortness of breath. Past treatments include nothing.       Review of Systems   Constitutional: Positive for malaise/fatigue. Negative for chills and fever.   HENT: Positive for congestion, sinus pain and sore throat. Negative for ear pain.    Eyes: Negative for pain, discharge and redness.   Respiratory: Negative for cough, shortness of breath and wheezing.    Cardiovascular: Negative for chest pain and palpitations.   Neurological: Positive for dizziness and headaches.   All other systems reviewed and are negative.      Medications:    • albuterol Aers  • amitriptyline Tabs  • citalopram Tabs  • clobetasol Oint  • diazePAM Tabs  • doxycycline Tabs  • ferrous sulfate Elix  • Magnesium Tabs  • metFORMIN ER Tb24  • Onexton Gel  • predniSONE Tabs  • SELENICAPS-200 PO  • SUMAtriptan Tabs  • tretinoin  • verapamil SR  • VITAMIN D PO    Allergies: Gluten meal    Problem List: Diana Hill has PCOS (polycystic ovarian syndrome); Vitamin D deficiency; High triglycerides; Anxiety and depression; Vertigo; Rash of hands; Snoring; Hospital discharge follow-up; Skin rash; Administrative encounter; Morbid obesity (HCC); Subclinical hypothyroidism; Insulin resistance; Anemia; and Cervical lymphadenopathy on their problem list.    Surgical History:  Past Surgical History:   Procedure Laterality Date   • MATTHIAS BY LAPAROSCOPY N/A 9/13/2018    Procedure: MATTHIAS BY LAPAROSCOPY;  Surgeon: Hayden Wilhelm M.D.;  Location: SURGERY [facility];  Service: General       Past Social Hx: Diana Hill  reports  "that she has never smoked. She has never used smokeless tobacco. She reports current alcohol use. She reports that she does not use drugs.     Past Family Hx:  Diana Hill family history includes Alcohol abuse in her father and paternal grandfather; Cancer in her paternal grandmother; Depression in her father; Heart Attack (age of onset: 66) in her maternal grandfather; Lung Disease in her paternal grandfather; No Known Problems in her maternal grandmother; Sleep Apnea in her brother; Stroke (age of onset: 66) in her paternal grandmother; Thyroid in her mother.     Problem list, medications, and allergies reviewed by myself today in Epic.     Objective:     Blood Pressure 124/88   Pulse 97   Temperature 36.7 °C (98.1 °F)   Respiration 16   Height 1.702 m (5' 7\")   Weight 122 kg (268 lb)   Oxygen Saturation 97%   Body Mass Index 41.97 kg/m²     Physical Exam  Vitals signs reviewed.   Constitutional:       General: She is not in acute distress.     Appearance: She is well-developed. She is not ill-appearing or toxic-appearing.   HENT:      Head: Normocephalic and atraumatic.      Right Ear: Hearing, tympanic membrane, ear canal and external ear normal.      Left Ear: Hearing, tympanic membrane, ear canal and external ear normal.      Nose: Mucosal edema and rhinorrhea present.      Right Sinus: Maxillary sinus tenderness present.      Left Sinus: Maxillary sinus tenderness present.      Mouth/Throat:      Pharynx: Uvula midline.   Eyes:      Conjunctiva/sclera: Conjunctivae normal.   Neck:      Musculoskeletal: Normal range of motion and neck supple.   Cardiovascular:      Rate and Rhythm: Normal rate and regular rhythm.      Heart sounds: Normal heart sounds.   Pulmonary:      Effort: Pulmonary effort is normal.      Breath sounds: Normal breath sounds.   Skin:     General: Skin is warm and dry.   Neurological:      Mental Status: She is alert and oriented to person, place, and time.   Psychiatric:    "      Behavior: Behavior normal.         Thought Content: Thought content normal.         Judgment: Judgment normal.         Assessment/Plan:     Medical Decision Making/Comments     Pt is a  25 yr old female who presents for evaluation of URI symptoms.  Pt states mucous drainage, sinus pain, and sore throat for 7 dasy.  Pt denies fever or SOB.  Vital signs normal.  ENT exam is unremarkable.  Lungs are clear to auscultation.   Heart sounds are normal.  Pt is most likely experiencing an upper respiratory infection. Rapid strep is negative.  Discussed at length that 90% of URI's are viral in etiology and only supportive care is indicated.      Diagnosis and associated orders     1. Upper respiratory tract infection, unspecified type  POCT Rapid Strep A     - Increase hydration  - Steam inhalation 20-30 min/day TID  - Saline irrigation  - Elevation of head at night  - Avoid cigarette smoke/fumes, caffeine and alcohol.  - NSAIDs/Acetaminophen PRN  - Decongestants: Pseudoephedrine HCl/Phenylephrine/Oxymetazoline PRN           Differential diagnosis, natural history, supportive care, and indications for immediate follow-up discussed.    Advised the patient to follow-up with the primary care physician for recheck, reevaluation, and consideration of further management.    Please note that this dictation was created using voice recognition software. I have made a reasonable attempt to correct obvious errors, but I expect that there are errors of grammar and possibly content that I did not discover before finalizing the note.

## 2021-02-11 ENCOUNTER — APPOINTMENT (OUTPATIENT)
Dept: SLEEP MEDICINE | Facility: MEDICAL CENTER | Age: 26
End: 2021-02-11
Attending: INTERNAL MEDICINE
Payer: COMMERCIAL

## 2021-02-11 DIAGNOSIS — G47.33 OSA (OBSTRUCTIVE SLEEP APNEA): ICD-10-CM

## 2021-03-06 ENCOUNTER — OFFICE VISIT (OUTPATIENT)
Dept: URGENT CARE | Facility: CLINIC | Age: 26
End: 2021-03-06
Payer: COMMERCIAL

## 2021-03-06 ENCOUNTER — APPOINTMENT (OUTPATIENT)
Dept: RADIOLOGY | Facility: MEDICAL CENTER | Age: 26
End: 2021-03-06
Attending: EMERGENCY MEDICINE
Payer: COMMERCIAL

## 2021-03-06 ENCOUNTER — APPOINTMENT (OUTPATIENT)
Dept: RADIOLOGY | Facility: MEDICAL CENTER | Age: 26
End: 2021-03-06
Attending: FAMILY MEDICINE
Payer: COMMERCIAL

## 2021-03-06 ENCOUNTER — HOSPITAL ENCOUNTER (EMERGENCY)
Facility: MEDICAL CENTER | Age: 26
End: 2021-03-06
Attending: EMERGENCY MEDICINE | Admitting: EMERGENCY MEDICINE
Payer: COMMERCIAL

## 2021-03-06 VITALS
WEIGHT: 270.6 LBS | DIASTOLIC BLOOD PRESSURE: 84 MMHG | BODY MASS INDEX: 42.47 KG/M2 | OXYGEN SATURATION: 97 % | RESPIRATION RATE: 16 BRPM | HEIGHT: 67 IN | TEMPERATURE: 98.1 F | SYSTOLIC BLOOD PRESSURE: 126 MMHG | HEART RATE: 87 BPM

## 2021-03-06 VITALS
BODY MASS INDEX: 42.7 KG/M2 | HEART RATE: 85 BPM | WEIGHT: 272.05 LBS | RESPIRATION RATE: 16 BRPM | OXYGEN SATURATION: 95 % | SYSTOLIC BLOOD PRESSURE: 115 MMHG | DIASTOLIC BLOOD PRESSURE: 67 MMHG | TEMPERATURE: 98.2 F | HEIGHT: 67 IN

## 2021-03-06 DIAGNOSIS — M79.662 PAIN OF LEFT CALF: ICD-10-CM

## 2021-03-06 DIAGNOSIS — M79.605 LEFT LEG PAIN: ICD-10-CM

## 2021-03-06 DIAGNOSIS — M79.18 MUSCULOSKELETAL PAIN: ICD-10-CM

## 2021-03-06 PROCEDURE — 99283 EMERGENCY DEPT VISIT LOW MDM: CPT

## 2021-03-06 PROCEDURE — 93971 EXTREMITY STUDY: CPT | Mod: LT

## 2021-03-06 PROCEDURE — 99213 OFFICE O/P EST LOW 20 MIN: CPT | Performed by: FAMILY MEDICINE

## 2021-03-06 RX ORDER — NORGESTIMATE AND ETHINYL ESTRADIOL 7DAYSX3 LO
KIT ORAL
COMMUNITY
End: 2021-08-17

## 2021-03-06 ASSESSMENT — FIBROSIS 4 INDEX
FIB4 SCORE: 0.3
FIB4 SCORE: 0.3

## 2021-03-06 NOTE — PROGRESS NOTES
"Subjective:      Diana Hill is a 25 y.o. female who presents with Leg Pain ((L) leg x 2 days; cramping; mostly in the calf )            This is a new problem.  25-year-old was on birth control presents for evaluation of left-sided calf pain for 2 days.  Denies any other symptoms.  She is worried about blood clot.  No history of blood clots reported.  No fever chills.  No injuries.  Pertinent review of system otherwise negative      ROS       Objective:     /84 (BP Location: Right arm, Patient Position: Sitting)   Pulse 87   Temp 36.7 °C (98.1 °F) (Temporal)   Resp 16   Ht 1.702 m (5' 7\")   Wt 123 kg (270 lb 9.6 oz)   SpO2 97%   BMI 42.38 kg/m²      Physical Exam  Constitutional:       General: She is not in acute distress.     Appearance: She is not ill-appearing, toxic-appearing or diaphoretic.   HENT:      Head: Normocephalic and atraumatic.   Eyes:      Conjunctiva/sclera: Conjunctivae normal.   Cardiovascular:      Rate and Rhythm: Normal rate.   Pulmonary:      Effort: Pulmonary effort is normal. No respiratory distress.      Breath sounds: No stridor.   Musculoskeletal:         General: No swelling.      Left lower leg: No edema.   Skin:            Comments: Tenderness on palpation of the outlined area.  Otherwise normal range of motion in the left lower extremity   Neurological:      Mental Status: She is alert and oriented to person, place, and time.   Psychiatric:         Mood and Affect: Mood normal.               Assessment/Plan:       ASSESSMENT:PLAN:  1. Pain of left calf  - US-EXTREMITY VENOUS LOWER UNILAT LEFT; Future- NOT DONE    Called patient for follow up because Doppler was scheduled for 4:30pm but there was a schedule mixed up she was told and it was rescheduled for Monday. She is on her way to [facility] ED to have this looked at and her mom works there as well and we discussed that this is the best course of action as she can not wait until Monday and wished her the " best and apologized for the workflow above  She appreciated phone call

## 2021-03-07 NOTE — ED TRIAGE NOTES
Chief Complaint   Patient presents with   • Leg Pain     Pt recently started on oral contraceptive about one month ago. Developed right calf pain about 2 days, denies injury. No redness or swelling noted.

## 2021-03-07 NOTE — ED PROVIDER NOTES
ED Provider Note    CHIEF COMPLAINT   Chief Complaint   Patient presents with   • Leg Pain       HPI   Diana Hill is a 25 y.o. female who presents with left calf pain onset 2 days ago, atraumatic.  She recently started oral birth control pill and is concerned about DVT.  She attempted to be seen at urgent care, was told it was too late to therefore presents to emergency department for this evaluation.  No shortness of breath, no cough, no pleuritic chest pain.  She denies history of DVT.  No arthralgia.  Patient's pain does extend up to her left thigh.    REVIEW OF SYSTEMS   Musculoskeletal: Left leg pain  Neurologic: No numbness  Skin: No swelling or bruising  Respiratory: No cough or shortness of breath      PAST MEDICAL HISTORY   Past Medical History:   Diagnosis Date   • Anemia     hx of borderline anemia   • Anxiety    • Celiac disease    • Daytime sleepiness    • Depression    • Insomnia    • Insulin resistance    • Migraine    • Morning headache    • PCOS (polycystic ovarian syndrome)    • Snoring    • Vertigo    • Vitamin D deficiency        FAMILY HISTORY  Family History   Problem Relation Age of Onset   • Thyroid Mother    • Depression Father    • Alcohol abuse Father    • No Known Problems Maternal Grandmother    • Heart Attack Maternal Grandfather 66   • Cancer Paternal Grandmother         Breast , esophageal   • Stroke Paternal Grandmother 66   • Alcohol abuse Paternal Grandfather    • Lung Disease Paternal Grandfather    • Sleep Apnea Brother        SOCIAL HISTORY  Social History     Socioeconomic History   • Marital status: Single     Spouse name: Not on file   • Number of children: Not on file   • Years of education: Not on file   • Highest education level: Not on file   Occupational History   • Occupation: [employer]     Comment: CNA ; Nursing student   Tobacco Use   • Smoking status: Never Smoker   • Smokeless tobacco: Never Used   Substance and Sexual Activity   • Alcohol use: Yes      "Comment: occasionally   • Drug use: No   • Sexual activity: Never     Partners: Male, Female   Other Topics Concern   • Not on file   Social History Narrative   • Not on file     Social Determinants of Health     Financial Resource Strain:    • Difficulty of Paying Living Expenses:    Food Insecurity:    • Worried About Running Out of Food in the Last Year:    • Ran Out of Food in the Last Year:    Transportation Needs:    • Lack of Transportation (Medical):    • Lack of Transportation (Non-Medical):    Physical Activity:    • Days of Exercise per Week:    • Minutes of Exercise per Session:    Stress:    • Feeling of Stress :    Social Connections:    • Frequency of Communication with Friends and Family:    • Frequency of Social Gatherings with Friends and Family:    • Attends [employer] Services:    • Active Member of Clubs or Organizations:    • Attends Club or Organization Meetings:    • Marital Status:    Intimate Partner Violence:    • Fear of Current or Ex-Partner:    • Emotionally Abused:    • Physically Abused:    • Sexually Abused:        SURGICAL HISTORY  Past Surgical History:   Procedure Laterality Date   • MATTHIAS BY LAPAROSCOPY N/A 9/13/2018    Procedure: MATTHIAS BY LAPAROSCOPY;  Surgeon: Hayden Wilhelm M.D.;  [facility];  Service: General       CURRENT MEDICATIONS   Home Medications    **Home medications have not yet been reviewed for this encounter**         ALLERGIES   Allergies   Allergen Reactions   • Gluten Meal        PHYSICAL EXAM  VITAL SIGNS: /83   Pulse 81   Temp 36.8 °C (98.2 °F) (Temporal)   Resp 19   Ht 1.702 m (5' 7\")   Wt 123 kg (272 lb 0.8 oz)   LMP  (Within Weeks)   SpO2 97%   BMI 42.61 kg/m²   Skin: No palpable cords, no ecchymosis, no asymmetric swelling.  No peripheral edema  Vascular: Intact distal capillary refill.   Musculoskeletal: Tenderness left lateral calf, tenderness left medial and lateral thigh  Neurologic: Sensation intact left " foot    RADIOLOGY/PROCEDURES  US-EXTREMITY VENOUS LOWER UNILAT LEFT   Final Result      No evidence of left lower extremity deep venous thrombosis.            COURSE & MEDICAL DECISION MAKING  Pertinent Labs & Imaging studies reviewed. (See chart for details)  Patient with left leg pain, suspect musculoskeletal in nature.  Given recent oral contraceptive initiation, followed by leg pain atraumatic in nature, ultrasound obtained to rule out DVT which was negative.  Patient advised to use Tylenol or Motrin for pain control.  She is well-appearing upon discharge.    FINAL IMPRESSION     1. Left leg pain     2. Musculoskeletal pain               Electronically signed by: Panda Dunn M.D., 3/6/2021 5:53 PM

## 2021-03-11 DIAGNOSIS — F32.A ANXIETY AND DEPRESSION: ICD-10-CM

## 2021-03-11 DIAGNOSIS — F41.9 ANXIETY AND DEPRESSION: ICD-10-CM

## 2021-03-11 RX ORDER — CITALOPRAM 20 MG/1
TABLET ORAL
Qty: 135 TABLET | Refills: 1 | Status: SHIPPED | OUTPATIENT
Start: 2021-03-11 | End: 2021-04-07

## 2021-03-25 ENCOUNTER — HOME STUDY (OUTPATIENT)
Dept: SLEEP MEDICINE | Facility: MEDICAL CENTER | Age: 26
End: 2021-03-25
Attending: INTERNAL MEDICINE
Payer: COMMERCIAL

## 2021-03-25 ENCOUNTER — TELEMEDICINE (OUTPATIENT)
Dept: MEDICAL GROUP | Facility: PHYSICIAN GROUP | Age: 26
End: 2021-03-25
Payer: COMMERCIAL

## 2021-03-25 VITALS — HEIGHT: 67 IN | BODY MASS INDEX: 39.24 KG/M2 | WEIGHT: 250 LBS

## 2021-03-25 DIAGNOSIS — M25.50 POLYARTHRALGIA: ICD-10-CM

## 2021-03-25 DIAGNOSIS — R21 RASH OF HANDS: ICD-10-CM

## 2021-03-25 DIAGNOSIS — L60.3 BRITTLE NAILS: ICD-10-CM

## 2021-03-25 PROCEDURE — 99214 OFFICE O/P EST MOD 30 MIN: CPT | Mod: 95,CR | Performed by: NURSE PRACTITIONER

## 2021-03-25 ASSESSMENT — PATIENT HEALTH QUESTIONNAIRE - PHQ9
SUM OF ALL RESPONSES TO PHQ QUESTIONS 1-9: 5
2. FEELING DOWN, DEPRESSED, IRRITABLE, OR HOPELESS: NOT AT ALL
3. TROUBLE FALLING OR STAYING ASLEEP OR SLEEPING TOO MUCH: SEVERAL DAYS
7. TROUBLE CONCENTRATING ON THINGS, SUCH AS READING THE NEWSPAPER OR WATCHING TELEVISION: NEARLY EVERY DAY
9. THOUGHTS THAT YOU WOULD BE BETTER OFF DEAD, OR OF HURTING YOURSELF: NOT AT ALL
6. FEELING BAD ABOUT YOURSELF - OR THAT YOU ARE A FAILURE OR HAVE LET YOURSELF OR YOUR FAMILY DOWN: NOT AL ALL
SUM OF ALL RESPONSES TO PHQ9 QUESTIONS 1 AND 2: 0
1. LITTLE INTEREST OR PLEASURE IN DOING THINGS: NOT AT ALL
4. FEELING TIRED OR HAVING LITTLE ENERGY: SEVERAL DAYS
5. POOR APPETITE OR OVEREATING: NOT AT ALL
8. MOVING OR SPEAKING SO SLOWLY THAT OTHER PEOPLE COULD HAVE NOTICED. OR THE OPPOSITE, BEING SO FIGETY OR RESTLESS THAT YOU HAVE BEEN MOVING AROUND A LOT MORE THAN USUAL: NOT AT ALL

## 2021-03-25 ASSESSMENT — FIBROSIS 4 INDEX: FIB4 SCORE: 0.3

## 2021-03-25 NOTE — ASSESSMENT & PLAN NOTE
This is a chronic problem.  Has been going on over a year.  Rash is predominantly in bilateral hands, spreading of the arms.  Associated symptom is pruritus.  Followed by dermatology, being treated for severe eczema.

## 2021-03-25 NOTE — PROGRESS NOTES
Telemedicine Visit: Established Patient     This encounter was conducted via Zoom.   Verbal consent was obtained. Patient's identity was verified.    Subjective:     Chief Complaint   Patient presents with   • Referral Needed     Rhumatology     Diana Hill is a 25 y.o. female presenting for evaluation and management of following problems:    Polyarthralgia  Problem today.  Generalized joint pain initially started 1 year ago.  Pain is in bilateral hips, knees and wrists.,  Getting worse.  Positive FH of arthritis in ankles.  No fevers, no erythema edema.  She denies any trauma, falls or history of MVA.  Patient was evaluated by neurology and endocrinology, not suspecting MS.    Rash of hands  This is a chronic problem.  Has been going on over a year.  Rash is predominantly in bilateral hands, spreading of the arms.  Associated symptom is pruritus.  Followed by dermatology, being treated for severe eczema.      ROS   Denies any recent fevers or chills. No nausea or vomiting. No chest pains or shortness of breath.     Allergies   Allergen Reactions   • Gluten Meal        Current medicines (including changes today)  Current Outpatient Medications   Medication Sig Dispense Refill   • citalopram (CELEXA) 20 MG Tab TAKE 1.5 TABLETS BY MOUTH EVERY  tablet 1   • Norgestim-Eth Estrad Triphasic 0.18/0.215/0.25 MG-25 MCG Tab Take  by mouth.     • ferrous sulfate (FEOSOL) 220 (44 Fe) MG/5ML Elixir      • tretinoin (RETIN-A) 0.025 % gel Apply 1 Application topically every bedtime. 45 g 2   • Clindamycin Phos-Benzoyl Perox (ONEXTON) 1.2-3.75 % Gel Apply 1 Application topically every day. 50 g 3   • doxycycline (VIBRAMYCIN) 100 MG Tab 1 tablet twice daily for 6 weeks, then decrease to 1 tablet daily 150 Tab 0   • clobetasol (TEMOVATE) 0.05 % Ointment Apply 1 Application topically 2 times a day. To hands for 4 weeks 45 g 1   • VITAMIN D PO Take 6,000 Units by mouth every day.     • Magnesium 250 MG Tab Take  by  mouth.     • Selenium (SELENICAPS-200 PO) Take  by mouth.     • metFORMIN ER (GLUCOPHAGE XR) 500 MG TABLET SR 24 HR Take 2 Tabs by mouth 2 times a day. 120 Tab 5   • verapamil SR (CALAN-SR) 120 MG CR tablet Take 120 mg by mouth every day.     • diazePAM (VALIUM) 5 MG Tab TAKE 1 TABLET BY MOUTH TWICE A DAY AS NEEDED FOR VERTIGO FOR 30 DAYS     • SUMAtriptan (IMITREX) 25 MG Tab tablet TAKE 1 TO 4 TABLETS BY MOUTH ONCE AS NEEDED FOR MIGRAINE (MAY REPEAT DOSE AFTER 2 HOURS, MAX DAILY DOSE 200MG) FOR UP TO 1 DOSE. 9 Tab 2   • albuterol 108 (90 Base) MCG/ACT Aero Soln inhalation aerosol Inhale 2 Puffs by mouth every four hours as needed for Shortness of Breath. 1 Inhaler 0   • amitriptyline (ELAVIL) 10 MG Tab        No current facility-administered medications for this visit.       Patient Active Problem List    Diagnosis Date Noted   • Polyarthralgia 03/25/2021   • Brittle nails 03/25/2021   • Cervical lymphadenopathy 12/29/2020   • Subclinical hypothyroidism 11/23/2020   • Insulin resistance 11/23/2020   • Anemia 11/23/2020   • Morbid obesity (HCC) 10/07/2020   • Skin rash 09/15/2020   • Administrative encounter 09/15/2020   • Hospital discharge follow-up 09/02/2020   • Rash of hands 08/12/2020   • Snoring 08/12/2020   • Vertigo 03/03/2020   • PCOS (polycystic ovarian syndrome) 11/19/2019   • Vitamin D deficiency 11/19/2019   • High triglycerides 11/19/2019   • Anxiety and depression 11/19/2019       Family History   Problem Relation Age of Onset   • Thyroid Mother    • Depression Father    • Alcohol abuse Father    • No Known Problems Maternal Grandmother    • Heart Attack Maternal Grandfather 66   • Cancer Paternal Grandmother         Breast , esophageal   • Stroke Paternal Grandmother 66   • Alcohol abuse Paternal Grandfather    • Lung Disease Paternal Grandfather    • Sleep Apnea Brother        She  has a past medical history of Anemia, Anxiety, Celiac disease, Daytime sleepiness, Depression, Insomnia, Insulin  "resistance, Migraine, Morning headache, PCOS (polycystic ovarian syndrome), Snoring, Vertigo, and Vitamin D deficiency. She also has no past medical history of Apnea, sleep, Cough, Gasping for breath, Painful breathing, Shortness of breath, Sputum production, or Wheezing.  She  has a past surgical history that includes nadeen by laparoscopy (N/A, 9/13/2018).       Objective:   Vitals obtained by patient:  Ht 1.702 m (5' 7\")   Wt 113 kg (250 lb)   LMP 02/25/2021   BMI 39.16 kg/m²      Physical Exam:  General: No acute distress. Well nourished.   HEENT: EOM grossly intact, no scleral icterus, no pharyngeal erythema.   Neck:  No JVD noted at 90 degrees, trachea midline  CVS: Pulse as reported by patient, no visible LE edema.  Resp: Unlabored respiratory effort, no cough or audible wheeze  MSK/Ext: No clubbing or cyanosis visible appreciated.  Skin: No rashes in visible areas.  Neurological: AOx3. CN III-XII grossly intact. No focal deficits.     LABS:   results reviewed and discussed with the patient, questions answered.    My total time spent caring for the patient on the day of the encounter was 25 minutes.   This does not include time spent on separately billable procedures/tests.   Assessment and Plan:   1. Polyarthralgia  Uncontrolled, stable.  Idiopathic etiology at this point.  Will obtain additional autoimmune panels.  Will review with patient at next visit.  - JALEN+VENTURA+C2+C4+RA QN +DNA/DS  - SMITH AB IGG; Future  - LYME TOTAL AB, SERUM; Future  - LYME WESTERN BLOT IGG + IGM; Future  - DX-JOINT SURVEY-HANDS SINGLE VIEW; Future    2. Brittle nails  Uncontrolled, stable.  Will obtain additional autoimmune panel.  - SJOGREN'S AB, ANTI-SS-A/-SS-B  - CONNECTIVE TISSUE DISEASES PROFILE; Future    3. Rash of hands  As discussed above.  - SJOGREN'S AB, ANTI-SS-A/-SS-B  - CONNECTIVE TISSUE DISEASES PROFILE; Future       Follow-up: No follow-ups on file.          "

## 2021-03-25 NOTE — ASSESSMENT & PLAN NOTE
Problem today.  Generalized joint pain initially started 1 year ago.  Pain is in bilateral hips, knees and wrists.,  Getting worse.  Positive FH of arthritis in ankles.  No fevers, no erythema edema.  She denies any trauma, falls or history of MVA.  Patient was evaluated by neurology and endocrinology, not suspecting MS.

## 2021-03-26 ENCOUNTER — HOSPITAL ENCOUNTER (OUTPATIENT)
Dept: LAB | Facility: MEDICAL CENTER | Age: 26
End: 2021-03-26
Attending: NURSE PRACTITIONER
Payer: COMMERCIAL

## 2021-03-26 ENCOUNTER — APPOINTMENT (OUTPATIENT)
Dept: URGENT CARE | Facility: CLINIC | Age: 26
End: 2021-03-26
Payer: COMMERCIAL

## 2021-03-26 ENCOUNTER — APPOINTMENT (OUTPATIENT)
Dept: RADIOLOGY | Facility: IMAGING CENTER | Age: 26
End: 2021-03-26
Attending: NURSE PRACTITIONER
Payer: COMMERCIAL

## 2021-03-26 DIAGNOSIS — M25.50 POLYARTHRALGIA: ICD-10-CM

## 2021-03-26 DIAGNOSIS — D64.9 ANEMIA, UNSPECIFIED TYPE: ICD-10-CM

## 2021-03-26 DIAGNOSIS — R21 RASH OF HANDS: ICD-10-CM

## 2021-03-26 DIAGNOSIS — L60.3 BRITTLE NAILS: ICD-10-CM

## 2021-03-26 DIAGNOSIS — R59.0 CERVICAL LYMPHADENOPATHY: ICD-10-CM

## 2021-03-26 PROCEDURE — 83516 IMMUNOASSAY NONANTIBODY: CPT

## 2021-03-26 PROCEDURE — 86235 NUCLEAR ANTIGEN ANTIBODY: CPT | Mod: 91

## 2021-03-26 PROCEDURE — 83036 HEMOGLOBIN GLYCOSYLATED A1C: CPT

## 2021-03-26 PROCEDURE — 82728 ASSAY OF FERRITIN: CPT

## 2021-03-26 PROCEDURE — 85025 COMPLETE CBC W/AUTO DIFF WBC: CPT

## 2021-03-26 PROCEDURE — 86160 COMPLEMENT ANTIGEN: CPT

## 2021-03-26 PROCEDURE — 86225 DNA ANTIBODY NATIVE: CPT

## 2021-03-26 PROCEDURE — 86431 RHEUMATOID FACTOR QUANT: CPT

## 2021-03-26 PROCEDURE — 83550 IRON BINDING TEST: CPT

## 2021-03-26 PROCEDURE — 77077 JOINT SURVEY SINGLE VIEW: CPT | Mod: TC | Performed by: NURSE PRACTITIONER

## 2021-03-26 PROCEDURE — 80053 COMPREHEN METABOLIC PANEL: CPT

## 2021-03-26 PROCEDURE — 86038 ANTINUCLEAR ANTIBODIES: CPT

## 2021-03-26 PROCEDURE — 86618 LYME DISEASE ANTIBODY: CPT

## 2021-03-26 PROCEDURE — 83540 ASSAY OF IRON: CPT

## 2021-03-26 PROCEDURE — 36415 COLL VENOUS BLD VENIPUNCTURE: CPT

## 2021-03-27 LAB
ALBUMIN SERPL BCP-MCNC: 3.9 G/DL (ref 3.2–4.9)
ALBUMIN/GLOB SERPL: 1.2 G/DL
ALP SERPL-CCNC: 65 U/L (ref 30–99)
ALT SERPL-CCNC: 14 U/L (ref 2–50)
ANION GAP SERPL CALC-SCNC: 12 MMOL/L (ref 7–16)
AST SERPL-CCNC: 14 U/L (ref 12–45)
BASOPHILS # BLD AUTO: 0.9 % (ref 0–1.8)
BASOPHILS # BLD: 0.05 K/UL (ref 0–0.12)
BILIRUB SERPL-MCNC: 0.2 MG/DL (ref 0.1–1.5)
BUN SERPL-MCNC: 12 MG/DL (ref 8–22)
C4 SERPL-MCNC: 23.4 MG/DL (ref 19–52)
CALCIUM SERPL-MCNC: 9.2 MG/DL (ref 8.5–10.5)
CHLORIDE SERPL-SCNC: 102 MMOL/L (ref 96–112)
CO2 SERPL-SCNC: 23 MMOL/L (ref 20–33)
CREAT SERPL-MCNC: 0.58 MG/DL (ref 0.5–1.4)
EOSINOPHIL # BLD AUTO: 0.17 K/UL (ref 0–0.51)
EOSINOPHIL NFR BLD: 3.1 % (ref 0–6.9)
ERYTHROCYTE [DISTWIDTH] IN BLOOD BY AUTOMATED COUNT: 48.2 FL (ref 35.9–50)
EST. AVERAGE GLUCOSE BLD GHB EST-MCNC: 111 MG/DL
FERRITIN SERPL-MCNC: 13.8 NG/ML (ref 10–291)
GLOBULIN SER CALC-MCNC: 3.2 G/DL (ref 1.9–3.5)
GLUCOSE SERPL-MCNC: 89 MG/DL (ref 65–99)
HBA1C MFR BLD: 5.5 % (ref 4–5.6)
HCT VFR BLD AUTO: 43.6 % (ref 37–47)
HGB BLD-MCNC: 14 G/DL (ref 12–16)
IMM GRANULOCYTES # BLD AUTO: 0 K/UL (ref 0–0.11)
IMM GRANULOCYTES NFR BLD AUTO: 0 % (ref 0–0.9)
IRON SATN MFR SERPL: 51 % (ref 15–55)
IRON SERPL-MCNC: 226 UG/DL (ref 40–170)
LYMPHOCYTES # BLD AUTO: 2.33 K/UL (ref 1–4.8)
LYMPHOCYTES NFR BLD: 42.8 % (ref 22–41)
MCH RBC QN AUTO: 28.2 PG (ref 27–33)
MCHC RBC AUTO-ENTMCNC: 32.1 G/DL (ref 33.6–35)
MCV RBC AUTO: 87.7 FL (ref 81.4–97.8)
MONOCYTES # BLD AUTO: 0.47 K/UL (ref 0–0.85)
MONOCYTES NFR BLD AUTO: 8.6 % (ref 0–13.4)
NEUTROPHILS # BLD AUTO: 2.42 K/UL (ref 2–7.15)
NEUTROPHILS NFR BLD: 44.6 % (ref 44–72)
NRBC # BLD AUTO: 0 K/UL
NRBC BLD-RTO: 0 /100 WBC
PLATELET # BLD AUTO: 266 K/UL (ref 164–446)
PMV BLD AUTO: 11.4 FL (ref 9–12.9)
POTASSIUM SERPL-SCNC: 4.2 MMOL/L (ref 3.6–5.5)
PROT SERPL-MCNC: 7.1 G/DL (ref 6–8.2)
RBC # BLD AUTO: 4.97 M/UL (ref 4.2–5.4)
RHEUMATOID FACT SER IA-ACNC: <10 IU/ML (ref 0–14)
SODIUM SERPL-SCNC: 137 MMOL/L (ref 135–145)
TIBC SERPL-MCNC: 445 UG/DL (ref 250–450)
UIBC SERPL-MCNC: 219 UG/DL (ref 110–370)
WBC # BLD AUTO: 5.4 K/UL (ref 4.8–10.8)

## 2021-03-29 ENCOUNTER — TELEMEDICINE (OUTPATIENT)
Dept: TELEHEALTH | Facility: TELEMEDICINE | Age: 26
End: 2021-03-29
Payer: COMMERCIAL

## 2021-03-29 DIAGNOSIS — H10.33 ACUTE CONJUNCTIVITIS OF BOTH EYES, UNSPECIFIED ACUTE CONJUNCTIVITIS TYPE: ICD-10-CM

## 2021-03-29 LAB
B BURGDOR AB SER IA-ACNC: 0.24 LIV (ref 0–1.2)
DSDNA AB TITR SER CLIF: NORMAL {TITER}
NUCLEAR IGG SER QL IA: NORMAL

## 2021-03-29 PROCEDURE — 95806 SLEEP STUDY UNATT&RESP EFFT: CPT | Performed by: INTERNAL MEDICINE

## 2021-03-29 PROCEDURE — 99213 OFFICE O/P EST LOW 20 MIN: CPT | Mod: 95,CR | Performed by: NURSE PRACTITIONER

## 2021-03-29 RX ORDER — POLYMYXIN B SULFATE AND TRIMETHOPRIM 1; 10000 MG/ML; [USP'U]/ML
1 SOLUTION OPHTHALMIC EVERY 4 HOURS
Qty: 10 ML | Refills: 0 | Status: SHIPPED | OUTPATIENT
Start: 2021-03-29 | End: 2021-04-05

## 2021-03-29 ASSESSMENT — ENCOUNTER SYMPTOMS
BLURRED VISION: 0
CONSTITUTIONAL NEGATIVE: 1
EYE ITCHING: 1
EYE PAIN: 1
EYE DISCHARGE: 1
FEVER: 0
FOREIGN BODY SENSATION: 0
EYE REDNESS: 1

## 2021-03-29 NOTE — PROGRESS NOTES
Subjective:     Diana Hill is a 25 y.o. female who presents for No chief complaint on file.       NOTE: This encounter was conducted via Zoom and with secure and encrypted videoconferencing equipment. The patient was in a private location in the [facility]. The patient's identity was confirmed and verbal consent was obtained to proceed with this virtual visit.    Eye Problem   Both eyes are affected.This is a new problem. The problem has been gradually worsening. There was no injury mechanism. The pain is mild. She does not wear contacts. Associated symptoms include an eye discharge, eye redness and itching. Pertinent negatives include no blurred vision, fever or foreign body sensation.     Patient reports that this morning, she woke up with both of her eyes crusted shut.  Reports bilateral eye irritation, redness, and itchiness.    PMH:  has a past medical history of Anemia, Anxiety, Celiac disease, Daytime sleepiness, Depression, Insomnia, Insulin resistance, Migraine, Morning headache, PCOS (polycystic ovarian syndrome), Snoring, Vertigo, and Vitamin D deficiency. She also has no past medical history of Apnea, sleep, Cough, Gasping for breath, Painful breathing, Shortness of breath, Sputum production, or Wheezing.    MEDS:   Current Outpatient Medications:   •  polymixin-trimethoprim (POLYTRIM) 67226-9.1 UNIT/ML-% Solution, Administer 1 Drop into both eyes every 4 hours for 7 days., Disp: 10 mL, Rfl: 0  •  citalopram (CELEXA) 20 MG Tab, TAKE 1.5 TABLETS BY MOUTH EVERY DAY, Disp: 135 tablet, Rfl: 1  •  Norgestim-Eth Estrad Triphasic 0.18/0.215/0.25 MG-25 MCG Tab, Take  by mouth., Disp: , Rfl:   •  amitriptyline (ELAVIL) 10 MG Tab, , Disp: , Rfl:   •  ferrous sulfate (FEOSOL) 220 (44 Fe) MG/5ML Elixir, , Disp: , Rfl:   •  tretinoin (RETIN-A) 0.025 % gel, Apply 1 Application topically every bedtime., Disp: 45 g, Rfl: 2  •  Clindamycin Phos-Benzoyl Perox (ONEXTON) 1.2-3.75 % Gel, Apply 1 Application  topically every day., Disp: 50 g, Rfl: 3  •  doxycycline (VIBRAMYCIN) 100 MG Tab, 1 tablet twice daily for 6 weeks, then decrease to 1 tablet daily, Disp: 150 Tab, Rfl: 0  •  clobetasol (TEMOVATE) 0.05 % Ointment, Apply 1 Application topically 2 times a day. To hands for 4 weeks, Disp: 45 g, Rfl: 1  •  VITAMIN D PO, Take 6,000 Units by mouth every day., Disp: , Rfl:   •  Magnesium 250 MG Tab, Take  by mouth., Disp: , Rfl:   •  Selenium (SELENICAPS-200 PO), Take  by mouth., Disp: , Rfl:   •  metFORMIN ER (GLUCOPHAGE XR) 500 MG TABLET SR 24 HR, Take 2 Tabs by mouth 2 times a day., Disp: 120 Tab, Rfl: 5  •  verapamil SR (CALAN-SR) 120 MG CR tablet, Take 120 mg by mouth every day., Disp: , Rfl:   •  diazePAM (VALIUM) 5 MG Tab, TAKE 1 TABLET BY MOUTH TWICE A DAY AS NEEDED FOR VERTIGO FOR 30 DAYS, Disp: , Rfl:   •  SUMAtriptan (IMITREX) 25 MG Tab tablet, TAKE 1 TO 4 TABLETS BY MOUTH ONCE AS NEEDED FOR MIGRAINE (MAY REPEAT DOSE AFTER 2 HOURS, MAX DAILY DOSE 200MG) FOR UP TO 1 DOSE., Disp: 9 Tab, Rfl: 2  •  albuterol 108 (90 Base) MCG/ACT Aero Soln inhalation aerosol, Inhale 2 Puffs by mouth every four hours as needed for Shortness of Breath., Disp: 1 Inhaler, Rfl: 0    ALLERGIES:   Allergies   Allergen Reactions   • Gluten Meal      SURGHX:   Past Surgical History:   Procedure Laterality Date   • MATTHIAS BY LAPAROSCOPY N/A 9/13/2018    Procedure: MATTHIAS BY LAPAROSCOPY;  Surgeon: Hayden Wilhelm M.D.;  Location: SURGERY [facility];  Service: General     SOCHX:  reports that she has never smoked. She has never used smokeless tobacco. She reports current alcohol use. She reports that she does not use drugs.     FH: Reviewed with patient, not pertinent to this visit.    Review of Systems   Constitutional: Negative.  Negative for fever and malaise/fatigue.   Eyes: Positive for pain, discharge, redness and itching. Negative for blurred vision.   All other systems reviewed and are negative.    Additional details per HPI.        Objective:     NOTE: Virtual encounter. Physical exam limited. Vital signs not performed.    Physical Exam  Constitutional:       General: She is not in acute distress.     Appearance: Normal appearance. She is well-developed and well-groomed. She is not ill-appearing.   HENT:      Head: Normocephalic.      Right Ear: External ear normal.      Left Ear: External ear normal.      Nose: Nose normal.      Mouth/Throat:      Lips: Pink. No lesions.      Mouth: Mucous membranes are moist.      Comments: Phonation normal  Eyes:      General: Lids are normal. No scleral icterus.        Right eye: No discharge.         Left eye: No discharge.      Extraocular Movements: Extraocular movements intact.      Conjunctiva/sclera:      Right eye: Right conjunctiva is injected.      Left eye: Left conjunctiva is injected.   Pulmonary:      Effort: Pulmonary effort is normal. No tachypnea, bradypnea or respiratory distress.      Comments: No cough or audible wheeze; speaks in full sentences  Musculoskeletal:      Cervical back: Normal range of motion. No edema.   Skin:     Coloration: Skin is not pale.      Findings: No rash.   Neurological:      Mental Status: She is alert and oriented to person, place, and time.   Psychiatric:         Attention and Perception: Attention normal.         Mood and Affect: Mood normal.         Speech: Speech normal.         Behavior: Behavior normal. Behavior is cooperative.       Assessment/Plan:     1. Acute conjunctivitis of both eyes, unspecified acute conjunctivitis type  - polymixin-trimethoprim (POLYTRIM) 30776-4.1 UNIT/ML-% Solution; Administer 1 Drop into both eyes every 4 hours for 7 days.  Dispense: 10 mL; Refill: 0    Rx as above sent electronically. Advised of infectious nature of conjunctivitis. Avoid rubbing eyes. Perform frequent hand hygiene.     Differential diagnosis, natural history, supportive care, over-the-counter symptom management per 's instructions, close  monitoring, and indications for immediate follow-up discussed.     Patient advised to: Return for 1) Symptoms that worsen/don't improve, or go to ER, 2) Follow up with primary care in 7-10 days.    All questions answered. Patient agrees with the plan of care.

## 2021-03-29 NOTE — PATIENT INSTRUCTIONS
"Conjunctivitis  Conjunctivitis is commonly called \"pink eye.\" Conjunctivitis can be caused by bacterial or viral infection, allergies, or injuries. There is usually redness of the lining of the eye, itching, discomfort, and sometimes discharge. There may be deposits of matter along the eyelids. A viral infection usually causes a watery discharge, while a bacterial infection causes a yellowish, thick discharge. Pink eye is very contagious and spreads by direct contact.  You may be given antibiotic eyedrops as part of your treatment. Before using your eye medicine, remove all drainage from the eye by washing gently with warm water and cotton balls. Continue to use the medication until you have awakened 2 mornings in a row without discharge from the eye. Do not rub your eye. This increases the irritation and helps spread infection. Use separate towels from other household members. Wash your hands with soap and water before and after touching your eyes. Use cold compresses to reduce pain and sunglasses to relieve irritation from light. Do not wear contact lenses or wear eye makeup until the infection is gone.  SEEK MEDICAL CARE IF:   · Your symptoms are not better after 3 days of treatment.  · You have increased pain or trouble seeing.  · The outer eyelids become very red or swollen.  Document Released: 01/25/2006 Document Revised: 03/11/2013 Document Reviewed: 12/18/2006  [employer]® Patient Information ©2014 [employer].    "

## 2021-03-29 NOTE — PROCEDURES
A 24 yo female with snoring, witnessed apneas, and nonrestorative sleep. Home PSG requested for evaluation for suspected JOEY.    Total recording time of 472 minutes, with good quality data noted.    There were 200 snore episodes noted associated with obstructive apneas/hypopneas. Overall CAMRON was 8.3/h and associated with desaturations to a lida of 86% Sp02. Mean HR was 65 BPM.    Impression:  Mild JOEY with CAMRON:8/h with desaturations to 86% Sp02.     Recommendations:  Treatment options include CPAP, an oral appliance or UPPP.

## 2021-03-30 LAB
CENTROMERE IGG TITR SER IF: 0 AU/ML (ref 0–40)
ENA JO1 AB TITR SER: 0 AU/ML (ref 0–40)
ENA SCL70 IGG SER QL: 0 AU/ML (ref 0–40)
ENA SM IGG SER-ACNC: 1 AU/ML (ref 0–40)
ENA SS-B IGG SER IA-ACNC: 0 AU/ML (ref 0–40)
RIBOSOMAL P AB SER-ACNC: 0 AU/ML (ref 0–40)
SSA52 R0ENA AB IGG Q0420: 0 AU/ML (ref 0–40)
SSA60 R0ENA AB IGG Q0419: 1 AU/ML (ref 0–40)
U1 SNRNP IGG SER QL: NORMAL

## 2021-04-04 LAB — MISCELLANEOUS LAB RESULT MISCLAB: NORMAL

## 2021-04-07 ENCOUNTER — OFFICE VISIT (OUTPATIENT)
Dept: DERMATOLOGY | Facility: IMAGING CENTER | Age: 26
End: 2021-04-07
Payer: COMMERCIAL

## 2021-04-07 ENCOUNTER — OFFICE VISIT (OUTPATIENT)
Dept: MEDICAL GROUP | Facility: PHYSICIAN GROUP | Age: 26
End: 2021-04-07
Payer: COMMERCIAL

## 2021-04-07 VITALS
WEIGHT: 270 LBS | SYSTOLIC BLOOD PRESSURE: 100 MMHG | HEIGHT: 67 IN | HEART RATE: 89 BPM | TEMPERATURE: 98.4 F | DIASTOLIC BLOOD PRESSURE: 78 MMHG | RESPIRATION RATE: 12 BRPM | BODY MASS INDEX: 42.38 KG/M2 | OXYGEN SATURATION: 97 %

## 2021-04-07 DIAGNOSIS — E66.01 MORBID OBESITY (HCC): ICD-10-CM

## 2021-04-07 DIAGNOSIS — R21 RASH AND NONSPECIFIC SKIN ERUPTION: ICD-10-CM

## 2021-04-07 DIAGNOSIS — F34.1 DYSTHYMIA: ICD-10-CM

## 2021-04-07 PROCEDURE — 11104 PUNCH BX SKIN SINGLE LESION: CPT | Performed by: NURSE PRACTITIONER

## 2021-04-07 PROCEDURE — 99214 OFFICE O/P EST MOD 30 MIN: CPT | Performed by: NURSE PRACTITIONER

## 2021-04-07 RX ORDER — FLUOXETINE HYDROCHLORIDE 20 MG/1
20 CAPSULE ORAL DAILY
Qty: 30 CAPSULE | Refills: 1 | Status: SHIPPED | OUTPATIENT
Start: 2021-04-07 | End: 2021-04-30

## 2021-04-07 RX ORDER — TACROLIMUS 1 MG/G
1 OINTMENT TOPICAL 2 TIMES DAILY
Qty: 60 G | Refills: 2 | Status: SHIPPED | OUTPATIENT
Start: 2021-04-07 | End: 2022-06-16

## 2021-04-07 ASSESSMENT — ENCOUNTER SYMPTOMS
CHILLS: 0
FEVER: 0

## 2021-04-07 ASSESSMENT — FIBROSIS 4 INDEX: FIB4 SCORE: 0.32

## 2021-04-07 NOTE — ASSESSMENT & PLAN NOTE
Chronic intermittent problem.  Previously on Celexa 20 mg.  Not sure if it is making a difference at this time.  Patient has been on this medication for years.  She denies SI, HI, excessive anxiety or depression.  She states she does not have much energy or excitement for doing things.  Is on therapy with return and open to therapy today.

## 2021-04-07 NOTE — ASSESSMENT & PLAN NOTE
Current BMI is 42.29.  Patient reports healthy diet.  She goes for walks and she is active at work, works as a CNA.

## 2021-04-07 NOTE — PROGRESS NOTES
Chief Complaint   Patient presents with   • Lab Results       HISTORY OF PRESENT ILLNESS: Patient is a 25 y.o. female, established patient who presents today to discuss medical problems as listed below:    Health Maintenance:  COMPLETED     Morbid obesity (HCC)  Current BMI is 42.29.  Patient reports healthy diet.  She goes for walks and she is active at work, works as a CNA.    Dysthymia  Chronic intermittent problem.  Previously on Celexa 20 mg.  Not sure if it is making a difference at this time.  Patient has been on this medication for years.  She denies SI, HI, excessive anxiety or depression.  She states she does not have much energy or excitement for doing things.  Is on therapy with return and open to therapy today.      Patient Active Problem List    Diagnosis Date Noted   • Dysthymia 04/07/2021   • Polyarthralgia 03/25/2021   • Brittle nails 03/25/2021   • Cervical lymphadenopathy 12/29/2020   • Subclinical hypothyroidism 11/23/2020   • Insulin resistance 11/23/2020   • Anemia 11/23/2020   • Morbid obesity (HCC) 10/07/2020   • Skin rash 09/15/2020   • Administrative encounter 09/15/2020   • Hospital discharge follow-up 09/02/2020   • Rash of hands 08/12/2020   • Snoring 08/12/2020   • Vertigo 03/03/2020   • PCOS (polycystic ovarian syndrome) 11/19/2019   • Vitamin D deficiency 11/19/2019   • High triglycerides 11/19/2019   • Anxiety and depression 11/19/2019        Allergies: Gluten meal    Current Outpatient Medications   Medication Sig Dispense Refill   • FLUoxetine (PROZAC) 20 MG Cap Take 1 capsule by mouth every day. 30 capsule 1   • Norgestim-Eth Estrad Triphasic 0.18/0.215/0.25 MG-25 MCG Tab Take  by mouth.     • tretinoin (RETIN-A) 0.025 % gel Apply 1 Application topically every bedtime. 45 g 2   • Clindamycin Phos-Benzoyl Perox (ONEXTON) 1.2-3.75 % Gel Apply 1 Application topically every day. 50 g 3   • doxycycline (VIBRAMYCIN) 100 MG Tab 1 tablet twice daily for 6 weeks, then decrease to 1 tablet  daily 150 Tab 0   • clobetasol (TEMOVATE) 0.05 % Ointment Apply 1 Application topically 2 times a day. To hands for 4 weeks 45 g 1   • VITAMIN D PO Take 6,000 Units by mouth every day.     • Magnesium 250 MG Tab Take  by mouth.     • Selenium (SELENICAPS-200 PO) Take  by mouth.     • metFORMIN ER (GLUCOPHAGE XR) 500 MG TABLET SR 24 HR Take 2 Tabs by mouth 2 times a day. 120 Tab 5   • verapamil SR (CALAN-SR) 120 MG CR tablet Take 120 mg by mouth every day.     • diazePAM (VALIUM) 5 MG Tab TAKE 1 TABLET BY MOUTH TWICE A DAY AS NEEDED FOR VERTIGO FOR 30 DAYS     • SUMAtriptan (IMITREX) 25 MG Tab tablet TAKE 1 TO 4 TABLETS BY MOUTH ONCE AS NEEDED FOR MIGRAINE (MAY REPEAT DOSE AFTER 2 HOURS, MAX DAILY DOSE 200MG) FOR UP TO 1 DOSE. 9 Tab 2   • albuterol 108 (90 Base) MCG/ACT Aero Soln inhalation aerosol Inhale 2 Puffs by mouth every four hours as needed for Shortness of Breath. 1 Inhaler 0   • amitriptyline (ELAVIL) 10 MG Tab      • ferrous sulfate (FEOSOL) 220 (44 Fe) MG/5ML Elixir        No current facility-administered medications for this visit.       Social History     Tobacco Use   • Smoking status: Never Smoker   • Smokeless tobacco: Never Used   Substance Use Topics   • Alcohol use: Yes     Comment: occasionally   • Drug use: No     Social History     Social History Narrative   • Not on file       Family History   Problem Relation Age of Onset   • Thyroid Mother    • Depression Father    • Alcohol abuse Father    • No Known Problems Maternal Grandmother    • Heart Attack Maternal Grandfather 66   • Cancer Paternal Grandmother         Breast , esophageal   • Stroke Paternal Grandmother 66   • Alcohol abuse Paternal Grandfather    • Lung Disease Paternal Grandfather    • Sleep Apnea Brother        Allergies, past medical history, past surgical history, family history, social history reviewed and updated.    Review of Systems:     - Constitutional: Negative for fever, chills, unexpected weight change, and  "fatigue/generalized weakness.     - Respiratory: Negative for cough, sputum production, chest congestion, dyspnea, wheezing, and crackles.      - Cardiovascular: Negative for chest pain, palpitations, orthopnea, and bilateral lower extremity edema.     - Psychiatric/Behavioral: Negative for depression, suicidal/homicidal ideation and memory loss.      All other systems reviewed and are negative    Exam:    /78   Pulse 89   Temp 36.9 °C (98.4 °F) (Temporal)   Resp 12   Ht 1.702 m (5' 7\")   Wt 122 kg (270 lb)   LMP 04/01/2021   SpO2 97%   BMI 42.29 kg/m²  Body mass index is 42.29 kg/m².    Physical Exam:  Constitutional: Well-developed and well-nourished. Not diaphoretic. No distress.   Cardiovascular: Regular rate and rhythm, S1 and S2 without murmur, rubs, or gallops.    Chest: Effort normal. Clear to auscultation throughout. No adventitious sounds.   Neurological: Alert and oriented x 3.   Psychiatric:  Behavior, mood, and affect are appropriate.  MA/nursing note and vitals reviewed.    LABS:3/2021   results reviewed and discussed with the patient, questions answered.    My total time spent caring for the patient on the day of the encounter was 25 minutes.   This does not include time spent on separately billable procedures/tests.     Assessment/Plan:  1. Dysthymia  Uncontrolled, stable.  Trial of Prozac, will DC Celexa.  Discussed nonpharmacological coping mechanisms.  Encouraged to try breathing exercises, meditation and exercise including yoga, swimming and fitness classes.  Try physical activity to tolerance and preference and develop a routine.  - REFERRAL TO BEHAVIORAL HEALTH  - FLUoxetine (PROZAC) 20 MG Cap; Take 1 capsule by mouth every day.  Dispense: 30 capsule; Refill: 1    2. Morbid obesity (HCC)  Uncontrolled, stable.  Discussed healthy diet.  Encouraged to consume less simple carbohydrates and starches and more healthy proteins, healthy fats and fiber.  Incorporate physical activity to " tolerance and preference and develop a system.       Discussed with patient possible alternative diagnoses, pt is to take all medications as prescribed. If symptoms persist FU w/PCP, if symptoms worsen go to emergency room. If experiencing any side effects from prescribed medications reports to the office immediately or go to emergency room.  Reviewed indication, dosage, usage and potential adverse effects of prescribed medications. Reviewed risks and benefits of treatment plan. Patient verbalizes understanding of all instruction and verbally agrees to plan.    No follow-ups on file. annual

## 2021-04-07 NOTE — PROGRESS NOTES
Dermatology Return Patient Visit    Chief Complaint   Patient presents with   • Follow-Up   • Rash       Subjective:     HPI:   Diana Hill is a 25 y.o. female presenting for    Patient returns today to follow up hand eczema     Patient has been using Clobetasol ointment stopped using oint, two weeks ago no improvement   Patient states knuckles are no longer affected, just finger tips and nail bed.      From Previous Note:  HPI:Rash b/l hands  Onset: 12/2020  Symptoms: blistering, cracking skin on both hands  Aggravating factors: Soaps  Alleviating factors: oral steroids  New creams/topicals: No  New medications (up to last 6 months): No  New travel: No  Other exposures: No  Treatments: [place]efes, Dermaced      Rash  Pertinent negatives include no fever.             Review of Systems   Constitutional: Negative for chills and fever.   Skin: Positive for itching and rash.        Objective:     A focused cutaneous exam was completed including: left and right upper extremities (including hands/digits and fingernails) with the following pertinent findings listed below. Remaining above-listed examined areas within normal limits / negative for rashes or lesions.          There were no vitals taken for this visit.    Physical Exam   Constitutional: She is oriented to person, place, and time and well-developed, well-nourished, and in no distress. No distress.   HENT:   Head: Normocephalic.   Pulmonary/Chest: Effort normal.   Neurological: She is alert and oriented to person, place, and time.   Skin: Skin is warm and dry. Rash noted. There is erythema.             Psychiatric: Mood normal.       DATA: none applicable to review    Assessment and Plan:      1. Rash and nonspecific skin eruption  Procedure Note   Procedure: Biopsy by punch technique  Location: right forearm  Preoperative diagnosis:eczema, contact derm, drug rxn  Risks, benefits and alternatives of procedure discussed and written informed consent obtained.  Time out completed. Area of biopsy prepped with alcohol. Anesthesia with 1% lidocaine with epinephrine administered with a 30 gauge needle. 4  mm punch biopsy of site performed. Hemostasis achieved with pressure and 4.0 prolene sutures. Vaseline applied to wound with bandage. Patient tolerated procedure well, and there were no complications.  The pathology specimen was sent to the lab via the staff.  Wound care was discussed with the patient.     Referral for patch testing-if not revelatory with patch testing, consider Dupixent.     - tacrolimus (PROTOPIC) 0.1 % Ointment; Apply 1 Application topically 2 times a day. To affected area on hands and arms  Dispense: 60 g; Refill: 2  - REFERRAL TO ALLERGY    I have performed a physical exam and reviewed and updated ROS and Plan today (4/9/2021). In review of dermatology visit (1/19/2021) there are no changes except as documented above.       Please note that this dictation was created using voice recognition software. I have made every reasonable attempt to correct obvious errors, but I expect that there are errors of grammar and possibly content that I did not discover before finalizing the note.      Followup: Return for pending biopsy.    MARKY Phan.

## 2021-04-09 ENCOUNTER — TELEPHONE (OUTPATIENT)
Dept: SLEEP MEDICINE | Facility: MEDICAL CENTER | Age: 26
End: 2021-04-09

## 2021-04-09 NOTE — TELEPHONE ENCOUNTER
PT LVM ASKING WHAT THE NEXT STEP FOR TREATMENT OF JOEY BASED ON HST.     PLEASE ADVISE ON HOW YOU WOULD LIKE TO PROCEED.

## 2021-04-12 ENCOUNTER — PATIENT MESSAGE (OUTPATIENT)
Dept: SLEEP MEDICINE | Facility: MEDICAL CENTER | Age: 26
End: 2021-04-12

## 2021-04-12 DIAGNOSIS — G47.33 OSA (OBSTRUCTIVE SLEEP APNEA): ICD-10-CM

## 2021-04-15 ENCOUNTER — TELEPHONE (OUTPATIENT)
Dept: DERMATOLOGY | Facility: IMAGING CENTER | Age: 26
End: 2021-04-15

## 2021-04-16 ENCOUNTER — TELEPHONE (OUTPATIENT)
Dept: DERMATOLOGY | Facility: IMAGING CENTER | Age: 26
End: 2021-04-16

## 2021-04-21 ENCOUNTER — TELEPHONE (OUTPATIENT)
Dept: DERMATOLOGY | Facility: IMAGING CENTER | Age: 26
End: 2021-04-21

## 2021-04-27 ENCOUNTER — OFFICE VISIT (OUTPATIENT)
Dept: MEDICAL GROUP | Facility: PHYSICIAN GROUP | Age: 26
End: 2021-04-27
Payer: COMMERCIAL

## 2021-04-27 VITALS
WEIGHT: 271 LBS | DIASTOLIC BLOOD PRESSURE: 90 MMHG | BODY MASS INDEX: 42.53 KG/M2 | OXYGEN SATURATION: 95 % | HEIGHT: 67 IN | HEART RATE: 72 BPM | SYSTOLIC BLOOD PRESSURE: 130 MMHG | TEMPERATURE: 97.6 F | RESPIRATION RATE: 12 BRPM

## 2021-04-27 DIAGNOSIS — H92.02 OTALGIA OF LEFT EAR: ICD-10-CM

## 2021-04-27 DIAGNOSIS — D50.9 IRON DEFICIENCY ANEMIA, UNSPECIFIED IRON DEFICIENCY ANEMIA TYPE: ICD-10-CM

## 2021-04-27 DIAGNOSIS — Z87.19 HISTORY OF CANKER SORES: ICD-10-CM

## 2021-04-27 PROCEDURE — 99214 OFFICE O/P EST MOD 30 MIN: CPT | Performed by: NURSE PRACTITIONER

## 2021-04-27 ASSESSMENT — FIBROSIS 4 INDEX: FIB4 SCORE: 0.32

## 2021-04-28 NOTE — ASSESSMENT & PLAN NOTE
New problem.  History of canker sores, none evident at this time.  Patient is curious about supplementation and preventative care at this time.

## 2021-04-28 NOTE — ASSESSMENT & PLAN NOTE
Chronic intermittent problem.  Recent lab values are WNL.  Supplemented with liquid iron, tolerated well.  Patient is concerned about absorption due to IBS.  Patient is also concerned about ferritin being in the low normal range.  She feels much better after oral replacement however is interested in referral to dermatology to be followed for her anemia.

## 2021-04-28 NOTE — PROGRESS NOTES
"Chief Complaint   Patient presents with   • Otalgia     ringing in left ear   • Medication Management     b1 and iron supp       HISTORY OF PRESENT ILLNESS: Patient is a 25 y.o. female, established patient who presents today to discuss medical problems as listed below:    Health Maintenance:  COMPLETED     Otalgia of left ear  New problem.  Patient reports pain, pounding in left ear started 1 week ago.  Associated symptoms include headaches and vertigo.  She denies fevers or changes in vision.  She denies ear discharge or itching.  She has history of seasonal allergies which are well controlled at this time.  Patient is established with ENT since 2014 due to vertigo.  Patient describes the sound in her left ear as \"sonogram\".  Symptom is chronic, never goes away, exacerbated by change in position.    History of canker sores  New problem.  History of canker sores, none evident at this time.  Patient is curious about supplementation and preventative care at this time.    Anemia  Chronic intermittent problem.  Recent lab values are WNL.  Supplemented with liquid iron, tolerated well.  Patient is concerned about absorption due to IBS.  Patient is also concerned about ferritin being in the low normal range.  She feels much better after oral replacement however is interested in referral to dermatology to be followed for her anemia.      Patient Active Problem List    Diagnosis Date Noted   • Otalgia of left ear 04/27/2021   • History of canker sores 04/27/2021   • Dysthymia 04/07/2021   • Polyarthralgia 03/25/2021   • Brittle nails 03/25/2021   • Cervical lymphadenopathy 12/29/2020   • Subclinical hypothyroidism 11/23/2020   • Insulin resistance 11/23/2020   • Anemia 11/23/2020   • Morbid obesity (HCC) 10/07/2020   • Skin rash 09/15/2020   • Administrative encounter 09/15/2020   • Hospital discharge follow-up 09/02/2020   • Rash of hands 08/12/2020   • Snoring 08/12/2020   • Vertigo 03/03/2020   • PCOS (polycystic ovarian " syndrome) 11/19/2019   • Vitamin D deficiency 11/19/2019   • High triglycerides 11/19/2019   • Anxiety and depression 11/19/2019        Allergies: Gluten meal    Current Outpatient Medications   Medication Sig Dispense Refill   • FLUoxetine (PROZAC) 20 MG Cap Take 1 capsule by mouth every day. 30 capsule 1   • tacrolimus (PROTOPIC) 0.1 % Ointment Apply 1 Application topically 2 times a day. To affected area on hands and arms 60 g 2   • Norgestim-Eth Estrad Triphasic 0.18/0.215/0.25 MG-25 MCG Tab Take  by mouth.     • tretinoin (RETIN-A) 0.025 % gel Apply 1 Application topically every bedtime. 45 g 2   • Clindamycin Phos-Benzoyl Perox (ONEXTON) 1.2-3.75 % Gel Apply 1 Application topically every day. 50 g 3   • VITAMIN D PO Take 6,000 Units by mouth every day.     • Magnesium 250 MG Tab Take  by mouth.     • Selenium (SELENICAPS-200 PO) Take  by mouth.     • metFORMIN ER (GLUCOPHAGE XR) 500 MG TABLET SR 24 HR Take 2 Tabs by mouth 2 times a day. 120 Tab 5   • verapamil SR (CALAN-SR) 120 MG CR tablet Take 120 mg by mouth every day.     • diazePAM (VALIUM) 5 MG Tab TAKE 1 TABLET BY MOUTH TWICE A DAY AS NEEDED FOR VERTIGO FOR 30 DAYS     • albuterol 108 (90 Base) MCG/ACT Aero Soln inhalation aerosol Inhale 2 Puffs by mouth every four hours as needed for Shortness of Breath. 1 Inhaler 0   • ferrous sulfate (FEOSOL) 220 (44 Fe) MG/5ML Elixir        No current facility-administered medications for this visit.       Social History     Tobacco Use   • Smoking status: Never Smoker   • Smokeless tobacco: Never Used   Substance Use Topics   • Alcohol use: Yes     Comment: occasionally   • Drug use: No     Social History     Social History Narrative   • Not on file       Family History   Problem Relation Age of Onset   • Thyroid Mother    • Depression Father    • Alcohol abuse Father    • No Known Problems Maternal Grandmother    • Heart Attack Maternal Grandfather 66   • Cancer Paternal Grandmother         Breast , esophageal   •  "Stroke Paternal Grandmother 66   • Alcohol abuse Paternal Grandfather    • Lung Disease Paternal Grandfather    • Sleep Apnea Brother        Allergies, past medical history, past surgical history, family history, social history reviewed and updated.    Review of Systems:     - Constitutional: Negative for fever, chills, unexpected weight change, and fatigue/generalized weakness.   EARS: pain in left ear.    - Respiratory: Negative for cough, sputum production, chest congestion, dyspnea, wheezing, and crackles.      - Cardiovascular: Negative for chest pain, palpitations, orthopnea, and bilateral lower extremity edema.     - Psychiatric/Behavioral: Negative for depression, suicidal/homicidal ideation and memory loss.      All other systems reviewed and are negative    Exam:    /90   Pulse 72   Temp 36.4 °C (97.6 °F) (Temporal)   Resp 12   Ht 1.702 m (5' 7\")   Wt 123 kg (271 lb)   LMP 04/01/2021   SpO2 95%   BMI 42.44 kg/m²  Body mass index is 42.44 kg/m².    Physical Exam:  Constitutional: Well-developed and well-nourished. Not diaphoretic. No distress.   Ears:  External ears unremarkable. Tympanic membranes clear and intact.  Neck:  No lymphadenopathy--cervical or supraclavicular.  Cardiovascular: Regular rate and rhythm, S1 and S2 without murmur, rubs, or gallops.    Chest: Effort normal. Clear to auscultation throughout. No adventitious sounds.   Neurological: Alert and oriented x 3.   Psychiatric:  Behavior, mood, and affect are appropriate.  MA/nursing note and vitals reviewed.    LABS: 3/2021  results reviewed and discussed with the patient, questions answered.    My total time spent caring for the patient on the day of the encounter was 25 minutes.   This does not include time spent on separately billable procedures/tests.     Assessment/Plan:  1. Iron deficiency anemia, unspecified iron deficiency anemia type  - REFERRAL TO HEMATOLOGY ONCOLOGY    2. Otalgia of left ear  Uncontrolled.  Patient " advised to follow-up with ENT as there is no concern with outer ear, no suspicion for infection.  DDx includes but not limited to middle/inner ear etiology.  Advised to change positions slowly, preventative care with allergy control, take OTC Claritin or Allegra whichever with better response.    3. History of canker sores  Discussed etiology.  Recommend OTC supplements with zinc, vitamin C, L-lysine and vitamin B.  Stable hydrated.       Discussed with patient possible alternative diagnoses, pt is to take all medications as prescribed. If symptoms persist FU w/PCP, if symptoms worsen go to emergency room. If experiencing any side effects from prescribed medications reports to the office immediately or go to emergency room.  Reviewed indication, dosage, usage and potential adverse effects of prescribed medications. Reviewed risks and benefits of treatment plan. Patient verbalizes understanding of all instruction and verbally agrees to plan.    No follow-ups on file. annual

## 2021-04-28 NOTE — ASSESSMENT & PLAN NOTE
"New problem.  Patient reports pain, pounding in left ear started 1 week ago.  Associated symptoms include headaches and vertigo.  She denies fevers or changes in vision.  She denies ear discharge or itching.  She has history of seasonal allergies which are well controlled at this time.  Patient is established with ENT since 2014 due to vertigo.  Patient describes the sound in her left ear as \"sonogram\".  Symptom is chronic, never goes away, exacerbated by change in position.  "

## 2021-04-30 ENCOUNTER — TELEPHONE (OUTPATIENT)
Dept: DERMATOLOGY | Facility: IMAGING CENTER | Age: 26
End: 2021-04-30

## 2021-04-30 DIAGNOSIS — F34.1 DYSTHYMIA: ICD-10-CM

## 2021-04-30 RX ORDER — FLUOXETINE HYDROCHLORIDE 20 MG/1
20 CAPSULE ORAL DAILY
Qty: 30 CAPSULE | Refills: 1 | Status: SHIPPED | OUTPATIENT
Start: 2021-04-30 | End: 2021-05-13

## 2021-05-13 ENCOUNTER — OFFICE VISIT (OUTPATIENT)
Dept: MEDICAL GROUP | Facility: PHYSICIAN GROUP | Age: 26
End: 2021-05-13
Payer: COMMERCIAL

## 2021-05-13 VITALS
WEIGHT: 266 LBS | OXYGEN SATURATION: 97 % | DIASTOLIC BLOOD PRESSURE: 80 MMHG | SYSTOLIC BLOOD PRESSURE: 132 MMHG | HEART RATE: 101 BPM | HEIGHT: 67 IN | RESPIRATION RATE: 12 BRPM | TEMPERATURE: 97.2 F | BODY MASS INDEX: 41.75 KG/M2

## 2021-05-13 DIAGNOSIS — F41.9 ANXIETY AND DEPRESSION: ICD-10-CM

## 2021-05-13 DIAGNOSIS — F32.A ANXIETY AND DEPRESSION: ICD-10-CM

## 2021-05-13 PROCEDURE — 99213 OFFICE O/P EST LOW 20 MIN: CPT | Performed by: NURSE PRACTITIONER

## 2021-05-13 RX ORDER — CITALOPRAM 20 MG/1
20 TABLET ORAL DAILY
Qty: 180 TABLET | Refills: 1 | Status: SHIPPED | OUTPATIENT
Start: 2021-05-13 | End: 2021-11-17

## 2021-05-13 ASSESSMENT — FIBROSIS 4 INDEX: FIB4 SCORE: 0.32

## 2021-05-14 NOTE — PROGRESS NOTES
Chief Complaint   Patient presents with   • Medication Management     Prozac- not working well       HISTORY OF PRESENT ILLNESS: Patient is a 25 y.o. female, established patient who presents today to discuss medical problems as listed below:      Anxiety and depression  This is chronic problem for patient.  Previously on citalopram 30 mg found to be helpful does as she tried 20 which was not adequate and tried 40 which patient felt was a little too much for her.  Trial of Prozac for 30 days made patient more emotional.  She is interested in going back to citalopram previous dose.  Pending referral to behavioral health.  Patient has to set up an appointment.      Patient Active Problem List    Diagnosis Date Noted   • Otalgia of left ear 04/27/2021   • History of canker sores 04/27/2021   • Dysthymia 04/07/2021   • Polyarthralgia 03/25/2021   • Brittle nails 03/25/2021   • Cervical lymphadenopathy 12/29/2020   • Subclinical hypothyroidism 11/23/2020   • Insulin resistance 11/23/2020   • Anemia 11/23/2020   • Morbid obesity (HCC) 10/07/2020   • Skin rash 09/15/2020   • Administrative encounter 09/15/2020   • Hospital discharge follow-up 09/02/2020   • Rash of hands 08/12/2020   • Snoring 08/12/2020   • Vertigo 03/03/2020   • PCOS (polycystic ovarian syndrome) 11/19/2019   • Vitamin D deficiency 11/19/2019   • High triglycerides 11/19/2019   • Anxiety and depression 11/19/2019        Allergies: Gluten meal    Current Outpatient Medications   Medication Sig Dispense Refill   • citalopram (CELEXA) 20 MG Tab Take 1 tablet by mouth every day. 180 tablet 1   • tacrolimus (PROTOPIC) 0.1 % Ointment Apply 1 Application topically 2 times a day. To affected area on hands and arms 60 g 2   • Norgestim-Eth Estrad Triphasic 0.18/0.215/0.25 MG-25 MCG Tab Take  by mouth.     • tretinoin (RETIN-A) 0.025 % gel Apply 1 Application topically every bedtime. 45 g 2   • Clindamycin Phos-Benzoyl Perox (ONEXTON) 1.2-3.75 % Gel Apply 1  Application topically every day. 50 g 3   • VITAMIN D PO Take 6,000 Units by mouth every day.     • Magnesium 250 MG Tab Take  by mouth.     • Selenium (SELENICAPS-200 PO) Take  by mouth.     • metFORMIN ER (GLUCOPHAGE XR) 500 MG TABLET SR 24 HR Take 2 Tabs by mouth 2 times a day. 120 Tab 5   • verapamil SR (CALAN-SR) 120 MG CR tablet Take 120 mg by mouth every day.     • diazePAM (VALIUM) 5 MG Tab TAKE 1 TABLET BY MOUTH TWICE A DAY AS NEEDED FOR VERTIGO FOR 30 DAYS     • albuterol 108 (90 Base) MCG/ACT Aero Soln inhalation aerosol Inhale 2 Puffs by mouth every four hours as needed for Shortness of Breath. 1 Inhaler 0     No current facility-administered medications for this visit.       Social History     Tobacco Use   • Smoking status: Never Smoker   • Smokeless tobacco: Never Used   Vaping Use   • Vaping Use: Never used   Substance Use Topics   • Alcohol use: Yes     Comment: occasionally   • Drug use: No     Social History     Social History Narrative   • Not on file       Family History   Problem Relation Age of Onset   • Thyroid Mother    • Depression Father    • Alcohol abuse Father    • No Known Problems Maternal Grandmother    • Heart Attack Maternal Grandfather 66   • Cancer Paternal Grandmother         Breast , esophageal   • Stroke Paternal Grandmother 66   • Alcohol abuse Paternal Grandfather    • Lung Disease Paternal Grandfather    • Sleep Apnea Brother        Allergies, past medical history, past surgical history, family history, social history reviewed and updated.    Review of Systems:     - Constitutional: Negative for fever, chills, unexpected weight change, and fatigue/generalized weakness.     - Respiratory: Negative for cough, sputum production, chest congestion, dyspnea, wheezing, and crackles.      - Cardiovascular: Negative for chest pain, palpitations, orthopnea, and bilateral lower extremity edema.    - Psychiatric/Behavioral: Negative for depression, suicidal/homicidal ideation and  "memory loss.      All other systems reviewed and are negative    Exam:    /80   Pulse (!) 101   Temp 36.2 °C (97.2 °F) (Temporal)   Resp 12   Ht 1.702 m (5' 7\")   Wt 121 kg (266 lb)   LMP 04/29/2021   SpO2 97%   BMI 41.66 kg/m²  Body mass index is 41.66 kg/m².    Physical Exam:  Constitutional: Well-developed and well-nourished. Not diaphoretic. No distress.   Cardiovascular: Regular rate and rhythm, S1 and S2 without murmur, rubs, or gallops.    Chest: Effort normal. Clear to auscultation throughout. No adventitious sounds. Neurological: Alert and oriented x 3.   Psychiatric:  Behavior, mood, and affect are appropriate.  MA/nursing note and vitals reviewed.    My total time spent caring for the patient on the day of the encounter was 15 minutes.   This does not include time spent on separately billable procedures/tests.     Assessment/Plan:  1. Anxiety and depression  We will restart citalopram 20 mg.  Okay to increase to 30 mg previous dose in a few days after initiation.  Discussed importance of CBT therapy.  Patient to call and schedule an appointment.  Also advised healthy diet such as low-carb and low saturated fat, keep well hydrated and exercise daily to tolerance and preference.  Patient is encouraged to discover activities that are enjoyable and helpful.  Advised against drastic diet.       Discussed with patient possible alternative diagnoses, pt is to take all medications as prescribed. If symptoms persist FU w/PCP, if symptoms worsen go to emergency room. If experiencing any side effects from prescribed medications reports to the office immediately or go to emergency room.  Reviewed indication, dosage, usage and potential adverse effects of prescribed medications. Reviewed risks and benefits of treatment plan. Patient verbalizes understanding of all instruction and verbally agrees to plan.    No follow-ups on file. annual  "

## 2021-05-14 NOTE — ASSESSMENT & PLAN NOTE
This is chronic problem for patient.  Previously on citalopram 30 mg found to be helpful does as she tried 20 which was not adequate and tried 40 which patient felt was a little too much for her.  Trial of Prozac for 30 days made patient more emotional.  She is interested in going back to citalopram previous dose.  Pending referral to behavioral health.  Patient has to set up an appointment.  History of surgery on sacrum on dual therapy.  Patient is aware not to take any certain modulators together with current prescription.  Patient has great support systems, mother in the room.  Mother is a nurse, nurturing and supportive to patient.

## 2021-05-20 ENCOUNTER — APPOINTMENT (OUTPATIENT)
Dept: SLEEP MEDICINE | Facility: MEDICAL CENTER | Age: 26
End: 2021-05-20
Payer: COMMERCIAL

## 2021-05-26 ENCOUNTER — APPOINTMENT (OUTPATIENT)
Dept: ENDOCRINOLOGY | Facility: MEDICAL CENTER | Age: 26
End: 2021-05-26
Attending: INTERNAL MEDICINE
Payer: COMMERCIAL

## 2021-06-16 ENCOUNTER — OFFICE VISIT (OUTPATIENT)
Dept: URGENT CARE | Facility: CLINIC | Age: 26
End: 2021-06-16
Payer: COMMERCIAL

## 2021-06-16 VITALS
DIASTOLIC BLOOD PRESSURE: 92 MMHG | HEIGHT: 67 IN | SYSTOLIC BLOOD PRESSURE: 126 MMHG | HEART RATE: 94 BPM | BODY MASS INDEX: 40.54 KG/M2 | WEIGHT: 258.3 LBS | OXYGEN SATURATION: 96 % | RESPIRATION RATE: 16 BRPM | TEMPERATURE: 97.9 F

## 2021-06-16 DIAGNOSIS — K21.9 GASTROESOPHAGEAL REFLUX DISEASE, UNSPECIFIED WHETHER ESOPHAGITIS PRESENT: ICD-10-CM

## 2021-06-16 DIAGNOSIS — R10.13 EPIGASTRIC ABDOMINAL PAIN: ICD-10-CM

## 2021-06-16 PROCEDURE — 99214 OFFICE O/P EST MOD 30 MIN: CPT | Performed by: NURSE PRACTITIONER

## 2021-06-16 ASSESSMENT — ENCOUNTER SYMPTOMS
NAUSEA: 1
ABDOMINAL PAIN: 1
FEVER: 0
VOMITING: 0

## 2021-06-16 ASSESSMENT — FIBROSIS 4 INDEX: FIB4 SCORE: 0.32

## 2021-06-17 ASSESSMENT — ENCOUNTER SYMPTOMS
HEARTBURN: 1
DIARRHEA: 1
BLOOD IN STOOL: 0

## 2021-06-17 NOTE — PROGRESS NOTES
"  Subjective:     Diana Hill is a 25 y.o. female who presents for Abdominal Pain ( upper abdomen stabbing pain x last night at midnight; feels like food is stuck )      States it started with the sensation that pill was stuck in the epigastric area. Drank water, no relief.  Acid reflux. No hx of GERD.  Intermittent spasms and stabs; \"like getting stuck\" in area. Took famotadine, which helped the gerd. Hx of cholecystectomy. Chronic diarrhea. Hx Celiac disese. Didn't eat any thing out of character before symptoms. No SOB. Does not drink alcohol. No hx of pancreatitis. States she was going to wait and see if it resolved, however felt the same sensation as if food was getting stuck with her last meal.     Abdominal Pain  This is a new problem. The current episode started yesterday. The problem occurs intermittently. The problem has been waxing and waning. The pain is located in the epigastric region. The pain is at a severity of 3/10. The pain is mild. The abdominal pain does not radiate. Associated symptoms include diarrhea and nausea. Pertinent negatives include no dysuria, fever, hematuria, melena or vomiting. The pain is aggravated by eating. The treatment provided mild relief.       Past Medical History:   Diagnosis Date   • Anemia     hx of borderline anemia   • Anxiety    • Celiac disease    • Daytime sleepiness    • Depression    • Insomnia    • Insulin resistance    • Migraine    • Morning headache    • PCOS (polycystic ovarian syndrome)    • Snoring    • Vertigo    • Vitamin D deficiency        Past Surgical History:   Procedure Laterality Date   • MATTHIAS BY LAPAROSCOPY N/A 9/13/2018    Procedure: MATTHIAS BY LAPAROSCOPY;  Surgeon: Hayden Wilhelm M.D.;  Location: SURGERY [facility];  Service: General       Social History     Socioeconomic History   • Marital status: Single     Spouse name: Not on file   • Number of children: Not on file   • Years of education: Not on file   • Highest " education level: Not on file   Occupational History   • Occupation: [employer]     Comment: CNA ; Nursing student   Tobacco Use   • Smoking status: Never Smoker   • Smokeless tobacco: Never Used   Vaping Use   • Vaping Use: Never used   Substance and Sexual Activity   • Alcohol use: Yes     Comment: occasionally   • Drug use: No   • Sexual activity: Never     Partners: Male, Female     Birth control/protection: Pill   Other Topics Concern   • Not on file   Social History Narrative   • Not on file     Social Determinants of Health     Financial Resource Strain:    • Difficulty of Paying Living Expenses:    Food Insecurity:    • Worried About Running Out of Food in the Last Year:    • Ran Out of Food in the Last Year:    Transportation Needs:    • Lack of Transportation (Medical):    • Lack of Transportation (Non-Medical):    Physical Activity:    • Days of Exercise per Week:    • Minutes of Exercise per Session:    Stress:    • Feeling of Stress :    Social Connections:    • Frequency of Communication with Friends and Family:    • Frequency of Social Gatherings with Friends and Family:    • Attends [employer] Services:    • Active Member of Clubs or Organizations:    • Attends Club or Organization Meetings:    • Marital Status:    Intimate Partner Violence:    • Fear of Current or Ex-Partner:    • Emotionally Abused:    • Physically Abused:    • Sexually Abused:         Family History   Problem Relation Age of Onset   • Thyroid Mother    • Depression Father    • Alcohol abuse Father    • No Known Problems Maternal Grandmother    • Heart Attack Maternal Grandfather 66   • Cancer Paternal Grandmother         Breast , esophageal   • Stroke Paternal Grandmother 66   • Alcohol abuse Paternal Grandfather    • Lung Disease Paternal Grandfather    • Sleep Apnea Brother         Allergies   Allergen Reactions   • Gluten Meal        Review of Systems   Constitutional: Negative for fever.   Gastrointestinal: Positive for  "abdominal pain, diarrhea, heartburn and nausea. Negative for blood in stool, melena and vomiting.   Genitourinary: Negative for dysuria and hematuria.   All other systems reviewed and are negative.       Objective:   /92 (BP Location: Left arm, Patient Position: Sitting)   Pulse 94   Temp 36.6 °C (97.9 °F) (Temporal)   Resp 16   Ht 1.702 m (5' 7\")   Wt 117 kg (258 lb 4.8 oz)   SpO2 96%   BMI 40.46 kg/m²     Physical Exam  Vitals reviewed.   Constitutional:       General: She is not in acute distress.     Appearance: She is well-developed. She is not toxic-appearing.   HENT:      Head: Normocephalic and atraumatic.      Right Ear: External ear normal.      Left Ear: External ear normal.      Nose: Nose normal.      Mouth/Throat:      Mouth: Mucous membranes are moist.   Eyes:      Conjunctiva/sclera: Conjunctivae normal.   Cardiovascular:      Rate and Rhythm: Normal rate.   Pulmonary:      Effort: Pulmonary effort is normal.   Abdominal:      General: Abdomen is protuberant. Bowel sounds are normal.      Palpations: Abdomen is soft.      Tenderness: There is abdominal tenderness in the right upper quadrant and epigastric area.   Musculoskeletal:         General: Normal range of motion.      Cervical back: Normal range of motion.   Skin:     General: Skin is warm and dry.      Findings: No rash.   Neurological:      General: No focal deficit present.      Mental Status: She is alert and oriented to person, place, and time.      GCS: GCS eye subscore is 4. GCS verbal subscore is 5. GCS motor subscore is 6.   Psychiatric:         Mood and Affect: Mood normal.         Speech: Speech normal.         Behavior: Behavior normal.         Thought Content: Thought content normal.         Judgment: Judgment normal.         Assessment/Plan:   1. Gastroesophageal reflux disease, unspecified whether esophagitis present    2. Epigastric abdominal pain  -GI Cocktail  -ER precautions.     Lifestyle and dietary " modification.  -Eliminate dietary triggers (caffeine, chocolate, spicy foods, food with high fat content, carbonated beverages, and peppermint).  -Avoid large meals before bedtime or laying down. Elevate head of bed for cough or throat irritation at night.  -Weight Loss  -Avoid tight-fitting garments  -OTC antacids as needed for relief of symptoms (Maalox or Tums)    Follow up with PCP. Follow up emergently for chest pain, spitting up or coughing up blood, coffee ground emesis(vomit), dizziness, dark tarry stools, blood in stools, shortness of breath.     Discussed GERD causing the sensation of food getting stuck in epigastric area. Continue use of OTC antacids. Discussed limitations in access to imaging or labs due to timing. Watchful wait; follow up emergently for worsening or persistent symptoms. Had some relief with GI cocktail.     Differential diagnosis, natural history, supportive care, and indications for immediate follow-up discussed.

## 2021-06-17 NOTE — PATIENT INSTRUCTIONS
Lifestyle and dietary modification.  -Eliminate dietary triggers (caffeine, chocolate, spicy foods, food with high fat content, carbonated beverages, and peppermint).  -Avoid large meals before bedtime or laying down. Elevate head of bed for cough or throat irritation at night.  -Weight Loss  -Avoid tight-fitting garments  -OTC antacids as needed for relief of symptoms (Maalox or Tums)    Follow up with PCP. Follow up emergently for chest pain, spitting up or coughing up blood, coffee ground emesis(vomit), dizziness, dark tarry stools, blood in stools, shortness of breath.       Gastroesophageal Reflux Disease, Adult  Gastroesophageal reflux (AISHA) happens when acid from the stomach flows up into the tube that connects the mouth and the stomach (esophagus). Normally, food travels down the esophagus and stays in the stomach to be digested. However, when a person has AISHA, food and stomach acid sometimes move back up into the esophagus. If this becomes a more serious problem, the person may be diagnosed with a disease called gastroesophageal reflux disease (GERD). GERD occurs when the reflux:  · Happens often.  · Causes frequent or severe symptoms.  · Causes problems such as damage to the esophagus.  When stomach acid comes in contact with the esophagus, the acid may cause soreness (inflammation) in the esophagus. Over time, GERD may create small holes (ulcers) in the lining of the esophagus.  What are the causes?  This condition is caused by a problem with the muscle between the esophagus and the stomach (lower esophageal sphincter, or LES). Normally, the LES muscle closes after food passes through the esophagus to the stomach. When the LES is weakened or abnormal, it does not close properly, and that allows food and stomach acid to go back up into the esophagus.  The LES can be weakened by certain dietary substances, medicines, and medical conditions, including:  · Tobacco use.  · Pregnancy.  · Having a hiatal  hernia.  · Alcohol use.  · Certain foods and beverages, such as coffee, chocolate, onions, and peppermint.  What increases the risk?  You are more likely to develop this condition if you:  · Have an increased body weight.  · Have a connective tissue disorder.  · Use NSAID medicines.  What are the signs or symptoms?  Symptoms of this condition include:  · Heartburn.  · Difficult or painful swallowing.  · The feeling of having a lump in the throat.  · A bitter taste in the mouth.  · Bad breath.  · Having a large amount of saliva.  · Having an upset or bloated stomach.  · Belching.  · Chest pain. Different conditions can cause chest pain. Make sure you see your health care provider if you experience chest pain.  · Shortness of breath or wheezing.  · Ongoing (chronic) cough or a night-time cough.  · Wearing away of tooth enamel.  · Weight loss.  How is this diagnosed?  Your health care provider will take a medical history and perform a physical exam. To determine if you have mild or severe GERD, your health care provider may also monitor how you respond to treatment. You may also have tests, including:  · A test to examine your stomach and esophagus with a small camera (endoscopy).  · A test that measures the acidity level in your esophagus.  · A test that measures how much pressure is on your esophagus.  · A barium swallow or modified barium swallow test to show the shape, size, and functioning of your esophagus.  How is this treated?  The goal of treatment is to help relieve your symptoms and to prevent complications. Treatment for this condition may vary depending on how severe your symptoms are. Your health care provider may recommend:  · Changes to your diet.  · Medicine.  · Surgery.  Follow these instructions at home:  Eating and drinking    · Follow a diet as recommended by your health care provider. This may involve avoiding foods and drinks such as:  ? Coffee and tea (with or without caffeine).  ? Drinks that  contain alcohol.  ? Energy drinks and sports drinks.  ? Carbonated drinks or sodas.  ? Chocolate and cocoa.  ? Peppermint and mint flavorings.  ? Garlic and onions.  ? Horseradish.  ? Spicy and acidic foods, including peppers, chili powder, alexander powder, vinegar, hot sauces, and barbecue sauce.  ? Citrus fruit juices and citrus fruits, such as oranges, jessica, and limes.  ? Tomato-based foods, such as red sauce, chili, salsa, and pizza with red sauce.  ? Fried and fatty foods, such as donuts, french fries, potato chips, and high-fat dressings.  ? High-fat meats, such as hot dogs and fatty cuts of red and white meats, such as rib eye steak, sausage, ham, and mota.  ? High-fat dairy items, such as whole milk, butter, and cream cheese.  · Eat small, frequent meals instead of large meals.  · Avoid drinking large amounts of liquid with your meals.  · Avoid eating meals during the 2-3 hours before bedtime.  · Avoid lying down right after you eat.  · Do not exercise right after you eat.  Lifestyle    · Do not use any products that contain nicotine or tobacco, such as cigarettes, e-cigarettes, and chewing tobacco. If you need help quitting, ask your health care provider.  · Try to reduce your stress by using methods such as yoga or meditation. If you need help reducing stress, ask your health care provider.  · If you are overweight, reduce your weight to an amount that is healthy for you. Ask your health care provider for guidance about a safe weight loss goal.  General instructions  · Pay attention to any changes in your symptoms.  · Take over-the-counter and prescription medicines only as told by your health care provider. Do not take aspirin, ibuprofen, or other NSAIDs unless your health care provider told you to do so.  · Wear loose-fitting clothing. Do not wear anything tight around your waist that causes pressure on your abdomen.  · Raise (elevate) the head of your bed about 6 inches (15 cm).  · Avoid bending over if  this makes your symptoms worse.  · Keep all follow-up visits as told by your health care provider. This is important.  Contact a health care provider if:  · You have:  ? New symptoms.  ? Unexplained weight loss.  ? Difficulty swallowing or it hurts to swallow.  ? Wheezing or a persistent cough.  ? A hoarse voice.  · Your symptoms do not improve with treatment.  Get help right away if you:  · Have pain in your arms, neck, jaw, teeth, or back.  · Feel sweaty, dizzy, or light-headed.  · Have chest pain or shortness of breath.  · Vomit and your vomit looks like blood or coffee grounds.  · Faint.  · Have stool that is bloody or black.  · Cannot swallow, drink, or eat.  Summary  · Gastroesophageal reflux happens when acid from the stomach flows up into the esophagus. GERD is a disease in which the reflux happens often, causes frequent or severe symptoms, or causes problems such as damage to the esophagus.  · Treatment for this condition may vary depending on how severe your symptoms are. Your health care provider may recommend diet and lifestyle changes, medicine, or surgery.  · Contact a health care provider if you have new or worsening symptoms.  · Take over-the-counter and prescription medicines only as told by your health care provider. Do not take aspirin, ibuprofen, or other NSAIDs unless your health care provider told you to do so.  · Keep all follow-up visits as told by your health care provider. This is important.  This information is not intended to replace advice given to you by your health care provider. Make sure you discuss any questions you have with your health care provider.  Document Released: 09/27/2006 Document Revised: 06/26/2019 Document Reviewed: 06/26/2019  Elsevier Patient Education © 2020 [employer]      Abdominal Pain, Adult  Abdominal pain can be caused by many things. Often, abdominal pain is not serious and it gets better with no treatment or by being treated at home. However, sometimes  abdominal pain is serious. Your health care provider will do a medical history and a physical exam to try to determine the cause of your abdominal pain.  Follow these instructions at home:  · Take over-the-counter and prescription medicines only as told by your health care provider. Do not take a laxative unless told by your health care provider.  · Drink enough fluid to keep your urine clear or pale yellow.  · Watch your condition for any changes.  · Keep all follow-up visits as told by your health care provider. This is important.  Contact a health care provider if:  · Your abdominal pain changes or gets worse.  · You are not hungry or you lose weight without trying.  · You are constipated or have diarrhea for more than 2-3 days.  · You have pain when you urinate or have a bowel movement.  · Your abdominal pain wakes you up at night.  · Your pain gets worse with meals, after eating, or with certain foods.  · You are throwing up and cannot keep anything down.  · You have a fever.  Get help right away if:  · Your pain does not go away as soon as your health care provider told you to expect.  · You cannot stop throwing up.  · Your pain is only in areas of the abdomen, such as the right side or the left lower portion of the abdomen.  · You have bloody or black stools, or stools that look like tar.  · You have severe pain, cramping, or bloating in your abdomen.  · You have signs of dehydration, such as:  ? Dark urine, very little urine, or no urine.  ? Cracked lips.  ? Dry mouth.  ? Sunken eyes.  ? Sleepiness.  ? Weakness.  This information is not intended to replace advice given to you by your health care provider. Make sure you discuss any questions you have with your health care provider.  Document Released: 09/27/2006 Document Revised: 07/07/2017 Document Reviewed: 05/31/2017  [email or web address] Interactive Patient Education © 2020 Elsevier Inc.

## 2021-06-19 ENCOUNTER — HOSPITAL ENCOUNTER (EMERGENCY)
Facility: MEDICAL CENTER | Age: 26
End: 2021-06-19
Attending: EMERGENCY MEDICINE
Payer: COMMERCIAL

## 2021-06-19 VITALS
BODY MASS INDEX: 40.97 KG/M2 | OXYGEN SATURATION: 98 % | HEIGHT: 67 IN | TEMPERATURE: 98.6 F | SYSTOLIC BLOOD PRESSURE: 127 MMHG | RESPIRATION RATE: 16 BRPM | HEART RATE: 69 BPM | WEIGHT: 261.02 LBS | DIASTOLIC BLOOD PRESSURE: 65 MMHG

## 2021-06-19 DIAGNOSIS — T46.2X5A PILL ESOPHAGITIS DUE TO TETRACYCLINE: ICD-10-CM

## 2021-06-19 DIAGNOSIS — K20.80 PILL ESOPHAGITIS DUE TO TETRACYCLINE: ICD-10-CM

## 2021-06-19 PROCEDURE — 99281 EMR DPT VST MAYX REQ PHY/QHP: CPT

## 2021-06-19 ASSESSMENT — FIBROSIS 4 INDEX: FIB4 SCORE: 0.32

## 2021-06-20 NOTE — ED PROVIDER NOTES
"ED Provider Note    CHIEF COMPLAINT  Chief Complaint   Patient presents with   • Foreign Body Swallowed     Reports \"dry swallowing doxycycline\" tuesday night and feeling the sensation that it is still there.       HPI  Diana Hill is a 25 y.o. female who presents for evaluation of substernal pain over the past 4 days after \"dry swallowing\" a doxycycline tablet.  She states that since then she has had a painful discomfort substernally, worse when she swallows, she is able to eat and drink.  She went to urgent care where a GI cocktail relieved her symptoms for a very brief period of time.  No vomiting.  No blood in her stool.  No shortness of breath or fever.  No other complaints.    REVIEW OF SYSTEMS  Negative for fever, rash, dyspnea, abdominal pain, back pain.     PAST MEDICAL HISTORY   has a past medical history of Anemia, Anxiety, Celiac disease, Daytime sleepiness, Depression, Insomnia, Insulin resistance, Migraine, Morning headache, PCOS (polycystic ovarian syndrome), Snoring, Vertigo, and Vitamin D deficiency.    SOCIAL HISTORY  Social History     Tobacco Use   • Smoking status: Never Smoker   • Smokeless tobacco: Never Used   Vaping Use   • Vaping Use: Never used   Substance and Sexual Activity   • Alcohol use: Yes     Comment: occasionally   • Drug use: No   • Sexual activity: Never     Partners: Male, Female     Birth control/protection: Pill       SURGICAL HISTORY   has a past surgical history that includes nadeen by laparoscopy (N/A, 9/13/2018).    CURRENT MEDICATIONS  I personally reviewed the medication list in the charting documentation.     ALLERGIES  Allergies   Allergen Reactions   • Gluten Meal        PHYSICAL EXAM  VITAL SIGNS: /93   Pulse 83   Temp 37 °C (98.6 °F) (Temporal)   Resp 16   Ht 1.702 m (5' 7\")   Wt 118 kg (261 lb 0.4 oz)   SpO2 98%   BMI 40.88 kg/m²   Constitutional: Well appearing patient in no acute distress.  Awake and alert, not toxic nor ill in " appearance.  HENT: Normocephalic, no obvious evidence of acute trauma.   Neck: Comfortable movement without any obvious restriction in the range of motion.  Eyes: Conjunctiva normal, Non-icteric.   Chest: Normal nonlabored respirations.  Heart is regular in both rate and rhythm.  Abdomen: Minimal epigastric tenderness, no rebound, no guarding  Skin: The exposed portions of skin reveal no obvious rash or other abnormalities.  Musculoskeletal: No obvious restriction in the range of motion in all major joints.   Neurologic: Alert, No obvious focal deficits noted.   Psychiatric: Affect normal for clinical presentation    COURSE & MEDICAL DECISION MAKING  Pertinent Labs & Imaging studies reviewed. (See chart for details)    Encounter Summary: This is a very pleasant 25 y.o. female who unfortunately required evaluation in the emergency department today with a history consistent with pill esophagitis after taking doxycycline, a medication that is commonly known to cause esophagitis.  It seems that she took a single dose of this without water and it likely slow transit time down her esophagus causing this injury.  Since then she is had discomfort.  She is able to swallow.  She has a benign abdomen on exam with only minimal epigastric tenderness.  Vital signs are within normal limits except for minimal hypertension.  GI cocktail improved her symptoms at urgent care a couple days ago.  At this point, there is no need for further evaluation or treatment here in emergency department.  I have urged her to avoid this medication.  She is already taking famotidine which I instructed her to continue, return instructions provided      DISPOSITION: Discharge Home      FINAL IMPRESSION  1. Pill esophagitis due to tetracycline        This dictation was created using voice recognition software. The accuracy of the dictation is limited to the abilities of the software. I expect there may be some errors of grammar and possibly content. The  nursing notes were reviewed and certain aspects of this information were incorporated into this note.    Electronically signed by: Alfredo Mustafa M.D., 6/19/2021 8:41 PM

## 2021-06-24 ENCOUNTER — OFFICE VISIT (OUTPATIENT)
Dept: BEHAVIORAL HEALTH | Facility: CLINIC | Age: 26
End: 2021-06-24
Payer: COMMERCIAL

## 2021-06-24 DIAGNOSIS — F32.A ANXIETY AND DEPRESSION: ICD-10-CM

## 2021-06-24 DIAGNOSIS — F41.9 ANXIETY AND DEPRESSION: ICD-10-CM

## 2021-06-24 PROCEDURE — 90791 PSYCH DIAGNOSTIC EVALUATION: CPT | Performed by: SOCIAL WORKER

## 2021-06-24 NOTE — PROGRESS NOTES
"[facility]   Initial Assessment    This visit was conducted via Zoom using secure and encrypted videoconferencing technology.  The patient was in a private location in the [facility].  The patient's identity was confirmed and verbal consent was obtained for this virtual visit.      Name: Diana Hill  MRN: [id number]  : 1995  Age: 25 y.o.  Date of assessment: 2021  PCP: DANIELE Resendiz  Persons in attendance: Patient  Total session time: 45 minutes      CHIEF COMPLAINT AND HISTORY OF PRESENTING PROBLEM:  (as stated by Patient):  Diana Hill is a 25 y.o., White female referred for assessment by Mary Okeefe, WIL.P.R*.  Primary presenting issue includes  \" Struggling with my depression lately, I have a lot of symptoms now sh ei snot understanding. I cannot concentrate, no motivation for anything, reports these have been going on forever. She has been on antidepressants for years. At one time thought not working and tried another one, then went back to current medication.   Not able to manager things by herself., she had a friend did a lot of testing that gave her a lot of diagnosis.  Things that she may have ADD, minds goes racing  And then she does not understand where she has been. Does have periods where she hyper focuses on things but these periods are far between, these are times when she only wants to be doing that project or show. This will last a couple of weeks. Feels currently more of a drifting. When in the depressive state (down feeling most of the time, totally fine text ing, not wanting to leave the house, prefer to stay home.  Still some anxiety, use to be severe anxiety, seen more as social anxiety ( if alone in public feel like everyone is focused on people or focused on what I am doing. Not so much when with friends as can turn focus to them)  .  BEHAVIORAL HEALTH TREATMENT HISTORY  Does patient/parent report a history of prior behavioral health " treatment for patient? Yes:    Dates Level of Care Facilty/Provider Diagnosis/Problem Medications   aages 16, then again at 19 outpatient [facility], Depression                                                                       History of untreated behavioral health issues identified? No  Does patient/parent report change in appetite or weight loss/gain? gained some weight over the last year, doing better now  Does patient/parent report physical pain? all th etime- chronic              Indicate if pain is acute or chronic, and location: mostly in my legs, back, stomach issues              Pain scale rating:  3         FAMILY/SOCIAL HISTORY  Current living situation/household members:living with parents  Does patient/parent report a family history of behavioral health issues, diagnoses, or treatment? Dad has depression, paternal grandmother depression and some on maternal side aunts/cousins  Family History   Problem Relation Age of Onset   • Thyroid Mother    • Depression Father    • Alcohol abuse Father    • No Known Problems Maternal Grandmother    • Heart Attack Maternal Grandfather 66   • Cancer Paternal Grandmother         Breast , esophageal   • Stroke Paternal Grandmother 66   • Alcohol abuse Paternal Grandfather    • Lung Disease Paternal Grandfather    • Sleep Apnea Brother           EMPLOYMENT/RESOURCES  Is the patient currently employed? Yes  Does the patient/parent report adequate financial resources? Yes       HISTORY:  Does patient report current or past enlistment? No               [If yes, complete below items]  Does patient report history of exposure to combat? No      SPIRITUAL/CULTURAL/IDENTITY:  What are the patient's/family's spiritual beliefs or practices?  Not really      ABUSE/NEGLECT/TRAUMA SCREENING  Does patient report feeling “unsafe” in his/her home, or afraid of anyone? No  Does patient report any history of physical, sexual, or emotional abuse? No  Is there evidence of  "neglect by self? No  Is there evidence of neglect by a caregiver? No                                                                                                          SAFETY ASSESSMENT - SELF  Does patient acknowledge current or past symptoms of dangerousness to self? when I was in high school I was cutting myself, but nothing currently.  Recent change in frequency/specificity/intensity of suicidal thoughts or self-harm behavior? No  Current access to firearms, medications, or other identified means of suicide/self-harm? No  If yes, willing to restrict access to means of suicide/self-harm? has access but no intention to use them.      Current Suicide Risk: Not applicable  Crisis Safety Plan completed and copy given to patient: not indicated at this time      SAFETY ASSESSMENT - OTHERS  Recent change in frequency/specificity/intensity of thoughts or threats to harm others? No  If Yes:  Current access to firearms/other identified means of harm?   If yes, willing to restrict access to weapons/means of harm?     Current Homicide Risk:  Not applicable  Crisis Safety Plan completed and copy given to patient? not indicated  Based on information provided during the current assessment, is a mandated “duty to warn” being exercised? No      SUBSTANCE USE/ADDICTION HISTORY  Patient denies use of any substance/addictive behaviors no addictions to drugs but her father is an addict \"alcohol\"    If No:  Is there a family history of substance use/addiction? Yes  Does patient acknowledge or parent/significant other report use of/dependence on substances? Yes  Last time patient used alcohol: occassionally  Within the past week? No  Last time patient used marijuana: none  Within the past month? No  Any other street drugs ever tried even once? No  Any use of prescription medications/pills without a prescription, or for reasons others than originally prescribed?  No  Any other addictive behavior reported (gambling, shopping, sex)? " "No         MENTAL STATUS/OBSERVATIONS              Participation: Active verbal participation  Grooming: Casual  Orientation:Alert   Behavior: anxious  Eye contact: Good          Mood:Euthymic  Affect:Congruent with content  Thought process: Logical  Thought content:  Within normal limits  Speech: Rate within normal limits and Volume within normal limits  Perception: Within normal limits  Memory: while not noticed today, she reports a poor memory  Insight: Adequate  Judgment:  Adequate  Other:               Family/couple interaction observations: not applicable      Patient's motivation/readiness for change: Super forgetful, if put something down will forget about it even using lit, pretty impulsive do not think before I do things. Cannot do things in one setting, procrastinate a lot. If I want to do, but things I should be doing that I do not focus on. \" I have a terrible memory cannot remember time lines. Reports doing poorly when not taking medications.         Topics addressed in psychotherapy include: Several things seem to be at work, social anxiety, depression but also some indication of ADD. She reports past severe anxiety but less now, it appears to be related more to social events.   Refer to psychiatrist to ensure that the medication she is taking is addressing these concerns and to possibly look at ADD. She reports these symptoms from an early age.   She will be going back to school and has quit before as it becomes overwhelming for her.    Care plan completed: hermelinda aceves gathering more information on diagnosis  Does patient express agreement with the above plan? Yes     Diagnosis:  No diagnosis found.    Referral appointment(s) scheduled? Yes       Clyde Minaya L.C.S.W.  "

## 2021-06-30 ENCOUNTER — DOCUMENTATION (OUTPATIENT)
Dept: BEHAVIORAL HEALTH | Facility: CLINIC | Age: 26
End: 2021-06-30

## 2021-07-03 ENCOUNTER — HOSPITAL ENCOUNTER (OUTPATIENT)
Dept: LAB | Facility: MEDICAL CENTER | Age: 26
End: 2021-07-03
Attending: EMERGENCY MEDICINE
Payer: COMMERCIAL

## 2021-07-03 LAB — COVID ORDER STATUS COVID19: NORMAL

## 2021-07-04 LAB
SARS-COV-2 RNA RESP QL NAA+PROBE: NOTDETECTED
SPECIMEN SOURCE: NORMAL

## 2021-07-21 ENCOUNTER — APPOINTMENT (OUTPATIENT)
Dept: ENDOCRINOLOGY | Facility: MEDICAL CENTER | Age: 26
End: 2021-07-21
Attending: INTERNAL MEDICINE
Payer: COMMERCIAL

## 2021-07-22 ENCOUNTER — OFFICE VISIT (OUTPATIENT)
Dept: BEHAVIORAL HEALTH | Facility: CLINIC | Age: 26
End: 2021-07-22
Payer: COMMERCIAL

## 2021-07-22 ENCOUNTER — APPOINTMENT (OUTPATIENT)
Dept: SLEEP MEDICINE | Facility: MEDICAL CENTER | Age: 26
End: 2021-07-22
Payer: COMMERCIAL

## 2021-07-22 DIAGNOSIS — F41.9 ANXIETY AND DEPRESSION: ICD-10-CM

## 2021-07-22 DIAGNOSIS — F34.1 DYSTHYMIA: ICD-10-CM

## 2021-07-22 DIAGNOSIS — F32.A ANXIETY AND DEPRESSION: ICD-10-CM

## 2021-07-22 PROCEDURE — 90834 PSYTX W PT 45 MINUTES: CPT | Mod: 95 | Performed by: SOCIAL WORKER

## 2021-07-22 NOTE — PROGRESS NOTES
"[facility]   Therapy Progress Note      This visit was conducted via Zoom using secure and encrypted videoconferencing technology.  The patient was in a private location in the [facility].  The patient's identity was confirmed and verbal consent was obtained for this virtual visit.      Name: Diaan Hill  MRN: [id number]  : 1995  Age: 25 y.o.  Date of assessment: 2021  PCP: DANIELE Resendiz  Persons in attendance: Patient  Total session time:  40 minutes    Objective Observations:   Participation:Active verbal participation   Grooming:Neat   Cognition:Alert   Eye Contact:Good   Mood:Depressed   Affect:Congruent with content   Thought Process:Goal-directed   Speech:Rate within normal limits and Volume within normal limits    Current Risk:   Suicide: not indicated   Homicide: not indicated   Self-Harm: not inidcated   Relapse: not inidcated   Safety Plan Reviewed: not applicable    Topics addressed in psychotherapy include:  \" Doing okay, working a lot.  Still some anxiety just before I go work. I have a lot chronic illness and call off a lot. Took a sleep test, thinks she has mild case. Sleep at night is hard even if I feel exhausted.   Discussed that it appears the physical illness impacts her anxiety and depression. Cannot do three days in a row, have to separate them out.   At age 13 got severe stomach issues and diagnosed with Seleic Diease but took a few more years to diagnosis. Is pretty true to staying true to her gluten free diet.   Depression is worse than the anxiety, even on a good day I think I will slip into a bad day. Try to doing walks when not working, staying     Tried to use distraction, mom is a good resource for this. Take deep breathes.    Care Plan Updated: Yes    Does patient express agreement with the above plan? Yes     Diagnosis:  1. Anxiety and depression    2. Dysthymia        Referral appointment(s) scheduled? Yes       Clyde Minaya, " OLGA

## 2021-08-03 ENCOUNTER — APPOINTMENT (OUTPATIENT)
Dept: MEDICAL GROUP | Facility: PHYSICIAN GROUP | Age: 26
End: 2021-08-03
Payer: COMMERCIAL

## 2021-08-10 ENCOUNTER — APPOINTMENT (OUTPATIENT)
Dept: BEHAVIORAL HEALTH | Facility: CLINIC | Age: 26
End: 2021-08-10
Payer: COMMERCIAL

## 2021-08-17 ENCOUNTER — OFFICE VISIT (OUTPATIENT)
Dept: MEDICAL GROUP | Facility: PHYSICIAN GROUP | Age: 26
End: 2021-08-17
Payer: COMMERCIAL

## 2021-08-17 VITALS
OXYGEN SATURATION: 97 % | RESPIRATION RATE: 12 BRPM | DIASTOLIC BLOOD PRESSURE: 86 MMHG | HEART RATE: 96 BPM | BODY MASS INDEX: 39.87 KG/M2 | HEIGHT: 67 IN | WEIGHT: 254 LBS | SYSTOLIC BLOOD PRESSURE: 132 MMHG | TEMPERATURE: 97.8 F

## 2021-08-17 DIAGNOSIS — Z02.9 ADMINISTRATIVE ENCOUNTER: ICD-10-CM

## 2021-08-17 DIAGNOSIS — G47.33 OBSTRUCTIVE SLEEP APNEA SYNDROME: ICD-10-CM

## 2021-08-17 DIAGNOSIS — E88.819 INSULIN RESISTANCE: ICD-10-CM

## 2021-08-17 DIAGNOSIS — G93.32 CHRONIC FATIGUE SYNDROME: ICD-10-CM

## 2021-08-17 PROBLEM — G43.809 VESTIBULAR MIGRAINE: Status: ACTIVE | Noted: 2021-08-10

## 2021-08-17 PROBLEM — G43.119 INTRACTABLE MIGRAINE WITH AURA WITHOUT STATUS MIGRAINOSUS: Status: ACTIVE | Noted: 2021-08-10

## 2021-08-17 PROCEDURE — 99213 OFFICE O/P EST LOW 20 MIN: CPT | Performed by: NURSE PRACTITIONER

## 2021-08-17 RX ORDER — RIZATRIPTAN BENZOATE 10 MG/1
TABLET ORAL
COMMUNITY
Start: 2021-08-10 | End: 2021-11-03

## 2021-08-17 RX ORDER — RIZATRIPTAN BENZOATE 10 MG/1
10 TABLET ORAL
COMMUNITY
Start: 2021-08-10 | End: 2021-08-17

## 2021-08-17 RX ORDER — TOPIRAMATE 50 MG/1
25 TABLET, FILM COATED ORAL
COMMUNITY
Start: 2021-08-10 | End: 2021-08-17

## 2021-08-17 RX ORDER — NORGESTIMATE AND ETHINYL ESTRADIOL 7DAYSX3 LO
1 KIT ORAL DAILY
COMMUNITY
Start: 2021-07-21

## 2021-08-17 RX ORDER — TOPIRAMATE 50 MG/1
TABLET, FILM COATED ORAL
COMMUNITY
Start: 2021-08-10 | End: 2021-08-17

## 2021-08-17 RX ORDER — METFORMIN HYDROCHLORIDE 500 MG/1
1000 TABLET, EXTENDED RELEASE ORAL 2 TIMES DAILY
Qty: 360 TABLET | Refills: 2 | Status: SHIPPED | OUTPATIENT
Start: 2021-08-17 | End: 2022-05-11

## 2021-08-17 RX ORDER — THIAMINE HCL 100 MG
TABLET ORAL
Status: ON HOLD | COMMUNITY
End: 2022-09-02

## 2021-08-17 ASSESSMENT — FIBROSIS 4 INDEX: FIB4 SCORE: 0.34

## 2021-08-18 NOTE — ASSESSMENT & PLAN NOTE
This is a chronic problem.  Has been evaluated and diagnosed with sleep apnea by pulmonology a few months ago.  Patient was seen by Dr. Radha Martinez.  Pending CPAP machine.  Patient has not been contacted since.

## 2021-08-18 NOTE — ASSESSMENT & PLAN NOTE
Patient is requesting [facility] accommodation paperwork filled out today.   Statement Selected

## 2021-08-18 NOTE — ASSESSMENT & PLAN NOTE
Chronic problem.  Patient has been followed by endocrinology, Dr. Wilman Cm.  Next appointment is in January.  Patient needs refill on Metformin today.  She is on Metformin ER 1000 mg twice daily.  Denies hypoglycemic episodes.  Last A1c is 5.5 from March 2021.

## 2021-08-31 ENCOUNTER — HOSPITAL ENCOUNTER (OUTPATIENT)
Facility: MEDICAL CENTER | Age: 26
End: 2021-08-31
Attending: NURSE PRACTITIONER
Payer: COMMERCIAL

## 2021-08-31 ENCOUNTER — EH NON-PROVIDER (OUTPATIENT)
Dept: OCCUPATIONAL MEDICINE | Facility: CLINIC | Age: 26
End: 2021-08-31

## 2021-08-31 DIAGNOSIS — Z11.59 ENCOUNTER FOR SCREENING FOR OTHER VIRAL DISEASES: ICD-10-CM

## 2021-08-31 PROCEDURE — U0003 INFECTIOUS AGENT DETECTION BY NUCLEIC ACID (DNA OR RNA); SEVERE ACUTE RESPIRATORY SYNDROME CORONAVIRUS 2 (SARS-COV-2) (CORONAVIRUS DISEASE [COVID-19]), AMPLIFIED PROBE TECHNIQUE, MAKING USE OF HIGH THROUGHPUT TECHNOLOGIES AS DESCRIBED BY CMS-2020-01-R: HCPCS

## 2021-08-31 PROCEDURE — U0003 INFECTIOUS AGENT DETECTION BY NUCLEIC ACID (DNA OR RNA); SEVERE ACUTE RESPIRATORY SYNDROME CORONAVIRUS 2 (SARS-COV-2) (CORONAVIRUS DISEASE [COVID-19]), AMPLIFIED PROBE TECHNIQUE, MAKING USE OF HIGH THROUGHPUT TECHNOLOGIES AS DESCRIBED BY CMS-2020-01-R: HCPCS | Performed by: NURSE PRACTITIONER

## 2021-08-31 PROCEDURE — U0005 INFEC AGEN DETEC AMPLI PROBE: HCPCS

## 2021-09-01 DIAGNOSIS — Z11.59 ENCOUNTER FOR SCREENING FOR OTHER VIRAL DISEASES: ICD-10-CM

## 2021-09-01 LAB — COVID ORDER STATUS COVID19: NORMAL

## 2021-09-02 LAB
SARS-COV-2 RNA RESP QL NAA+PROBE: NOTDETECTED
SPECIMEN SOURCE: NORMAL

## 2021-10-11 ENCOUNTER — OFFICE VISIT (OUTPATIENT)
Dept: URGENT CARE | Facility: CLINIC | Age: 26
End: 2021-10-11
Payer: COMMERCIAL

## 2021-10-11 VITALS
HEIGHT: 67 IN | BODY MASS INDEX: 39.08 KG/M2 | SYSTOLIC BLOOD PRESSURE: 122 MMHG | OXYGEN SATURATION: 99 % | HEART RATE: 74 BPM | WEIGHT: 249 LBS | TEMPERATURE: 97.7 F | DIASTOLIC BLOOD PRESSURE: 72 MMHG | RESPIRATION RATE: 16 BRPM

## 2021-10-11 DIAGNOSIS — L30.9 DERMATITIS: ICD-10-CM

## 2021-10-11 PROCEDURE — 99213 OFFICE O/P EST LOW 20 MIN: CPT | Performed by: STUDENT IN AN ORGANIZED HEALTH CARE EDUCATION/TRAINING PROGRAM

## 2021-10-11 RX ORDER — PREDNISONE 20 MG/1
40 TABLET ORAL DAILY
Qty: 10 TABLET | Refills: 0 | Status: SHIPPED | OUTPATIENT
Start: 2021-10-11 | End: 2021-10-16

## 2021-10-11 RX ORDER — FLUOCINONIDE CREAM (EMULSIFIED BASE) 0.5 MG/G
1 CREAM TOPICAL 2 TIMES DAILY
Qty: 30 G | Refills: 0 | Status: SHIPPED | OUTPATIENT
Start: 2021-10-11 | End: 2021-10-25

## 2021-10-11 RX ORDER — TOPIRAMATE 100 MG/1
100 TABLET, FILM COATED ORAL 2 TIMES DAILY
COMMUNITY
End: 2022-08-05

## 2021-10-11 ASSESSMENT — FIBROSIS 4 INDEX: FIB4 SCORE: 0.34

## 2021-10-11 NOTE — PROGRESS NOTES
Subjective:   CHIEF COMPLAINT  Chief Complaint   Patient presents with   • Eczema     very dry hands started 2 days ago       HPI  Diana Hill is a 26 y.o. female with a history of celiac disease presents with chief complaint of an itchy rash on bilateral hands developed 2 days ago.  The patient believes she recently had a gluten which possibly triggered the rash.  Rash is most notable along the distal extent of all 10 digits.  She has tried using triamcinolone cream and tacrolimus; tacrolimus provides some relief.  Patient says oral steroids have helped with symptoms of having in the past.      REVIEW OF SYSTEMS  General: no fever or chills  GI: no nausea or vomiting  See HPI for further details.    PAST MEDICAL HISTORY  Patient Active Problem List    Diagnosis Date Noted   • Chronic fatigue syndrome 08/17/2021   • Intractable migraine with aura without status migrainosus 08/10/2021   • Obstructive sleep apnea syndrome 08/10/2021   • Vestibular migraine 08/10/2021   • Otalgia of left ear 04/27/2021   • History of canker sores 04/27/2021   • Dysthymia 04/07/2021   • Polyarthralgia 03/25/2021   • Brittle nails 03/25/2021   • Cervical lymphadenopathy 12/29/2020   • Subclinical hypothyroidism 11/23/2020   • Insulin resistance 11/23/2020   • Anemia 11/23/2020   • Morbid obesity (HCC) 10/07/2020   • Skin rash 09/15/2020   • Administrative encounter 09/15/2020   • Hospital discharge follow-up 09/02/2020   • Rash of hands 08/12/2020   • Snoring 08/12/2020   • Vertigo 03/03/2020   • PCOS (polycystic ovarian syndrome) 11/19/2019   • Vitamin D deficiency 11/19/2019   • High triglycerides 11/19/2019   • Anxiety and depression 11/19/2019       SURGICAL HISTORY   has a past surgical history that includes nadeen by laparoscopy (N/A, 9/13/2018).    ALLERGIES  Allergies   Allergen Reactions   • Gluten Meal        CURRENT MEDICATIONS  Home Medications     Reviewed by Tricia Pope (Radiology Technologist) on 10/11/21 at 1322  " Med List Status: <None>   Medication Last Dose Status   albuterol 108 (90 Base) MCG/ACT Aero Soln inhalation aerosol PRN Active   B Complex Vitamins (VITAMIN-B COMPLEX PO) Taking Active   citalopram (CELEXA) 20 MG Tab Taking Active   Clindamycin Phos-Benzoyl Perox (ONEXTON) 1.2-3.75 % Gel Taking Active   diazePAM (VALIUM) 5 MG Tab PRN Active   Magnesium 500 MG Tab Taking Active   metFORMIN ER (GLUCOPHAGE XR) 500 MG TABLET SR 24 HR Taking Active   Norgestim-Eth Estrad Triphasic (TRI-LO-ESTARYLLA) 0.18/0.215/0.25 MG-25 MCG Tab Taking Active   rizatriptan (MAXALT) 10 MG tablet Taking Active   Selenium 200 MCG Cap Taking Active   tacrolimus (PROTOPIC) 0.1 % Ointment Taking Active   tretinoin (RETIN-A) 0.025 % gel Taking Active   VITAMIN D PO Taking Active                SOCIAL HISTORY  Social History     Tobacco Use   • Smoking status: Never Smoker   • Smokeless tobacco: Never Used   Vaping Use   • Vaping Use: Never used   Substance and Sexual Activity   • Alcohol use: Yes     Comment: occasionally   • Drug use: No   • Sexual activity: Never     Partners: Male, Female     Birth control/protection: Pill       FAMILY HISTORY  Family History   Problem Relation Age of Onset   • Thyroid Mother    • Depression Father    • Alcohol abuse Father    • No Known Problems Maternal Grandmother    • Heart Attack Maternal Grandfather 66   • Cancer Paternal Grandmother         Breast , esophageal   • Stroke Paternal Grandmother 66   • Alcohol abuse Paternal Grandfather    • Lung Disease Paternal Grandfather    • Sleep Apnea Brother           Objective:   PHYSICAL EXAM  VITAL SIGNS: /72   Pulse 74   Temp 36.5 °C (97.7 °F)   Resp 16   Ht 1.702 m (5' 7\")   Wt 113 kg (249 lb)   SpO2 99%   BMI 39.00 kg/m²     Gen: no acute distress, normal voice  Skin: Dry, scaling digits (all 10 bilaterally) with satellite lesions extending proximally from the fingers.  No evidence of subcutaneous infection.  Head: Atraumatic, " normocephalic  Psych: normal affect, normal judgement, alert, awake      Assessment/Plan:     1. Dermatitis  Fluocinonide Emulsified Base 0.05 % Cream    predniSONE (DELTASONE) 20 MG Tab   Patient with a history of celiac's and believes symptoms possibly due to gluten which she recently had.  Examination more consistent with irritant  Vs contact dermatitis of the hands.  No known exposures other than gluten.  Discussed elimination of triggers and use of emollients.  Patient has a scheduled follow-up appointment with dermatology.  -Ordered prescription for p.o. steroids  -Ordered prescription for high potency topical steroid  -Avoid scented soaps (body soap and laundry detergent)  -Use hypoallergenic laundry detergent and avoid dryer sheets  -Maintain skin hydration with daily lotion/emollients        Differential diagnosis, natural history, supportive care, and indications for immediate follow-up discussed. All questions answered. Patient agrees with the plan of care.    Follow-up as needed if symptoms worsen or fail to improve to PCP, Urgent care or Emergency Room.    Please note that this dictation was created using voice recognition software. I have made a reasonable attempt to correct obvious errors, but I expect that there are errors of grammar and possibly content that I did not discover before finalizing the note.

## 2021-10-21 ENCOUNTER — OFFICE VISIT (OUTPATIENT)
Dept: MEDICAL GROUP | Facility: PHYSICIAN GROUP | Age: 26
End: 2021-10-21
Payer: COMMERCIAL

## 2021-10-21 VITALS
HEIGHT: 67 IN | WEIGHT: 245.8 LBS | DIASTOLIC BLOOD PRESSURE: 76 MMHG | BODY MASS INDEX: 38.58 KG/M2 | TEMPERATURE: 98.2 F | OXYGEN SATURATION: 98 % | HEART RATE: 95 BPM | RESPIRATION RATE: 16 BRPM | SYSTOLIC BLOOD PRESSURE: 118 MMHG

## 2021-10-21 DIAGNOSIS — Z23 NEED FOR VACCINATION: ICD-10-CM

## 2021-10-21 DIAGNOSIS — Z79.899 MEDICATION MANAGEMENT: ICD-10-CM

## 2021-10-21 DIAGNOSIS — B07.0 PLANTAR WART, LEFT FOOT: ICD-10-CM

## 2021-10-21 PROCEDURE — 90471 IMMUNIZATION ADMIN: CPT | Performed by: NURSE PRACTITIONER

## 2021-10-21 PROCEDURE — 90686 IIV4 VACC NO PRSV 0.5 ML IM: CPT | Performed by: NURSE PRACTITIONER

## 2021-10-21 PROCEDURE — 99213 OFFICE O/P EST LOW 20 MIN: CPT | Mod: 25 | Performed by: NURSE PRACTITIONER

## 2021-10-21 ASSESSMENT — FIBROSIS 4 INDEX: FIB4 SCORE: 0.34

## 2021-10-22 NOTE — ASSESSMENT & PLAN NOTE
New problem.  Plantar wart on the left foot near pinky x 1 mos. patient has tried multiple over-the-counter therapies, was not helpful.  Needing referral to dermatology today

## 2021-10-22 NOTE — PROGRESS NOTES
Chief Complaint   Patient presents with   • Bump     left foot x1mon painful   • Patient Question     hormones       HISTORY OF PRESENT ILLNESS: Patient is a 26 y.o. female, established patient who presents today to discuss medical problems as listed below:    Health Maintenance:  COMPLETED     Plantar wart, left foot  New problem.  Plantar wart on the left foot near pinky x 1 mos. patient has tried multiple over-the-counter therapies, was not helpful.  Needing referral to dermatology today    Medication management  Patient is interested in helping a family member with breast-feeding.  Patient considers initiating lactation.  She has a few questions about compatibility of her medications and also how can she start the process of lactation.      Patient Active Problem List    Diagnosis Date Noted   • Plantar wart, left foot 10/21/2021   • Medication management 10/21/2021   • Chronic fatigue syndrome 08/17/2021   • Intractable migraine with aura without status migrainosus 08/10/2021   • Obstructive sleep apnea syndrome 08/10/2021   • Vestibular migraine 08/10/2021   • Otalgia of left ear 04/27/2021   • History of canker sores 04/27/2021   • Dysthymia 04/07/2021   • Polyarthralgia 03/25/2021   • Brittle nails 03/25/2021   • Cervical lymphadenopathy 12/29/2020   • Subclinical hypothyroidism 11/23/2020   • Insulin resistance 11/23/2020   • Anemia 11/23/2020   • Morbid obesity (HCC) 10/07/2020   • Skin rash 09/15/2020   • Administrative encounter 09/15/2020   • Hospital discharge follow-up 09/02/2020   • Rash of hands 08/12/2020   • Snoring 08/12/2020   • Vertigo 03/03/2020   • PCOS (polycystic ovarian syndrome) 11/19/2019   • Vitamin D deficiency 11/19/2019   • High triglycerides 11/19/2019   • Anxiety and depression 11/19/2019        Allergies: Gluten meal    Current Outpatient Medications   Medication Sig Dispense Refill   • topiramate (TOPAMAX) 100 MG Tab Take 100 mg by mouth 2 times a day.     • Fluocinonide  Emulsified Base 0.05 % Cream Apply 1 Application topically 2 times a day for 14 days. 30 g 0   • rizatriptan (MAXALT) 10 MG tablet      • Magnesium 500 MG Tab 1 tablet with a meal Orally Once a day for 30 day(s)     • Norgestim-Eth Estrad Triphasic (TRI-LO-ESTARYLLA) 0.18/0.215/0.25 MG-25 MCG Tab Take 1 Tablet by mouth every day.     • Selenium 200 MCG Cap Take  by mouth.     • metFORMIN ER (GLUCOPHAGE XR) 500 MG TABLET SR 24 HR Take 2 Tablets by mouth 2 times a day. 360 Tablet 2   • B Complex Vitamins (VITAMIN-B COMPLEX PO) Take  by mouth.     • citalopram (CELEXA) 20 MG Tab Take 1 tablet by mouth every day. 180 tablet 1   • tacrolimus (PROTOPIC) 0.1 % Ointment Apply 1 Application topically 2 times a day. To affected area on hands and arms 60 g 2   • tretinoin (RETIN-A) 0.025 % gel Apply 1 Application topically every bedtime. 45 g 2   • Clindamycin Phos-Benzoyl Perox (ONEXTON) 1.2-3.75 % Gel Apply 1 Application topically every day. 50 g 3   • VITAMIN D PO Take 6,000 Units by mouth every day.     • diazePAM (VALIUM) 5 MG Tab TAKE 1 TABLET BY MOUTH TWICE A DAY AS NEEDED FOR VERTIGO FOR 30 DAYS     • albuterol 108 (90 Base) MCG/ACT Aero Soln inhalation aerosol Inhale 2 Puffs by mouth every four hours as needed for Shortness of Breath. 1 Inhaler 0     No current facility-administered medications for this visit.       Social History     Tobacco Use   • Smoking status: Never Smoker   • Smokeless tobacco: Never Used   Vaping Use   • Vaping Use: Never used   Substance Use Topics   • Alcohol use: Yes     Comment: occasionally   • Drug use: No     Social History     Social History Narrative   • Not on file       Family History   Problem Relation Age of Onset   • Thyroid Mother    • Depression Father    • Alcohol abuse Father    • No Known Problems Maternal Grandmother    • Heart Attack Maternal Grandfather 66   • Cancer Paternal Grandmother         Breast , esophageal   • Stroke Paternal Grandmother 66   • Alcohol abuse  "Paternal Grandfather    • Lung Disease Paternal Grandfather    • Sleep Apnea Brother        Allergies, past medical history, past surgical history, family history, social history reviewed and updated.    Review of Systems:     - Constitutional: Negative for fever, chills, unexpected weight change, and fatigue/generalized weakness.     - Respiratory: Negative for cough, sputum production, chest congestion, dyspnea, wheezing, and crackles.      - Cardiovascular: Negative for chest pain, palpitations, orthopnea, and bilateral lower extremity edema.     - Skin: wart in L foot     - Psychiatric/Behavioral: Negative for depression, suicidal/homicidal ideation and memory loss.      All other systems reviewed and are negative    Exam:    /76   Pulse 95   Temp 36.8 °C (98.2 °F) (Temporal)   Resp 16   Ht 1.702 m (5' 7\")   Wt 111 kg (245 lb 12.8 oz)   SpO2 98%   BMI 38.50 kg/m²  Body mass index is 38.5 kg/m².    Physical Exam:  Constitutional: Well-developed and well-nourished. Not diaphoretic. No distress.   Skin: small round plantar wart in L foot  Cardiovascular: Regular rate and rhythm, S1 and S2 without murmur, rubs, or gallops.    Chest: Effort normal. Clear to auscultation throughout. No adventitious sounds.   Neurological: Alert and oriented x 3.   Psychiatric:  Behavior, mood, and affect are appropriate.  MA/nursing note and vitals reviewed.    My total time spent caring for the patient on the day of the encounter was 15 minutes.   This does not include time spent on separately billable procedures/tests.     Assessment/Plan:  1. Need for vaccination  - INFLUENZA VACCINE QUAD INJ (PF)    2. Plantar wart, left foot  - REFERRAL TO DERMATOLOGY    3. Medication management  Multiple medications and patient's regimen are category D during lactation.  Patient advised to not thoroughly shaved lactation on given medications.  Also advised patient to get a second opinion from lactation consultant.    Discussed with " patient possible alternative diagnoses, pt is to take all medications as prescribed. If symptoms persist FU w/PCP, if symptoms worsen go to emergency room. If experiencing any side effects from prescribed medications reports to the office immediately or go to emergency room.  Reviewed indication, dosage, usage and potential adverse effects of prescribed medications. Reviewed risks and benefits of treatment plan. Patient verbalizes understanding of all instruction and verbally agrees to plan.    No follow-ups on file. PRN

## 2021-10-22 NOTE — ASSESSMENT & PLAN NOTE
Patient is interested in helping a family member with breast-feeding.  Patient considers initiating lactation.  She has a few questions about compatibility of her medications and also how can she start the process of lactation.

## 2021-11-03 ENCOUNTER — HOSPITAL ENCOUNTER (OUTPATIENT)
Facility: MEDICAL CENTER | Age: 26
End: 2021-11-03
Attending: FAMILY MEDICINE
Payer: COMMERCIAL

## 2021-11-03 ENCOUNTER — OFFICE VISIT (OUTPATIENT)
Dept: URGENT CARE | Facility: CLINIC | Age: 26
End: 2021-11-03
Payer: COMMERCIAL

## 2021-11-03 VITALS
SYSTOLIC BLOOD PRESSURE: 122 MMHG | WEIGHT: 244.2 LBS | OXYGEN SATURATION: 98 % | BODY MASS INDEX: 38.33 KG/M2 | RESPIRATION RATE: 16 BRPM | HEART RATE: 101 BPM | TEMPERATURE: 98 F | HEIGHT: 67 IN | DIASTOLIC BLOOD PRESSURE: 78 MMHG

## 2021-11-03 DIAGNOSIS — B96.89 ACUTE BACTERIAL SINUSITIS: ICD-10-CM

## 2021-11-03 DIAGNOSIS — R53.83 FATIGUE, UNSPECIFIED TYPE: ICD-10-CM

## 2021-11-03 DIAGNOSIS — R05.9 COUGH: ICD-10-CM

## 2021-11-03 DIAGNOSIS — R52 BODY ACHES: ICD-10-CM

## 2021-11-03 DIAGNOSIS — J01.90 ACUTE BACTERIAL SINUSITIS: ICD-10-CM

## 2021-11-03 DIAGNOSIS — R50.9 FEVER, UNSPECIFIED FEVER CAUSE: ICD-10-CM

## 2021-11-03 DIAGNOSIS — R09.81 NASAL CONGESTION: ICD-10-CM

## 2021-11-03 DIAGNOSIS — J02.9 SORE THROAT: ICD-10-CM

## 2021-11-03 DIAGNOSIS — Z11.52 ENCOUNTER FOR SCREENING FOR COVID-19: ICD-10-CM

## 2021-11-03 LAB
EXTERNAL QUALITY CONTROL: NORMAL
INT CON NEG: NORMAL
INT CON POS: NORMAL
S PYO AG THROAT QL: NEGATIVE
SARS-COV+SARS-COV-2 AG RESP QL IA.RAPID: NEGATIVE

## 2021-11-03 PROCEDURE — 87880 STREP A ASSAY W/OPTIC: CPT | Performed by: FAMILY MEDICINE

## 2021-11-03 PROCEDURE — 99214 OFFICE O/P EST MOD 30 MIN: CPT | Mod: CS | Performed by: FAMILY MEDICINE

## 2021-11-03 PROCEDURE — U0005 INFEC AGEN DETEC AMPLI PROBE: HCPCS

## 2021-11-03 PROCEDURE — 87426 SARSCOV CORONAVIRUS AG IA: CPT | Performed by: FAMILY MEDICINE

## 2021-11-03 PROCEDURE — U0003 INFECTIOUS AGENT DETECTION BY NUCLEIC ACID (DNA OR RNA); SEVERE ACUTE RESPIRATORY SYNDROME CORONAVIRUS 2 (SARS-COV-2) (CORONAVIRUS DISEASE [COVID-19]), AMPLIFIED PROBE TECHNIQUE, MAKING USE OF HIGH THROUGHPUT TECHNOLOGIES AS DESCRIBED BY CMS-2020-01-R: HCPCS

## 2021-11-03 RX ORDER — AZITHROMYCIN 250 MG/1
TABLET, FILM COATED ORAL
Qty: 6 TABLET | Refills: 0 | Status: SHIPPED | OUTPATIENT
Start: 2021-11-03 | End: 2021-11-08

## 2021-11-03 ASSESSMENT — FIBROSIS 4 INDEX: FIB4 SCORE: 0.34

## 2021-11-03 NOTE — LETTER
November 3, 2021         Patient: Diana Hill   YOB: 1995   Date of Visit: 11/3/2021           To Whom it May Concern:    Diana Hill was seen in my clinic on 11/3/2021. She may return to work after PCR COVID are resulted.Please excuse yesterday ( 11/2/21) and today ( 11/3/21) absences.   If you have any questions or concerns, please don't hesitate to call.        Sincerely,           Patrice Thakkar M.D.  Electronically Signed

## 2021-11-03 NOTE — PROGRESS NOTES
Chief Complaint:    Chief Complaint   Patient presents with   • Coronavirus Screening     runny nose, fever, mild sore throat,fatigue, mild cough, body aches x 3days        History of Present Illness:    Symptoms since 10/31/21. She has had fever over 100 F, body aches, fatigue, severe nasal congestion with thick, purulent mucus from nose, mild sore throat, and mild cough. She occasionally gets sinus infections. Z-bola works/tolerates for previous sinus infections.      Past Medical History:    Past Medical History:   Diagnosis Date   • Anemia     hx of borderline anemia   • Anxiety    • Celiac disease    • Daytime sleepiness    • Depression    • Insomnia    • Insulin resistance    • Migraine    • Morning headache    • PCOS (polycystic ovarian syndrome)    • Snoring    • Vertigo    • Vitamin D deficiency      Past Surgical History:    Past Surgical History:   Procedure Laterality Date   • MATTHIAS BY LAPAROSCOPY N/A 9/13/2018    Procedure: MATTHIAS BY LAPAROSCOPY;  Surgeon: Hayden Wilhelm M.D.;  Location: SURGERY [facility];  Service: General     Social History:    Social History     Socioeconomic History   • Marital status: Single     Spouse name: Not on file   • Number of children: Not on file   • Years of education: Not on file   • Highest education level: Not on file   Occupational History   • Occupation: [employer]     Comment: CNA ; Nursing student   Tobacco Use   • Smoking status: Never Smoker   • Smokeless tobacco: Never Used   Vaping Use   • Vaping Use: Never used   Substance and Sexual Activity   • Alcohol use: Yes     Comment: occasionally   • Drug use: No   • Sexual activity: Never     Partners: Male, Female     Birth control/protection: Pill   Other Topics Concern   • Not on file   Social History Narrative   • Not on file     Social Determinants of Health     Financial Resource Strain:    • Difficulty of Paying Living Expenses:    Food Insecurity:    • Worried About Running Out of Food in the  Last Year:    • Ran Out of Food in the Last Year:    Transportation Needs:    • Lack of Transportation (Medical):    • Lack of Transportation (Non-Medical):    Physical Activity:    • Days of Exercise per Week:    • Minutes of Exercise per Session:    Stress:    • Feeling of Stress :    Social Connections:    • Frequency of Communication with Friends and Family:    • Frequency of Social Gatherings with Friends and Family:    • Attends Islam Services:    • Active Member of Clubs or Organizations:    • Attends Club or Organization Meetings:    • Marital Status:    Intimate Partner Violence:    • Fear of Current or Ex-Partner:    • Emotionally Abused:    • Physically Abused:    • Sexually Abused:      Family History:    Family History   Problem Relation Age of Onset   • Thyroid Mother    • Depression Father    • Alcohol abuse Father    • No Known Problems Maternal Grandmother    • Heart Attack Maternal Grandfather 66   • Cancer Paternal Grandmother         Breast , esophageal   • Stroke Paternal Grandmother 66   • Alcohol abuse Paternal Grandfather    • Lung Disease Paternal Grandfather    • Sleep Apnea Brother      Medications:    Current Outpatient Medications on File Prior to Visit   Medication Sig Dispense Refill   • topiramate (TOPAMAX) 100 MG Tab Take 100 mg by mouth 2 times a day.     • Magnesium 500 MG Tab 1 tablet with a meal Orally Once a day for 30 day(s)     • Norgestim-Eth Estrad Triphasic (TRI-LO-ESTARYLLA) 0.18/0.215/0.25 MG-25 MCG Tab Take 1 Tablet by mouth every day.     • Selenium 200 MCG Cap Take  by mouth.     • metFORMIN ER (GLUCOPHAGE XR) 500 MG TABLET SR 24 HR Take 2 Tablets by mouth 2 times a day. 360 Tablet 2   • B Complex Vitamins (VITAMIN-B COMPLEX PO) Take  by mouth.     • citalopram (CELEXA) 20 MG Tab Take 1 tablet by mouth every day. 180 tablet 1   • tacrolimus (PROTOPIC) 0.1 % Ointment Apply 1 Application topically 2 times a day. To affected area on hands and arms 60 g 2   • tretinoin  "(RETIN-A) 0.025 % gel Apply 1 Application topically every bedtime. 45 g 2   • Clindamycin Phos-Benzoyl Perox (ONEXTON) 1.2-3.75 % Gel Apply 1 Application topically every day. 50 g 3   • VITAMIN D PO Take 6,000 Units by mouth every day.     • diazePAM (VALIUM) 5 MG Tab TAKE 1 TABLET BY MOUTH TWICE A DAY AS NEEDED FOR VERTIGO FOR 30 DAYS     • albuterol 108 (90 Base) MCG/ACT Aero Soln inhalation aerosol Inhale 2 Puffs by mouth every four hours as needed for Shortness of Breath. 1 Inhaler 0     No current facility-administered medications on file prior to visit.     Allergies:    Allergies   Allergen Reactions   • Gluten Meal        Vitals:    Vitals:    11/03/21 1534   BP: 122/78   BP Location: Right arm   Patient Position: Sitting   Pulse: (!) 101   Resp: 16   Temp: 36.7 °C (98 °F)   TempSrc: Temporal   SpO2: 98%   Weight: 111 kg (244 lb 3.2 oz)   Height: 1.702 m (5' 7\")       Physical Exam:    Constitutional: Vital signs reviewed. Appears well-developed and well-nourished. No acute distress.   Eyes: Sclera white, conjunctivae clear.   ENT: Severe bilateral nasal congestion. External ears normal. External auditory canals normal without discharge. TMs translucent and non-bulging. Hearing normal. Lips/teeth are normal. Oral mucosa pink and moist. Posterior pharynx: WNL.  Neck: Neck supple.   Cardiovascular: Regular rate and rhythm. No murmur.  Pulmonary/Chest: Respirations non-labored. Clear to auscultation bilaterally.  Musculoskeletal: Normal gait. No muscular atrophy or weakness.  Neurological: Alert and oriented to person, place, and time. Muscle tone normal. Coordination normal.   Skin: No rashes or lesions. Warm, dry, normal turgor.  Psychiatric: Normal mood and affect. Behavior is normal. Judgment and thought content normal.       Diagnostics:    POCT Rapid Strep A  Order: [id number]  Status:  Final result   Visible to patient:  No (scheduled for 11/4/2021  2:11 PM) Next appt:  11/23/2021 at 04:30 PM in " Dermatology ([facility] Dx:  Sore throat   0 Result Notes  Component  4:05 PM   Rapid Strep Screen Negative    Internal Control Positive Valid    Internal Control Negative Valid    Resulting Agency [facility]         Specimen Collected: 21  4:05 PM Last Resulted: 21  4:11 PM           POCT SARS-COV Antigen ELI (Symptomatic Only)  Order: [id number]  Status:  Final result   Visible to patient:  No (scheduled for 2021  2:00 PM) Next appt:  2021 at 04:30 PM in Dermatology ([facility]) Dx:  Cough; Nasal congestion; Body aches; ...   0 Result Notes  Component  3:44 PM   Internal  Valid    SARS-COV ANTIGEN LEI Negative    Resulting [facility]         Specimen Collected: 21  3:44 PM Last Resulted: 21  4:00 PM             Medical Decision Makin. Fever, unspecified fever cause  - POCT SARS-COV Antigen LEI (Symptomatic Only)  - SARS-CoV-2 PCR (24 hour In-House): Collect NP swab in VTM; Future    2. Body aches  - POCT SARS-COV Antigen LEI (Symptomatic Only)  - SARS-CoV-2 PCR (24 hour In-House): Collect NP swab in VTM; Future    3. Fatigue, unspecified type  - POCT SARS-COV Antigen LEI (Symptomatic Only)  - SARS-CoV-2 PCR (24 hour In-House): Collect NP swab in VTM; Future    4. Nasal congestion  - POCT SARS-COV Antigen LEI (Symptomatic Only)  - SARS-CoV-2 PCR (24 hour In-House): Collect NP swab in VTM; Future    5. Sore throat  - POCT Rapid Strep A  - POCT SARS-COV Antigen LEI (Symptomatic Only)  - SARS-CoV-2 PCR (24 hour In-House): Collect NP swab in VTM; Future    6. Cough  - POCT SARS-COV Antigen LEI (Symptomatic Only)  - SARS-CoV-2 PCR (24 hour In-House): Collect NP swab in VTM; Future    7. Acute bacterial sinusitis  - azithromycin (ZITHROMAX) 250 MG Tab; 2 TABS BY MOUTH ON DAY 1, 1 TAB ON DAYS 2-5.  Dispense: 6 Tablet; Refill: 0    8. Encounter for screening for COVID-19  - POCT SARS-COV Antigen LEI (Symptomatic Only)  - SARS-CoV-2  PCR (24 hour In-House): Collect NP swab in VTM; Future      Discussed with her DDX, management options, and risks, benefits, and alternatives to treatment plan agreed upon.    Symptoms since 10/31/21. She has had fever over 100 F, body aches, fatigue, severe nasal congestion with thick, purulent mucus from nose, mild sore throat, and mild cough. She occasionally gets sinus infections. Z-bola works/tolerates for previous sinus infections.    Severe bilateral nasal congestion on exam.    Rapid Strep test today is negative.    POC Covid test today negative.    May use over-the-counter medications (such as cold meds) for symptoms as needed.    Agreeable to medication prescribed.    Agreeable to PCR COVID-19 test obtained.    Advised test result will show in MyChart.    Patient will follow-up if needed while waiting for test result.

## 2021-11-04 ENCOUNTER — OFFICE VISIT (OUTPATIENT)
Dept: BEHAVIORAL HEALTH | Facility: CLINIC | Age: 26
End: 2021-11-04
Payer: COMMERCIAL

## 2021-11-04 DIAGNOSIS — J02.9 SORE THROAT: ICD-10-CM

## 2021-11-04 DIAGNOSIS — F34.1 DYSTHYMIA: ICD-10-CM

## 2021-11-04 DIAGNOSIS — R52 BODY ACHES: ICD-10-CM

## 2021-11-04 DIAGNOSIS — R50.9 FEVER, UNSPECIFIED FEVER CAUSE: ICD-10-CM

## 2021-11-04 DIAGNOSIS — R05.9 COUGH: ICD-10-CM

## 2021-11-04 DIAGNOSIS — Z11.52 ENCOUNTER FOR SCREENING FOR COVID-19: ICD-10-CM

## 2021-11-04 DIAGNOSIS — R53.83 FATIGUE, UNSPECIFIED TYPE: ICD-10-CM

## 2021-11-04 DIAGNOSIS — R09.81 NASAL CONGESTION: ICD-10-CM

## 2021-11-04 LAB — COVID ORDER STATUS COVID19: NORMAL

## 2021-11-04 PROCEDURE — 90837 PSYTX W PT 60 MINUTES: CPT | Mod: 95 | Performed by: SOCIAL WORKER

## 2021-11-04 NOTE — PROGRESS NOTES
"[facility]   Therapy Progress Note      This visit was conducted via Zoom using secure and encrypted videoconferencing technology.  The patient was in a private location in the [facility].  The patient's identity was confirmed and verbal consent was obtained for this virtual visit.      Name: Diana Hill  MRN: [id number]  : 1995  Age: 26 y.o.  Date of assessment: 2021  PCP: DANIELE Resendiz  Persons in attendance: Patient  Total session time:56  minutes    Objective Observations:   Participation:Active verbal participation   Grooming:Neat   Cognition:Alert   Eye Contact:Good   Mood:Depressed   Affect:Sad   Thought Process:Goal-directed   Speech:Rate within normal limits and Volume within normal limits    Current Risk:   Suicide: not indicated   Homicide: not indicated   Self-Harm: not indicated   Relapse: not indicated   Safety Plan Reviewed: not applicable    Topics addressed in psychotherapy include: \" Little down this month. Currently has a sinus infection. Cannot pinpoint specific causes. I was very up for awhile and doing okay. Not sure if seasonal but I have just been down. Feeling like I am kind of a stupor, Like I am just waking away, like I am going home and have been awhile.    Not do not member my past, high school, last year, last month. Her mother reports that she has always had a tendency toward seasonal depression. Use distraction much. When not hyper-focused on something , nothing keeps my attention. If not in front of me it does not exist any more. \"The down periods have been more frequent.\" I try to not focus on things that make me feel bad but rather things that make me feel good.  Sleep is a lot, nap throughout the day, sometimes lay awake and think, I feel very fragile, tired all of the time. Has been diagnosed with Chronic fatigue. Sleeping when napping 8-9 at night but then another 3 during the day. In down periods not sleep at night, but will " "sleep during day. Get over stimulated during the day because the family is so loud, so the sleep is a break from them. Short fuse.    My father is an alcoholic , I grew up with that. Currently with her parents. When she was 3 he started drinking, older now- will fight back with him. HE will go a couple of days were he does not drink. He will be angry and yelling, super mean. Remember fighting yelling, putting older brother into a wall. Emotional, verbal, he is a manipulator. Bad the last few months, he seems like he had total breakdown a different person. Think of him as a brother not daughter /father\"  Co-dependent with her mother. 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  Trying to do low carb, low sugar, high protein. Keto tried and some keto snacks. Lost 30 pounds( over 2 months)but not really noticing a change.Appetite very up and down, super guilty over eating. Younger had a poor relationship with food. Saw a nutrionist over the food issues did not really find it helpful. Not self confident, self-esteem and body weight.  \" What if this is all there this\". Discussion around childhood trauma is playing a role in this as well with the ongoing influence of her father in her life.  Care Plan Updated: reviewed    Does patient express agreement with the above plan? Yes     Diagnosis:  1. Dysthymia        Referral appointment(s) scheduled? Yes       Clyde Minaya L.C.S.W.      "

## 2021-11-05 LAB
SARS-COV-2 RNA RESP QL NAA+PROBE: NOTDETECTED
SPECIMEN SOURCE: NORMAL

## 2021-11-17 ENCOUNTER — OFFICE VISIT (OUTPATIENT)
Dept: BEHAVIORAL HEALTH | Facility: CLINIC | Age: 26
End: 2021-11-17
Payer: COMMERCIAL

## 2021-11-17 DIAGNOSIS — F41.1 GENERALIZED ANXIETY DISORDER: ICD-10-CM

## 2021-11-17 DIAGNOSIS — F33.1 MODERATE EPISODE OF RECURRENT MAJOR DEPRESSIVE DISORDER (HCC): ICD-10-CM

## 2021-11-17 DIAGNOSIS — Z79.899 ENCOUNTER FOR LONG-TERM (CURRENT) USE OF MEDICATIONS: ICD-10-CM

## 2021-11-17 PROBLEM — F41.9 ANXIETY AND DEPRESSION: Status: RESOLVED | Noted: 2019-11-19 | Resolved: 2021-11-17

## 2021-11-17 PROBLEM — F34.1 DYSTHYMIA: Status: RESOLVED | Noted: 2021-04-07 | Resolved: 2021-11-17

## 2021-11-17 PROBLEM — F32.A ANXIETY AND DEPRESSION: Status: RESOLVED | Noted: 2019-11-19 | Resolved: 2021-11-17

## 2021-11-17 PROCEDURE — 99204 OFFICE O/P NEW MOD 45 MIN: CPT | Mod: GC | Performed by: PSYCHIATRY & NEUROLOGY

## 2021-11-17 ASSESSMENT — PATIENT HEALTH QUESTIONNAIRE - PHQ9
CLINICAL INTERPRETATION OF PHQ2 SCORE: 6
SUM OF ALL RESPONSES TO PHQ QUESTIONS 1-9: 24
5. POOR APPETITE OR OVEREATING: 3 - NEARLY EVERY DAY

## 2021-11-17 ASSESSMENT — ANXIETY QUESTIONNAIRES
2. NOT BEING ABLE TO STOP OR CONTROL WORRYING: NEARLY EVERY DAY
6. BECOMING EASILY ANNOYED OR IRRITABLE: NEARLY EVERY DAY
GAD7 TOTAL SCORE: 18
4. TROUBLE RELAXING: NEARLY EVERY DAY
1. FEELING NERVOUS, ANXIOUS, OR ON EDGE: NEARLY EVERY DAY
7. FEELING AFRAID AS IF SOMETHING AWFUL MIGHT HAPPEN: SEVERAL DAYS
3. WORRYING TOO MUCH ABOUT DIFFERENT THINGS: NEARLY EVERY DAY
5. BEING SO RESTLESS THAT IT IS HARD TO SIT STILL: MORE THAN HALF THE DAYS
IF YOU CHECKED OFF ANY PROBLEMS ON THIS QUESTIONNAIRE, HOW DIFFICULT HAVE THESE PROBLEMS MADE IT FOR YOU TO DO YOUR WORK, TAKE CARE OF THINGS AT HOME, OR GET ALONG WITH OTHER PEOPLE: VERY DIFFICULT

## 2021-11-17 NOTE — PATIENT INSTRUCTIONS
Decrease Celexa to 20mg for one week  At the same time Start Sertraline 25mg for one week  After one week, decrease Celexa to 10mg daily for one week   At the same time, increase Sertraline to 50mg daily  Continue Sertraline 50mg daily until next appointment

## 2021-11-17 NOTE — PROGRESS NOTES
"  INITIAL PSYCHIATRIC EVALUATION      This provider informed the patient their medical records are totally confidential except for the use by other providers involved in their care, or if the patient signs a release, or to report instances of child or elder abuse, or if it is determined they are an immediate risk to harm themselves or others.      CHIEF COMPLAINT  \"I wanted to see if my medications needed to be adjusted\"      HISTORY OF PRESENT ILLNESS  Diana Hill is a 26 y.o. old female comes in today to establish care and for evaluation of mood, anxiety and concentration.   Patient states she has had difficulty with depressed mood with anhedonia, changes in energy, poor sleep, poor appetite. She states thoughts of wishing she were dead without plan or intent. She states she has a strong support system with her mother and friends and feels that she would seek additional support if these thoughts increased.  Patient states increased anxiety with consistent thoughts that are always present. She often thinks about how others are perceiving her. She states difficulty with intrusive thoughts that repeat in her head. She states difficulty with intrusive images. She states recently having difficulty with sensory overload in situations where she starts to have increased anxiety with increased noise or certain textures. She has had decreased appetite in relation to this stating that certain foods make her feel nauseous to think about. She states that if she eats these items, she will feel guilty after and has been avoiding food intake due to this. Patient states she will rearrange her room until it \"feels right\" often due to increased anxiety as well.   Patient states difficulty concentrating with consistent thoughts about various circumstances present. She often loses items and has difficulty completing tasks. She has difficulty with motivation to finish work or start tasks.       PSYCHIATRIC REVIEW OF SYSTEMS: " denies manic symptoms and denies psychotic symptoms including  /       MEDICAL REVIEW OF SYSTEMS:   Constitutional fatigue   Eyes negative   Ears/Nose/Mouth/Throat negative   Cardiovascular negative   Respiratory negative   Gastrointestinal negative   Genitourinary negative   Muscular negative   Integumentary negative   Neurological negative   Endocrine negative   Hematologic/Lymphatic negative     CURRENT MEDICATIONS:  Current Outpatient Medications   Medication Sig Dispense Refill   • topiramate (TOPAMAX) 100 MG Tab Take 100 mg by mouth 2 times a day.     • Magnesium 500 MG Tab 1 tablet with a meal Orally Once a day for 30 day(s)     • Norgestim-Eth Estrad Triphasic (TRI-LO-ESTARYLLA) 0.18/0.215/0.25 MG-25 MCG Tab Take 1 Tablet by mouth every day.     • Selenium 200 MCG Cap Take  by mouth.     • metFORMIN ER (GLUCOPHAGE XR) 500 MG TABLET SR 24 HR Take 2 Tablets by mouth 2 times a day. 360 Tablet 2   • B Complex Vitamins (VITAMIN-B COMPLEX PO) Take  by mouth.     • citalopram (CELEXA) 20 MG Tab Take 1 tablet by mouth every day. 180 tablet 1   • tacrolimus (PROTOPIC) 0.1 % Ointment Apply 1 Application topically 2 times a day. To affected area on hands and arms 60 g 2   • tretinoin (RETIN-A) 0.025 % gel Apply 1 Application topically every bedtime. 45 g 2   • Clindamycin Phos-Benzoyl Perox (ONEXTON) 1.2-3.75 % Gel Apply 1 Application topically every day. 50 g 3   • VITAMIN D PO Take 6,000 Units by mouth every day.     • diazePAM (VALIUM) 5 MG Tab TAKE 1 TABLET BY MOUTH TWICE A DAY AS NEEDED FOR VERTIGO FOR 30 DAYS     • albuterol 108 (90 Base) MCG/ACT Aero Soln inhalation aerosol Inhale 2 Puffs by mouth every four hours as needed for Shortness of Breath. 1 Inhaler 0     No current facility-administered medications for this visit.       ALLERGIES:  Gluten meal    PAST PSYCHIATRIC HISTORY  Prior psychiatric hospitalization: denies  Prior Self harm/suicide attempt: denies  Prior Diagnosis: depression    PAST  PSYCHIATRIC MEDICATIONS  • Prozac - increased suicidal ideation  • Paxil - took as teenager, felt symptoms of serotonin syndrome were present with combination of Wellbutrin  • Amitriptyline - took as teenager, states symptoms of serotonin syndrome     FAMILY HISTORY  Psychiatric diagnosis:  Paternal history of depression and anxiety, brother anxiety   Substance abuse history:  Paternal family history alcohol use disorder     SUBSTANCE USE HISTORY:  ALCOHOL: 1-2 times per year  TOBACCO: denies  CANNABIS: denies  OPIOIDS: denies  PRESCRIPTION MEDICATIONS: denies  OTHERS: denies  History of inpatient/outpatient rehab treatment: n/a    SOCIAL HISTORY  Childhood: born in [place]  and describes childhood as okay. Patient states close relationship with her mother and brothers. States strained relationship with father due to alcohol use   Education: currently taking pre-requisite courses for nursing school   Employment: works at [employer]  Current living situation: lives with family   History of emotional/physical/sexual abuse - denies    MEDICAL HISTORY  Past Medical History:   Diagnosis Date   • Anemia     hx of borderline anemia   • Anxiety    • Celiac disease    • Daytime sleepiness    • Depression    • Insomnia    • Insulin resistance    • Migraine    • Morning headache    • PCOS (polycystic ovarian syndrome)    • Snoring    • Vertigo    • Vitamin D deficiency      Past Surgical History:   Procedure Laterality Date   • MATTHIAS BY LAPAROSCOPY N/A 9/13/2018    Procedure: MATTHIAS BY LAPAROSCOPY;  Surgeon: Hayden Wilhelm M.D.;  Location: SURGERY [facility];  Service: General         PHYSICAL EXAMINAION:  Vital signs: There were no vitals taken for this visit.  Musculoskeletal: Normal gait.   Abnormal movements: no abnormal movements noted     MENTAL STATUS EXAMINATION      General:   - Grooming and hygiene: Casual and Neat,   - Apparent distress: no apparent distress ,   - Behavior: Calm  - Eye Contact:  Good,   - no  psychomotor agitation or retardation    - Participation: Active verbal participation  Orientation: Alert and Fully Oriented to person, place and time  Mood: Anxious  Affect: Anxious,  Thought Process: Logical and Goal-directed  Thought Content: Denies homicidal ideations, intent or plan, states suicidal ideation without plan or intent  Within normal limits  Perception: Denies auditory or visual hallucinations. No delusions noted Within normal limits  Attention span and concentration: Intact   Speech:Rate within normal limits and Volume within normal limits  Language: Appropriate   Insight: Good  Judgment: Good  Recent and remote memory: No gross evidence of memory deficits      DEPRESSION SCREENING:  Depression Screen (PHQ-2/PHQ-9) 11/19/2019 3/25/2021   PHQ-2 Total Score - 0   PHQ-2 Total Score 3 -   PHQ-9 Total Score - 5   PHQ-9 Total Score 12 -       Interpretation of PHQ-9 Total Score   Score Severity   1-4 No Depression   5-9 Mild Depression   10-14 Moderate Depression   15-19 Moderately Severe Depression   20-27 Severe Depression      SAFETY ASSESSMENT - SELF:    Does patient acknowledge current or past symptoms of dangerousness to self? no  History of suicide by family member: no  History of suicide by friend/significant other: no  Recent change in amount/specificity/intensity of suicidal thoughts or self-harm behavior? Thoughts without plan or intent   Current access to firearms, medications, or other identified means of suicide/self-harm? no  If yes, willing to restrict access to means of suicide/self-harm?   Protective factors present: patient states close relationship with family and friends and strong support system that she communicates with        SAFETY ASSESSMENT - OTHERS:    Does patient acknowledge current or past symptoms of aggressive behavior or risk to others? no  Recent change in amount/specificity/intensity of thoughts or threats to harm others? no  Current access to firearms/other identified  means of harm? no  If yes, willing to restrict access to weapons/means of harm?        CURRENT RISK:       Suicidal: Low       Homicidal: Low       Self-Harm: Low       Relapse: Not applicable       Crisis Safety Plan Reviewed Not Indicated    MEDICAL RECORDS/LABS/DIAGNOSTIC TESTS REVIEWED:  Lab Results   Component Value Date/Time    SODIUM 137 03/26/2021 09:59 AM    POTASSIUM 4.2 03/26/2021 09:59 AM    CHLORIDE 102 03/26/2021 09:59 AM    CO2 23 03/26/2021 09:59 AM    GLUCOSE 89 03/26/2021 09:59 AM    BUN 12 03/26/2021 09:59 AM    CREATININE 0.58 03/26/2021 09:59 AM      Lab Results   Component Value Date/Time    WBC 5.4 03/26/2021 09:59 AM    RBC 4.97 03/26/2021 09:59 AM    HEMOGLOBIN 14.0 03/26/2021 09:59 AM    HEMATOCRIT 43.6 03/26/2021 09:59 AM    MCV 87.7 03/26/2021 09:59 AM    MCH 28.2 03/26/2021 09:59 AM    MCHC 32.1 (L) 03/26/2021 09:59 AM    MPV 11.4 03/26/2021 09:59 AM    NEUTSPOLYS 44.60 03/26/2021 09:59 AM    LYMPHOCYTES 42.80 (H) 03/26/2021 09:59 AM    MONOCYTES 8.60 03/26/2021 09:59 AM    EOSINOPHILS 3.10 03/26/2021 09:59 AM    BASOPHILS 0.90 03/26/2021 09:59 AM            [place]  records -   Reviewed, appropriate       ASSESSMENT  Diana Hill is a 26 y.o. old female presenting to [facility]. Patient has had increased depression and anxiety with minimal benefit with use of Celexa. She states fear with relation to transition to new medication due to previous side effects. Discussed slow taper and close follow up and monitoring for medication response. Patient has had anxiety symptoms that have components of obsessive thoughts and images present. Will further evaluate patient for symptoms that may be consistent with OCD. At this time, it is difficult to determine if anxiety is causing primary inattention vs attention deficit hyperactivity disorder. Will further review this after management of anxiety is improved.       DIFFERENTIAL DIAGNOSES  1. Major Depressive Disorder, recurrent, moderate    2. Generalized Anxiety Disorder   3. R/O OCD      PLAN:  (1) Major Depressive Disorder, recurrent, moderate  • Start Sertraline 25mg PO daily for one week, then increase to 50mg PO daily  • Decrease Citalopram to 20mg PO daily for one week, then 10mg PO daily for one week, then discontinue   • I reviewed clinical lab tests done in last 1 year. Patient will need following lab test done: CBC, CMP, TSH, Vitamin D   • Medication options, alternatives (including no medications) and medication risks/benefits/side effects were discussed in detail.  • Explained importance of contraceptive measures while on psychotropic medications, educated to let provider know if ever pregnant or wanting to become pregnant. Verbalized understanding.  • The patient was advised to call, message provider on [employer], or come in to the clinic if symptoms worsen or if any future questions/issues regarding their medications arise; the patient verbalized understanding and agreement.    • The patient was educated to call 911, call the suicide hotline, or go to local ER if having thoughts of suicide or homicide; verbalized understanding.        Return to clinic in 4 weeks or sooner if symptoms worsen.  Next Appointment:  instruction provided on how to make the next appointment.     The proposed treatment plan was discussed with the patient who was provided the opportunity to ask questions and make suggestions regarding alternative treatment. Patient verbalized understanding and expressed agreement with the plan.     Thank you for allowing me to participate in the care of this patient.    Yari Presley D.O.  11/17/21    CC:   DANIELE Resendiz

## 2021-11-29 ENCOUNTER — HOSPITAL ENCOUNTER (OUTPATIENT)
Facility: MEDICAL CENTER | Age: 26
End: 2021-11-29
Attending: FAMILY MEDICINE
Payer: COMMERCIAL

## 2021-11-29 ENCOUNTER — OFFICE VISIT (OUTPATIENT)
Dept: URGENT CARE | Facility: CLINIC | Age: 26
End: 2021-11-29
Payer: COMMERCIAL

## 2021-11-29 VITALS
OXYGEN SATURATION: 97 % | TEMPERATURE: 98.2 F | DIASTOLIC BLOOD PRESSURE: 78 MMHG | HEART RATE: 83 BPM | RESPIRATION RATE: 16 BRPM | HEIGHT: 67 IN | BODY MASS INDEX: 38.61 KG/M2 | SYSTOLIC BLOOD PRESSURE: 116 MMHG | WEIGHT: 246 LBS

## 2021-11-29 DIAGNOSIS — L30.9 ECZEMA, UNSPECIFIED TYPE: ICD-10-CM

## 2021-11-29 PROCEDURE — 87070 CULTURE OTHR SPECIMN AEROBIC: CPT

## 2021-11-29 PROCEDURE — 87077 CULTURE AEROBIC IDENTIFY: CPT

## 2021-11-29 PROCEDURE — 87186 SC STD MICRODIL/AGAR DIL: CPT

## 2021-11-29 PROCEDURE — 99215 OFFICE O/P EST HI 40 MIN: CPT | Performed by: FAMILY MEDICINE

## 2021-11-29 PROCEDURE — 87075 CULTR BACTERIA EXCEPT BLOOD: CPT

## 2021-11-29 PROCEDURE — 87205 SMEAR GRAM STAIN: CPT

## 2021-11-29 RX ORDER — SULFAMETHOXAZOLE AND TRIMETHOPRIM 800; 160 MG/1; MG/1
1 TABLET ORAL 2 TIMES DAILY
Qty: 14 TABLET | Refills: 0 | Status: SHIPPED | OUTPATIENT
Start: 2021-11-29 | End: 2021-12-06

## 2021-11-29 RX ORDER — FLUOCINONIDE 0.5 MG/G
1 OINTMENT TOPICAL DAILY
Qty: 15 G | Refills: 0 | Status: SHIPPED | OUTPATIENT
Start: 2021-11-29 | End: 2021-12-06

## 2021-11-29 ASSESSMENT — FIBROSIS 4 INDEX: FIB4 SCORE: 0.34

## 2021-11-29 NOTE — PROGRESS NOTES
Subjective:       Chief Complaint   Patient presents with   • Other     possible contact dermatitis, very dry hand and looks infected going on for a long time, needed a relief       Pt has long hx of rash on hands, for at least past several yrs.   She is currently seeing dermatologist and has been tried on several different topical steroids, all with varying degrees of effectiveness.    She c/o worsening flare up on hands today x 7 d.    Also has noted some crusty, yellow discharge around base of nails and hands have more redness than she has with typical flare up                Social History     Tobacco Use   • Smoking status: Never Smoker   • Smokeless tobacco: Never Used   Vaping Use   • Vaping Use: Never used   Substance Use Topics   • Alcohol use: Yes     Comment: occasionally   • Drug use: No         Past Medical History:   Diagnosis Date   • Anemia     hx of borderline anemia   • Anxiety    • Celiac disease    • Daytime sleepiness    • Depression    • Insomnia    • Insulin resistance    • Migraine    • Morning headache    • PCOS (polycystic ovarian syndrome)    • Snoring    • Vertigo    • Vitamin D deficiency        Current Outpatient Medications on File Prior to Visit   Medication Sig Dispense Refill   • sertraline (ZOLOFT) 50 MG Tab Take 0.5 Tablets by mouth every day for 7 days, THEN 1 Tablet every day for 23 days. 27 Tablet 1   • topiramate (TOPAMAX) 100 MG Tab Take 100 mg by mouth 2 times a day.     • Magnesium 500 MG Tab 1 tablet with a meal Orally Once a day for 30 day(s)     • Norgestim-Eth Estrad Triphasic (TRI-LO-ESTARYLLA) 0.18/0.215/0.25 MG-25 MCG Tab Take 1 Tablet by mouth every day.     • Selenium 200 MCG Cap Take  by mouth.     • metFORMIN ER (GLUCOPHAGE XR) 500 MG TABLET SR 24 HR Take 2 Tablets by mouth 2 times a day. 360 Tablet 2   • B Complex Vitamins (VITAMIN-B COMPLEX PO) Take  by mouth.     • tacrolimus (PROTOPIC) 0.1 % Ointment Apply 1 Application topically 2 times a day. To affected  "area on hands and arms 60 g 2   • tretinoin (RETIN-A) 0.025 % gel Apply 1 Application topically every bedtime. 45 g 2   • Clindamycin Phos-Benzoyl Perox (ONEXTON) 1.2-3.75 % Gel Apply 1 Application topically every day. 50 g 3   • VITAMIN D PO Take 6,000 Units by mouth every day.     • albuterol 108 (90 Base) MCG/ACT Aero Soln inhalation aerosol Inhale 2 Puffs by mouth every four hours as needed for Shortness of Breath. 1 Inhaler 0     No current facility-administered medications on file prior to visit.       Review of Systems   Constitutional: Negative for fever and fatigue.   HENT: Negative for rhinorrhea and sore throat.    Respiratory: Negative for cough and shortness of breath.    Cardiovascular: Negative for chest pain.   Gastrointestinal: Negative for diarrhea.   Musculoskeletal: Negative for joint pain.   Skin: Positive for rash.   Neurological: Negative for dizziness.   All other systems reviewed and are negative.         Objective:     /78   Pulse 83   Temp 36.8 °C (98.2 °F)   Resp 16   Ht 1.702 m (5' 7\")   Wt 112 kg (246 lb)   SpO2 97%       Physical Exam   Constitutional: pt is oriented to person, place, and time. Pt appears well-developed and well-nourished. No distress.   HENT:   Head: Normocephalic and atraumatic.   Eyes: Conjunctivae are normal.   Cardiovascular: Normal rate, regular rhythm and normal heart sounds.    Pulmonary/Chest: Effort normal and breath sounds normal. No respiratory distress. Pt has no wheezes. Pt has no rales.   Neurological: pt is alert and oriented to person, place, and time. No cranial nerve deficit.   Skin: Skin is warm. Rash (erythematous scaly plaques on dorsum both hands.    There is increased, warmth, erythema around base of nails and scant amount of honey crusted discharge noted. ) noted. Pt is not diaphoretic. There is erythema.   Nursing note and vitals reviewed.              Assessment/Plan:     1. Eczema, unspecified type    Her sx today likely " represent eczema flare up, also the skin on both hands appears to have a secondary bacterial cellulitis, although I did not note any swelling that would indicate tenosynovitis.       Will treat with topical steroid (class IV) - Lidex and also tx what is likely a staph cellulitis with bactrim        - Referral to Dermatology  - fluocinonide (LIDEX) 0.05 % Ointment; Apply 1 Application topically every day for 7 days.  Dispense: 15 g; Refill: 0  - sulfamethoxazole-trimethoprim (BACTRIM DS) 800-160 MG tablet; Take 1 Tablet by mouth 2 times a day for 7 days.  Dispense: 14 Tablet; Refill: 0  - ANAEROBIC/AEROBIC/GRAM STAIN          Follow up in one week if no improvement, sooner if symptoms worsen.     My total time spent caring for the patient on the day of the encounter was at least 40 minutes.   This does not include time spent on separately billable procedures/tests.

## 2021-11-30 LAB
GRAM STN SPEC: NORMAL
SIGNIFICANT IND 70042: NORMAL
SITE SITE: NORMAL
SOURCE SOURCE: NORMAL

## 2021-12-02 LAB
BACTERIA WND AEROBE CULT: ABNORMAL
GRAM STN SPEC: ABNORMAL
SIGNIFICANT IND 70042: ABNORMAL
SITE SITE: ABNORMAL
SOURCE SOURCE: ABNORMAL

## 2021-12-03 LAB
BACTERIA SPEC ANAEROBE CULT: NORMAL
SIGNIFICANT IND 70042: NORMAL
SITE SITE: NORMAL
SOURCE SOURCE: NORMAL

## 2021-12-04 ENCOUNTER — TELEPHONE (OUTPATIENT)
Dept: URGENT CARE | Facility: PHYSICIAN GROUP | Age: 26
End: 2021-12-04

## 2021-12-09 NOTE — PROGRESS NOTES
Chief Complaint   Patient presents with   • Establish Care     ref sleep study, derm       HISTORY OF PRESENT ILLNESS: Patient is a 25 y.o. female, established patient who presents today to discuss medical problems as listed below:    Health Maintenance:  COMPLETED     Snoring  New to me.  This is a chronic problem.  Experiencing daytime fatigue.  She denies CP, palpitations, dyspnea.    Rash of hands  New to me.  New problem.  Rash on hands for the last 2 months, comes and goes.  Worse after wearing gloves at work.  Has tried OTC medications, rest does not go away completely.  No rash on any other parts of the body.  No bleeding, itching or pain.      Patient Active Problem List    Diagnosis Date Noted   • Rash of hands 08/12/2020   • Snoring 08/12/2020   • Vertigo 03/03/2020   • PCOS (polycystic ovarian syndrome) 11/19/2019   • Vitamin D deficiency 11/19/2019   • High triglycerides 11/19/2019   • Anxiety and depression 11/19/2019        Allergies: Gluten meal    Current Outpatient Medications   Medication Sig Dispense Refill   • verapamil SR (CALAN-SR) 120 MG CR tablet Take 120 mg by mouth every day.     • diazePAM (VALIUM) 5 MG Tab TAKE 1 TABLET BY MOUTH TWICE A DAY AS NEEDED FOR VERTIGO FOR 30 DAYS     • rizatriptan (MAXALT) 10 MG tablet PLEASE SEE ATTACHED FOR DETAILED DIRECTIONS     • SUMAtriptan (IMITREX) 25 MG Tab tablet TAKE 1 TO 4 TABLETS BY MOUTH ONCE AS NEEDED FOR MIGRAINE (MAY REPEAT DOSE AFTER 2 HOURS, MAX DAILY DOSE 200MG) FOR UP TO 1 DOSE. 9 Tab 2   • albuterol 108 (90 Base) MCG/ACT Aero Soln inhalation aerosol Inhale 2 Puffs by mouth every four hours as needed for Shortness of Breath. 1 Inhaler 0   • citalopram (CELEXA) 20 MG Tab Take 1.5 Tabs by mouth every day. 135 Tab 1   • triamcinolone acetonide (KENALOG) 0.1 % Ointment Apply 1 Application to affected area(s) 3 times a day as needed. 80 g 1   • norethindrone-ethinyl estradiol (OVCON) 0.4-35 MG-MCG per tablet Take 1 Tab by mouth every day.  (Patient not taking: Reported on 8/12/2020) 28 Tab 6     No current facility-administered medications for this visit.        Social History     Tobacco Use   • Smoking status: Never Smoker   • Smokeless tobacco: Never Used   Substance Use Topics   • Alcohol use: Not Currently     Comment: occasionally   • Drug use: No     Social History     Social History Narrative   • Not on file       Family History   Problem Relation Age of Onset   • Thyroid Mother    • Depression Father    • Alcohol abuse Father    • No Known Problems Maternal Grandmother    • Heart Attack Maternal Grandfather 66   • Cancer Paternal Grandmother         Breast , esophageal   • Stroke Paternal Grandmother 66   • Alcohol abuse Paternal Grandfather    • Lung Disease Paternal Grandfather    • Sleep Apnea Brother        Allergies, past medical history, past surgical history, family history, social history reviewed and updated.    Review of Systems:     - Constitutional: Negative for fever, chills, unexpected weight change, and fatigue/generalized weakness.     - HEENT: Negative for headaches, vision changes, hearing changes, ear pain, ear discharge, rhinorrhea, sinus congestion, sore throat, and neck pain.      - Respiratory: Negative for cough, sputum production, chest congestion, dyspnea, wheezing, and crackles.      - Cardiovascular: Negative for chest pain, palpitations, orthopnea, and bilateral lower extremity edema.     - Gastrointestinal: Negative for heartburn, nausea, vomiting, abdominal pain, hematochezia, melena, diarrhea, constipation, and greasy/foul-smelling stools.     - Genitourinary: Negative for dysuria, polyuria, hematuria, pyuria, urinary urgency, and urinary incontinence.    - Musculoskeletal: Negative for myalgias, back pain, and joint pain.     - Skin: Rash on both hands.    - Neurological: Negative for dizziness, tingling, tremors, focal sensory deficit, focal weakness and headaches.     - Endo/Heme/Allergies: Does not  "bruise/bleed easily.     - Psychiatric/Behavioral: Negative for depression, suicidal/homicidal ideation and memory loss.      All other systems reviewed and are negative    Exam:    /78 (BP Location: Left arm, Patient Position: Sitting)   Pulse 88   Temp 36.2 °C (97.2 °F) (Temporal)   Ht 1.702 m (5' 7\")   Wt 117 kg (258 lb)   SpO2 97%   BMI 40.41 kg/m²  Body mass index is 40.41 kg/m².    Physical Exam:  Constitutional: Well-developed and well-nourished. Not diaphoretic. No distress.   Skin: Mild rash in both hands, flat pink scattered lesions, non-irritated or bleeding.  Head: Atraumatic without lesions.  Eyes: Conjunctivae and extraocular motions are normal. Pupils are equal, round, and reactive to light. No scleral icterus.   Ears:  External ears unremarkable. Tympanic membranes clear and intact.  Nose: Nares patent. Septum midline. Turbinates without erythema nor edema. No discharge.   Mouth/Throat: Dentition is normal. Tongue normal. Oropharynx is clear and moist. Posterior pharynx without erythema or exudates.  Neck: Supple, trachea midline. Normal range of motion. No thyromegaly present. No lymphadenopathy--cervical or supraclavicular.  Cardiovascular: Regular rate and rhythm, S1 and S2 without murmur, rubs, or gallops.    Chest: Effort normal. Clear to auscultation throughout. No adventitious sounds. No CVA tenderness.  Abdomen: Soft, non tender, and without distention. Active bowel sounds in all four quadrants. No rebound, guarding, masses or HSM.  : Negative for dysuria, polyuria, hematuria, pyuria, urinary urgency, and urinary incontinence.  Extremities: No cyanosis, clubbing, erythema, nor edema. Distal pulses intact and symmetric.   Musculoskeletal: All major joints AROM full in all directions without pain.  Neurological: Alert and oriented x 3. DTRs 2+/3 and symmetric. No cranial nerve deficit. 5/5 myotomes. Sensation intact. Negative Rhomberg.  Psychiatric:  Behavior, mood, and affect are " appropriate.  MA/nursing note and vitals reviewed.    LABS: 5/19/20  results reviewed and discussed with the patient, questions answered.    Patient was seen for 25 minutes face to face of which > 50% of appointment time was spent on counseling and coordination of care regarding the above.     Assessment/Plan:  1. Snoring  - REFERRAL TO PULMONARY AND SLEEP MEDICINE General Pulmonary    2. Rash of hands  Patient is requesting referral to dermatology.  She has tried multiple OTC medications without significant improvement.  - REFERRAL TO DERMATOLOGY       Discussed with patient possible alternative diagnoses, pt is to take all medications as prescribed. If symptoms persist FU w/PCP, if symptoms worsen go to emergency room. If experiencing any side effects from prescribed medications reports to the office immediately or go to emergency room.  Reviewed indication, dosage, usage and potential adverse effects of prescribed medications. Reviewed risks and benefits of treatment plan. Patient verbalizes understanding of all instruction and verbally agrees to plan.    Return if symptoms worsen or fail to improve.   Arava Pregnancy And Lactation Text: This medication is Pregnancy Category X and is absolutely contraindicated during pregnancy. It is unknown if it is excreted in breast milk.

## 2021-12-17 ENCOUNTER — APPOINTMENT (OUTPATIENT)
Dept: BEHAVIORAL HEALTH | Facility: CLINIC | Age: 26
End: 2021-12-17
Payer: COMMERCIAL

## 2021-12-23 ENCOUNTER — OFFICE VISIT (OUTPATIENT)
Dept: BEHAVIORAL HEALTH | Facility: CLINIC | Age: 26
End: 2021-12-23
Payer: COMMERCIAL

## 2021-12-23 DIAGNOSIS — F41.1 GENERALIZED ANXIETY DISORDER: ICD-10-CM

## 2021-12-23 PROCEDURE — 90837 PSYTX W PT 60 MINUTES: CPT | Mod: 95 | Performed by: SOCIAL WORKER

## 2021-12-23 NOTE — PROGRESS NOTES
"[facility]   Therapy Progress Note      This visit was conducted via Zoom using secure and encrypted videoconferencing technology.  The patient was in a private location in the [facility].  The patient's identity was confirmed and verbal consent was obtained for this virtual visit.      Name: Diana Hill  MRN: [id number]  : 1995  Age: 26 y.o.  Date of assessment: 2021  PCP: DANIELE Resendiz  Persons in attendance: Patient  Total session time: 54 minutes    Objective Observations:   Participation:Active verbal participation   Grooming:Casual   Cognition:Alert   Eye Contact:Good   Mood:Euthymic   Affect:Congruent with content   Thought Process:Logical and Goal-directed   Speech:Rate within normal limits and Volume within normal limits    Current Risk:   Suicide: not indicated   Homicide: not indicated   Self-Harm: not indicated   Relapse: not indicated   Safety Plan Reviewed: not applicable    Topics addressed in psychotherapy include: \" Working a lot, preparing of rthe holidays. Working extra hours. Not had a lot of time off , yesterday was first day off in two weeks.  Will take a vacation in January. Has been diagnosed with OCD and noticed a change with the addition of the Prozac. Worse than ever for the sleeping. \"I have very poor impulse control, struggling with budgeting , spending is over spending\"    Encouraged to set up an account to pay self- savings, then bills, essentials and what is left will be for fun. Take 24 hours before buying any impulsive buys.    Care Plan Updated:  Reviewed, slight improvement noted    Does patient express agreement with the above plan? Yes     Diagnosis:  1. Generalized anxiety disorder        Referral appointment(s) scheduled? Yes       Clyde Minaya L.C.S.W.    "

## 2022-01-11 ENCOUNTER — HOSPITAL ENCOUNTER (OUTPATIENT)
Facility: MEDICAL CENTER | Age: 27
End: 2022-01-11
Attending: NURSE PRACTITIONER
Payer: COMMERCIAL

## 2022-01-11 ENCOUNTER — OFFICE VISIT (OUTPATIENT)
Dept: URGENT CARE | Facility: CLINIC | Age: 27
End: 2022-01-11
Payer: COMMERCIAL

## 2022-01-11 VITALS
RESPIRATION RATE: 16 BRPM | OXYGEN SATURATION: 98 % | BODY MASS INDEX: 37.51 KG/M2 | HEART RATE: 83 BPM | SYSTOLIC BLOOD PRESSURE: 114 MMHG | DIASTOLIC BLOOD PRESSURE: 78 MMHG | TEMPERATURE: 97.7 F | HEIGHT: 67 IN | WEIGHT: 239 LBS

## 2022-01-11 DIAGNOSIS — J06.9 VIRAL URI WITH COUGH: ICD-10-CM

## 2022-01-11 PROCEDURE — 99213 OFFICE O/P EST LOW 20 MIN: CPT | Performed by: NURSE PRACTITIONER

## 2022-01-11 PROCEDURE — U0003 INFECTIOUS AGENT DETECTION BY NUCLEIC ACID (DNA OR RNA); SEVERE ACUTE RESPIRATORY SYNDROME CORONAVIRUS 2 (SARS-COV-2) (CORONAVIRUS DISEASE [COVID-19]), AMPLIFIED PROBE TECHNIQUE, MAKING USE OF HIGH THROUGHPUT TECHNOLOGIES AS DESCRIBED BY CMS-2020-01-R: HCPCS

## 2022-01-11 ASSESSMENT — ENCOUNTER SYMPTOMS
COUGH: 1
CHILLS: 1
MYALGIAS: 1
SORE THROAT: 1
SHORTNESS OF BREATH: 0
RHINORRHEA: 1

## 2022-01-11 ASSESSMENT — VISUAL ACUITY: OU: 1

## 2022-01-11 ASSESSMENT — FIBROSIS 4 INDEX: FIB4 SCORE: 0.34

## 2022-01-11 NOTE — LETTER
January 11, 2022         Patient: Diana Hill   YOB: 1995   Date of Visit: 1/11/2022           To Whom it May Concern:    Diana Hill was seen in my clinic on 1/11/2022.    COVID-19 PCR test pending. Patient is advised to self-isolate as recommended by the CDC.    The CDC recommends that any person who has symptoms of COVID-19 self-isolates until 1) at least 10 days have elapsed since symptom onset, and 2) at least 24 hours have passed since resolution of any fever without use of fever-reducing medications, and 3) other symptoms have improved.    If results are positive, the health department should be consulted for further instructions. Otherwise, follow the CDC self-isolation criteria stated above.    If results are negative and there is exposure to COVID-19, the CDC recommends self-isolation for 14 days after last exposure.    If results are negative and there is no exposure to COVID-19, the patient may return to work, school, or regular activities after symptoms have fully resolved for at least 24 hours.    In general, repeat testing is not necessary and is not offered in our urgent cares.    Please visit http://www.cdc.gov for further information.     If you have any questions or concerns, please don't hesitate to call.        Sincerely,         MARKY Dwyer.  Electronically Signed

## 2022-01-12 NOTE — PROGRESS NOTES
Subjective:     Diana Hill is a 26 y.o. female who presents for Coronavirus Screening (fatigue, bodyache, chills and sore throat started 3 days ago)       URI   This is a new problem. The problem has been gradually worsening. Associated symptoms include congestion, coughing, rhinorrhea and a sore throat.     Recently traveled to [facility].  Was at an airport.  Needs COVID testing.  Also reporting indirect exposure.    During this visit, appropriate PPE was worn, hand hygiene was performed, and the patient and any visitors were masked.     PMH:  has a past medical history of Anemia, Anxiety, Celiac disease, Daytime sleepiness, Depression, Insomnia, Insulin resistance, Migraine, Morning headache, PCOS (polycystic ovarian syndrome), Snoring, Vertigo, and Vitamin D deficiency. She also has no past medical history of Apnea, sleep, Cough, Gasping for breath, Painful breathing, Shortness of breath, Sputum production, or Wheezing.    MEDS:   Current Outpatient Medications:   •  sertraline (ZOLOFT) 50 MG Tab, Take 50 mg by mouth every day., Disp: , Rfl:   •  topiramate (TOPAMAX) 100 MG Tab, Take 100 mg by mouth 2 times a day., Disp: , Rfl:   •  Magnesium 500 MG Tab, 1 tablet with a meal Orally Once a day for 30 day(s), Disp: , Rfl:   •  Norgestim-Eth Estrad Triphasic (TRI-LO-ESTARYLLA) 0.18/0.215/0.25 MG-25 MCG Tab, Take 1 Tablet by mouth every day., Disp: , Rfl:   •  Selenium 200 MCG Cap, Take  by mouth., Disp: , Rfl:   •  metFORMIN ER (GLUCOPHAGE XR) 500 MG TABLET SR 24 HR, Take 2 Tablets by mouth 2 times a day., Disp: 360 Tablet, Rfl: 2  •  B Complex Vitamins (VITAMIN-B COMPLEX PO), Take  by mouth., Disp: , Rfl:   •  VITAMIN D PO, Take 6,000 Units by mouth every day., Disp: , Rfl:   •  tacrolimus (PROTOPIC) 0.1 % Ointment, Apply 1 Application topically 2 times a day. To affected area on hands and arms (Patient not taking: Reported on 1/11/2022), Disp: 60 g, Rfl: 2  •  tretinoin (RETIN-A) 0.025 % gel, Apply 1  "Application topically every bedtime. (Patient not taking: Reported on 1/11/2022), Disp: 45 g, Rfl: 2  •  Clindamycin Phos-Benzoyl Perox (ONEXTON) 1.2-3.75 % Gel, Apply 1 Application topically every day. (Patient not taking: Reported on 1/11/2022), Disp: 50 g, Rfl: 3  •  albuterol 108 (90 Base) MCG/ACT Aero Soln inhalation aerosol, Inhale 2 Puffs by mouth every four hours as needed for Shortness of Breath. (Patient not taking: Reported on 1/11/2022), Disp: 1 Inhaler, Rfl: 0    ALLERGIES:   Allergies   Allergen Reactions   • Gluten Meal        SURGHX:   Past Surgical History:   Procedure Laterality Date   • MATTHIAS BY LAPAROSCOPY N/A 9/13/2018    Procedure: MATTHIAS BY LAPAROSCOPY;  Surgeon: Hayden Wilhelm M.D.;  Location: SURGERY [facility];  Service: General     SOCHX:  reports that she has never smoked. She has never used smokeless tobacco. She reports current alcohol use. She reports that she does not use drugs.     FH: Reviewed with patient, not pertinent to this visit.    Review of Systems   Constitutional: Positive for chills and malaise/fatigue.   HENT: Positive for congestion, rhinorrhea and sore throat.    Respiratory: Positive for cough. Negative for shortness of breath.    Musculoskeletal: Positive for myalgias.   All other systems reviewed and are negative.    Additional details per HPI.      Objective:     /78   Pulse 83   Temp 36.5 °C (97.7 °F)   Resp 16   Ht 1.702 m (5' 7\")   Wt 108 kg (239 lb)   SpO2 98%   BMI 37.43 kg/m²     Physical Exam  Vitals reviewed.   Constitutional:       General: She is not in acute distress.     Appearance: She is well-developed. She is not ill-appearing or toxic-appearing.   HENT:      Head: Normocephalic.      Right Ear: External ear normal.      Left Ear: External ear normal.      Nose: Congestion present.      Mouth/Throat:      Mouth: Mucous membranes are moist.      Pharynx: Oropharynx is clear.   Eyes:      General: Vision grossly intact.      " Extraocular Movements: Extraocular movements intact.      Conjunctiva/sclera: Conjunctivae normal.   Cardiovascular:      Rate and Rhythm: Normal rate and regular rhythm.      Heart sounds: Normal heart sounds.   Pulmonary:      Effort: Pulmonary effort is normal. No respiratory distress.      Breath sounds: Normal breath sounds. No decreased breath sounds, wheezing, rhonchi or rales.   Musculoskeletal:         General: No deformity. Normal range of motion.      Cervical back: Normal range of motion.   Skin:     General: Skin is warm and dry.      Coloration: Skin is not pale.   Neurological:      Mental Status: She is alert and oriented to person, place, and time.      Sensory: No sensory deficit.      Motor: No weakness.   Psychiatric:         Behavior: Behavior normal. Behavior is cooperative.       Assessment/Plan:     1. Viral URI with cough  - COVID/SARS CoV-2 PCR; Future    Discussed likely self-limiting viral etiology and expected course and duration of illness. Vital signs stable, afebrile, no acute distress at this time.    COVID-19 PCR test pending. Patient is advised to self-isolate as recommended by the CDC.    Differential diagnosis, natural history, supportive care, over-the-counter symptom management per 's instructions, ibuprofen, close monitoring, and indications for immediate follow-up discussed.     All questions answered. Patient agrees with the plan of care.    Discharge summary provided.    Work note provided.     Patient is signed up for [employer] and approves of electronic communication of test results.

## 2022-01-12 NOTE — PATIENT INSTRUCTIONS
Viral Respiratory Infection  A respiratory infection is an illness that affects part of the respiratory system, such as the lungs, nose, or throat. A respiratory infection that is caused by a virus is called a viral respiratory infection.  Common types of viral respiratory infections include:  · A cold.  · The flu (influenza).  · A respiratory syncytial virus (RSV) infection.  What are the causes?  This condition is caused by a virus.  What are the signs or symptoms?  Symptoms of this condition include:  · A stuffy or runny nose.  · Yellow or green nasal discharge.  · A cough.  · Sneezing.  · Fatigue.  · Achy muscles.  · A sore throat.  · Sweating or chills.  · A fever.  · A headache.  How is this diagnosed?  This condition may be diagnosed based on:  · Your symptoms.  · A physical exam.  · Testing of nasal swabs.  How is this treated?  This condition may be treated with medicines, such as:  · Antiviral medicine. This may shorten the length of time a person has symptoms.  · Expectorants. These make it easier to cough up mucus.  · Decongestant nasal sprays.  · Acetaminophen or NSAIDs to relieve fever and pain.  Antibiotic medicines are not prescribed for viral infections. This is because antibiotics are designed to kill bacteria. They are not effective against viruses.  Follow these instructions at home:    Managing pain and congestion  · Take over-the-counter and prescription medicines only as told by your health care provider.  · If you have a sore throat, gargle with a salt-water mixture 3-4 times a day or as needed. To make a salt-water mixture, completely dissolve ½-1 tsp of salt in 1 cup of warm water.  · Use nose drops made from salt water to ease congestion and soften raw skin around your nose.  · Drink enough fluid to keep your urine pale yellow. This helps prevent dehydration and helps loosen up mucus.  General instructions  · Rest as much as possible.  · Do not drink alcohol.  · Do not use any products  that contain nicotine or tobacco, such as cigarettes and e-cigarettes. If you need help quitting, ask your health care provider.  · Keep all follow-up visits as told by your health care provider. This is important.  How is this prevented?    · Get an annual flu shot. You may get the flu shot in late summer, fall, or winter. Ask your health care provider when you should get your flu shot.  · Avoid exposing others to your respiratory infection.  ? Stay home from work or school as told by your health care provider.  ? Wash your hands with soap and water often, especially after you cough or sneeze. If soap and water are not available, use alcohol-based hand .  · Avoid contact with people who are sick during cold and flu season. This is generally fall and winter.  Contact a health care provider if:  · Your symptoms last for 10 days or longer.  · Your symptoms get worse over time.  · You have a fever.  · You have severe sinus pain in your face or forehead.  · The glands in your jaw or neck become very swollen.  Get help right away if you:  · Feel pain or pressure in your chest.  · Have shortness of breath.  · Faint or feel like you will faint.  · Have severe and persistent vomiting.  · Feel confused or disoriented.  Summary  · A respiratory infection is an illness that affects part of the respiratory system, such as the lungs, nose, or throat. A respiratory infection that is caused by a virus is called a viral respiratory infection.  · Common types of viral respiratory infections are a cold, influenza, and respiratory syncytial virus (RSV) infection.  · Symptoms of this condition include a stuffy or runny nose, cough, sneezing, fatigue, achy muscles, sore throat, and fevers or chills.  · Antibiotic medicines are not prescribed for viral infections. This is because antibiotics are designed to kill bacteria. They are not effective against viruses.  This information is not intended to replace advice given to you by  your health care provider. Make sure you discuss any questions you have with your health care provider.  Document Released: 09/27/2006 Document Revised: 12/26/2019 Document Reviewed: 01/28/2019  [employer] Patient Education © 2020 [employer]        COVID-19 PCR test pending. Patient is advised to self-isolate as recommended by the CDC.    The CDC recommends that any person who has symptoms of COVID-19 self-isolates until 1) at least 10 days have elapsed since symptom onset, and 2) at least 24 hours have passed since resolution of any fever without use of fever-reducing medications, and 3) other symptoms have improved.    If results are positive, the health department should be consulted for further instructions. Otherwise, follow the CDC self-isolation criteria stated above.    If results are negative and there is exposure to COVID-19, the CDC recommends self-isolation for 14 days after last exposure.    If results are negative and there is no exposure to COVID-19, the patient may return to work, school, or regular activities after symptoms have fully resolved for at least 24 hours.    In general, repeat testing is not necessary and is not offered in our urgent cares.    Please visit http://www.cdc.gov for further information.

## 2022-01-13 DIAGNOSIS — J06.9 VIRAL URI WITH COUGH: ICD-10-CM

## 2022-01-13 LAB — COVID ORDER STATUS COVID19: NORMAL

## 2022-01-14 LAB
SARS-COV-2 RNA RESP QL NAA+PROBE: NOTDETECTED
SPECIMEN SOURCE: NORMAL

## 2022-01-26 NOTE — TELEPHONE ENCOUNTER
Upcoming appt. 01/31    Received request via: Patient    Was the patient seen in the last year in this department? Yes    Does the patient have an active prescription (recently filled or refills available) for medication(s) requested? No

## 2022-01-31 ENCOUNTER — OFFICE VISIT (OUTPATIENT)
Dept: ENDOCRINOLOGY | Facility: MEDICAL CENTER | Age: 27
End: 2022-01-31
Attending: INTERNAL MEDICINE
Payer: COMMERCIAL

## 2022-01-31 ENCOUNTER — OFFICE VISIT (OUTPATIENT)
Dept: BEHAVIORAL HEALTH | Facility: CLINIC | Age: 27
End: 2022-01-31
Payer: COMMERCIAL

## 2022-01-31 VITALS
SYSTOLIC BLOOD PRESSURE: 104 MMHG | WEIGHT: 240.9 LBS | HEART RATE: 113 BPM | DIASTOLIC BLOOD PRESSURE: 74 MMHG | BODY MASS INDEX: 37.81 KG/M2 | OXYGEN SATURATION: 98 % | HEIGHT: 67 IN

## 2022-01-31 DIAGNOSIS — R79.89 ABNORMAL THYROID BLOOD TEST: ICD-10-CM

## 2022-01-31 DIAGNOSIS — E28.2 PCOS (POLYCYSTIC OVARIAN SYNDROME): ICD-10-CM

## 2022-01-31 DIAGNOSIS — F41.1 GENERALIZED ANXIETY DISORDER: ICD-10-CM

## 2022-01-31 DIAGNOSIS — F33.1 MODERATE EPISODE OF RECURRENT MAJOR DEPRESSIVE DISORDER (HCC): ICD-10-CM

## 2022-01-31 DIAGNOSIS — E55.9 VITAMIN D DEFICIENCY: ICD-10-CM

## 2022-01-31 DIAGNOSIS — E88.819 INSULIN RESISTANCE: ICD-10-CM

## 2022-01-31 PROCEDURE — 99214 OFFICE O/P EST MOD 30 MIN: CPT | Mod: GC | Performed by: PSYCHIATRY & NEUROLOGY

## 2022-01-31 PROCEDURE — 99211 OFF/OP EST MAY X REQ PHY/QHP: CPT | Performed by: INTERNAL MEDICINE

## 2022-01-31 PROCEDURE — 99214 OFFICE O/P EST MOD 30 MIN: CPT | Performed by: INTERNAL MEDICINE

## 2022-01-31 RX ORDER — SERTRALINE HYDROCHLORIDE 100 MG/1
100 TABLET, FILM COATED ORAL DAILY
Qty: 30 TABLET | Refills: 1 | Status: SHIPPED | OUTPATIENT
Start: 2022-01-31 | End: 2022-02-15

## 2022-01-31 RX ORDER — TRAZODONE HYDROCHLORIDE 50 MG/1
50 TABLET ORAL NIGHTLY
Qty: 30 TABLET | Refills: 1 | Status: SHIPPED | OUTPATIENT
Start: 2022-01-31 | End: 2022-02-15

## 2022-01-31 ASSESSMENT — ANXIETY QUESTIONNAIRES
7. FEELING AFRAID AS IF SOMETHING AWFUL MIGHT HAPPEN: NEARLY EVERY DAY
2. NOT BEING ABLE TO STOP OR CONTROL WORRYING: NEARLY EVERY DAY
GAD7 TOTAL SCORE: 21
3. WORRYING TOO MUCH ABOUT DIFFERENT THINGS: NEARLY EVERY DAY
5. BEING SO RESTLESS THAT IT IS HARD TO SIT STILL: NEARLY EVERY DAY
1. FEELING NERVOUS, ANXIOUS, OR ON EDGE: NEARLY EVERY DAY
6. BECOMING EASILY ANNOYED OR IRRITABLE: NEARLY EVERY DAY
4. TROUBLE RELAXING: NEARLY EVERY DAY

## 2022-01-31 ASSESSMENT — PATIENT HEALTH QUESTIONNAIRE - PHQ9
SUM OF ALL RESPONSES TO PHQ QUESTIONS 1-9: 23
CLINICAL INTERPRETATION OF PHQ2 SCORE: 6
5. POOR APPETITE OR OVEREATING: 3 - NEARLY EVERY DAY

## 2022-01-31 ASSESSMENT — FIBROSIS 4 INDEX: FIB4 SCORE: 0.34

## 2022-01-31 NOTE — PROGRESS NOTES
"[facility]  PSYCHIATRIC FOLLOW-UP NOTE    Persons in attendance: Patient      Reason for visit / chief complaint:   26 year old female with history of major depressive disorder and generalized anxiety disorder presenting for medication management. Patient was started on Sertraline at previous appointment with increase to 50mg.     \"The last month has been difficult\"     SUBJECTIVE / HPI:  Patient states initial improvement with use of Sertraline with anxiety and mood. She notes that over the last month, she has had increased stressors with subsequent increase in anxiety and depressed mood. She has had family relational stressors with her father and states increased stress within the home. She has had anhedonia, poor sleep, poor appetite, poor energy. She states frequent thoughts of wanting to self harm and states she has not wanted to end her life or had plans that are related to do this. She has been able to utilize skills to avoid self harming, but states the thoughts have been more intrusive than they have been in some time.   Patient states continued increased anxiety and difficulty with feeling on edge and consistent worry. She becomes anxious about not being able to sleep and states this is impactful during the day. She notes difficulty with needing things in a specific way within her room and violent intrusive thoughts that are distressing for her. She states she has tolerated Sertraline well with some initial GI distress that has since resolved.         OBJECTIVE:    MSE :   Appearance: well groomed, appropriate    Behavior: calm, cooperative, pleasant, engaged. good eye contact.  No tics. No mannerisms.   Speech : normal rate, normal volume, normal tone   Language: normal vocabulary   Mood: depressed   Affect: dysthymic   Thought Process: linear, coherent, goal-directed. No flight of ideas.  No loose associations   Thought Content: no auditory or visual hallucinations  and thoughts of wishing " she were dead without plan or intent   Attention/Concentration: appropriate    Memory: no gross deficits   Orientation: oriented to person, place, situation   Neurological: Deferred   Fund of Knowledge: appropriate   Insight/Judgment: good / good    Allergies   Allergen Reactions   • Gluten Meal            PAST PSYCHIATRIC HISTORY  Prior psychiatric hospitalization: denies  Prior Self harm/suicide attempt: denies  Prior Diagnosis: depression     PAST PSYCHIATRIC MEDICATIONS  · Prozac - increased suicidal ideation  · Paxil - took as teenager, felt symptoms of serotonin syndrome were present with combination of Wellbutrin  · Amitriptyline - took as teenager, states symptoms of serotonin syndrome      FAMILY HISTORY  Psychiatric diagnosis:  Paternal history of depression and anxiety, brother anxiety   Substance abuse history:  Paternal family history alcohol use disorder      SUBSTANCE USE HISTORY:  ALCOHOL: 1-2 times per year  TOBACCO: denies  CANNABIS: denies  OPIOIDS: denies  PRESCRIPTION MEDICATIONS: denies  OTHERS: denies  History of inpatient/outpatient rehab treatment: n/a     SOCIAL HISTORY  Childhood: born in [place]  and describes childhood as okay. Patient states close relationship with her mother and brothers. States strained relationship with father due to alcohol use   Education: currently taking pre-requisite courses for nursing school   Employment: works at [employer]  Current living situation: lives with family   History of emotional/physical/sexual abuse - denies    Review of systems:        Constitutional negative   Eyes negative   Ears/Nose/Mouth/Throat negative   Cardiovascular negative   Respiratory negative   Gastrointestinal negative   Genitourinary negative   Muscular negative   Integumentary negative   Neurological negative   Endocrine negative   Hematologic/Lymphatic negative       Medical Records/Labs/Diagnostic Tests Reviewed:   No new records for review        Current Outpatient Medications:    •  sertraline, 100 mg, Oral, DAILY  •  traZODone, 50 mg, Oral, Nightly  •  tretinoin, APPLY 1 APPLICATION TOPICALLY EVERY BEDTIME. START WITH 2 -3 APPS PER WEEK AND WORK UP AS TOLERATED  •  topiramate, 100 mg, Oral, BID  •  Magnesium, 1 tablet with a meal Orally Once a day for 30 day(s)  •  Norgestim-Eth Estrad Triphasic, 1 Tablet, Oral, DAILY  •  Selenium, Take  by mouth.  •  metFORMIN ER, 1,000 mg, Oral, BID  •  B Complex Vitamins (VITAMIN-B COMPLEX PO), Take  by mouth.  •  tacrolimus, 1 Application, Topical, BID (Patient not taking: Reported on 1/11/2022)  •  Onexton, 1 Application, Apply externally, DAILY (Patient not taking: Reported on 1/11/2022)  •  VITAMIN D PO, 6,000 Units, Oral, DAILY  •  albuterol, 2 Puff, Inhalation, Q4HRS PRN (Patient not taking: Reported on 1/11/2022)           ASSESSMENT:26 year old female with history of generalized anxiety disorder and major depressive disorder presenting for medication management. Patient has had continued difficulty with increased anxiety and worsening depressed mood over the lats month. She states initial benefit from Sertraline use and tolerance to medication. She has had continued intrusive thoughts and compulsive behaviors that are concerning for potential obsessive compulsive disorder. She states thoughts of wishing she were dead have been present without plan or intent and states she would seek support from her family if these thoughts were to worsen. Encouraged patient to schedule follow up therapy appointment and to complete labwork.      DDX:  1. Generalized Anxiety Disorder  2. Major Depressive Disorder, recurrent, moderate  3. R/O OCD      PLAN:  - Increase Sertraline 100mg PO daily  -Start Trazodone 50mg at bedtime as needed for sleep   -Medication options, alternatives (including no medications) and medication risks/benefits/side effects were discussed in detail.  -The patient was advised to call, message provider on MyChart, or come in to the  clinic if symptoms worsen or if any future questions/issues regarding their medications arise; the patient verbalized understanding and agreement.    -The patient was educated to call 911, call the suicide hotline, or go to local ER if having thoughts of suicide or homicide; verbalized understanding.            - Medical Records/Labs/Diagnostic Tests Ordered: None, lab orders pending       Yari Presley DO

## 2022-01-31 NOTE — PROGRESS NOTES
Chief Complaint: Follow up for history of abnormal thyroid tests, history of PCOS, history of obesity and insulin resistance syndrome    HPI:     Diana Hill is a 26 y.o. female here for follow up of the above medical issue    We initially saw the patient as a telehealth consultation back in November 2020.  She had a normal TSH of 2.2 with a slightly low free T4 of 0.87 with normal total T3 levels on August 31, 2020    She complained of fatigue, being easily jolted, having migraines, hair loss and dry skin.   She had seen neurology and had a negative work-up    We recommended additional blood testing after her initial visit with  measurement of direct free T4 levels   However the patient never completed the blood work despite instructions and adequate documentation of sending those labs      I am seeing her for follow-up today.    She is back on Metformin extended release 1000 mg twice a day for her PCOS and she is worried about whether or not she has diabetes.  She is on oral contraceptives as well    She reports regular menstrual cycles are regular bleeding and denies excessive facial hair or body hair    She is still concerned about her weight.    She still reports fatigue but her other symptoms like being jolted, migraines and hair loss are slightly better    She is currently not on thyroid hormone replacement therapy      Her other comorbid issues include migraines treated with Topamax, depression treated with Zoloft          Patient's medications, allergies, and social histories were reviewed and updated as appropriate.      ROS:     CONS:     No fever, no chills   EYES:     No diplopia, no blurry vision   CV:           No chest pain, no palpitations   PULM:     No SOB, no cough, no hemoptysis.   GI:            No nausea, no vomiting, no diarrhea, no constipation   ENDO:     No polyuria, no polydipsia, no heat intolerance, no cold intolerance       Past Medical History:  Problem List:  2021-11:  Generalized anxiety disorder  2021-11: Moderate episode of recurrent major depressive disorder (HCC)  2021-10: Plantar wart, left foot  2021-10: Medication management  2021-08: Chronic fatigue syndrome  2021-08: Intractable migraine with aura without status migrainosus  2021-08: Obstructive sleep apnea syndrome  2021-08: Vestibular migraine  2021-04: Otalgia of left ear  2021-04: History of canker sores  2021-04: Dysthymia  2021-03: Polyarthralgia  2021-03: Brittle nails  2020-12: Cervical lymphadenopathy  2020-11: Subclinical hypothyroidism  2020-11: Insulin resistance  2020-11: Anemia  2020-10: Morbid obesity (HCC)  2020-09: Skin rash  2020-09: Administrative encounter  2020-09: Hospital discharge follow-up  2020-08: Rash of hands  2020-08: Snoring  2020-03: Vertigo  2019-11: PCOS (polycystic ovarian syndrome)  2019-11: Vitamin D deficiency  2019-11: High triglycerides  2019-11: Anxiety and depression  2018-09: Biliary colic      Past Surgical History:  Past Surgical History:   Procedure Laterality Date   • MATTHIAS BY LAPAROSCOPY N/A 9/13/2018    Procedure: MATTHIAS BY LAPAROSCOPY;  Surgeon: Hayden Wilhelm M.D.;  Location: SURGERY [facility];  Service: General        Allergies:  Gluten meal     Social History:  Social History     Tobacco Use   • Smoking status: Never Smoker   • Smokeless tobacco: Never Used   Vaping Use   • Vaping Use: Never used   Substance Use Topics   • Alcohol use: Yes     Comment: occasionally   • Drug use: No        Family History:   family history includes Alcohol abuse in her father and paternal grandfather; Cancer in her paternal grandmother; Depression in her father; Heart Attack (age of onset: 66) in her maternal grandfather; Lung Disease in her paternal grandfather; No Known Problems in her maternal grandmother; Sleep Apnea in her brother; Stroke (age of onset: 66) in her paternal grandmother; Thyroid in her mother.      PHYSICAL EXAM:   Vital signs: /74 (BP Location: Left  "arm, Patient Position: Sitting, BP Cuff Size: Adult)   Pulse (!) 113   Ht 1.702 m (5' 7\")   Wt 109 kg (240 lb 14.4 oz)   SpO2 98%   BMI 37.73 kg/m²   GENERAL: Obese female in no apparent distress.   EYE:  No ocular asymmetry, PERRLA  HENT: Pink, moist mucous membranes.    NECK: No thyromegaly.   CARDIOVASCULAR:  No murmurs  LUNGS: Clear breath sounds  ABDOMEN: Soft, nontender   EXTREMITIES: No clubbing, cyanosis, or edema.   NEUROLOGICAL: No gross focal motor abnormalities   LYMPH: No cervical adenopathy palpated.   SKIN: No rashes, lesions.       Labs:  Lab Results   Component Value Date/Time    SODIUM 137 03/26/2021 09:59 AM    POTASSIUM 4.2 03/26/2021 09:59 AM    CHLORIDE 102 03/26/2021 09:59 AM    CO2 23 03/26/2021 09:59 AM    ANION 12.0 03/26/2021 09:59 AM    GLUCOSE 89 03/26/2021 09:59 AM    BUN 12 03/26/2021 09:59 AM    CREATININE 0.58 03/26/2021 09:59 AM    CALCIUM 9.2 03/26/2021 09:59 AM    ASTSGOT 14 03/26/2021 09:59 AM    ALTSGPT 14 03/26/2021 09:59 AM    TBILIRUBIN 0.2 03/26/2021 09:59 AM    ALBUMIN 3.9 03/26/2021 09:59 AM    TOTPROTEIN 7.1 03/26/2021 09:59 AM    GLOBULIN 3.2 03/26/2021 09:59 AM    AGRATIO 1.2 03/26/2021 09:59 AM       Lab Results   Component Value Date/Time    SODIUM 137 03/26/2021 0959    POTASSIUM 4.2 03/26/2021 0959    CHLORIDE 102 03/26/2021 0959    CO2 23 03/26/2021 0959    GLUCOSE 89 03/26/2021 0959    BUN 12 03/26/2021 0959    CREATININE 0.58 03/26/2021 0959    CALCIUM 9.2 03/26/2021 0959    ANION 12.0 03/26/2021 0959       Lab Results   Component Value Date/Time    CHOLSTRLTOT 185 11/04/2019 0850    TRIGLYCERIDE 161 (H) 11/04/2019 0850    HDL 67 11/04/2019 0850    LDL 86 11/04/2019 0850       Lab Results   Component Value Date/Time    TSHULTRASEN 2.800 11/24/2020 1049     Lab Results   Component Value Date/Time    FREET4 0.87 (L) 08/31/2020 2133     Lab Results   Component Value Date/Time    FREET3 3.16 08/28/2018 1120     No results found for: THYSTIMIG    Lab Results "   Component Value Date/Time    MICROSOMALA <9.0 09/15/2020 1126         Imaging:      ASSESSMENT/PLAN:     1. Abnormal thyroid blood test  Advised patient to get labs again to check a TSH and direct FT4 (LCMS) which is different from a free T4 via Radioimmunoassay  I will update her on the test results and make recommendations      2. PCOS (polycystic ovarian syndrome)  Stable  Continue Metformin and oral contraceptives  We will check her LH FSH and testosterone levels and 17 hydroxyprogesterone level    3. Insulin resistance  Stable  Continue Metformin  We will check her A1c and fasting glucose and fasting insulin levels  I recommend diet and exercise      4. Vitamin D deficiency  Stable we will recheck calcium vitamin D levels and I will update her      Return in about 6 months (around 7/31/2022).      Thank you kindly for allowing me to participate in the thyroid care plan for this patient.    Wilman Crow MD, [facility], [facility]  01/31/22    CC:   DANIELE Resendiz

## 2022-02-03 ENCOUNTER — HOSPITAL ENCOUNTER (OUTPATIENT)
Dept: LAB | Facility: MEDICAL CENTER | Age: 27
End: 2022-02-03
Attending: INTERNAL MEDICINE
Payer: COMMERCIAL

## 2022-02-03 DIAGNOSIS — R79.89 ABNORMAL THYROID BLOOD TEST: ICD-10-CM

## 2022-02-03 DIAGNOSIS — E88.819 INSULIN RESISTANCE: ICD-10-CM

## 2022-02-03 DIAGNOSIS — E28.2 PCOS (POLYCYSTIC OVARIAN SYNDROME): ICD-10-CM

## 2022-02-03 DIAGNOSIS — E55.9 VITAMIN D DEFICIENCY: ICD-10-CM

## 2022-02-03 LAB
ALBUMIN SERPL BCP-MCNC: 3.9 G/DL (ref 3.2–4.9)
ALBUMIN/GLOB SERPL: 1.3 G/DL
ALP SERPL-CCNC: 74 U/L (ref 30–99)
ALT SERPL-CCNC: 25 U/L (ref 2–50)
ANION GAP SERPL CALC-SCNC: 10 MMOL/L (ref 7–16)
AST SERPL-CCNC: 18 U/L (ref 12–45)
BILIRUB SERPL-MCNC: 0.3 MG/DL (ref 0.1–1.5)
BUN SERPL-MCNC: 11 MG/DL (ref 8–22)
CALCIUM SERPL-MCNC: 8.5 MG/DL (ref 8.4–10.2)
CHLORIDE SERPL-SCNC: 109 MMOL/L (ref 96–112)
CO2 SERPL-SCNC: 22 MMOL/L (ref 20–33)
CREAT SERPL-MCNC: 0.58 MG/DL (ref 0.5–1.4)
EST. AVERAGE GLUCOSE BLD GHB EST-MCNC: 105 MG/DL
FASTING STATUS PATIENT QL REPORTED: NORMAL
FSH SERPL-ACNC: 8.1 MIU/ML
GLOBULIN SER CALC-MCNC: 3 G/DL (ref 1.9–3.5)
GLUCOSE SERPL-MCNC: 87 MG/DL (ref 65–99)
HBA1C MFR BLD: 5.3 % (ref 4–5.6)
LH SERPL-ACNC: 8.1 IU/L
POTASSIUM SERPL-SCNC: 4.4 MMOL/L (ref 3.6–5.5)
PROT SERPL-MCNC: 6.9 G/DL (ref 6–8.2)
SODIUM SERPL-SCNC: 141 MMOL/L (ref 135–145)
T4 FREE SERPL-MCNC: 1.1 NG/DL (ref 0.93–1.7)
TSH SERPL DL<=0.005 MIU/L-ACNC: 1.35 UIU/ML (ref 0.38–5.33)

## 2022-02-03 PROCEDURE — 84402 ASSAY OF FREE TESTOSTERONE: CPT

## 2022-02-03 PROCEDURE — 36415 COLL VENOUS BLD VENIPUNCTURE: CPT

## 2022-02-03 PROCEDURE — 83036 HEMOGLOBIN GLYCOSYLATED A1C: CPT

## 2022-02-03 PROCEDURE — 84403 ASSAY OF TOTAL TESTOSTERONE: CPT

## 2022-02-03 PROCEDURE — 84443 ASSAY THYROID STIM HORMONE: CPT

## 2022-02-03 PROCEDURE — 83001 ASSAY OF GONADOTROPIN (FSH): CPT

## 2022-02-03 PROCEDURE — 84439 ASSAY OF FREE THYROXINE: CPT

## 2022-02-03 PROCEDURE — 83525 ASSAY OF INSULIN: CPT

## 2022-02-03 PROCEDURE — 83002 ASSAY OF GONADOTROPIN (LH): CPT

## 2022-02-03 PROCEDURE — 83498 ASY HYDROXYPROGESTERONE 17-D: CPT

## 2022-02-03 PROCEDURE — 84270 ASSAY OF SEX HORMONE GLOBUL: CPT

## 2022-02-03 PROCEDURE — 80053 COMPREHEN METABOLIC PANEL: CPT

## 2022-02-05 LAB — INSULIN P FAST SERPL-ACNC: 17 UIU/ML (ref 3–25)

## 2022-02-07 LAB
17OHP SERPL-MCNC: 11.06 NG/DL
T4 FREE SERPL DIALY-MCNC: 1.3 NG/DL (ref 1.1–2.4)

## 2022-02-08 LAB
SHBG SERPL-SCNC: 181 NMOL/L (ref 30–135)
TESTOST FREE SERPL-MCNC: 1.5 PG/ML (ref 0.8–7.4)
TESTOST SERPL-MCNC: 31 NG/DL (ref 9–55)

## 2022-02-14 DIAGNOSIS — F33.1 MODERATE EPISODE OF RECURRENT MAJOR DEPRESSIVE DISORDER (HCC): ICD-10-CM

## 2022-02-14 DIAGNOSIS — F41.1 GENERALIZED ANXIETY DISORDER: ICD-10-CM

## 2022-02-15 RX ORDER — TRAZODONE HYDROCHLORIDE 50 MG/1
TABLET ORAL
Qty: 90 TABLET | Refills: 1 | Status: SHIPPED | OUTPATIENT
Start: 2022-02-15 | End: 2022-06-01

## 2022-02-15 RX ORDER — SERTRALINE HYDROCHLORIDE 100 MG/1
100 TABLET, FILM COATED ORAL DAILY
Qty: 90 TABLET | Refills: 1 | Status: SHIPPED | OUTPATIENT
Start: 2022-02-15 | End: 2022-06-01 | Stop reason: SDUPTHER

## 2022-02-22 ENCOUNTER — OFFICE VISIT (OUTPATIENT)
Dept: BEHAVIORAL HEALTH | Facility: CLINIC | Age: 27
End: 2022-02-22

## 2022-02-22 ENCOUNTER — APPOINTMENT (OUTPATIENT)
Dept: BEHAVIORAL HEALTH | Facility: CLINIC | Age: 27
End: 2022-02-22
Payer: COMMERCIAL

## 2022-02-22 DIAGNOSIS — F41.1 GENERALIZED ANXIETY DISORDER: ICD-10-CM

## 2022-02-22 DIAGNOSIS — F33.1 MODERATE EPISODE OF RECURRENT MAJOR DEPRESSIVE DISORDER (HCC): ICD-10-CM

## 2022-02-22 PROCEDURE — 99999 PR NO CHARGE: CPT | Performed by: SOCIAL WORKER

## 2022-02-22 NOTE — NON-PROVIDER
"[facility]   Therapy Progress Note      This visit was conducted via Zoom using secure and encrypted videoconferencing technology.  The patient was in a private location in the [facility].  The patient's identity was confirmed and verbal consent was obtained for this virtual visit.    This note was entered in wrong format. Correct format is also found for this date.      Name: Diana Hill  MRN: [id number]  : 1995  Age: 26 y.o.  Date of assessment: 2022  PCP: DANIELE Resendiz  Persons in attendance: Patient  Total session time: 45 minutes    Objective Observations:   Participation:Active verbal participation   Grooming:Casual   Cognition:Alert   Eye Contact:Good   Mood:Depressed   Affect:Sad and Tearful   Thought Process:Goal-directed   Speech:Rate within normal limits and Volume within normal limits    Current Risk:   Suicide: not indicated   Homicide: not indicated   Self-Harm: not indicated   Relapse: not indicated   Safety Plan Reviewed: not applicable    Topics addressed in psychotherapy include: Diana was 20 minutes late as she reports that she got lost. She reports things at home are not going well.  Because her mother no longer wants to deal with her , Diana has becomes the parent in the home, making meals for her brothers although admits sometimes they are not full meals. Her father quit his job that was well paying and has not worked consistently since then. His most recent job he worked only 2 days. She describes her father as 'he use to be a functional drunk, but now all he does is drink from the moment he wakes until he goes to bed.\" He is not contributing to the family and they are in debt.    She became tearful as she described she does not have the funds and even if she did how would she leave her brothers,' they would just come with me and my mom\". Her oldest brother is living with his own family so not really apart of what is currently happening. " Encouraged that they set down as a family (her brothers and mom and herself) and make a plan as to how they want to handle the situation. She reports her father says he wants to do better but does nothing, He finds fault with every program they have taken him to and has began to steal money from them.    Care Plan Updated: No    Does patient express agreement with the above plan? Yes     Diagnosis:  1. Generalized anxiety disorder    2. Moderate episode of recurrent major depressive disorder (HCC)        Referral appointment(s) scheduled? Yes      Clyde Minaya L.C.S.W.

## 2022-02-23 NOTE — PROGRESS NOTES
"[facility]   Therapy Progress Note        Name: Diana Hill  MRN: [id number]  : 1995  Age: 26 y.o.  Date of assessment: 2022  PCP: DANIELE Resendiz  Persons in attendance: Patient  Total session time: 45 minutes      Topics addressed in psychotherapy include: Diana was 20 minutes late as she reports that she got lost. She reports things at home are not going well.  Because her mother no longer wants to deal with her , Diana has becomes the parent in the home, making meals for her brothers although admits sometimes they are not full meals. Her father quit his job that was well paying and has not worked consistently since then. His most recent job he worked only 2 days. She describes her father as 'he use to be a functional drunk, but now all he does is drink from the moment he wakes until he goes to bed.\" He is not contributing to the family and they are in debt.    She became tearful as she described she does not have the funds and even if she did how would she leave her brothers,' they would just come with me and my mom\". Her oldest brother is living with his own family so not really apart of what is currently happening. Encouraged that they set down as a family (her brothers and mom and herself) and make a plan as to how they want to handle the situation. She reports her father says he wants to do better but does nothing, He finds fault with every program they have taken him to and has began to steal money from them.    Objective Observations:   Participation:Active verbal participation   Grooming:Neat   Cognition:Alert   Eye Contact:Good   Mood:Depressed   Affect:Sad and Tearful   Thought Process:Goal-directed   Speech:Rate within normal limits and Volume within normal limits    Current Risk:   Suicide: not indicated   Homicide: not indicated   Self-Harm: not indicated   Relapse: not indicated   Safety Plan Reviewed: not applicable    Care Plan Updated: No    Does " patient express agreement with the above plan? Yes     Diagnosis:  1. Generalized anxiety disorder    2. Moderate episode of recurrent major depressive disorder (HCC)        Referral appointment(s) scheduled? Yes       Clyde Minaya L.C.S.W.

## 2022-03-08 ENCOUNTER — APPOINTMENT (OUTPATIENT)
Dept: BEHAVIORAL HEALTH | Facility: CLINIC | Age: 27
End: 2022-03-08
Payer: COMMERCIAL

## 2022-04-26 ENCOUNTER — OFFICE VISIT (OUTPATIENT)
Dept: MEDICAL GROUP | Facility: PHYSICIAN GROUP | Age: 27
End: 2022-04-26
Payer: COMMERCIAL

## 2022-04-26 VITALS
DIASTOLIC BLOOD PRESSURE: 70 MMHG | WEIGHT: 235.6 LBS | RESPIRATION RATE: 16 BRPM | SYSTOLIC BLOOD PRESSURE: 122 MMHG | TEMPERATURE: 97.2 F | OXYGEN SATURATION: 97 % | BODY MASS INDEX: 36.98 KG/M2 | HEIGHT: 67 IN | HEART RATE: 88 BPM

## 2022-04-26 DIAGNOSIS — R10.9 BILATERAL FLANK PAIN: ICD-10-CM

## 2022-04-26 DIAGNOSIS — R35.0 URINARY FREQUENCY: ICD-10-CM

## 2022-04-26 LAB
APPEARANCE UR: NORMAL
BILIRUB UR STRIP-MCNC: NORMAL MG/DL
COLOR UR AUTO: NORMAL
GLUCOSE UR STRIP.AUTO-MCNC: NEGATIVE MG/DL
KETONES UR STRIP.AUTO-MCNC: NEGATIVE MG/DL
LEUKOCYTE ESTERASE UR QL STRIP.AUTO: NEGATIVE
NITRITE UR QL STRIP.AUTO: NEGATIVE
PH UR STRIP.AUTO: 5 [PH] (ref 5–8)
PROT UR QL STRIP: NEGATIVE MG/DL
RBC UR QL AUTO: NEGATIVE
SP GR UR STRIP.AUTO: 1.03
UROBILINOGEN UR STRIP-MCNC: 0.2 MG/DL

## 2022-04-26 PROCEDURE — 81002 URINALYSIS NONAUTO W/O SCOPE: CPT | Performed by: NURSE PRACTITIONER

## 2022-04-26 PROCEDURE — 99214 OFFICE O/P EST MOD 30 MIN: CPT | Performed by: NURSE PRACTITIONER

## 2022-04-26 RX ORDER — TOPIRAMATE 50 MG/1
TABLET, FILM COATED ORAL
COMMUNITY
Start: 2022-03-29 | End: 2022-04-26

## 2022-04-26 ASSESSMENT — FIBROSIS 4 INDEX: FIB4 SCORE: 0.32

## 2022-04-26 NOTE — PROGRESS NOTES
Chief Complaint   Patient presents with   • Flank Pain     Mostly R side, comes and goes        HISTORY OF PRESENT ILLNESS: Patient is a 26 y.o. female, established patient who presents today to discuss medical problems as listed below:    Health Maintenance:  COMPLETED     Bilateral flank pain  New problem.  Right flank pain started 2 weeks ago, gradual onset.  Now experiencing pain in the left flank. Pain is intermittent, worse laying on L side, no pain at rest. No rashes or bruising.  More pain on the right side.  Originally thought it was of musculoskeletal etiology.  No falls, no aggressive exercise.  No LUTs, except for urinary frequency.  Denies pelvic pain, urinary urgency, no hematuria, pyuria or fever.  Patient is well-hydrated.  No history of diabetes with A1c at 5.3 from 2/3/2022.  No history of UTIs or kidney stones. Nausea started 2 days ago, improved now.   Vitals WNL, POCT UA normal except for small bilirubin.      Patient Active Problem List    Diagnosis Date Noted   • Bilateral flank pain 04/26/2022   • Generalized anxiety disorder 11/17/2021   • Moderate episode of recurrent major depressive disorder (HCC) 11/17/2021   • Plantar wart, left foot 10/21/2021   • Medication management 10/21/2021   • Chronic fatigue syndrome 08/17/2021   • Intractable migraine with aura without status migrainosus 08/10/2021   • Obstructive sleep apnea syndrome 08/10/2021   • Vestibular migraine 08/10/2021   • Otalgia of left ear 04/27/2021   • History of canker sores 04/27/2021   • Polyarthralgia 03/25/2021   • Brittle nails 03/25/2021   • Cervical lymphadenopathy 12/29/2020   • Subclinical hypothyroidism 11/23/2020   • Insulin resistance 11/23/2020   • Anemia 11/23/2020   • Morbid obesity (HCC) 10/07/2020   • Skin rash 09/15/2020   • Administrative encounter 09/15/2020   • Hospital discharge follow-up 09/02/2020   • Rash of hands 08/12/2020   • Snoring 08/12/2020   • Vertigo 03/03/2020   • PCOS (polycystic ovarian  syndrome) 11/19/2019   • Vitamin D deficiency 11/19/2019   • High triglycerides 11/19/2019        Allergies: Gluten meal    Current Outpatient Medications   Medication Sig Dispense Refill   • sertraline (ZOLOFT) 100 MG Tab TAKE 1 TABLET BY MOUTH EVERY DAY 90 Tablet 1   • traZODone (DESYREL) 50 MG Tab TAKE 1 TABLET BY MOUTH EVERY DAY IN THE EVENING 90 Tablet 1   • tretinoin (RETIN-A) 0.025 % gel APPLY 1 APPLICATION TOPICALLY EVERY BEDTIME. START WITH 2 -3 APPS PER WEEK AND WORK UP AS TOLERATED 45 g 0   • topiramate (TOPAMAX) 100 MG Tab Take 100 mg by mouth 2 times a day.     • Magnesium 500 MG Tab 1 tablet with a meal Orally Once a day for 30 day(s)     • Norgestim-Eth Estrad Triphasic 0.18/0.215/0.25 MG-25 MCG Tab Take 1 Tablet by mouth every day.     • Selenium 200 MCG Cap Take  by mouth.     • metFORMIN ER (GLUCOPHAGE XR) 500 MG TABLET SR 24 HR Take 2 Tablets by mouth 2 times a day. 360 Tablet 2   • B Complex Vitamins (VITAMIN-B COMPLEX PO) Take  by mouth.     • tacrolimus (PROTOPIC) 0.1 % Ointment Apply 1 Application topically 2 times a day. To affected area on hands and arms 60 g 2   • Clindamycin Phos-Benzoyl Perox (ONEXTON) 1.2-3.75 % Gel Apply 1 Application topically every day. 50 g 3   • VITAMIN D PO Take 6,000 Units by mouth every day.     • albuterol 108 (90 Base) MCG/ACT Aero Soln inhalation aerosol Inhale 2 Puffs by mouth every four hours as needed for Shortness of Breath. 1 Inhaler 0     No current facility-administered medications for this visit.       Social History     Tobacco Use   • Smoking status: Never Smoker   • Smokeless tobacco: Never Used   Vaping Use   • Vaping Use: Never used   Substance Use Topics   • Alcohol use: Yes     Comment: occasionally   • Drug use: No     Social History     Social History Narrative   • Not on file       Family History   Problem Relation Age of Onset   • Thyroid Mother    • Depression Father    • Alcohol abuse Father    • No Known Problems Maternal Grandmother    •  "Heart Attack Maternal Grandfather 66   • Cancer Paternal Grandmother         Breast , esophageal   • Stroke Paternal Grandmother 66   • Alcohol abuse Paternal Grandfather    • Lung Disease Paternal Grandfather    • Sleep Apnea Brother        Allergies, past medical history, past surgical history, family history, social history reviewed and updated.    Review of Systems:     - Constitutional: Negative for fever, chills, unexpected weight change, and fatigue/generalized weakness.     - Respiratory: Negative for cough, sputum production, chest congestion, dyspnea, wheezing, and crackles.      - Cardiovascular: Negative for chest pain, palpitations, orthopnea, and bilateral lower extremity edema.     - Genitourinary: Bilateral flank pain, right worse than left, urinary frequency.  Negative for dysuria, polyuria, hematuria, pyuria, urinary urgency, and urinary incontinence.    - Musculoskeletal: Bilateral flank pains.  Negative for myalgias, back pain, and joint pain.     - Psychiatric/Behavioral: Negative for depression, suicidal/homicidal ideation and memory loss.      All other systems reviewed and are negative    Exam:    /70   Pulse 88   Temp 36.2 °C (97.2 °F) (Temporal)   Resp 16   Ht 1.702 m (5' 7\")   Wt 107 kg (235 lb 9.6 oz)   SpO2 97%   BMI 36.90 kg/m²  Body mass index is 36.9 kg/m².    Physical Exam:  Constitutional: Well-developed and well-nourished. Not diaphoretic. No distress.   Cardiovascular: Regular rate and rhythm, S1 and S2 without murmur, rubs, or gallops.    Chest: Effort normal. Clear to auscultation throughout. No adventitious sounds. CVA tenderness noted on palpation.  : Negative for dysuria, polyuria, hematuria, pyuria, urinary urgency, and urinary incontinence.  Musculoskeletal: Range of motion intact in lumbar spine.  All major joints AROM full in all directions without pain.  Neurological: Alert and oriented x 3.   Psychiatric:  Behavior, mood, and affect are " appropriate.  MA/nursing note and vitals reviewed.    Assessment/Plan:  1. Bilateral flank pain  Will obtain renal ultrasound,   If negative likely musculoskeletal in etiology.  Trial of OTC Voltaren gel, warm packs to the site and gentle stretching if tolerating.  We will follow-up in 1 to 2 weeks.  - POCT Urinalysis  - US-RENAL; Future    2. Urinary frequency  Urinalysis is negative.  - POCT Urinalysis  - US-RENAL; Future       Discussed with patient possible alternative diagnoses, pt is to take all medications as prescribed. If symptoms persist FU w/PCP, if symptoms worsen go to emergency room. If experiencing any side effects from prescribed medications reports to the office immediately or go to emergency room.  Reviewed indication, dosage, usage and potential adverse effects of prescribed medications. Reviewed risks and benefits of treatment plan. Patient verbalizes understanding of all instruction and verbally agrees to plan.    No follow-ups on file. 1-2 wks

## 2022-04-26 NOTE — ASSESSMENT & PLAN NOTE
New problem.  Right flank pain started 2 weeks ago, gradual onset.  Now experiencing pain in the left flank. Pain is intermittent, worse laying on L side, no pain at rest. No rashes or bruising.  More pain on the right side.  Originally thought it was of musculoskeletal etiology.  No falls, no aggressive exercise.  No LUTs, except for urinary frequency.  Denies pelvic pain, urinary urgency, no hematuria, pyuria or fever.  Patient is well-hydrated.  No history of diabetes with A1c at 5.3 from 2/3/2022.  No history of UTIs or kidney stones. Nausea started 2 days ago, improved now.   Vitals WNL, POCT UA normal except for small bilirubin.

## 2022-06-01 ENCOUNTER — TELEMEDICINE (OUTPATIENT)
Dept: BEHAVIORAL HEALTH | Facility: CLINIC | Age: 27
End: 2022-06-01
Payer: COMMERCIAL

## 2022-06-01 DIAGNOSIS — F33.1 MODERATE EPISODE OF RECURRENT MAJOR DEPRESSIVE DISORDER (HCC): ICD-10-CM

## 2022-06-01 DIAGNOSIS — F41.1 GENERALIZED ANXIETY DISORDER: ICD-10-CM

## 2022-06-01 PROCEDURE — 99214 OFFICE O/P EST MOD 30 MIN: CPT | Mod: 95,GC | Performed by: PSYCHIATRY & NEUROLOGY

## 2022-06-01 RX ORDER — TRAZODONE HYDROCHLORIDE 100 MG/1
100 TABLET ORAL NIGHTLY
Qty: 30 TABLET | Refills: 1 | Status: SHIPPED | OUTPATIENT
Start: 2022-06-01 | End: 2022-07-06

## 2022-06-01 RX ORDER — SERTRALINE HYDROCHLORIDE 100 MG/1
TABLET, FILM COATED ORAL
Qty: 53 TABLET | Refills: 1 | Status: SHIPPED | OUTPATIENT
Start: 2022-06-01 | End: 2022-07-06

## 2022-06-01 NOTE — PROGRESS NOTES
"[facility]  PSYCHIATRIC FOLLOW-UP NOTE    Persons in attendance: Patient      Reason for visit / chief complaint:   26 year old female with history of major depressive disorder and generalized anxiety disorder presenting for medication management. Sertraline was increased to 100mg PO daily at previus appointment.     \"My anxiety has been high\"    SUBJECTIVE / HPI:  Patient states she has had increased anxiety. She notes consistent feeling of being on edge and that something bad will happen. Patient states she often has to rearrange things or clean until she internally feels \"right\". She states this is often distressing and time consuming.  Patient notes improvement in depressed mood and anhedonia. She has had difficulty falling asleep, but has been sleeping well through the night. She denies changes in appetite or energy. She denies suicidal ideation, plan or intent.      OBJECTIVE:    MSE :   Appearance: well groomed, appropriate    Behavior: calm, cooperative, pleasant, engaged. good eye contact.  No tics. No mannerisms.   Speech : normal rate, normal volume, normal tone   Language: normal vocabulary   Mood: anxious    Affect: euthymic   Thought Process: linear, coherent, goal-directed. No flight of ideas.  No loose associations   Thought Content: no auditory or visual hallucinations  and thoughts of wishing she were dead without plan or intent   Attention/Concentration: appropriate    Memory: no gross deficits   Orientation: oriented to person, place, situation   Neurological: Deferred   Fund of Knowledge: appropriate   Insight/Judgment: good / good    Allergies   Allergen Reactions   • Gluten Meal            PAST PSYCHIATRIC HISTORY  Prior psychiatric hospitalization: denies  Prior Self harm/suicide attempt: denies  Prior Diagnosis: depression     PAST PSYCHIATRIC MEDICATIONS  · Prozac - increased suicidal ideation  · Paxil - took as teenager, felt symptoms of serotonin syndrome were present with " combination of Wellbutrin  · Amitriptyline - took as teenager, states symptoms of serotonin syndrome      FAMILY HISTORY  Psychiatric diagnosis:  Paternal history of depression and anxiety, brother anxiety   Substance abuse history:  Paternal family history alcohol use disorder      SUBSTANCE USE HISTORY:  ALCOHOL: 1-2 times per year  TOBACCO: denies  CANNABIS: denies  OPIOIDS: denies  PRESCRIPTION MEDICATIONS: denies  OTHERS: denies  History of inpatient/outpatient rehab treatment: n/a     SOCIAL HISTORY  Childhood: born in [place]  and describes childhood as okay. Patient states close relationship with her mother and brothers. States strained relationship with father due to alcohol use   Education: currently taking pre-requisite courses for nursing school   Employment: works at [employer]  Current living situation: lives with family   History of emotional/physical/sexual abuse - denies    Review of systems:        Constitutional negative   Eyes negative   Ears/Nose/Mouth/Throat negative   Cardiovascular negative   Respiratory negative   Gastrointestinal negative   Genitourinary negative   Muscular negative   Integumentary negative   Neurological negative   Endocrine negative   Hematologic/Lymphatic negative       Medical Records/Labs/Diagnostic Tests Reviewed:   No new records for review        Current Outpatient Medications:   •  sertraline, Take 1.5 Tablets by mouth every day for 15 days, THEN 2 Tablets every day for 15 days.  •  traZODone, 100 mg, Oral, Nightly  •  metFORMIN ER, 1,000 mg, Oral, BID  •  tretinoin, APPLY 1 APPLICATION TOPICALLY EVERY BEDTIME. START WITH 2 -3 APPS PER WEEK AND WORK UP AS TOLERATED  •  topiramate, 100 mg, Oral, BID  •  Magnesium, 1 tablet with a meal Orally Once a day for 30 day(s)  •  Norgestim-Eth Estrad Triphasic, 1 Tablet, Oral, DAILY  •  Selenium, Take  by mouth.  •  B Complex Vitamins (VITAMIN-B COMPLEX PO), Take  by mouth.  •  tacrolimus, 1 Application, Topical, BID  •   Onexton, 1 Application, Apply externally, DAILY  •  VITAMIN D PO, 6,000 Units, Oral, DAILY  •  albuterol, 2 Puff, Inhalation, Q4HRS PRN           ASSESSMENT:26 year old female with history of generalized anxiety disorder and major depressive disorder presenting for medication management. Patient notes improvement in mood with continued elevation in anxiety. Patient has obsessive nature to anxiety with need for changes in order or placement of items until she feels it is correct. Discussed increase in Sertraline to aid with this.    DDX:  1. Generalized Anxiety Disorder  2. Major Depressive Disorder, recurrent, moderate  3. R/O OCD      PLAN:  - Increase Sertraline 150mg PO daily for two weeks, then 200mg PO daily  -Increase Trazodone 100mg PO at bedtime as needed for sleep  -Medication options, alternatives (including no medications) and medication risks/benefits/side effects were discussed in detail.  -The patient was advised to call, message provider on [facility], or come in to the clinic if symptoms worsen or if any future questions/issues regarding their medications arise; the patient verbalized understanding and agreement.    -The patient was educated to call 911, call the suicide hotline, or go to local ER if having thoughts of suicide or homicide; verbalized understanding.            - Medical Records/Labs/Diagnostic Tests Ordered: None, lab orders pending       Yari Presley DO

## 2022-06-03 DIAGNOSIS — Z83.2 FAMILY HISTORY OF CLOTTING DISORDER: ICD-10-CM

## 2022-06-03 NOTE — PROGRESS NOTES
Mother with history of Antithrombin deficiency, following with hematologist.  Advised to test patient for potentially hereditary condition.  Patient without history of DVTs.  Currently on OCP with estrogen.

## 2022-06-13 ENCOUNTER — HOSPITAL ENCOUNTER (OUTPATIENT)
Dept: RADIOLOGY | Facility: MEDICAL CENTER | Age: 27
End: 2022-06-13
Attending: NURSE PRACTITIONER
Payer: COMMERCIAL

## 2022-06-13 DIAGNOSIS — R10.9 BILATERAL FLANK PAIN: ICD-10-CM

## 2022-06-13 DIAGNOSIS — R35.0 URINARY FREQUENCY: ICD-10-CM

## 2022-06-13 PROCEDURE — 76775 US EXAM ABDO BACK WALL LIM: CPT

## 2022-06-15 ENCOUNTER — OFFICE VISIT (OUTPATIENT)
Dept: DERMATOLOGY | Facility: IMAGING CENTER | Age: 27
End: 2022-06-15
Payer: COMMERCIAL

## 2022-06-15 DIAGNOSIS — L30.9 HAND ECZEMA: ICD-10-CM

## 2022-06-15 DIAGNOSIS — L08.9 SKIN INFECTION: ICD-10-CM

## 2022-06-15 PROCEDURE — 99214 OFFICE O/P EST MOD 30 MIN: CPT | Performed by: NURSE PRACTITIONER

## 2022-06-15 RX ORDER — DOXYCYCLINE HYCLATE 100 MG
100 TABLET ORAL 2 TIMES DAILY
Qty: 14 TABLET | Refills: 0 | Status: SHIPPED | OUTPATIENT
Start: 2022-06-15 | End: 2022-07-15

## 2022-06-15 RX ORDER — BETAMETHASONE DIPROPIONATE 0.05 %
OINTMENT (GRAM) TOPICAL
Qty: 50 G | Refills: 3 | Status: SHIPPED | OUTPATIENT
Start: 2022-06-15 | End: 2022-07-15

## 2022-06-15 RX ORDER — DUPILUMAB 300 MG/2ML
300 INJECTION, SOLUTION SUBCUTANEOUS ONCE
Qty: 6 EACH | Refills: 1 | Status: CANCELLED | OUTPATIENT
Start: 2022-06-15 | End: 2022-06-15

## 2022-06-15 RX ORDER — TACROLIMUS 1 MG/G
OINTMENT TOPICAL
Qty: 60 G | Refills: 3 | Status: SHIPPED | OUTPATIENT
Start: 2022-06-15 | End: 2022-06-16

## 2022-06-15 NOTE — PROGRESS NOTES
DERMATOLOGY NOTE  FOLLOW UP VISIT       Chief complaint:  Follow up      Flare up rash on bilateral hands . itchy ness irritated, pain and purulent drainage and crust to several proximal nail folds    From Previous Note:  HPI:Rash b/l hands  Onset: 12/2020  Symptoms: blistering, cracking skin on both hands  Aggravating factors: Soaps  Alleviating factors: oral steroids  New creams/topicals: No  New medications (up to last 6 months): No  New travel: No  Other exposures: No  Treatments: [place]es, Dermaced        Allergies   Allergen Reactions   • Gluten Meal         MEDICATIONS:  Medications relevant to specialty reviewed.     REVIEW OF SYSTEMS:   Positive for skin (see HPI)  Negative for fevers and chills       EXAM:  There were no vitals taken for this visit.  Constitutional: Well-developed, well-nourished, and in no distress.     A focused skin exam was performed including the affected areas of the bilateral hands. Notable findings on exam today listed below and/or in assessment/plan.     Dry cracked finger tips on several fingers of bilateral hands with several areas of eczematous rash on lateral aspect of fingers and volar aspect of bilateral hands  Dry yellowish crusts to proximal nail folds on several fingers    IMPRESSION / PLAN:    1. Hand eczema  Discussed course/nature of disease and usual treatment recommended  May need to consider biologic in future--such as Dupixent  Will empirically treat for paronychia to several fingers  Stressed importance of moisturizing hands after washing and submersion in water  Rx's below--to alternate between the 2 every 2 weeks    - betamethasone dipropionate (DIPROLENE) 0.05 % Ointment; AAA twice a day for 2 weeks at a time, alternating with tacrolimus every 2 weeks  Dispense: 50 g; Refill: 3  - tacrolimus (PROTOPIC) 0.1 % Ointment; AAA twice a day for 2 weeks, alternating every 2 weeks with betamethasone ointment  Dispense: 60 g; Refill: 3    2. Skin infection  A above  -  doxycycline (VIBRAMYCIN) 100 MG Tab; Take 1 Tablet by mouth 2 times a day.  Dispense: 14 Tablet; Refill: 0        Discussed risks, benefits, alternative treatments as well as common side effects associated with prescribed treatment, Patient verbalized understanding and agrees with plan regarding the above                 Please note that this dictation was created using voice recognition software. I have made every reasonable attempt to correct obvious errors, but I expect that there are errors of grammar and possibly content that I did not discover before finalizing the note.      Return to clinic in: Return for 3-4 weeks, eczema follow up. and as needed for any new or changing skin lesions.

## 2022-06-16 ENCOUNTER — OFFICE VISIT (OUTPATIENT)
Dept: MEDICAL GROUP | Facility: PHYSICIAN GROUP | Age: 27
End: 2022-06-16
Payer: COMMERCIAL

## 2022-06-16 VITALS
TEMPERATURE: 97.8 F | OXYGEN SATURATION: 98 % | RESPIRATION RATE: 14 BRPM | HEART RATE: 91 BPM | DIASTOLIC BLOOD PRESSURE: 70 MMHG | SYSTOLIC BLOOD PRESSURE: 124 MMHG | BODY MASS INDEX: 36.73 KG/M2 | HEIGHT: 67 IN | WEIGHT: 234 LBS

## 2022-06-16 DIAGNOSIS — R19.7 DIARRHEA, UNSPECIFIED TYPE: ICD-10-CM

## 2022-06-16 DIAGNOSIS — N63.0 BREAST LUMP IN FEMALE: ICD-10-CM

## 2022-06-16 DIAGNOSIS — R10.9 BILATERAL FLANK PAIN: ICD-10-CM

## 2022-06-16 PROCEDURE — 99214 OFFICE O/P EST MOD 30 MIN: CPT | Performed by: NURSE PRACTITIONER

## 2022-06-16 ASSESSMENT — FIBROSIS 4 INDEX: FIB4 SCORE: 0.32

## 2022-06-16 NOTE — PROGRESS NOTES
Chief Complaint   Patient presents with   • Breast Mass     L breast towards armpit x beginning of June        HISTORY OF PRESENT ILLNESS: Patient is a 26 y.o. female, established patient who presents today to discuss medical problems as listed below:    Health Maintenance:  COMPLETED     Bilateral flank pain  US renal results from 6/13/22  IMPRESSION:     1.  No hydronephrosis identified  2.  No renal stones noted  3.  There is a 4.3 mm echogenic focus mid superior pole right kidney which may represent a small angiomyolipoma. Would suggest 6 month right renal ultrasound follow-up to ensure stability    Has been evaluated by GI.    History of celiac disease.  Has occasional intermittent right flank pain.  Has chronic diarrhea, has IBS.  Wanting referral to gastroenterologist.        Breast lump in female  New problem.  Patient reports noting lump at 1:00 of left breast and to me.  Family history of breast cancer in paternal mother in her 40s.  Patient reports lumpy breasts.  Denies nipple inversion, dimples, no lactation or discoloration.  No fevers or weight change.      Patient Active Problem List    Diagnosis Date Noted   • Breast lump in female 06/16/2022   • Bilateral flank pain 04/26/2022   • Generalized anxiety disorder 11/17/2021   • Moderate episode of recurrent major depressive disorder (HCC) 11/17/2021   • Plantar wart, left foot 10/21/2021   • Medication management 10/21/2021   • Chronic fatigue syndrome 08/17/2021   • Intractable migraine with aura without status migrainosus 08/10/2021   • Obstructive sleep apnea syndrome 08/10/2021   • Vestibular migraine 08/10/2021   • Otalgia of left ear 04/27/2021   • History of canker sores 04/27/2021   • Polyarthralgia 03/25/2021   • Brittle nails 03/25/2021   • Cervical lymphadenopathy 12/29/2020   • Subclinical hypothyroidism 11/23/2020   • Insulin resistance 11/23/2020   • Anemia 11/23/2020   • Morbid obesity (HCC) 10/07/2020   • Skin rash 09/15/2020   •  Administrative encounter 09/15/2020   • Hospital discharge follow-up 09/02/2020   • Rash of hands 08/12/2020   • Snoring 08/12/2020   • Vertigo 03/03/2020   • PCOS (polycystic ovarian syndrome) 11/19/2019   • Vitamin D deficiency 11/19/2019   • High triglycerides 11/19/2019        Allergies: Gluten meal    Current Outpatient Medications   Medication Sig Dispense Refill   • betamethasone dipropionate (DIPROLENE) 0.05 % Ointment AAA twice a day for 2 weeks at a time, alternating with tacrolimus every 2 weeks 50 g 3   • doxycycline (VIBRAMYCIN) 100 MG Tab Take 1 Tablet by mouth 2 times a day. 14 Tablet 0   • sertraline (ZOLOFT) 100 MG Tab Take 1.5 Tablets by mouth every day for 15 days, THEN 2 Tablets every day for 15 days. 53 Tablet 1   • traZODone (DESYREL) 100 MG Tab Take 1 Tablet by mouth every evening. 30 Tablet 1   • metFORMIN ER (GLUCOPHAGE XR) 500 MG TABLET SR 24 HR TAKE 2 TABLETS BY MOUTH 2 TIMES A DAY. 360 Tablet 0   • tretinoin (RETIN-A) 0.025 % gel APPLY 1 APPLICATION TOPICALLY EVERY BEDTIME. START WITH 2 -3 APPS PER WEEK AND WORK UP AS TOLERATED 45 g 0   • topiramate (TOPAMAX) 100 MG Tab Take 100 mg by mouth 2 times a day.     • Magnesium 500 MG Tab 1 tablet with a meal Orally Once a day for 30 day(s)     • Norgestim-Eth Estrad Triphasic 0.18/0.215/0.25 MG-25 MCG Tab Take 1 Tablet by mouth every day.     • Selenium 200 MCG Cap Take  by mouth.     • B Complex Vitamins (VITAMIN-B COMPLEX PO) Take  by mouth.     • Clindamycin Phos-Benzoyl Perox (ONEXTON) 1.2-3.75 % Gel Apply 1 Application topically every day. 50 g 3   • VITAMIN D PO Take 6,000 Units by mouth every day.     • albuterol 108 (90 Base) MCG/ACT Aero Soln inhalation aerosol Inhale 2 Puffs by mouth every four hours as needed for Shortness of Breath. 1 Inhaler 0     No current facility-administered medications for this visit.       Social History     Tobacco Use   • Smoking status: Never Smoker   • Smokeless tobacco: Never Used   Vaping Use   •  "Vaping Use: Never used   Substance Use Topics   • Alcohol use: Yes     Comment: occasionally   • Drug use: No     Social History     Social History Narrative   • Not on file       Family History   Problem Relation Age of Onset   • Thyroid Mother    • Depression Father    • Alcohol abuse Father    • No Known Problems Maternal Grandmother    • Heart Attack Maternal Grandfather 66   • Cancer Paternal Grandmother         Breast , esophageal   • Stroke Paternal Grandmother 66   • Alcohol abuse Paternal Grandfather    • Lung Disease Paternal Grandfather    • Sleep Apnea Brother        Allergies, past medical history, past surgical history, family history, social history reviewed and updated.    Review of Systems:     - Constitutional: Negative for fever, chills, unexpected weight change, and fatigue/generalized weakness.     - Respiratory: Negative for cough, sputum production, chest congestion, dyspnea, wheezing, and crackles.      - Cardiovascular: Negative for chest pain, palpitations, orthopnea, and bilateral lower extremity edema.     - Psychiatric/Behavioral: Negative for depression, suicidal/homicidal ideation and memory loss.      All other systems reviewed and are negative    Exam:    /70   Pulse 91   Temp 36.6 °C (97.8 °F) (Temporal)   Resp 14   Ht 1.702 m (5' 7\")   Wt 106 kg (234 lb)   SpO2 98%   BMI 36.65 kg/m²  Body mass index is 36.65 kg/m².    Physical Exam:  Constitutional: Well-developed and well-nourished. Not diaphoretic. No distress.   Skin: Upper segment of the left breast at 1:00 and surrounding tissue palpated, no single identifiable.  Left axillary nodes without inflammation.    Cardiovascular: Regular rate and rhythm, S1 and S2 without murmur, rubs, or gallops.    Chest: Effort normal. Clear to auscultation throughout. No adventitious sounds.   Neurological: Alert and oriented x 3.   Psychiatric:  Behavior, mood, and affect are appropriate.  MA/nursing note and vitals " reviewed.    Assessment/Plan:  1. Bilateral flank pain    2. Diarrhea, unspecified type  - Referral to Gastroenterology    3. Breast lump in female  - US-BREAST LIMITED-LEFT; Future       Discussed with patient possible alternative diagnoses, pt is to take all medications as prescribed. If symptoms persist FU w/PCP, if symptoms worsen go to emergency room. If experiencing any side effects from prescribed medications reports to the office immediately or go to emergency room.  Reviewed indication, dosage, usage and potential adverse effects of prescribed medications. Reviewed risks and benefits of treatment plan. Patient verbalizes understanding of all instruction and verbally agrees to plan.    No follow-ups on file.

## 2022-06-16 NOTE — ASSESSMENT & PLAN NOTE
US renal results from 6/13/22  IMPRESSION:     1.  No hydronephrosis identified  2.  No renal stones noted  3.  There is a 4.3 mm echogenic focus mid superior pole right kidney which may represent a small angiomyolipoma. Would suggest 6 month right renal ultrasound follow-up to ensure stability    Has been evaluated by GI.    History of celiac disease.  Has occasional intermittent right flank pain.  Has chronic diarrhea, has IBS.  Wanting referral to gastroenterologist.

## 2022-06-16 NOTE — ASSESSMENT & PLAN NOTE
New problem.  Patient reports noting lump at 1:00 of left breast and to me.  Family history of breast cancer in paternal mother in her 40s.  Patient reports lumpy breasts.  Denies nipple inversion, dimples, no lactation or discoloration.  No fevers or weight change.

## 2022-06-21 ENCOUNTER — PATIENT MESSAGE (OUTPATIENT)
Dept: MEDICAL GROUP | Facility: PHYSICIAN GROUP | Age: 27
End: 2022-06-21
Payer: COMMERCIAL

## 2022-06-21 DIAGNOSIS — E88.819 INSULIN RESISTANCE: ICD-10-CM

## 2022-06-24 RX ORDER — METFORMIN HYDROCHLORIDE 500 MG/1
1000 TABLET, EXTENDED RELEASE ORAL 2 TIMES DAILY
Qty: 360 TABLET | Refills: 0 | Status: ON HOLD | OUTPATIENT
Start: 2022-06-24 | End: 2022-09-02

## 2022-06-27 ENCOUNTER — HOSPITAL ENCOUNTER (OUTPATIENT)
Dept: LAB | Facility: MEDICAL CENTER | Age: 27
End: 2022-06-27
Attending: STUDENT IN AN ORGANIZED HEALTH CARE EDUCATION/TRAINING PROGRAM
Payer: COMMERCIAL

## 2022-06-27 ENCOUNTER — HOSPITAL ENCOUNTER (OUTPATIENT)
Dept: LAB | Facility: MEDICAL CENTER | Age: 27
End: 2022-06-27
Attending: NURSE PRACTITIONER
Payer: COMMERCIAL

## 2022-06-27 ENCOUNTER — HOSPITAL ENCOUNTER (OUTPATIENT)
Dept: LAB | Facility: MEDICAL CENTER | Age: 27
End: 2022-06-27
Attending: INTERNAL MEDICINE
Payer: COMMERCIAL

## 2022-06-27 DIAGNOSIS — E55.9 VITAMIN D DEFICIENCY: ICD-10-CM

## 2022-06-27 DIAGNOSIS — E88.819 INSULIN RESISTANCE: ICD-10-CM

## 2022-06-27 DIAGNOSIS — Z79.899 ENCOUNTER FOR LONG-TERM (CURRENT) USE OF MEDICATIONS: ICD-10-CM

## 2022-06-27 DIAGNOSIS — Z83.2 FAMILY HISTORY OF CLOTTING DISORDER: ICD-10-CM

## 2022-06-27 DIAGNOSIS — E28.2 PCOS (POLYCYSTIC OVARIAN SYNDROME): ICD-10-CM

## 2022-06-27 DIAGNOSIS — R79.89 ABNORMAL THYROID BLOOD TEST: ICD-10-CM

## 2022-06-27 LAB
ALBUMIN SERPL BCP-MCNC: 3.8 G/DL (ref 3.2–4.9)
ALBUMIN SERPL BCP-MCNC: 3.9 G/DL (ref 3.2–4.9)
ALBUMIN/GLOB SERPL: 1.3 G/DL
ALBUMIN/GLOB SERPL: 1.3 G/DL
ALP SERPL-CCNC: 59 U/L (ref 30–99)
ALP SERPL-CCNC: 61 U/L (ref 30–99)
ALT SERPL-CCNC: 14 U/L (ref 2–50)
ALT SERPL-CCNC: 14 U/L (ref 2–50)
ANION GAP SERPL CALC-SCNC: 10 MMOL/L (ref 7–16)
ANION GAP SERPL CALC-SCNC: 11 MMOL/L (ref 7–16)
APTT PPP: 27.3 SEC (ref 24.7–36)
AST SERPL-CCNC: 13 U/L (ref 12–45)
AST SERPL-CCNC: 13 U/L (ref 12–45)
BASOPHILS # BLD AUTO: 0.6 % (ref 0–1.8)
BASOPHILS # BLD: 0.03 K/UL (ref 0–0.12)
BILIRUB SERPL-MCNC: 0.2 MG/DL (ref 0.1–1.5)
BILIRUB SERPL-MCNC: 0.2 MG/DL (ref 0.1–1.5)
BUN SERPL-MCNC: 9 MG/DL (ref 8–22)
BUN SERPL-MCNC: 9 MG/DL (ref 8–22)
CALCIUM SERPL-MCNC: 8.7 MG/DL (ref 8.4–10.2)
CALCIUM SERPL-MCNC: 8.8 MG/DL (ref 8.4–10.2)
CHLORIDE SERPL-SCNC: 107 MMOL/L (ref 96–112)
CHLORIDE SERPL-SCNC: 108 MMOL/L (ref 96–112)
CO2 SERPL-SCNC: 20 MMOL/L (ref 20–33)
CO2 SERPL-SCNC: 21 MMOL/L (ref 20–33)
CREAT SERPL-MCNC: 0.64 MG/DL (ref 0.5–1.4)
CREAT SERPL-MCNC: 0.64 MG/DL (ref 0.5–1.4)
EOSINOPHIL # BLD AUTO: 0.18 K/UL (ref 0–0.51)
EOSINOPHIL NFR BLD: 3.7 % (ref 0–6.9)
ERYTHROCYTE [DISTWIDTH] IN BLOOD BY AUTOMATED COUNT: 45.6 FL (ref 35.9–50)
FASTING STATUS PATIENT QL REPORTED: NORMAL
FASTING STATUS PATIENT QL REPORTED: NORMAL
GFR SERPLBLD CREATININE-BSD FMLA CKD-EPI: 126 ML/MIN/1.73 M 2
GFR SERPLBLD CREATININE-BSD FMLA CKD-EPI: 126 ML/MIN/1.73 M 2
GLOBULIN SER CALC-MCNC: 3 G/DL (ref 1.9–3.5)
GLOBULIN SER CALC-MCNC: 3 G/DL (ref 1.9–3.5)
GLUCOSE SERPL-MCNC: 89 MG/DL (ref 65–99)
GLUCOSE SERPL-MCNC: 90 MG/DL (ref 65–99)
HCT VFR BLD AUTO: 39.6 % (ref 37–47)
HGB BLD-MCNC: 12.6 G/DL (ref 12–16)
IMM GRANULOCYTES # BLD AUTO: 0 K/UL (ref 0–0.11)
IMM GRANULOCYTES NFR BLD AUTO: 0 % (ref 0–0.9)
LYMPHOCYTES # BLD AUTO: 2.24 K/UL (ref 1–4.8)
LYMPHOCYTES NFR BLD: 46.6 % (ref 22–41)
MCH RBC QN AUTO: 28.3 PG (ref 27–33)
MCHC RBC AUTO-ENTMCNC: 31.8 G/DL (ref 33.6–35)
MCV RBC AUTO: 89 FL (ref 81.4–97.8)
MONOCYTES # BLD AUTO: 0.4 K/UL (ref 0–0.85)
MONOCYTES NFR BLD AUTO: 8.3 % (ref 0–13.4)
NEUTROPHILS # BLD AUTO: 1.96 K/UL (ref 2–7.15)
NEUTROPHILS NFR BLD: 40.8 % (ref 44–72)
NRBC # BLD AUTO: 0 K/UL
NRBC BLD-RTO: 0 /100 WBC
PLATELET # BLD AUTO: 246 K/UL (ref 164–446)
PMV BLD AUTO: 10.5 FL (ref 9–12.9)
POTASSIUM SERPL-SCNC: 4.1 MMOL/L (ref 3.6–5.5)
POTASSIUM SERPL-SCNC: 4.1 MMOL/L (ref 3.6–5.5)
PROT SERPL-MCNC: 6.8 G/DL (ref 6–8.2)
PROT SERPL-MCNC: 6.9 G/DL (ref 6–8.2)
RBC # BLD AUTO: 4.45 M/UL (ref 4.2–5.4)
SODIUM SERPL-SCNC: 138 MMOL/L (ref 135–145)
SODIUM SERPL-SCNC: 139 MMOL/L (ref 135–145)
T4 FREE SERPL-MCNC: 0.99 NG/DL (ref 0.93–1.7)
TSH SERPL DL<=0.005 MIU/L-ACNC: 1.64 UIU/ML (ref 0.38–5.33)
TSH SERPL DL<=0.005 MIU/L-ACNC: 1.67 UIU/ML (ref 0.38–5.33)
WBC # BLD AUTO: 4.8 K/UL (ref 4.8–10.8)

## 2022-06-27 PROCEDURE — 80053 COMPREHEN METABOLIC PANEL: CPT | Mod: 91

## 2022-06-27 PROCEDURE — 82607 VITAMIN B-12: CPT

## 2022-06-27 PROCEDURE — 82306 VITAMIN D 25 HYDROXY: CPT | Mod: 91

## 2022-06-27 PROCEDURE — 81240 F2 GENE: CPT

## 2022-06-27 PROCEDURE — 80053 COMPREHEN METABOLIC PANEL: CPT

## 2022-06-27 PROCEDURE — 85306 CLOT INHIBIT PROT S FREE: CPT

## 2022-06-27 PROCEDURE — 84439 ASSAY OF FREE THYROXINE: CPT

## 2022-06-27 PROCEDURE — 84443 ASSAY THYROID STIM HORMONE: CPT

## 2022-06-27 PROCEDURE — 84443 ASSAY THYROID STIM HORMONE: CPT | Mod: 91

## 2022-06-27 PROCEDURE — 85300 ANTITHROMBIN III ACTIVITY: CPT

## 2022-06-27 PROCEDURE — 82306 VITAMIN D 25 HYDROXY: CPT

## 2022-06-27 PROCEDURE — 81241 F5 GENE: CPT

## 2022-06-27 PROCEDURE — 85730 THROMBOPLASTIN TIME PARTIAL: CPT

## 2022-06-27 PROCEDURE — 85025 COMPLETE CBC W/AUTO DIFF WBC: CPT

## 2022-06-27 PROCEDURE — 36415 COLL VENOUS BLD VENIPUNCTURE: CPT

## 2022-06-27 PROCEDURE — 85303 CLOT INHIBIT PROT C ACTIVITY: CPT

## 2022-06-28 LAB
25(OH)D3 SERPL-MCNC: 42 NG/ML (ref 30–100)
25(OH)D3 SERPL-MCNC: 42 NG/ML (ref 30–100)
VIT B12 SERPL-MCNC: 376 PG/ML (ref 211–911)

## 2022-06-29 LAB
AT III ACT/NOR PPP CHRO: 95 % (ref 76–128)
PROT C ACT/NOR PPP: 182 % (ref 83–168)
PROT S ACT/NOR PPP: 56 % (ref 57–131)

## 2022-06-30 DIAGNOSIS — F33.1 MODERATE EPISODE OF RECURRENT MAJOR DEPRESSIVE DISORDER (HCC): ICD-10-CM

## 2022-06-30 DIAGNOSIS — F41.1 GENERALIZED ANXIETY DISORDER: ICD-10-CM

## 2022-06-30 LAB — T4 FREE SERPL DIALY-MCNC: 1.3 NG/DL (ref 1.1–2.4)

## 2022-06-30 NOTE — TELEPHONE ENCOUNTER
Med Change Request     Received request via: Pharmacy    Was the patient seen in the last year in this department? Yes    Does the patient have an active prescription (recently filled or refills available) for medication(s) requested? No

## 2022-07-01 LAB
F2 C.20210G>A GENO BLD/T: NEGATIVE
F5 P.R506Q BLD/T QL: NEGATIVE

## 2022-07-06 RX ORDER — TRAZODONE HYDROCHLORIDE 100 MG/1
TABLET ORAL
Qty: 90 TABLET | Refills: 1 | Status: ON HOLD | OUTPATIENT
Start: 2022-07-06 | End: 2022-09-02

## 2022-07-15 ENCOUNTER — OFFICE VISIT (OUTPATIENT)
Dept: URGENT CARE | Facility: CLINIC | Age: 27
End: 2022-07-15
Payer: COMMERCIAL

## 2022-07-15 ENCOUNTER — HOSPITAL ENCOUNTER (OUTPATIENT)
Facility: MEDICAL CENTER | Age: 27
End: 2022-07-15
Attending: FAMILY MEDICINE
Payer: COMMERCIAL

## 2022-07-15 VITALS
RESPIRATION RATE: 16 BRPM | TEMPERATURE: 98 F | OXYGEN SATURATION: 96 % | WEIGHT: 236.2 LBS | SYSTOLIC BLOOD PRESSURE: 112 MMHG | BODY MASS INDEX: 37.07 KG/M2 | DIASTOLIC BLOOD PRESSURE: 72 MMHG | HEIGHT: 67 IN | HEART RATE: 108 BPM

## 2022-07-15 DIAGNOSIS — R05.9 COUGH: ICD-10-CM

## 2022-07-15 DIAGNOSIS — R53.83 FATIGUE, UNSPECIFIED TYPE: ICD-10-CM

## 2022-07-15 PROCEDURE — U0005 INFEC AGEN DETEC AMPLI PROBE: HCPCS

## 2022-07-15 PROCEDURE — U0003 INFECTIOUS AGENT DETECTION BY NUCLEIC ACID (DNA OR RNA); SEVERE ACUTE RESPIRATORY SYNDROME CORONAVIRUS 2 (SARS-COV-2) (CORONAVIRUS DISEASE [COVID-19]), AMPLIFIED PROBE TECHNIQUE, MAKING USE OF HIGH THROUGHPUT TECHNOLOGIES AS DESCRIBED BY CMS-2020-01-R: HCPCS

## 2022-07-15 PROCEDURE — 99213 OFFICE O/P EST LOW 20 MIN: CPT | Performed by: FAMILY MEDICINE

## 2022-07-15 ASSESSMENT — ENCOUNTER SYMPTOMS
DIARRHEA: 1
EYE DISCHARGE: 0
WEIGHT LOSS: 0
EYE REDNESS: 0
NAUSEA: 0
VOMITING: 0
HEADACHES: 1

## 2022-07-15 ASSESSMENT — FIBROSIS 4 INDEX: FIB4 SCORE: 0.34

## 2022-07-15 NOTE — PROGRESS NOTES
"Subjective     Diana Hill is a 26 y.o. female who presents with Coronavirus Screening (Pt states began Sunday, fatigue, low back pain, body aches, sore throat, cough, raspy voice, pt requesting pcr test )            Onset 7/13/2022 productive cough without blood in spuitum. ST. Hoarse voice. Myalgia. Mild SOB. Intermittent wheeze. +PMH asthma. Using albuterol once daily. Fatigue started 7/10/22. PMH C19 1/2022. She is exposed again. No other aggravating or alleviating factors.        Review of Systems   Constitutional: Negative for weight loss.   Eyes: Negative for discharge and redness.   Gastrointestinal: Positive for diarrhea. Negative for nausea and vomiting.   Musculoskeletal: Negative for joint pain.   Skin: Negative for itching and rash.   Neurological: Positive for headaches.              Objective     /72 (BP Location: Left arm, Patient Position: Sitting, BP Cuff Size: Adult)   Pulse (!) 108   Temp 36.7 °C (98 °F) (Temporal)   Resp 16   Ht 1.702 m (5' 7\")   Wt 107 kg (236 lb 3.2 oz)   SpO2 96%   BMI 36.99 kg/m²      Physical Exam  Constitutional:       General: She is not in acute distress.     Appearance: She is well-developed.   HENT:      Head: Normocephalic and atraumatic.   Eyes:      Conjunctiva/sclera: Conjunctivae normal.   Cardiovascular:      Rate and Rhythm: Regular rhythm. Tachycardia present.      Heart sounds: Normal heart sounds. No murmur heard.  Pulmonary:      Effort: Pulmonary effort is normal.      Breath sounds: Normal breath sounds. No wheezing.   Skin:     General: Skin is warm and dry.      Findings: No rash.   Neurological:      Mental Status: She is alert.                             Assessment & Plan      1. Cough  COVID/SARS CoV-2 PCR   2. Fatigue, unspecified type  COVID/SARS CoV-2 PCR     Differential diagnosis, natural history, supportive care, and indications for immediate follow-up discussed at length.     Self isolate per protocol. F/u c19 pcr testing. "

## 2022-07-16 DIAGNOSIS — R05.9 COUGH: ICD-10-CM

## 2022-07-16 DIAGNOSIS — R53.83 FATIGUE, UNSPECIFIED TYPE: ICD-10-CM

## 2022-07-16 LAB
COVID ORDER STATUS COVID19: NORMAL
SARS-COV-2 RNA RESP QL NAA+PROBE: NOTDETECTED
SPECIMEN SOURCE: NORMAL

## 2022-07-21 ENCOUNTER — APPOINTMENT (OUTPATIENT)
Dept: BEHAVIORAL HEALTH | Facility: CLINIC | Age: 27
End: 2022-07-21
Payer: COMMERCIAL

## 2022-07-27 ENCOUNTER — OFFICE VISIT (OUTPATIENT)
Dept: MEDICAL GROUP | Facility: PHYSICIAN GROUP | Age: 27
End: 2022-07-27
Payer: COMMERCIAL

## 2022-07-27 VITALS
HEIGHT: 67 IN | RESPIRATION RATE: 18 BRPM | DIASTOLIC BLOOD PRESSURE: 68 MMHG | OXYGEN SATURATION: 97 % | SYSTOLIC BLOOD PRESSURE: 132 MMHG | HEART RATE: 81 BPM | BODY MASS INDEX: 37.92 KG/M2 | WEIGHT: 241.6 LBS | TEMPERATURE: 97.8 F

## 2022-07-27 DIAGNOSIS — R00.2 HEART PALPITATIONS: ICD-10-CM

## 2022-07-27 PROCEDURE — 99214 OFFICE O/P EST MOD 30 MIN: CPT | Performed by: NURSE PRACTITIONER

## 2022-07-27 RX ORDER — PROPRANOLOL HYDROCHLORIDE 10 MG/1
10 TABLET ORAL 2 TIMES DAILY PRN
Qty: 60 TABLET | Refills: 1 | Status: SHIPPED | OUTPATIENT
Start: 2022-07-27 | End: 2022-08-24

## 2022-07-27 ASSESSMENT — FIBROSIS 4 INDEX: FIB4 SCORE: 0.34

## 2022-07-27 NOTE — ASSESSMENT & PLAN NOTE
New problem.  Heart palpitations x1 month, worse in the last few days, couple of episodes per day lasting for a few seconds, gets worse in the evening. Associated symptoms lightheadedness, dyspnea.  No fever, no GI abnormality outside of usual.  Similar symptoms during teenage years, diagnosis established.  Recent lab work from June 2022 with normal thyroid, no anemia, electrolytes within normal limits.  Had upper respiratory symptoms in the last week, tested negative for COVID, she had some sore throat.  Denies anxiety and depression.

## 2022-07-27 NOTE — PROGRESS NOTES
Chief Complaint   Patient presents with   • Irregular Heart Beat     Tachycardia X 1 month       HISTORY OF PRESENT ILLNESS: Patient is a 26 y.o. female, established patient who presents today to discuss medical problems as listed below:    Health Maintenance:  COMPLETED     Heart palpitations  New problem.  Heart palpitations x1 month, worse in the last few days, couple of episodes per day lasting for a few seconds, gets worse in the evening. Associated symptoms lightheadedness, dyspnea.  No fever, no GI abnormality outside of usual.  Similar symptoms during teenage years, diagnosis established.  Recent lab work from June 2022 with normal thyroid, no anemia, electrolytes within normal limits.  Had upper respiratory symptoms in the last week, tested negative for COVID, she had some sore throat.  Denies anxiety and depression.      Patient Active Problem List    Diagnosis Date Noted   • Heart palpitations 07/27/2022   • Breast lump in female 06/16/2022   • Bilateral flank pain 04/26/2022   • Generalized anxiety disorder 11/17/2021   • Moderate episode of recurrent major depressive disorder (HCC) 11/17/2021   • Plantar wart, left foot 10/21/2021   • Medication management 10/21/2021   • Chronic fatigue syndrome 08/17/2021   • Intractable migraine with aura without status migrainosus 08/10/2021   • Obstructive sleep apnea syndrome 08/10/2021   • Vestibular migraine 08/10/2021   • Otalgia of left ear 04/27/2021   • History of canker sores 04/27/2021   • Polyarthralgia 03/25/2021   • Brittle nails 03/25/2021   • Cervical lymphadenopathy 12/29/2020   • Subclinical hypothyroidism 11/23/2020   • Insulin resistance 11/23/2020   • Anemia 11/23/2020   • Morbid obesity (HCC) 10/07/2020   • Skin rash 09/15/2020   • Administrative encounter 09/15/2020   • Hospital discharge follow-up 09/02/2020   • Rash of hands 08/12/2020   • Snoring 08/12/2020   • Vertigo 03/03/2020   • PCOS (polycystic ovarian syndrome) 11/19/2019   • Vitamin D  deficiency 11/19/2019   • High triglycerides 11/19/2019        Allergies: Gluten meal    Current Outpatient Medications   Medication Sig Dispense Refill   • propranolol (INDERAL) 10 MG Tab Take 1 Tablet by mouth 2 times a day as needed (heart palpitations). 60 Tablet 1   • traZODone (DESYREL) 100 MG Tab TAKE 1 TABLET BY MOUTH EVERY DAY IN THE EVENING 90 Tablet 1   • sertraline (ZOLOFT) 100 MG Tab Take 2 Tablets by mouth every day. 60 Tablet 1   • metFORMIN ER (GLUCOPHAGE XR) 500 MG TABLET SR 24 HR Take 2 Tablets by mouth 2 times a day. 360 Tablet 0   • tretinoin (RETIN-A) 0.025 % gel APPLY 1 APPLICATION TOPICALLY EVERY BEDTIME. START WITH 2 -3 APPS PER WEEK AND WORK UP AS TOLERATED 45 g 0   • topiramate (TOPAMAX) 100 MG Tab Take 100 mg by mouth 2 times a day.     • Magnesium 500 MG Tab 1 tablet with a meal Orally Once a day for 30 day(s)     • Norgestim-Eth Estrad Triphasic 0.18/0.215/0.25 MG-25 MCG Tab Take 1 Tablet by mouth every day.     • Selenium 200 MCG Cap Take  by mouth.     • B Complex Vitamins (VITAMIN-B COMPLEX PO) Take  by mouth.     • VITAMIN D PO Take 6,000 Units by mouth every day.     • albuterol 108 (90 Base) MCG/ACT Aero Soln inhalation aerosol Inhale 2 Puffs by mouth every four hours as needed for Shortness of Breath. 1 Inhaler 0     No current facility-administered medications for this visit.       Social History     Tobacco Use   • Smoking status: Never Smoker   • Smokeless tobacco: Never Used   Vaping Use   • Vaping Use: Never used   Substance Use Topics   • Alcohol use: Yes     Comment: occasionally   • Drug use: No     Social History     Social History Narrative   • Not on file       Family History   Problem Relation Age of Onset   • Thyroid Mother    • Depression Father    • Alcohol abuse Father    • No Known Problems Maternal Grandmother    • Heart Attack Maternal Grandfather 66   • Cancer Paternal Grandmother         Breast , esophageal   • Stroke Paternal Grandmother 66   • Alcohol abuse  "Paternal Grandfather    • Lung Disease Paternal Grandfather    • Sleep Apnea Brother        Allergies, past medical history, past surgical history, family history, social history reviewed and updated.    Review of Systems:     - Constitutional: Negative for fever, chills, unexpected weight change, and fatigue/generalized weakness.     - Respiratory: Negative for cough, sputum production, chest congestion, dyspnea, wheezing, and crackles.      - Cardiovascular:palpitations.  Negative for chest pain, orthopnea, and bilateral lower extremity edema.    - Psychiatric/Behavioral: Negative for depression, suicidal/homicidal ideation and memory loss.      All other systems reviewed and are negative    Exam:    /64 (BP Location: Left arm, Patient Position: Sitting)   Pulse 81   Temp 36.6 °C (97.8 °F) (Temporal)   Resp 18   Ht 1.702 m (5' 7\")   Wt 110 kg (241 lb 9.6 oz)   SpO2 97%   BMI 37.84 kg/m²  Body mass index is 37.84 kg/m².    Physical Exam:  Constitutional: Well-developed and well-nourished. Not diaphoretic. No distress.   Cardiovascular: Regular rate and rhythm, S1 and S2 without murmur, rubs, or gallops.    Chest: Effort normal. Clear to auscultation throughout. No adventitious sounds.   Neurological: Alert and oriented x 3.   Psychiatric:  Behavior, mood, and affect are appropriate.  MA/nursing note and vitals reviewed.    LABS: 6/2022  results reviewed and discussed with the patient, questions answered.     Assessment/Plan:  1. Heart palpitations  Suspecting POTS.  Orthostatic BP asymptomatic.  - Cardiac Event Monitor; Future  - DX-CHEST-2 VIEWS; Future  - Tilt Table Test  - REFERRAL TO CARDIOLOGY  - propranolol (INDERAL) 10 MG Tab; Take 1 Tablet by mouth 2 times a day as needed (heart palpitations).  Dispense: 60 Tablet; Refill: 1       Discussed with patient possible alternative diagnoses, pt is to take all medications as prescribed. If symptoms persist FU w/PCP, if symptoms worsen go to emergency " room. If experiencing any side effects from prescribed medications reports to the office immediately or go to emergency room.  Reviewed indication, dosage, usage and potential adverse effects of prescribed medications. Reviewed risks and benefits of treatment plan. Patient verbalizes understanding of all instruction and verbally agrees to plan.    No follow-ups on file. 1 mo

## 2022-08-03 ENCOUNTER — NON-PROVIDER VISIT (OUTPATIENT)
Dept: CARDIOLOGY | Facility: MEDICAL CENTER | Age: 27
End: 2022-08-03
Attending: NURSE PRACTITIONER
Payer: COMMERCIAL

## 2022-08-03 DIAGNOSIS — I49.3 PVC'S (PREMATURE VENTRICULAR CONTRACTIONS): ICD-10-CM

## 2022-08-03 DIAGNOSIS — I47.10 SVT (SUPRAVENTRICULAR TACHYCARDIA) (HCC): ICD-10-CM

## 2022-08-03 DIAGNOSIS — I47.29 NSVT (NONSUSTAINED VENTRICULAR TACHYCARDIA) (HCC): ICD-10-CM

## 2022-08-03 DIAGNOSIS — R00.2 HEART PALPITATIONS: ICD-10-CM

## 2022-08-03 DIAGNOSIS — R00.0 SINUS TACHYCARDIA: ICD-10-CM

## 2022-08-03 DIAGNOSIS — I45.5 SINUS PAUSE: ICD-10-CM

## 2022-08-03 NOTE — PROGRESS NOTES
> 48 Hour Holter Monitor placed, baseline transmitted.    > In office hook-up per Mary Okeefe, APRN    > Serial # [id number]

## 2022-08-05 ENCOUNTER — OFFICE VISIT (OUTPATIENT)
Dept: CARDIOLOGY | Facility: MEDICAL CENTER | Age: 27
End: 2022-08-05
Attending: NURSE PRACTITIONER
Payer: COMMERCIAL

## 2022-08-05 VITALS
OXYGEN SATURATION: 98 % | WEIGHT: 241 LBS | HEIGHT: 67 IN | DIASTOLIC BLOOD PRESSURE: 80 MMHG | SYSTOLIC BLOOD PRESSURE: 114 MMHG | RESPIRATION RATE: 18 BRPM | BODY MASS INDEX: 37.83 KG/M2 | HEART RATE: 77 BPM

## 2022-08-05 DIAGNOSIS — R00.2 HEART PALPITATIONS: Primary | ICD-10-CM

## 2022-08-05 LAB — EKG IMPRESSION: NORMAL

## 2022-08-05 PROCEDURE — 93000 ELECTROCARDIOGRAM COMPLETE: CPT | Performed by: INTERNAL MEDICINE

## 2022-08-05 PROCEDURE — 99203 OFFICE O/P NEW LOW 30 MIN: CPT | Performed by: INTERNAL MEDICINE

## 2022-08-05 RX ORDER — BETAMETHASONE DIPROPIONATE 0.05 %
1 OINTMENT (GRAM) TOPICAL
COMMUNITY
Start: 2022-06-15 | End: 2023-08-12

## 2022-08-05 RX ORDER — SERTRALINE HYDROCHLORIDE 100 MG/1
2 TABLET, FILM COATED ORAL DAILY
COMMUNITY
Start: 2022-07-06 | End: 2022-08-05

## 2022-08-05 RX ORDER — TOPIRAMATE 50 MG/1
50 TABLET, FILM COATED ORAL
COMMUNITY
Start: 2022-07-28

## 2022-08-05 RX ORDER — TOPIRAMATE 50 MG/1
50 TABLET, FILM COATED ORAL DAILY
COMMUNITY
Start: 2022-07-28 | End: 2022-08-05

## 2022-08-05 RX ORDER — TRAZODONE HYDROCHLORIDE 100 MG/1
1 TABLET ORAL EVERY EVENING
COMMUNITY
Start: 2022-07-06 | End: 2022-08-05

## 2022-08-05 ASSESSMENT — ENCOUNTER SYMPTOMS
ORTHOPNEA: 0
INSOMNIA: 0
NAUSEA: 1
PALPITATIONS: 1
WHEEZING: 1
DEPRESSION: 1
ABDOMINAL PAIN: 0
SHORTNESS OF BREATH: 0
CHILLS: 0
HEADACHES: 1
NERVOUS/ANXIOUS: 1
BLURRED VISION: 0
PND: 0
LOSS OF CONSCIOUSNESS: 0
MYALGIAS: 0
DIZZINESS: 1
FEVER: 0
DIARRHEA: 1

## 2022-08-05 ASSESSMENT — FIBROSIS 4 INDEX: FIB4 SCORE: 0.34

## 2022-08-05 NOTE — PROGRESS NOTES
"Chief Complaint   Patient presents with    Palpitations    Tachycardia       Subjective     Diana Hill is a 26 y.o. female who presents today referred by her PCP Mary SOLIMAN for cardiology consultation for evaluation of palpitations.    The patient has a significant past medical history of asthma, insulin resistance on metformin, PCOS (Wilman Cm MD), OCD, celiac disease, depression (Yari Presley DO), migraine headaches, insomnia, mild JOEY (sleep clinic Radha Martinez MD) and intermittent thyroid problems.    The patient has a history of palpitations dating back to age 18, saw a local [place] pediatric cardiologist and was diagnosed with postural tachycardia but \"not POTS\".  Her symptoms resolved.    Over the past month the patient reports symptoms of palpitations in which her heart races up to a minute then suddenly stops.  These episodes occur daily, several times throughout the day occurring randomly regardless of activity in addition noticeable when changing positions.  No syncope.  She has no prior history of hypercholesterolemia or hypertension.  She does not smoke cigarettes or vape, use illicit drugs or alcohol.  She drinks coffee.  She has been given she as needed propranolol by her PCP for palpitations and who is scheduled her for a tilt table and a cardiac event recorder that she handed in today.    The patient has had 2 COVID infections, most recent one in 2022.  She has had 2 vaccinations, no booster.  Since then she is noted increasing problems with her asthma requiring inhaler use on a daily basis.    For a period of time in  she was on verapamil for unclear reasons.    Family history significant for maternal grandparents with heart attacks in their 60s and 80s with the maternal grandfather  suddenly at age 66.    Past Medical History:   Diagnosis Date    Anemia     hx of borderline anemia    Anxiety     Celiac disease     Daytime sleepiness     Depression     " Insomnia     Insulin resistance     Migraine     Morning headache     PCOS (polycystic ovarian syndrome)     Snoring     Vertigo     Vitamin D deficiency      Past Surgical History:   Procedure Laterality Date    MATTHIAS BY LAPAROSCOPY N/A 9/13/2018    Procedure: MATTHIAS BY LAPAROSCOPY;  Surgeon: Hayden Wilhelm M.D.;  Location: SURGERY [facility];  Service: General     Family History   Problem Relation Age of Onset    Thyroid Mother     Depression Father     Alcohol abuse Father     No Known Problems Maternal Grandmother     Heart Attack Maternal Grandfather 66    Cancer Paternal Grandmother         Breast , esophageal    Stroke Paternal Grandmother 66    Alcohol abuse Paternal Grandfather     Lung Disease Paternal Grandfather     Sleep Apnea Brother      Social History     Socioeconomic History    Marital status: Single     Spouse name: Not on file    Number of children: Not on file    Years of education: Not on file    Highest education level: Not on file   Occupational History    Occupation: CNA [employer]     Comment: CNA ; Nursing student   Tobacco Use    Smoking status: Never Smoker    Smokeless tobacco: Never Used   Vaping Use    Vaping Use: Never used   Substance and Sexual Activity    Alcohol use: Yes     Comment: occasionally    Drug use: No    Sexual activity: Never     Partners: Male, Female     Birth control/protection: Pill   Other Topics Concern    Not on file   Social History Narrative    Not on file     Social Determinants of Health     Financial Resource Strain: Not on file   Food Insecurity: Not on file   Transportation Needs: Not on file   Physical Activity: Not on file   Stress: Not on file   Social Connections: Not on file   Intimate Partner Violence: Not on file   Housing Stability: Not on file     Allergies   Allergen Reactions    Gluten Meal      Outpatient Encounter Medications as of 8/5/2022   Medication Sig Dispense Refill    betamethasone dipropionate (DIPROLENE) 0.05 % Ointment  APPLY TO AFFECTED AREA TWICE A DAY FOR 2 WEEKS AT A TIME, ALTERNATING WITH TACROLIMUS EVERY 2 WEEKS      topiramate (TOPAMAX) 50 MG tablet Take 50 mg by mouth every day.      propranolol (INDERAL) 10 MG Tab Take 1 Tablet by mouth 2 times a day as needed (heart palpitations). 60 Tablet 1    traZODone (DESYREL) 100 MG Tab TAKE 1 TABLET BY MOUTH EVERY DAY IN THE EVENING 90 Tablet 1    sertraline (ZOLOFT) 100 MG Tab Take 2 Tablets by mouth every day. 60 Tablet 1    metFORMIN ER (GLUCOPHAGE XR) 500 MG TABLET SR 24 HR Take 2 Tablets by mouth 2 times a day. 360 Tablet 0    tretinoin (RETIN-A) 0.025 % gel APPLY 1 APPLICATION TOPICALLY EVERY BEDTIME. START WITH 2 -3 APPS PER WEEK AND WORK UP AS TOLERATED 45 g 0    Magnesium 500 MG Tab 1 tablet with a meal Orally Once a day for 30 day(s)      Norgestim-Eth Estrad Triphasic 0.18/0.215/0.25 MG-25 MCG Tab Take 1 Tablet by mouth every day.      Selenium 200 MCG Cap Take  by mouth.      B Complex Vitamins (VITAMIN-B COMPLEX PO) Take  by mouth.      VITAMIN D PO Take 6,000 Units by mouth every day.      albuterol 108 (90 Base) MCG/ACT Aero Soln inhalation aerosol Inhale 2 Puffs by mouth every four hours as needed for Shortness of Breath. 1 Inhaler 0    [DISCONTINUED] sertraline (ZOLOFT) 100 MG Tab Take 2 Tablets by mouth every day. (Patient not taking: Reported on 8/5/2022)      [DISCONTINUED] topiramate (TOPAMAX) 50 MG tablet Take 50 mg by mouth every day. (Patient not taking: Reported on 8/5/2022)      [DISCONTINUED] traZODone (DESYREL) 100 MG Tab Take 1 Tablet by mouth every evening. (Patient not taking: Reported on 8/5/2022)      [DISCONTINUED] topiramate (TOPAMAX) 100 MG Tab Take 100 mg by mouth 2 times a day. (Patient not taking: Reported on 8/5/2022)       No facility-administered encounter medications on file as of 8/5/2022.     Review of Systems   Constitutional:  Positive for malaise/fatigue. Negative for chills and fever.   HENT:  Negative for congestion.    Eyes:   "Negative for blurred vision.   Respiratory:  Positive for wheezing. Negative for shortness of breath.    Cardiovascular:  Positive for palpitations. Negative for chest pain, orthopnea, leg swelling and PND.   Gastrointestinal:  Positive for diarrhea and nausea. Negative for abdominal pain.   Genitourinary:  Negative for dysuria.   Musculoskeletal:  Negative for joint pain and myalgias.   Skin:  Negative for rash.   Neurological:  Positive for dizziness and headaches. Negative for loss of consciousness.   Psychiatric/Behavioral:  Positive for depression. The patient is nervous/anxious. The patient does not have insomnia.             Objective     /80 (BP Location: Left arm, Patient Position: Sitting, BP Cuff Size: Adult long)   Pulse 77   Resp 18   Ht 1.702 m (5' 7\")   Wt 109 kg (241 lb)   SpO2 98%   BMI 37.75 kg/m²     Physical Exam  Vitals reviewed.   Constitutional:       General: She is not in acute distress.     Appearance: She is well-developed.   Eyes:      Conjunctiva/sclera: Conjunctivae normal.      Pupils: Pupils are equal, round, and reactive to light.   Neck:      Vascular: No JVD.   Cardiovascular:      Rate and Rhythm: Normal rate and regular rhythm.      Pulses:           Carotid pulses are 2+ on the right side and 2+ on the left side.     Heart sounds: Normal heart sounds. No murmur heard.    No friction rub. No gallop.   Pulmonary:      Effort: Pulmonary effort is normal. No accessory muscle usage or respiratory distress.      Breath sounds: Normal breath sounds. No wheezing or rales.   Abdominal:      General: There is no distension.      Palpations: Abdomen is soft. There is no mass.      Tenderness: There is no abdominal tenderness.   Musculoskeletal:      Cervical back: Normal range of motion and neck supple.      Right lower leg: No edema.      Left lower leg: No edema.   Skin:     General: Skin is warm and dry.      Findings: No rash.      Nails: There is no clubbing. "   Neurological:      Mental Status: She is alert and oriented to person, place, and time.   Psychiatric:         Behavior: Behavior normal.          SLEEP STUDY 2/11/2021  Impression:  Mild JOEY with CAMRON:8/h with desaturations to 86% Sp02.   Recommendations:  Treatment options include CPAP, an oral appliance or UPPP.    EKG 8/5/2022 sinus bradycardia, rate 57, personally interpreted    Assessment & Plan     1. Heart palpitations  EKG       Medical Decision Making: Today's Assessment/Status/Plan:   1.  Await results of cardiac event recorder, the device was handed in today to be processed and should be available next week.  2.  Await tilt table test per PCP.  3.  She was diagnosed with mild JOEY in 3/2021 with recommendation of treatment intervention however there has been no follow-up, would recommend follow-up referral and treatment, will defer to PCP.    Thank you for allowing me to see this patient in consultation, call if any questions

## 2022-08-08 ENCOUNTER — TELEPHONE (OUTPATIENT)
Dept: CARDIOLOGY | Facility: MEDICAL CENTER | Age: 27
End: 2022-08-08
Payer: COMMERCIAL

## 2022-08-08 NOTE — TELEPHONE ENCOUNTER
----- Message from BLU MariscalNGINA sent at 8/8/2022 10:17 AM PDT -----  Regarding: Holter for review  Hello!    I have uploaded this patient's 48 hour Holter for SW to review as an established patient, please.    Thank you!

## 2022-08-08 NOTE — TELEPHONE ENCOUNTER
48 hour Holter uploaded for review by RODERICK on 8/8/2022  Established patient  LUZMA Grant notified

## 2022-08-10 LAB — EKG IMPRESSION: NORMAL

## 2022-08-11 ENCOUNTER — OFFICE VISIT (OUTPATIENT)
Dept: BEHAVIORAL HEALTH | Facility: CLINIC | Age: 27
End: 2022-08-11
Payer: COMMERCIAL

## 2022-08-11 DIAGNOSIS — F33.1 MODERATE EPISODE OF RECURRENT MAJOR DEPRESSIVE DISORDER (HCC): ICD-10-CM

## 2022-08-11 DIAGNOSIS — F41.1 GENERALIZED ANXIETY DISORDER: ICD-10-CM

## 2022-08-11 PROCEDURE — 90837 PSYTX W PT 60 MINUTES: CPT | Mod: 95 | Performed by: SOCIAL WORKER

## 2022-08-15 NOTE — TELEPHONE ENCOUNTER
S/W pt, informed as stated by Dr. Abad, pt agrees with plan. Requesting records from Dr. Hope fax # [phone].

## 2022-08-15 NOTE — TELEPHONE ENCOUNTER
I reviewed the monitor tracings  Heart monitor is normal, no abnormal heart rhythm to explain her symptoms at this time.  please request records from Melissa Hope MD, local pediatric cardiologist, was seen for somewhat similar symptoms  PCP scheduled tilt table  Also should follow up for JOEY  Can continue to take propranolol as prescribed per PCP

## 2022-08-17 NOTE — TELEPHONE ENCOUNTER
To SW---not urgent--Scanning OV note and Echo findings from 2013 from Dr. Melissa Hope's as requested by RODERICK to review.

## 2022-08-21 DIAGNOSIS — R00.2 HEART PALPITATIONS: ICD-10-CM

## 2022-08-23 ENCOUNTER — OFFICE VISIT (OUTPATIENT)
Dept: URGENT CARE | Facility: CLINIC | Age: 27
End: 2022-08-23
Payer: COMMERCIAL

## 2022-08-23 VITALS
BODY MASS INDEX: 37.97 KG/M2 | RESPIRATION RATE: 18 BRPM | TEMPERATURE: 97.8 F | HEIGHT: 67 IN | OXYGEN SATURATION: 96 % | WEIGHT: 241.9 LBS | HEART RATE: 74 BPM | SYSTOLIC BLOOD PRESSURE: 112 MMHG | DIASTOLIC BLOOD PRESSURE: 78 MMHG

## 2022-08-23 DIAGNOSIS — J02.0 PHARYNGITIS DUE TO STREPTOCOCCUS SPECIES: ICD-10-CM

## 2022-08-23 DIAGNOSIS — R59.0 LYMPHADENOPATHY, ANTERIOR CERVICAL: ICD-10-CM

## 2022-08-23 LAB
INT CON NEG: ABNORMAL
INT CON POS: ABNORMAL
S PYO AG THROAT QL: POSITIVE

## 2022-08-23 PROCEDURE — 99213 OFFICE O/P EST LOW 20 MIN: CPT | Performed by: FAMILY MEDICINE

## 2022-08-23 PROCEDURE — 87880 STREP A ASSAY W/OPTIC: CPT | Performed by: FAMILY MEDICINE

## 2022-08-23 RX ORDER — AMOXICILLIN 500 MG/1
500 CAPSULE ORAL 2 TIMES DAILY
Qty: 20 CAPSULE | Refills: 0 | Status: SHIPPED | OUTPATIENT
Start: 2022-08-23 | End: 2022-09-02

## 2022-08-23 ASSESSMENT — FIBROSIS 4 INDEX: FIB4 SCORE: 0.35

## 2022-08-23 ASSESSMENT — ENCOUNTER SYMPTOMS
MYALGIAS: 0
SHORTNESS OF BREATH: 0
COUGH: 0
DIZZINESS: 0
VOMITING: 0
WHEEZING: 1
SORE THROAT: 0
NAUSEA: 0
FEVER: 0
CHILLS: 0
SWOLLEN GLANDS: 1

## 2022-08-23 NOTE — PROGRESS NOTES
Subjective:   Diana Hill is a 27 y.o. female who presents for Pharyngitis (X 8/9; yesterday started getting pain in (R) lung, swollen in lymph nodes, wheezing )        Pharyngitis   This is a new (Reports sore throat over the past 2 weeks) problem. Episode onset: 2 weeks. Associated symptoms include swollen glands (Onset yesterday). Pertinent negatives include no coughing, shortness of breath or vomiting. Treatments tried: Fluids, rest. The treatment provided no relief.   PMH:  has a past medical history of Anemia, Anxiety, Celiac disease, Daytime sleepiness, Depression, Insomnia, Insulin resistance, Migraine, Morning headache, PCOS (polycystic ovarian syndrome), Snoring, Vertigo, and Vitamin D deficiency.    She has no past medical history of Apnea, sleep, Cough, Gasping for breath, Painful breathing, Shortness of breath, Sputum production, or Wheezing.  MEDS:   Current Outpatient Medications:     amoxicillin (AMOXIL) 500 MG Cap, Take 1 Capsule by mouth 2 times a day for 10 days., Disp: 20 Capsule, Rfl: 0    betamethasone dipropionate (DIPROLENE) 0.05 % Ointment, APPLY TO AFFECTED AREA TWICE A DAY FOR 2 WEEKS AT A TIME, ALTERNATING WITH TACROLIMUS EVERY 2 WEEKS, Disp: , Rfl:     topiramate (TOPAMAX) 50 MG tablet, Take 50 mg by mouth every day., Disp: , Rfl:     traZODone (DESYREL) 100 MG Tab, TAKE 1 TABLET BY MOUTH EVERY DAY IN THE EVENING, Disp: 90 Tablet, Rfl: 1    sertraline (ZOLOFT) 100 MG Tab, Take 2 Tablets by mouth every day., Disp: 60 Tablet, Rfl: 1    metFORMIN ER (GLUCOPHAGE XR) 500 MG TABLET SR 24 HR, Take 2 Tablets by mouth 2 times a day., Disp: 360 Tablet, Rfl: 0    tretinoin (RETIN-A) 0.025 % gel, APPLY 1 APPLICATION TOPICALLY EVERY BEDTIME. START WITH 2 -3 APPS PER WEEK AND WORK UP AS TOLERATED, Disp: 45 g, Rfl: 0    Magnesium 500 MG Tab, 1 tablet with a meal Orally Once a day for 30 day(s), Disp: , Rfl:     Norgestim-Eth Estrad Triphasic 0.18/0.215/0.25 MG-25 MCG Tab, Take 1 Tablet by mouth  "every day., Disp: , Rfl:     Selenium 200 MCG Cap, Take  by mouth., Disp: , Rfl:     B Complex Vitamins (VITAMIN-B COMPLEX PO), Take  by mouth., Disp: , Rfl:     VITAMIN D PO, Take 6,000 Units by mouth every day., Disp: , Rfl:     albuterol 108 (90 Base) MCG/ACT Aero Soln inhalation aerosol, Inhale 2 Puffs by mouth every four hours as needed for Shortness of Breath., Disp: 1 Inhaler, Rfl: 0    propranolol (INDERAL) 10 MG Tab, Take 1 Tablet by mouth 2 times a day as needed (heart palpitations). (Patient not taking: Reported on 8/23/2022), Disp: 60 Tablet, Rfl: 1  ALLERGIES:   Allergies   Allergen Reactions    Gluten Meal      SURGHX:   Past Surgical History:   Procedure Laterality Date    MATTHIAS BY LAPAROSCOPY N/A 9/13/2018    Procedure: MATTHIAS BY LAPAROSCOPY;  Surgeon: Hayden Wilhelm M.D.;  Location: SURGERY [facility];  Service: General     SOCHX:  reports that she has never smoked. She has never used smokeless tobacco. She reports current alcohol use. She reports that she does not use drugs.  FH:   Family History   Problem Relation Age of Onset    Thyroid Mother     Depression Father     Alcohol abuse Father     No Known Problems Maternal Grandmother     Heart Attack Maternal Grandfather 66    Cancer Paternal Grandmother         Breast , esophageal    Stroke Paternal Grandmother 66    Alcohol abuse Paternal Grandfather     Lung Disease Paternal Grandfather     Sleep Apnea Brother      Review of Systems   Constitutional:  Negative for chills and fever.   HENT:  Negative for sore throat.    Respiratory:  Positive for wheezing. Negative for cough and shortness of breath.    Gastrointestinal:  Negative for nausea and vomiting.   Musculoskeletal:  Negative for myalgias.   Skin:  Negative for rash.   Neurological:  Negative for dizziness.      Objective:   /78 (BP Location: Right arm, Patient Position: Sitting)   Pulse 74   Temp 36.6 °C (97.8 °F) (Temporal)   Resp 18   Ht 1.702 m (5' 7\")   Wt 110 kg " (241 lb 14.4 oz)   SpO2 96%   BMI 37.89 kg/m²   Physical Exam  Vitals and nursing note reviewed.   Constitutional:       General: She is not in acute distress.     Appearance: She is well-developed.   HENT:      Head: Normocephalic and atraumatic.      Right Ear: External ear normal.      Left Ear: External ear normal.      Nose: Rhinorrhea present.      Mouth/Throat:      Mouth: Mucous membranes are moist.      Pharynx: Posterior oropharyngeal erythema present.   Eyes:      Conjunctiva/sclera: Conjunctivae normal.   Cardiovascular:      Rate and Rhythm: Normal rate.   Pulmonary:      Effort: Pulmonary effort is normal. No respiratory distress.      Breath sounds: Normal breath sounds.   Abdominal:      General: There is no distension.   Musculoskeletal:         General: Normal range of motion.      Cervical back: Normal range of motion.   Lymphadenopathy:      Cervical: Cervical adenopathy present.      Right cervical: Superficial cervical adenopathy present.      Left cervical: Superficial cervical adenopathy present.   Skin:     General: Skin is warm and dry.   Neurological:      General: No focal deficit present.      Mental Status: She is alert and oriented to person, place, and time. Mental status is at baseline.      Gait: Gait (gait at baseline) normal.   Psychiatric:         Judgment: Judgment normal.       Point-of-care rapid strep: Positive          Assessment/Plan:   1. Pharyngitis due to Streptococcus species  - amoxicillin (AMOXIL) 500 MG Cap; Take 1 Capsule by mouth 2 times a day for 10 days.  Dispense: 20 Capsule; Refill: 0        Medical Decision Making/Course:  In the course of preparing for this visit with review of the pertinent past medical history, recent and past clinic visits, current medications, and performing chart, immunization, medical history and medication reconciliation, and in the further course of obtaining the current history pertinent to the clinic visit today, performing an  exam and evaluation, ordering and independently evaluating labs, tests  , and/or procedures, prescribing any recommended new medications as noted above, providing any pertinent counseling and education and recommending further coordination of care including recommendations for symptomatic and supportive measures, at least  24 minutes of total time were spent during this encounter.      Discussed close monitoring, return precautions, and supportive measures of maintaining adequate fluid hydration and caloric intake, relative rest and symptom management as needed for pain and/or fever.    Differential diagnosis, natural history, supportive care, and indications for immediate follow-up discussed.     Advised the patient to follow-up with the primary care physician for recheck, reevaluation, and consideration of further management.    Please note that this dictation was created using voice recognition software. I have worked with consultants from the vendor as well as technical experts from [employer] to optimize the interface. I have made every reasonable attempt to correct obvious errors, but I expect that there are errors of grammar and possibly content that I did not discover before finalizing the note.

## 2022-08-24 RX ORDER — PROPRANOLOL HYDROCHLORIDE 10 MG/1
10 TABLET ORAL 2 TIMES DAILY PRN
Qty: 60 TABLET | Refills: 1 | Status: ON HOLD | OUTPATIENT
Start: 2022-08-24 | End: 2022-09-02

## 2022-09-01 ENCOUNTER — HOSPITAL ENCOUNTER (OUTPATIENT)
Facility: MEDICAL CENTER | Age: 27
End: 2022-09-02
Attending: EMERGENCY MEDICINE | Admitting: HOSPITALIST
Payer: COMMERCIAL

## 2022-09-01 ENCOUNTER — APPOINTMENT (OUTPATIENT)
Dept: RADIOLOGY | Facility: MEDICAL CENTER | Age: 27
End: 2022-09-01
Attending: EMERGENCY MEDICINE
Payer: COMMERCIAL

## 2022-09-01 DIAGNOSIS — R55 NEAR SYNCOPE: ICD-10-CM

## 2022-09-01 LAB
ALBUMIN SERPL BCP-MCNC: 3.8 G/DL (ref 3.2–4.9)
ALBUMIN/GLOB SERPL: 1.3 G/DL
ALP SERPL-CCNC: 68 U/L (ref 30–99)
ALT SERPL-CCNC: 13 U/L (ref 2–50)
ANION GAP SERPL CALC-SCNC: 12 MMOL/L (ref 7–16)
APPEARANCE UR: ABNORMAL
AST SERPL-CCNC: 13 U/L (ref 12–45)
BACTERIA #/AREA URNS HPF: ABNORMAL /HPF
BASOPHILS # BLD AUTO: 0.5 % (ref 0–1.8)
BASOPHILS # BLD: 0.04 K/UL (ref 0–0.12)
BILIRUB SERPL-MCNC: 0.2 MG/DL (ref 0.1–1.5)
BILIRUB UR QL STRIP.AUTO: NEGATIVE
BUN SERPL-MCNC: 12 MG/DL (ref 8–22)
CALCIUM SERPL-MCNC: 9.1 MG/DL (ref 8.4–10.2)
CHLORIDE SERPL-SCNC: 108 MMOL/L (ref 96–112)
CO2 SERPL-SCNC: 19 MMOL/L (ref 20–33)
COLOR UR: YELLOW
CREAT SERPL-MCNC: 0.96 MG/DL (ref 0.5–1.4)
EKG IMPRESSION: NORMAL
EOSINOPHIL # BLD AUTO: 0.32 K/UL (ref 0–0.51)
EOSINOPHIL NFR BLD: 3.7 % (ref 0–6.9)
EPI CELLS #/AREA URNS HPF: ABNORMAL /HPF
ERYTHROCYTE [DISTWIDTH] IN BLOOD BY AUTOMATED COUNT: 44.8 FL (ref 35.9–50)
GFR SERPLBLD CREATININE-BSD FMLA CKD-EPI: 84 ML/MIN/1.73 M 2
GLOBULIN SER CALC-MCNC: 2.9 G/DL (ref 1.9–3.5)
GLUCOSE SERPL-MCNC: 83 MG/DL (ref 65–99)
GLUCOSE UR STRIP.AUTO-MCNC: NEGATIVE MG/DL
HCG SERPL QL: NEGATIVE
HCT VFR BLD AUTO: 40.3 % (ref 37–47)
HGB BLD-MCNC: 13.1 G/DL (ref 12–16)
IMM GRANULOCYTES # BLD AUTO: 0.02 K/UL (ref 0–0.11)
IMM GRANULOCYTES NFR BLD AUTO: 0.2 % (ref 0–0.9)
KETONES UR STRIP.AUTO-MCNC: ABNORMAL MG/DL
LEUKOCYTE ESTERASE UR QL STRIP.AUTO: ABNORMAL
LYMPHOCYTES # BLD AUTO: 3.51 K/UL (ref 1–4.8)
LYMPHOCYTES NFR BLD: 40.8 % (ref 22–41)
MCH RBC QN AUTO: 28.1 PG (ref 27–33)
MCHC RBC AUTO-ENTMCNC: 32.5 G/DL (ref 33.6–35)
MCV RBC AUTO: 86.3 FL (ref 81.4–97.8)
MICRO URNS: ABNORMAL
MONOCYTES # BLD AUTO: 0.61 K/UL (ref 0–0.85)
MONOCYTES NFR BLD AUTO: 7.1 % (ref 0–13.4)
MUCOUS THREADS #/AREA URNS HPF: ABNORMAL /HPF
NEUTROPHILS # BLD AUTO: 4.11 K/UL (ref 2–7.15)
NEUTROPHILS NFR BLD: 47.7 % (ref 44–72)
NITRITE UR QL STRIP.AUTO: NEGATIVE
NRBC # BLD AUTO: 0 K/UL
NRBC BLD-RTO: 0 /100 WBC
PH UR STRIP.AUTO: 7.5 [PH] (ref 5–8)
PLATELET # BLD AUTO: 289 K/UL (ref 164–446)
PMV BLD AUTO: 10.6 FL (ref 9–12.9)
POTASSIUM SERPL-SCNC: 4 MMOL/L (ref 3.6–5.5)
PROT SERPL-MCNC: 6.7 G/DL (ref 6–8.2)
PROT UR QL STRIP: NEGATIVE MG/DL
RBC # BLD AUTO: 4.67 M/UL (ref 4.2–5.4)
RBC # URNS HPF: ABNORMAL /HPF
RBC UR QL AUTO: NEGATIVE
SODIUM SERPL-SCNC: 139 MMOL/L (ref 135–145)
SP GR UR STRIP.AUTO: 1.01
TROPONIN T SERPL-MCNC: <6 NG/L (ref 6–19)
UNIDENT CRYS URNS QL MICRO: ABNORMAL /HPF
WBC # BLD AUTO: 8.6 K/UL (ref 4.8–10.8)
WBC #/AREA URNS HPF: ABNORMAL /HPF
YEAST #/AREA URNS HPF: ABNORMAL /HPF

## 2022-09-01 PROCEDURE — 36415 COLL VENOUS BLD VENIPUNCTURE: CPT

## 2022-09-01 PROCEDURE — 700105 HCHG RX REV CODE 258: Performed by: EMERGENCY MEDICINE

## 2022-09-01 PROCEDURE — G0378 HOSPITAL OBSERVATION PER HR: HCPCS

## 2022-09-01 PROCEDURE — 80053 COMPREHEN METABOLIC PANEL: CPT

## 2022-09-01 PROCEDURE — 99220 PR INITIAL OBSERVATION CARE,LEVL III: CPT | Performed by: HOSPITALIST

## 2022-09-01 PROCEDURE — 81001 URINALYSIS AUTO W/SCOPE: CPT

## 2022-09-01 PROCEDURE — 93005 ELECTROCARDIOGRAM TRACING: CPT | Performed by: EMERGENCY MEDICINE

## 2022-09-01 PROCEDURE — 84484 ASSAY OF TROPONIN QUANT: CPT

## 2022-09-01 PROCEDURE — 84703 CHORIONIC GONADOTROPIN ASSAY: CPT

## 2022-09-01 PROCEDURE — 71045 X-RAY EXAM CHEST 1 VIEW: CPT

## 2022-09-01 PROCEDURE — 85025 COMPLETE CBC W/AUTO DIFF WBC: CPT

## 2022-09-01 PROCEDURE — 99285 EMERGENCY DEPT VISIT HI MDM: CPT

## 2022-09-01 RX ORDER — PROMETHAZINE HYDROCHLORIDE 25 MG/1
12.5-25 SUPPOSITORY RECTAL EVERY 4 HOURS PRN
Status: DISCONTINUED | OUTPATIENT
Start: 2022-09-01 | End: 2022-09-02 | Stop reason: HOSPADM

## 2022-09-01 RX ORDER — ACETAMINOPHEN 325 MG/1
650 TABLET ORAL EVERY 6 HOURS PRN
Status: DISCONTINUED | OUTPATIENT
Start: 2022-09-01 | End: 2022-09-02 | Stop reason: HOSPADM

## 2022-09-01 RX ORDER — TRAZODONE HYDROCHLORIDE 50 MG/1
100 TABLET ORAL EVERY EVENING
Status: DISCONTINUED | OUTPATIENT
Start: 2022-09-02 | End: 2022-09-02 | Stop reason: HOSPADM

## 2022-09-01 RX ORDER — AMOXICILLIN 250 MG
2 CAPSULE ORAL 2 TIMES DAILY
Status: DISCONTINUED | OUTPATIENT
Start: 2022-09-01 | End: 2022-09-02 | Stop reason: HOSPADM

## 2022-09-01 RX ORDER — ONDANSETRON 4 MG/1
4 TABLET, ORALLY DISINTEGRATING ORAL EVERY 4 HOURS PRN
Status: DISCONTINUED | OUTPATIENT
Start: 2022-09-01 | End: 2022-09-02 | Stop reason: HOSPADM

## 2022-09-01 RX ORDER — PROCHLORPERAZINE EDISYLATE 5 MG/ML
5-10 INJECTION INTRAMUSCULAR; INTRAVENOUS EVERY 4 HOURS PRN
Status: DISCONTINUED | OUTPATIENT
Start: 2022-09-01 | End: 2022-09-02 | Stop reason: HOSPADM

## 2022-09-01 RX ORDER — SODIUM CHLORIDE, SODIUM LACTATE, POTASSIUM CHLORIDE, CALCIUM CHLORIDE 600; 310; 30; 20 MG/100ML; MG/100ML; MG/100ML; MG/100ML
1000 INJECTION, SOLUTION INTRAVENOUS ONCE
Status: COMPLETED | OUTPATIENT
Start: 2022-09-01 | End: 2022-09-01

## 2022-09-01 RX ORDER — BISACODYL 10 MG
10 SUPPOSITORY, RECTAL RECTAL
Status: DISCONTINUED | OUTPATIENT
Start: 2022-09-01 | End: 2022-09-02 | Stop reason: HOSPADM

## 2022-09-01 RX ORDER — ONDANSETRON 2 MG/ML
4 INJECTION INTRAMUSCULAR; INTRAVENOUS EVERY 4 HOURS PRN
Status: DISCONTINUED | OUTPATIENT
Start: 2022-09-01 | End: 2022-09-02 | Stop reason: HOSPADM

## 2022-09-01 RX ORDER — PROMETHAZINE HYDROCHLORIDE 25 MG/1
12.5-25 TABLET ORAL EVERY 4 HOURS PRN
Status: DISCONTINUED | OUTPATIENT
Start: 2022-09-01 | End: 2022-09-02 | Stop reason: HOSPADM

## 2022-09-01 RX ORDER — POLYETHYLENE GLYCOL 3350 17 G/17G
1 POWDER, FOR SOLUTION ORAL
Status: DISCONTINUED | OUTPATIENT
Start: 2022-09-01 | End: 2022-09-02 | Stop reason: HOSPADM

## 2022-09-01 RX ORDER — NORGESTIMATE AND ETHINYL ESTRADIOL 7DAYSX3 LO
1 KIT ORAL DAILY
Status: DISCONTINUED | OUTPATIENT
Start: 2022-09-02 | End: 2022-09-02

## 2022-09-01 RX ADMIN — SODIUM CHLORIDE, POTASSIUM CHLORIDE, SODIUM LACTATE AND CALCIUM CHLORIDE 1000 ML: 600; 310; 30; 20 INJECTION, SOLUTION INTRAVENOUS at 21:43

## 2022-09-01 ASSESSMENT — FIBROSIS 4 INDEX: FIB4 SCORE: 0.35

## 2022-09-02 ENCOUNTER — APPOINTMENT (OUTPATIENT)
Dept: CARDIOLOGY | Facility: MEDICAL CENTER | Age: 27
End: 2022-09-02
Attending: HOSPITALIST
Payer: COMMERCIAL

## 2022-09-02 VITALS
HEIGHT: 67 IN | RESPIRATION RATE: 18 BRPM | WEIGHT: 257.94 LBS | TEMPERATURE: 97.3 F | SYSTOLIC BLOOD PRESSURE: 117 MMHG | DIASTOLIC BLOOD PRESSURE: 66 MMHG | OXYGEN SATURATION: 97 % | HEART RATE: 67 BPM | BODY MASS INDEX: 40.48 KG/M2

## 2022-09-02 PROBLEM — I95.1 ORTHOSTASIS: Status: ACTIVE | Noted: 2022-09-02

## 2022-09-02 LAB
ANION GAP SERPL CALC-SCNC: 9 MMOL/L (ref 7–16)
BUN SERPL-MCNC: 11 MG/DL (ref 8–22)
CALCIUM SERPL-MCNC: 8.6 MG/DL (ref 8.4–10.2)
CHLORIDE SERPL-SCNC: 108 MMOL/L (ref 96–112)
CO2 SERPL-SCNC: 19 MMOL/L (ref 20–33)
CREAT SERPL-MCNC: 0.64 MG/DL (ref 0.5–1.4)
D DIMER PPP IA.FEU-MCNC: 0.27 UG/ML (FEU) (ref 0–0.5)
ERYTHROCYTE [DISTWIDTH] IN BLOOD BY AUTOMATED COUNT: 44.7 FL (ref 35.9–50)
GFR SERPLBLD CREATININE-BSD FMLA CKD-EPI: 126 ML/MIN/1.73 M 2
GLUCOSE SERPL-MCNC: 89 MG/DL (ref 65–99)
HCT VFR BLD AUTO: 36.7 % (ref 37–47)
HGB BLD-MCNC: 11.7 G/DL (ref 12–16)
LV EJECT FRACT  99904: 65
LV EJECT FRACT MOD 2C 99903: 60.71
LV EJECT FRACT MOD 4C 99902: 62.08
LV EJECT FRACT MOD BP 99901: 63.77
MCH RBC QN AUTO: 27.6 PG (ref 27–33)
MCHC RBC AUTO-ENTMCNC: 31.9 G/DL (ref 33.6–35)
MCV RBC AUTO: 86.6 FL (ref 81.4–97.8)
PLATELET # BLD AUTO: 228 K/UL (ref 164–446)
PMV BLD AUTO: 10.7 FL (ref 9–12.9)
POTASSIUM SERPL-SCNC: 3.6 MMOL/L (ref 3.6–5.5)
RBC # BLD AUTO: 4.24 M/UL (ref 4.2–5.4)
SODIUM SERPL-SCNC: 136 MMOL/L (ref 135–145)
TROPONIN T SERPL-MCNC: 7 NG/L (ref 6–19)
WBC # BLD AUTO: 7.6 K/UL (ref 4.8–10.8)

## 2022-09-02 PROCEDURE — 99217 PR OBSERVATION CARE DISCHARGE: CPT | Performed by: INTERNAL MEDICINE

## 2022-09-02 PROCEDURE — A9270 NON-COVERED ITEM OR SERVICE: HCPCS | Performed by: HOSPITALIST

## 2022-09-02 PROCEDURE — 85379 FIBRIN DEGRADATION QUANT: CPT

## 2022-09-02 PROCEDURE — 80048 BASIC METABOLIC PNL TOTAL CA: CPT

## 2022-09-02 PROCEDURE — A9270 NON-COVERED ITEM OR SERVICE: HCPCS | Performed by: INTERNAL MEDICINE

## 2022-09-02 PROCEDURE — G0378 HOSPITAL OBSERVATION PER HR: HCPCS

## 2022-09-02 PROCEDURE — 94760 N-INVAS EAR/PLS OXIMETRY 1: CPT

## 2022-09-02 PROCEDURE — 36415 COLL VENOUS BLD VENIPUNCTURE: CPT

## 2022-09-02 PROCEDURE — 93306 TTE W/DOPPLER COMPLETE: CPT | Mod: 26 | Performed by: INTERNAL MEDICINE

## 2022-09-02 PROCEDURE — 700102 HCHG RX REV CODE 250 W/ 637 OVERRIDE(OP): Performed by: INTERNAL MEDICINE

## 2022-09-02 PROCEDURE — 700101 HCHG RX REV CODE 250: Performed by: HOSPITALIST

## 2022-09-02 PROCEDURE — 85027 COMPLETE CBC AUTOMATED: CPT

## 2022-09-02 PROCEDURE — 93306 TTE W/DOPPLER COMPLETE: CPT

## 2022-09-02 PROCEDURE — 84484 ASSAY OF TROPONIN QUANT: CPT

## 2022-09-02 PROCEDURE — 700102 HCHG RX REV CODE 250 W/ 637 OVERRIDE(OP): Performed by: HOSPITALIST

## 2022-09-02 RX ORDER — TOPIRAMATE 100 MG/1
100 TABLET, FILM COATED ORAL DAILY
Status: DISCONTINUED | OUTPATIENT
Start: 2022-09-02 | End: 2022-09-02 | Stop reason: HOSPADM

## 2022-09-02 RX ORDER — TRAZODONE HYDROCHLORIDE 100 MG/1
100 TABLET ORAL NIGHTLY PRN
Status: ON HOLD | COMMUNITY
End: 2023-08-15

## 2022-09-02 RX ORDER — AMOXICILLIN AND CLAVULANATE POTASSIUM 500; 125 MG/1; MG/1
1 TABLET, FILM COATED ORAL EVERY 12 HOURS
Status: DISCONTINUED | OUTPATIENT
Start: 2022-09-02 | End: 2022-09-02 | Stop reason: HOSPADM

## 2022-09-02 RX ORDER — TRAZODONE HYDROCHLORIDE 50 MG/1
100 TABLET ORAL NIGHTLY PRN
Status: DISCONTINUED | OUTPATIENT
Start: 2022-09-02 | End: 2022-09-02 | Stop reason: HOSPADM

## 2022-09-02 RX ORDER — METFORMIN HYDROCHLORIDE 500 MG/1
2000 TABLET, EXTENDED RELEASE ORAL DAILY
Status: DISCONTINUED | OUTPATIENT
Start: 2022-09-02 | End: 2022-09-02 | Stop reason: HOSPADM

## 2022-09-02 RX ORDER — METFORMIN HYDROCHLORIDE 500 MG/1
2000 TABLET, EXTENDED RELEASE ORAL DAILY
COMMUNITY
End: 2022-09-22 | Stop reason: SDUPTHER

## 2022-09-02 RX ADMIN — TOPIRAMATE 100 MG: 100 TABLET, FILM COATED ORAL at 05:48

## 2022-09-02 RX ADMIN — METFORMIN HYDROCHLORIDE 2000 MG: 500 TABLET, EXTENDED RELEASE ORAL at 09:12

## 2022-09-02 RX ADMIN — AMOXICILLIN AND CLAVULANATE POTASSIUM 1 TABLET: 500; 125 TABLET, FILM COATED ORAL at 08:17

## 2022-09-02 RX ADMIN — SERTRALINE HYDROCHLORIDE 200 MG: 50 TABLET ORAL at 05:48

## 2022-09-02 ASSESSMENT — COGNITIVE AND FUNCTIONAL STATUS - GENERAL
SUGGESTED CMS G CODE MODIFIER MOBILITY: CH
DAILY ACTIVITIY SCORE: 24
SUGGESTED CMS G CODE MODIFIER DAILY ACTIVITY: CH
MOBILITY SCORE: 24

## 2022-09-02 ASSESSMENT — ENCOUNTER SYMPTOMS
COUGH: 0
DOUBLE VISION: 0
MYALGIAS: 0
PALPITATIONS: 0
BLURRED VISION: 0
BRUISES/BLEEDS EASILY: 0
NECK PAIN: 0
DEPRESSION: 0
WEAKNESS: 0
FEVER: 0
SORE THROAT: 0
SHORTNESS OF BREATH: 0
INSOMNIA: 0
NAUSEA: 0
HEADACHES: 0
VOMITING: 0
DIZZINESS: 0

## 2022-09-02 ASSESSMENT — LIFESTYLE VARIABLES
HOW MANY TIMES IN THE PAST YEAR HAVE YOU HAD 5 OR MORE DRINKS IN A DAY: 0
ON A TYPICAL DAY WHEN YOU DRINK ALCOHOL HOW MANY DRINKS DO YOU HAVE: 0
EVER HAD A DRINK FIRST THING IN THE MORNING TO STEADY YOUR NERVES TO GET RID OF A HANGOVER: NO
CONSUMPTION TOTAL: NEGATIVE
HAVE YOU EVER FELT YOU SHOULD CUT DOWN ON YOUR DRINKING: NO
HAVE PEOPLE ANNOYED YOU BY CRITICIZING YOUR DRINKING: NO
AVERAGE NUMBER OF DAYS PER WEEK YOU HAVE A DRINK CONTAINING ALCOHOL: 0
TOTAL SCORE: 0
ALCOHOL_USE: NO
EVER FELT BAD OR GUILTY ABOUT YOUR DRINKING: NO
TOTAL SCORE: 0
TOTAL SCORE: 0

## 2022-09-02 ASSESSMENT — PATIENT HEALTH QUESTIONNAIRE - PHQ9
SUM OF ALL RESPONSES TO PHQ9 QUESTIONS 1 AND 2: 0
2. FEELING DOWN, DEPRESSED, IRRITABLE, OR HOPELESS: NOT AT ALL
1. LITTLE INTEREST OR PLEASURE IN DOING THINGS: NOT AT ALL

## 2022-09-02 ASSESSMENT — FIBROSIS 4 INDEX: FIB4 SCORE: 0.31

## 2022-09-02 NOTE — PROGRESS NOTES
Med rec completed per pt   Allergies reviewed    Pt started a 10 day course of Amoxicillin on 8/23/2022

## 2022-09-02 NOTE — PROGRESS NOTES
Telemetry Shift Summary     Rhythm: SR  Rate: 64-77  Measurements: 0.12/0.08/0.40  Ectopy (reported by Monitor Tech): rPAC, rPVC     Normal Values  Rhythm: Sinus  HR:   Measurements: 0.12-0.20/0.06-0.10/0.30-0.52

## 2022-09-02 NOTE — CARE PLAN
The patient is Stable - Low risk of patient condition declining or worsening    Shift Goals  Clinical Goals: rest and manage symptoms  Patient Goals: rest and comfort  Family Goals: HAILEY    Progress made toward(s) clinical / shift goals:  Patient resting and comfortable, ambulated halls, orthostatics done, pain is controlled, monitored s/s of syncope and labs.       Problem: Knowledge Deficit - Standard  Goal: Patient and family/care givers will demonstrate understanding of plan of care, disease process/condition, diagnostic tests and medications  9/2/2022 1252 by Dianne Valdivia R.N.  Outcome: Progressing    Problem: Syncope Management  Goal: Patient's signs and symptoms of syncope will be assessed and managed  9/2/2022 1252 by Dianne Valdivia R.N.  Outcome: Progressing    Problem: Syncope Management  Goal: Patient's vital signs will be with within normal range or improve  9/2/2022 1252 by Dianne Valdivia R.N.  Outcome: Progressing    Problem: Psychosocial  Goal: Patient's level of anxiety will decrease  Outcome: Progressing             Patient is not progressing towards the following goals:

## 2022-09-02 NOTE — H&P
Hospital Medicine History & Physical Note    Date of Service  9/1/2022    Primary Care Physician  DANIELE Resendiz    Consultants  None    Code Status  Full Code    Chief Complaint  Chief Complaint   Patient presents with    Dizziness    Rapid Heart Beat     26 yo female presents to triage with reports of lightheadedness, dizziness and near syncopal that has gotten worse over the last 2 days.  Patient reports she has had an elevated HR.  Reports she is being treated for strep and doing some testing to rule out POTS.  + nausea today,  denies diarrhea.  Currently being treated with antibiotics for strep        History of Presenting Illness  Diana Hill is a 27 y.o. female who presented to the emergency department on 9/1/2022 with complaints of having 3 near syncopal episodes over the past 2 days.  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  She reports having tunnel vision and suddenly not feeling well.  She had 2 additional episodes lasting almost 30 minutes for her to recover.  No loss of consciousness and no head trauma.  She has had issues in the past with tachycardia of unclear etiology.  She just had a Holter monitor placed by cardiology early August and follows with Dr Gonzales.  She reports no major cardiac event during Holter monitoring.  She has a tilt table test scheduled for November.  History of COVID in January and referred to pulmonology given that ever since COVID history she is using her albuterol inhaler on a daily basis and prior to that, rarely needed.  Additionally, she has associated fatigue and reports tracking her heart rate with her smart watch and seeing variations of heart rate from 60s to 130 bpm.  Most of the events, she reports having a heart rate around 120 to 130 bpm.    I discussed the plan of care with patient.    Review of Systems  Review of Systems    Constitutional:  Positive for malaise/fatigue. Negative for fever.   HENT:  Negative for congestion and sore throat.    Eyes:  Negative for blurred vision and double vision.   Respiratory:  Negative for cough and shortness of breath.    Cardiovascular:  Negative for chest pain and palpitations.   Gastrointestinal:  Negative for nausea and vomiting.   Genitourinary:  Negative for dysuria and urgency.   Musculoskeletal:  Negative for myalgias and neck pain.   Skin:  Negative for itching and rash.   Neurological:  Negative for dizziness, weakness and headaches.   Endo/Heme/Allergies:  Does not bruise/bleed easily.   Psychiatric/Behavioral:  Negative for depression. The patient does not have insomnia.      Past Medical History   has a past medical history of Anemia, Anxiety, Celiac disease, Daytime sleepiness, Depression, Insomnia, Insulin resistance, Migraine, Morning headache, PCOS (polycystic ovarian syndrome), Snoring, Vertigo, and Vitamin D deficiency.    Surgical History   has a past surgical history that includes nadeen by laparoscopy (N/A, 9/13/2018).     Family History  family history includes Alcohol abuse in her father and paternal grandfather; Cancer in her paternal grandmother; Depression in her father; Heart Attack (age of onset: 66) in her maternal grandfather; Lung Disease in her paternal grandfather; No Known Problems in her maternal grandmother; Sleep Apnea in her brother; Stroke (age of onset: 66) in her paternal grandmother; Thyroid in her mother.   Family history reviewed with patient. There is no family history that is pertinent to the chief complaint.     Social History   reports that she has never smoked. She has never used smokeless tobacco. She reports current alcohol use. She reports that she does not use drugs.    Allergies  Allergies   Allergen Reactions    Gluten Meal        Medications  Prior to Admission Medications   Prescriptions Last Dose Informant Patient Reported? Taking?   B  Complex Vitamins (VITAMIN-B COMPLEX PO)   Yes No   Sig: Take  by mouth.   Magnesium 500 MG Tab   Yes No   Si tablet with a meal Orally Once a day for 30 day(s)   Norgestim-Eth Estrad Triphasic 0.18/0.215/0.25 MG-25 MCG Tab   Yes No   Sig: Take 1 Tablet by mouth every day.   Selenium 200 MCG Cap   Yes No   Sig: Take  by mouth.   VITAMIN D PO   Yes No   Sig: Take 6,000 Units by mouth every day.   albuterol 108 (90 Base) MCG/ACT Aero Soln inhalation aerosol   No No   Sig: Inhale 2 Puffs by mouth every four hours as needed for Shortness of Breath.   amoxicillin (AMOXIL) 500 MG Cap   No No   Sig: Take 1 Capsule by mouth 2 times a day for 10 days.   betamethasone dipropionate (DIPROLENE) 0.05 % Ointment   Yes No   Sig: APPLY TO AFFECTED AREA TWICE A DAY FOR 2 WEEKS AT A TIME, ALTERNATING WITH TACROLIMUS EVERY 2 WEEKS   metFORMIN ER (GLUCOPHAGE XR) 500 MG TABLET SR 24 HR   No No   Sig: Take 2 Tablets by mouth 2 times a day.   propranolol (INDERAL) 10 MG Tab   No No   Sig: TAKE 1 TABLET BY MOUTH 2 TIMES A DAY AS NEEDED (HEART PALPITATIONS).   sertraline (ZOLOFT) 100 MG Tab   No No   Sig: Take 2 Tablets by mouth every day.   topiramate (TOPAMAX) 50 MG tablet   Yes No   Sig: Take 50 mg by mouth every day.   traZODone (DESYREL) 100 MG Tab   No No   Sig: TAKE 1 TABLET BY MOUTH EVERY DAY IN THE EVENING   tretinoin (RETIN-A) 0.025 % gel   No No   Sig: APPLY 1 APPLICATION TOPICALLY EVERY BEDTIME. START WITH 2 -3 APPS PER WEEK AND WORK UP AS TOLERATED      Facility-Administered Medications: None       Physical Exam  Temp:  [36.8 °C (98.3 °F)] 36.8 °C (98.3 °F)  Pulse:  [] 69  Resp:  [18-19] 19  BP: (123-140)/(68-96) 125/68  SpO2:  [96 %-98 %] 98 %  Blood Pressure: 125/68   Temperature: 36.8 °C (98.3 °F)   Pulse: 69   Respiration: 19   Pulse Oximetry: 98 %       Physical Exam  Constitutional:       Appearance: Normal appearance.   HENT:      Head: Normocephalic and atraumatic.      Mouth/Throat:      Mouth: Mucous  membranes are moist.      Pharynx: Oropharynx is clear.   Eyes:      Extraocular Movements: Extraocular movements intact.      Pupils: Pupils are equal, round, and reactive to light.   Cardiovascular:      Rate and Rhythm: Normal rate and regular rhythm.      Heart sounds: Normal heart sounds.   Pulmonary:      Effort: Pulmonary effort is normal.      Breath sounds: Normal breath sounds.   Abdominal:      General: Abdomen is flat. Bowel sounds are normal.      Palpations: Abdomen is soft.   Musculoskeletal:      Cervical back: Normal range of motion and neck supple.   Skin:     General: Skin is warm and dry.   Neurological:      General: No focal deficit present.      Mental Status: She is alert and oriented to person, place, and time.   Psychiatric:         Mood and Affect: Mood normal.         Behavior: Behavior normal.       Laboratory:  Recent Labs     09/01/22 2026   WBC 8.6   RBC 4.67   HEMOGLOBIN 13.1   HEMATOCRIT 40.3   MCV 86.3   MCH 28.1   MCHC 32.5*   RDW 44.8   PLATELETCT 289   MPV 10.6     Recent Labs     09/01/22 2026   SODIUM 139   POTASSIUM 4.0   CHLORIDE 108   CO2 19*   GLUCOSE 83   BUN 12   CREATININE 0.96   CALCIUM 9.1     Recent Labs     09/01/22 2026   ALTSGPT 13   ASTSGOT 13   ALKPHOSPHAT 68   TBILIRUBIN 0.2   GLUCOSE 83         No results for input(s): NTPROBNP in the last 72 hours.      Recent Labs     09/01/22 2026   TROPONINT <6       Imaging:  DX-CHEST-PORTABLE (1 VIEW)   Final Result      No acute cardiac or pulmonary abnormalities are identified.      EC-ECHOCARDIOGRAM COMPLETE W/ CONT    (Results Pending)       EKG:  My impression is: NSR at 81 bpm, normal axis and no acute ST elevation    Assessment/Plan:  Justification for Admission Status  I anticipate this patient is appropriate for observation status at this time because Multiple episodes of near syncope        * Near syncope- (present on admission)  Assessment & Plan  -Observation status with telemetry monitoring.  -Near  syncopal event much more frequent, 3 episodes over the past 3 days.  No true syncope or fall but had to be on the ground sitting down on the floor for about 10 to 15 minutes to recover from it.  -Vital somewhat orthostatics in the emergency department and she received IV fluids.  -She reports rapid heart rate on her smart watch while having these events, heart rate ranging from 120 to 130 bpm.  -She was told in the past she might need to be be started on propranolol, but is somewhat concerned given that overnight heart rate drops to 50 bpm.  -This is a chronic problem for her.  She follows with cardiology, Dr. Gonzales, recently had a Holter monitor early August.  -Post COVID cardiology abnormality not fully excluded  -I am adding echocardiogram, D-dimer and we will keep her on the heart monitor tonight.  -Patient also has a tilt table test scheduled for November, the earlier possible.      BMI 38.0-38.9,adult  Assessment & Plan  -Had a long conversation with her about optimizing healthy lifestyle including focusing on sleeping, physical activity and encouraged focus on unprocessed foods and increasing amount of whole plant based food consumption.    Vestibular migraine- (present on admission)  Assessment & Plan  -Follows with neurology and is stable on Topamax, 100 mg p.o. daily.    PCOS (polycystic ovarian syndrome)- (present on admission)  Assessment & Plan  -Continue Birth Control and metformin    Generalized anxiety disorder- (present on admission)  Assessment & Plan  -Stable on sertraline.      VTE prophylaxis: SCDs/TEDs

## 2022-09-02 NOTE — PROGRESS NOTES
Monitor Summary:    Rhythm:  SR   Rate Range:  60-90  Ectopy: rPVC,PAC    Measurements:  0.16/0.10/0.40

## 2022-09-02 NOTE — PROGRESS NOTES
Received bedside report from LUZMA Young, pt care assumed. VS stable, pt assessment complete. Pt A&Ox4, no c/o pain at this time. POC discussed with pt and verbalizes no questions. Pt denies any additional needs at this time. Bed locked and in lowest position. Pt educated on fall risk and verbalized understanding, call light within reach, hourly rounding initiated.

## 2022-09-02 NOTE — ED PROVIDER NOTES
"ED Provider Note    CHIEF COMPLAINT  Chief Complaint   Patient presents with    Dizziness    Rapid Heart Beat     26 yo female presents to triage with reports of lightheadedness, dizziness and near syncopal that has gotten worse over the last 2 days.  Patient reports she has had an elevated HR.  Reports she is being treated for strep and doing some testing to rule out POTS.  + nausea today,  denies diarrhea.  Currently being treated with antibiotics for strep        HPI  Diana Hill is a 27 y.o. female here for evaluation of lightheadedness and dizziness.  Patient states that over the last couple of days she has had intermittent lightheadedness and tachycardia according to her watch.  She states that this is happened in the past, and she refused work-up for the same.  However this episodes are becoming more frequent, and causing her increased lightheadedness.  She was recently treated for strep pharyngitis, has 2 days left of her antibiotics.  She denies any chest pain, shortness of breath or abdominal pain, and she denies any back pain.  Nothing seems alleviate her symptoms.  She states that when she goes to a standing position from a seated or lying position, she has heart rate\" 130s.\"      ROS  See HPI for further details, o/w negative.     PAST MEDICAL HISTORY   has a past medical history of Anemia, Anxiety, Celiac disease, Daytime sleepiness, Depression, Insomnia, Insulin resistance, Migraine, Morning headache, PCOS (polycystic ovarian syndrome), Snoring, Vertigo, and Vitamin D deficiency.    SOCIAL HISTORY  Social History     Tobacco Use    Smoking status: Never    Smokeless tobacco: Never   Vaping Use    Vaping Use: Never used   Substance and Sexual Activity    Alcohol use: Yes     Comment: occasionally    Drug use: No    Sexual activity: Never     Partners: Male, Female     Birth control/protection: Pill       Family History  No bleeding disorders    SURGICAL HISTORY   has a past surgical history " that includes nadeen by laparoscopy (N/A, 9/13/2018).    CURRENT MEDICATIONS  Home Medications       Reviewed by Concha Rivero R.N. (Registered Nurse) on 09/01/22 at 2006  Med List Status: Not Addressed     Medication Last Dose Status   albuterol 108 (90 Base) MCG/ACT Aero Soln inhalation aerosol  Active   amoxicillin (AMOXIL) 500 MG Cap  Active   B Complex Vitamins (VITAMIN-B COMPLEX PO)  Active   betamethasone dipropionate (DIPROLENE) 0.05 % Ointment  Active   Magnesium 500 MG Tab  Active   metFORMIN ER (GLUCOPHAGE XR) 500 MG TABLET SR 24 HR  Active   Norgestim-Eth Estrad Triphasic 0.18/0.215/0.25 MG-25 MCG Tab  Active   propranolol (INDERAL) 10 MG Tab  Active   Selenium 200 MCG Cap  Active   sertraline (ZOLOFT) 100 MG Tab  Active   topiramate (TOPAMAX) 50 MG tablet  Active   traZODone (DESYREL) 100 MG Tab  Active   tretinoin (RETIN-A) 0.025 % gel  Active   VITAMIN D PO  Active                    ALLERGIES  Allergies   Allergen Reactions    Gluten Meal        REVIEW OF SYSTEMS  See HPI for further details. Review of systems as above, otherwise all other systems are negative.     PHYSICAL EXAM  Constitutional: Well developed, well nourished. No acute distress.  HEENT: Normocephalic, atraumatic. Posterior pharynx clear and moist.  Eyes:  EOMI. Normal sclera.  Neck: Supple, Full range of motion, nontender.  Chest/Pulmonary: clear to ausculation. Symmetrical expansion.   Cardio: Regular rate and rhythm with no murmur.   Abdomen: Soft, nontender. No peritoneal signs. No guarding. No palpable masses.  Musculoskeletal: No deformity, no edema, neurovascular intact.   Neuro: Clear speech, appropriate, cooperative, cranial nerves II-XII grossly intact.  Psych: Normal mood and affect    PROCEDURES     MEDICAL RECORD  I have reviewed patient's medical record and pertinent results are listed.    COURSE & MEDICAL DECISION MAKING  I have reviewed any medical record information, laboratory studies and radiographic results as  noted above.      Results for orders placed or performed during the hospital encounter of 09/01/22   CBC w/ Differential   Result Value Ref Range    WBC 8.6 4.8 - 10.8 K/uL    RBC 4.67 4.20 - 5.40 M/uL    Hemoglobin 13.1 12.0 - 16.0 g/dL    Hematocrit 40.3 37.0 - 47.0 %    MCV 86.3 81.4 - 97.8 fL    MCH 28.1 27.0 - 33.0 pg    MCHC 32.5 (L) 33.6 - 35.0 g/dL    RDW 44.8 35.9 - 50.0 fL    Platelet Count 289 164 - 446 K/uL    MPV 10.6 9.0 - 12.9 fL    Neutrophils-Polys 47.70 44.00 - 72.00 %    Lymphocytes 40.80 22.00 - 41.00 %    Monocytes 7.10 0.00 - 13.40 %    Eosinophils 3.70 0.00 - 6.90 %    Basophils 0.50 0.00 - 1.80 %    Immature Granulocytes 0.20 0.00 - 0.90 %    Nucleated RBC 0.00 /100 WBC    Neutrophils (Absolute) 4.11 2.00 - 7.15 K/uL    Lymphs (Absolute) 3.51 1.00 - 4.80 K/uL    Monos (Absolute) 0.61 0.00 - 0.85 K/uL    Eos (Absolute) 0.32 0.00 - 0.51 K/uL    Baso (Absolute) 0.04 0.00 - 0.12 K/uL    Immature Granulocytes (abs) 0.02 0.00 - 0.11 K/uL    NRBC (Absolute) 0.00 K/uL   Complete Metabolic Panel (CMP)   Result Value Ref Range    Sodium 139 135 - 145 mmol/L    Potassium 4.0 3.6 - 5.5 mmol/L    Chloride 108 96 - 112 mmol/L    Co2 19 (L) 20 - 33 mmol/L    Anion Gap 12.0 7.0 - 16.0    Glucose 83 65 - 99 mg/dL    Bun 12 8 - 22 mg/dL    Creatinine 0.96 0.50 - 1.40 mg/dL    Calcium 9.1 8.4 - 10.2 mg/dL    AST(SGOT) 13 12 - 45 U/L    ALT(SGPT) 13 2 - 50 U/L    Alkaline Phosphatase 68 30 - 99 U/L    Total Bilirubin 0.2 0.1 - 1.5 mg/dL    Albumin 3.8 3.2 - 4.9 g/dL    Total Protein 6.7 6.0 - 8.2 g/dL    Globulin 2.9 1.9 - 3.5 g/dL    A-G Ratio 1.3 g/dL   Troponin STAT   Result Value Ref Range    Troponin T <6 6 - 19 ng/L   BETA-HCG QUALITATIVE SERUM   Result Value Ref Range    Beta-Hcg Qualitative Serum Negative Negative   URINALYSIS,CULTURE IF INDICATED    Specimen: Urine   Result Value Ref Range    Color Yellow     Character Cloudy (A)     Specific Gravity 1.015 <1.035    Ph 7.5 5.0 - 8.0    Glucose Negative  Negative mg/dL    Ketones Trace (A) Negative mg/dL    Protein Negative Negative mg/dL    Bilirubin Negative Negative    Nitrite Negative Negative    Leukocyte Esterase Small (A) Negative    Occult Blood Negative Negative    Micro Urine Req Microscopic    ESTIMATED GFR   Result Value Ref Range    GFR (CKD-EPI) 84 >60 mL/min/1.73 m 2   URINE MICROSCOPIC (W/UA)   Result Value Ref Range    WBC 2-5 /hpf    RBC 0-2 /hpf    Bacteria Few (A) None /hpf    Epithelial Cells Rare Few /hpf    Mucous Threads Rare /hpf    Urine Crystals Mod Amorphous /hpf    Yeast Cells Few (A) None /hpf   EKG   Result Value Ref Range    Report       [facility] Emergency Dept.    Test Date:  2022  Pt Name:    JOSE MÁRQUEZ                 Department: [facility]  MRN:        [id number]                      Room:       -ROOM 5  Gender:     Female                       Technician: KYLIE  :        [id number]                   Requested By:JENNIFER ARECHIGA  Order #:    [id number]                    Reading MD:    Measurements  Intervals                                Axis  Rate:       81                           P:          68  AZ:         132                          QRS:        53  QRSD:       88                           T:          36  QT:         381  QTc:        443    Interpretive Statements  Sinus rhythm  Compared to ECG 2022 08:04:51  Sinus bradycardia no longer present       DX-CHEST-PORTABLE (1 VIEW)   Final Result      No acute cardiac or pulmonary abnormalities are identified.      EC-ECHOCARDIOGRAM COMPLETE W/ CONT    (Results Pending)     Ekg; normal sinus rhythm at a rate of 81, no ST elevation, no ST depression.  QTC is 443.  Compared to EKG from 2022.    HYDRATION: Based on the patient's presentation of Dehydration the patient was given IV fluids. IV Hydration was used because oral hydration was not adequate alone. Upon recheck following hydration, the patient was improved.    Patient will be admitted  to the hospitalist service for IV fluids and echocardiogram.      FINAL IMPRESSION  1. Near syncope        2.     Ortho stasis.         Electronically signed by: Tacho Kee D.O., 9/1/2022 9:50 PM

## 2022-09-02 NOTE — DISCHARGE SUMMARY
Discharge Summary    CHIEF COMPLAINT ON ADMISSION  Chief Complaint   Patient presents with    Dizziness    Rapid Heart Beat     26 yo female presents to triage with reports of lightheadedness, dizziness and near syncopal that has gotten worse over the last 2 days.  Patient reports she has had an elevated HR.  Reports she is being treated for strep and doing some testing to rule out POTS.  + nausea today,  denies diarrhea.  Currently being treated with antibiotics for strep        Reason for Admission  faitness, dizzieness     Admission Date  9/1/2022    CODE STATUS  Full Code    HPI & HOSPITAL COURSE  Diana Hill is a 27 y.o. female with celiac disease, depression, migraine, PCOS, admitted 9/1/2022 with 3 near syncopal episodes over the past 2 days, reporting having tunnel vision and suddenly not feeling well, each episode lasting about 30 minutes for her to recover.  She had no loss of consciousness or head trauma.  She had issues in the past with tachycardia of unclear etiology, and has had a Holter monitor placed for 48 hours in early August with no major cardiac event noted.  She has a tilt table test scheduled for November.  She has been tracking her heart rate with her smart watch, and sees variation of heart rate from 60s to 130s.  For most of the event, she reports having heart rate of around 120-130.  Labs were unremarkable.  EKG showed normal sinus rhythm with no dynamic ischemic changes.  Chest x-ray showed nothing acute.  She was mildly orthostatic in the ED, and received IV fluids.      She was monitored on telemetry, which did not show any significant arrhythmias except for only rare PACs and PVCs. D dimer is low. Echocardiogram is normal, with EF of 65%, with no significant valvular abnormalities.  Repeat orthostatic vital signs are normal.  Troponins remain negative.  Repeat orthostatic was done, which has normalized although her heart rate does go up by more than 30 points with change in  position. She was able to ambulate the halls of the hospital without any presyncopal symptoms. She remained hemodynamically stable and afebrile.      She was felt to have orthostasis as cause of her presyncope. As she is unable to avoid standing up for long periods of time at work, she is advised to wear compression stockings, and liberalize dietary salt intake. Results of the work-up were discussed with the patient, she was counseled to follow-up with her cardiologist for possible Zio patch placement for extended cardiac monitoring, and to keep her tilt table test appointment.  She understood and is in agreement.    I have personally seen and examined the patient on the day of discharge. With her clinical improvement, she was deemed ready to discharge from the hospital as she did not have any further hospitalization needs. Patient felt comfortable going home. The discharge plan was discussed with the patient, with which she was agreeable to.     Therefore, she is discharged in good and stable condition to home with close outpatient follow-up.        Discharge Date  9/2/2022      FOLLOW UP ITEMS POST DISCHARGE  -Follow-up with cardiology for Zio patch placement for extended cardiac monitoring, and tilt table testing.  -Use compression stockings at work, and liberalize dietary salt intake.   - counseled to seek immediate medical attention, or return to the ED for recurrent or worsening symptoms.      DISCHARGE DIAGNOSES  Principal Problem:    Near syncope POA: Yes  Active Problems:    Orthostasis POA: Yes    PCOS (polycystic ovarian syndrome) POA: Yes    Vestibular migraine POA: Yes    Generalized anxiety disorder POA: Yes    BMI 38.0-38.9,adult POA: Unknown  Resolved Problems:    * No resolved hospital problems. *      FOLLOW UP  Future Appointments   Date Time Provider Department Center   9/6/2022 10:40 AM DANIELE Dodson [facility] None   9/13/2022  8:00 AM [facility]   9/27/2022   1:00 PM Clyde Minaya [facility] [address]   11/30/2022  9:30 AM [facility] E[facility] None     No follow-up provider specified.    MEDICATIONS ON DISCHARGE     Medication List        CONTINUE taking these medications        Instructions   albuterol 108 (90 Base) MCG/ACT Aers inhalation aerosol   Inhale 2 Puffs by mouth every four hours as needed for Shortness of Breath.  Dose: 2 Puff     amoxicillin 500 MG Caps  Commonly known as: AMOXIL   Take 1 Capsule by mouth 2 times a day for 10 days.  Dose: 500 mg     betamethasone dipropionate 0.05 % Oint  Commonly known as: DIPROLENE   Apply 1 Application topically every day. Apply to hands  Dose: 1 Application     metFORMIN  MG Tb24  Commonly known as: GLUCOPHAGE XR   Take 2,000 mg by mouth every day.  Dose: 2,000 mg     Norgestim-Eth Estrad Triphasic 0.18/0.215/0.25 MG-25 MCG Tabs   Take 1 Tablet by mouth every day.  Dose: 1 Tablet     sertraline 100 MG Tabs  Commonly known as: Zoloft   Take 2 Tablets by mouth every day.  Dose: 200 mg     topiramate 50 MG tablet  Commonly known as: TOPAMAX   Take 50 mg by mouth every day.  Dose: 50 mg     traZODone 100 MG Tabs  Commonly known as: DESYREL   Take 100 mg by mouth at bedtime as needed for Sleep.  Dose: 100 mg     tretinoin 0.025 % gel  Commonly known as: RETIN-A   APPLY 1 APPLICATION TOPICALLY EVERY BEDTIME. START WITH 2 -3 APPS PER WEEK AND WORK UP AS TOLERATED     VITAMIN D PO   Take 1 Tablet by mouth every day.  Dose: 1 Tablet              Allergies  Allergies   Allergen Reactions    Gluten Meal Unspecified     Stomach problems        DIET  Orders Placed This Encounter   Procedures    Diet Order Diet: Cardiac     Standing Status:   Standing     Number of Occurrences:   1     Order Specific Question:   Diet:     Answer:   Cardiac [6]       ACTIVITY  As tolerated.  Weight bearing as tolerated    CONSULTATIONS  None    PROCEDURES  None    LABORATORY  Lab Results   Component Value Date    SODIUM 136 09/02/2022     POTASSIUM 3.6 09/02/2022    CHLORIDE 108 09/02/2022    CO2 19 (L) 09/02/2022    GLUCOSE 89 09/02/2022    BUN 11 09/02/2022    CREATININE 0.64 09/02/2022        Lab Results   Component Value Date    WBC 7.6 09/02/2022    HEMOGLOBIN 11.7 (L) 09/02/2022    HEMATOCRIT 36.7 (L) 09/02/2022    PLATELETCT 228 09/02/2022        Total time of the discharge process = 40 minutes.

## 2022-09-02 NOTE — ASSESSMENT & PLAN NOTE
-Observation status with telemetry monitoring.  -Near syncopal event much more frequent, 3 episodes over the past 3 days.  No true syncope or fall but had to be on the ground sitting down on the floor for about 10 to 15 minutes to recover from it.  -Vital somewhat orthostatics in the emergency department and she received IV fluids.  -She reports rapid heart rate on her smart watch while having these events, heart rate ranging from 120 to 130 bpm.  -She was told in the past she might need to be be started on propranolol, but is somewhat concerned given that overnight heart rate drops to 50 bpm.  -This is a chronic problem for her.  She follows with cardiology, Dr. Gonzales, recently had a Holter monitor early August.  -Post COVID cardiology abnormality not fully excluded  -I am adding echocardiogram, D-dimer and we will keep her on the heart monitor tonight.  -Patient also has a tilt table test scheduled for November, the earlier possible.

## 2022-09-02 NOTE — ASSESSMENT & PLAN NOTE
-Had a long conversation with her about optimizing healthy lifestyle including focusing on sleeping, physical activity and encouraged focus on unprocessed foods and increasing amount of whole plant based food consumption.

## 2022-09-02 NOTE — PROGRESS NOTES
4 Eyes Skin Assessment Completed by LUZMA Young and LUZMA Gomez.    Head WDL  Ears WDL  Nose WDL  Mouth WDL  Neck WDL  Breast/Chest WDL  Shoulder Blades WDL  Spine WDL  (R) Arm/Elbow/Hand WDL  (L) Arm/Elbow/Hand WDL  Abdomen WDL  Groin WDL  Scrotum/Coccyx/Buttocks WDL  (R) Leg WDL  (L) Leg WDL  (R) Heel/Foot/Toe WDL  (L) Heel/Foot/Toe WDL          Devices In Places none      Interventions In Place Pillows and Pressure Redistribution Mattress    Possible Skin Injury No    Pictures Uploaded Into Epic N/A  Wound Consult Placed N/A  RN Wound Prevention Protocol Ordered No

## 2022-09-02 NOTE — DIETARY
NUTRITION SERVICES: BMI - Pt with BMI >40 (=Body mass index is 40.4 kg/m².), Class III obesity. Weight loss counseling not appropriate in acute care setting.     RECOMMEND - If appropriate at DC please refer to outpatient nutrition services for weight management.

## 2022-09-02 NOTE — CARE PLAN
Patient A&Ox4, VS stable, room air, telemetry, IV patent, call light within reach, bed low and locked    The patient is Stable - Low risk of patient condition declining or worsening         Progress made toward(s) clinical / shift goals:    Problem: Knowledge Deficit - Standard  Goal: Patient and family/care givers will demonstrate understanding of plan of care, disease process/condition, diagnostic tests and medications  Outcome: Progressing     Problem: Syncope Management  Goal: Patient's signs and symptoms of syncope will be assessed and managed  Outcome: Progressing  Goal: Cardiac arrhythmias will be absent or managed  Outcome: Progressing  Goal: Patient's vital signs will be with within normal range or improve  Outcome: Progressing  Goal: Patient's risk for medication side effects that could worsen signs and symptoms of syncope will be decreased  Outcome: Progressing       Patient is not progressing towards the following goals:

## 2022-09-02 NOTE — ED TRIAGE NOTES
"Chief Complaint   Patient presents with    Dizziness    Rapid Heart Beat     26 yo female presents to triage with reports of lightheadedness, dizziness and near syncopal that has gotten worse over the last 2 days.  Patient reports she has had an elevated HR.  Reports she is being treated for strep and doing some testing to rule out POTS.  + nausea today,  denies diarrhea.  Currently being treated with antibiotics for strep     BP (!) 140/96   Pulse 90   Temp 36.8 °C (98.3 °F) (Temporal)   Resp 18   Ht 1.702 m (5' 7\")   Wt 111 kg (244 lb 7.8 oz)   LMP 08/18/2022 (Approximate)   SpO2 97%   BMI 38.29 kg/m²      Patient is Covid Vaccinated   "

## 2022-09-02 NOTE — PROGRESS NOTES
Patient discharge paperwork completed. Patient discharge instructions discussed and completed. IV and tele box removed. No further questions or concerns. Family member at bedside.

## 2022-09-06 ENCOUNTER — OFFICE VISIT (OUTPATIENT)
Dept: SLEEP MEDICINE | Facility: MEDICAL CENTER | Age: 27
End: 2022-09-06
Payer: COMMERCIAL

## 2022-09-06 VITALS
OXYGEN SATURATION: 96 % | SYSTOLIC BLOOD PRESSURE: 128 MMHG | HEART RATE: 83 BPM | WEIGHT: 242.51 LBS | DIASTOLIC BLOOD PRESSURE: 76 MMHG | BODY MASS INDEX: 38.06 KG/M2 | HEIGHT: 67 IN

## 2022-09-06 DIAGNOSIS — G93.32 CHRONIC FATIGUE SYNDROME: ICD-10-CM

## 2022-09-06 DIAGNOSIS — R06.02 SHORTNESS OF BREATH: ICD-10-CM

## 2022-09-06 DIAGNOSIS — G47.33 OSA (OBSTRUCTIVE SLEEP APNEA): ICD-10-CM

## 2022-09-06 DIAGNOSIS — Z86.16 HISTORY OF COVID-19: ICD-10-CM

## 2022-09-06 DIAGNOSIS — R00.2 HEART PALPITATIONS: ICD-10-CM

## 2022-09-06 PROCEDURE — 99214 OFFICE O/P EST MOD 30 MIN: CPT | Performed by: NURSE PRACTITIONER

## 2022-09-06 ASSESSMENT — FIBROSIS 4 INDEX: FIB4 SCORE: 0.4

## 2022-09-08 ENCOUNTER — OFFICE VISIT (OUTPATIENT)
Dept: MEDICAL GROUP | Facility: PHYSICIAN GROUP | Age: 27
End: 2022-09-08
Payer: COMMERCIAL

## 2022-09-08 VITALS — WEIGHT: 244.8 LBS | BODY MASS INDEX: 38.42 KG/M2 | TEMPERATURE: 97.2 F | RESPIRATION RATE: 16 BRPM | HEIGHT: 67 IN

## 2022-09-08 DIAGNOSIS — Z09 HOSPITAL DISCHARGE FOLLOW-UP: ICD-10-CM

## 2022-09-08 PROCEDURE — 99214 OFFICE O/P EST MOD 30 MIN: CPT | Performed by: NURSE PRACTITIONER

## 2022-09-08 ASSESSMENT — FIBROSIS 4 INDEX: FIB4 SCORE: 0.4

## 2022-09-08 NOTE — PROGRESS NOTES
Chief Complaint   Patient presents with    Hospital Follow-up       HISTORY OF PRESENT ILLNESS: Patient is a 27 y.o. female, established patient who presents today to discuss medical problems as listed below:    Health Maintenance:  COMPLETED     Hospital discharge follow-up  Patient is here for hospital follow-up.  She was admitted to [facility] on 9/1/2022 monitored on telemetry for 1 day discharged the next day.  She was admitted for lightheadedness, dizziness and rapid heartbeat.  She was being treated with antibiotics for strep infection and in process of ruling out POTS.  She had 3 separate episodes of presyncope lasting 30 minutes, associated symptoms of tunnel vision.  Her tilt table test is scheduled for November.  She had HR monitor device fluctuating from .  Her cardiac work-up was negative, labs unremarkable, EKG normal sinus rhythm, x-ray normal, echocardiogram normal with EF of 65%, troponins negative, D-dimer low.  She was monitored on telemetry with some PACs and PVCs.  She was mildly orthostatic at the time of admission, received IV fluids, repeat orthostatics were normal.  She was discharged in stable condition and was advised to follow-up with cardiology with whom she is scheduled on 9/20/2022.  Recommended Zio patch.  Today her vitals are within normal limits, /64, pulse 82.   pt reports no improvement in symptoms after hospital discharge.   Pt is followed with neurology for migraines, pending MRI of the brain.  Also following with therapist and psychiatrist, anxiety and depression under control.  Was also evaluated by [facility] endocrinology for abnormal findings.    3 tests pending:  - MRI brain  - zio patch 14 day monitoring   - Table tilt test    Patient Active Problem List    Diagnosis Date Noted    BMI 38.0-38.9,adult 09/02/2022    Orthostasis 09/02/2022    Near syncope 09/01/2022    Heart palpitations 07/27/2022    Breast lump in female 06/16/2022    Bilateral flank pain  04/26/2022    Generalized anxiety disorder 11/17/2021    Moderate episode of recurrent major depressive disorder (HCC) 11/17/2021    Plantar wart, left foot 10/21/2021    Medication management 10/21/2021    Chronic fatigue syndrome 08/17/2021    Intractable migraine with aura without status migrainosus 08/10/2021    Obstructive sleep apnea syndrome 08/10/2021    Vestibular migraine 08/10/2021    Otalgia of left ear 04/27/2021    History of canker sores 04/27/2021    Polyarthralgia 03/25/2021    Brittle nails 03/25/2021    Cervical lymphadenopathy 12/29/2020    Subclinical hypothyroidism 11/23/2020    Insulin resistance 11/23/2020    Anemia 11/23/2020    Morbid obesity (HCC) 10/07/2020    Skin rash 09/15/2020    Administrative encounter 09/15/2020    Hospital discharge follow-up 09/02/2020    Rash of hands 08/12/2020    Snoring 08/12/2020    Vertigo 03/03/2020    PCOS (polycystic ovarian syndrome) 11/19/2019    Vitamin D deficiency 11/19/2019    High triglycerides 11/19/2019        Allergies: Gluten meal    Current Outpatient Medications   Medication Sig Dispense Refill    metFORMIN ER (GLUCOPHAGE XR) 500 MG TABLET SR 24 HR Take 2,000 mg by mouth every day.      traZODone (DESYREL) 100 MG Tab Take 100 mg by mouth at bedtime as needed for Sleep.      betamethasone dipropionate (DIPROLENE) 0.05 % Ointment Apply 1 Application topically every day. Apply to hands      topiramate (TOPAMAX) 50 MG tablet Take 50 mg by mouth every day.      sertraline (ZOLOFT) 100 MG Tab Take 2 Tablets by mouth every day. 60 Tablet 1    tretinoin (RETIN-A) 0.025 % gel APPLY 1 APPLICATION TOPICALLY EVERY BEDTIME. START WITH 2 -3 APPS PER WEEK AND WORK UP AS TOLERATED 45 g 0    Norgestim-Eth Estrad Triphasic 0.18/0.215/0.25 MG-25 MCG Tab Take 1 Tablet by mouth every day.      VITAMIN D PO Take 1 Tablet by mouth every day.      albuterol 108 (90 Base) MCG/ACT Aero Soln inhalation aerosol Inhale 2 Puffs by mouth every four hours as needed for  "Shortness of Breath. 1 Inhaler 0     No current facility-administered medications for this visit.       Social History     Tobacco Use    Smoking status: Never    Smokeless tobacco: Never   Vaping Use    Vaping Use: Never used   Substance Use Topics    Alcohol use: Yes     Comment: occasionally    Drug use: No     Social History     Social History Narrative    Not on file       Family History   Problem Relation Age of Onset    Thyroid Mother     Depression Father     Alcohol abuse Father     No Known Problems Maternal Grandmother     Heart Attack Maternal Grandfather 66    Cancer Paternal Grandmother         Breast , esophageal    Stroke Paternal Grandmother 66    Alcohol abuse Paternal Grandfather     Lung Disease Paternal Grandfather     Sleep Apnea Brother        Allergies, past medical history, past surgical history, family history, social history reviewed and updated.    Review of Systems:     - Constitutional: Negative for fever, chills, unexpected weight change, and fatigue/generalized weakness.       - Respiratory: Negative for cough, sputum production, chest congestion, dyspnea, wheezing, and crackles.      - Cardiovascular: Negative for chest pain, palpitations, orthopnea, and bilateral lower extremity edema.      - Psychiatric/Behavioral: Negative for depression, suicidal/homicidal ideation and memory loss.      All other systems reviewed and are negative    Exam:    BP (P) 124/64   Pulse (P) 82   Temp 36.2 °C (97.2 °F) (Temporal)   Resp 16   Ht 1.702 m (5' 7\")   Wt 111 kg (244 lb 12.8 oz)   LMP 08/18/2022 (Approximate)   SpO2 (P) 97%   BMI 38.34 kg/m²  Body mass index is 38.34 kg/m².    Physical Exam:  Constitutional: Well-developed and well-nourished. Not diaphoretic. No distress.   Cardiovascular: Regular rate and rhythm, S1 and S2 without murmur, rubs, or gallops.    Chest: Effort normal. Clear to auscultation throughout. No adventitious sounds. Neurological: Alert and oriented x 3. "   Psychiatric:  Behavior, mood, and affect are appropriate.  MA/nursing note and vitals reviewed.    Assessment/Plan:  1. Hospital discharge follow-up  Hospital records reviewed and discussed with patient and mother, all questions answered.  Patient awaiting 3 follow-up tests as listed in the note.  Cardiology follow-up on September 20.  Patient advised to avoid drastic changes in position to accommodate blood flow to the brain.  Utilize legs when sitting down.  Stay well-hydrated.  No limitation on salty foods.       Discussed with patient possible alternative diagnoses, pt is to take all medications as prescribed. If symptoms persist FU w/PCP, if symptoms worsen go to emergency room. If experiencing any side effects from prescribed medications reports to the office immediately or go to emergency room.  Reviewed indication, dosage, usage and potential adverse effects of prescribed medications. Reviewed risks and benefits of treatment plan. Patient verbalizes understanding of all instruction and verbally agrees to plan.    No follow-ups on file.

## 2022-09-12 ENCOUNTER — HOSPITAL ENCOUNTER (OUTPATIENT)
Dept: RADIOLOGY | Facility: MEDICAL CENTER | Age: 27
End: 2022-09-12
Attending: NURSE PRACTITIONER
Payer: COMMERCIAL

## 2022-09-12 DIAGNOSIS — Z86.16 HISTORY OF COVID-19: ICD-10-CM

## 2022-09-12 DIAGNOSIS — R06.02 SHORTNESS OF BREATH: ICD-10-CM

## 2022-09-12 PROCEDURE — 71250 CT THORAX DX C-: CPT

## 2022-09-13 ENCOUNTER — OFFICE VISIT (OUTPATIENT)
Dept: BEHAVIORAL HEALTH | Facility: CLINIC | Age: 27
End: 2022-09-13
Payer: COMMERCIAL

## 2022-09-13 DIAGNOSIS — F33.1 MODERATE EPISODE OF RECURRENT MAJOR DEPRESSIVE DISORDER (HCC): ICD-10-CM

## 2022-09-13 DIAGNOSIS — F41.1 GENERALIZED ANXIETY DISORDER: ICD-10-CM

## 2022-09-13 PROCEDURE — 90837 PSYTX W PT 60 MINUTES: CPT | Performed by: SOCIAL WORKER

## 2022-09-13 NOTE — PROGRESS NOTES
"[facility]   Therapy Progress Note        Name: Diana Hill  MRN: [id number]  : 1995  Age: 27 y.o.  Date of assessment: 2022  PCP: DANIELE Resendiz  Persons in attendance: Patient  Total session time: 53 minutes      Topics addressed in psychotherapy include: Reports that she was recently hospitalized for almost fainting at work. It is more concerns with her heart. She reports feeling very tired- exhausted. She is going to have a heart monitor for a couple of weeks but the actual test will not be until November. Discussion around advocating for herself, she keeps a [employer] notebook where she can track her own data and questions.  \" I will be starting school in October which is hard as I will also be working full time. Cosmetology is not the end game but I do want to do it. I eventually want to be working with forensics or autopsies. 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 Also went over mindfulness moments she can do at anytime to bring her body to rest. Setting up schedules for morning and evening.    Objective Observations:   Participation:Active verbal participation   Grooming:Neat   Cognition:Alert and did express feeling tired- exhausted with all that is happening   Eye Contact:Good   Mood:Anxious   Affect:Anxious and expressing exhaustion   Thought Process:Goal-directed   Speech:Rate within normal limits and Volume within normal limits    Current Risk:   Suicide: not indicated   Homicide: not indicated   Self-Harm: not indicated   Relapse: not indicated   Safety Plan Reviewed: not applicable    Care Plan Updated: No    Does patient express agreement with the above plan? Yes     Diagnosis:  1. Generalized anxiety disorder    2. Moderate episode of recurrent " major depressive disorder (HCC)        Referral appointment(s) scheduled?  Made earlier        Clyde Minaya L.C.S.W.

## 2022-09-20 ENCOUNTER — NON-PROVIDER VISIT (OUTPATIENT)
Dept: CARDIOLOGY | Facility: MEDICAL CENTER | Age: 27
End: 2022-09-20
Attending: NURSE PRACTITIONER
Payer: COMMERCIAL

## 2022-09-20 ENCOUNTER — OFFICE VISIT (OUTPATIENT)
Dept: CARDIOLOGY | Facility: MEDICAL CENTER | Age: 27
End: 2022-09-20
Payer: COMMERCIAL

## 2022-09-20 VITALS
DIASTOLIC BLOOD PRESSURE: 86 MMHG | SYSTOLIC BLOOD PRESSURE: 124 MMHG | RESPIRATION RATE: 14 BRPM | HEIGHT: 67 IN | HEART RATE: 94 BPM | BODY MASS INDEX: 38.77 KG/M2 | WEIGHT: 247 LBS | OXYGEN SATURATION: 97 %

## 2022-09-20 DIAGNOSIS — I45.5 SINUS PAUSE: ICD-10-CM

## 2022-09-20 DIAGNOSIS — R00.2 HEART PALPITATIONS: ICD-10-CM

## 2022-09-20 DIAGNOSIS — I47.10 SVT (SUPRAVENTRICULAR TACHYCARDIA) (HCC): ICD-10-CM

## 2022-09-20 DIAGNOSIS — R00.0 SINUS TACHYCARDIA: ICD-10-CM

## 2022-09-20 DIAGNOSIS — I49.3 PVC'S (PREMATURE VENTRICULAR CONTRACTIONS): ICD-10-CM

## 2022-09-20 DIAGNOSIS — I47.29 NSVT (NONSUSTAINED VENTRICULAR TACHYCARDIA) (HCC): ICD-10-CM

## 2022-09-20 PROCEDURE — 99213 OFFICE O/P EST LOW 20 MIN: CPT | Performed by: NURSE PRACTITIONER

## 2022-09-20 ASSESSMENT — FIBROSIS 4 INDEX: FIB4 SCORE: 0.4

## 2022-09-20 NOTE — PROGRESS NOTES
Cardiology Clinic Follow-up Note    Date of note:    9/20/2022  Primary Care Provider: DANIELE Resendiz    Name:             Diana Hill  YOB: 1995  MRN:               [id number]    CC: palpitations    Primary Cardiologist: Dr. Aabd    Patient HPI:   Diana Hill is a 27 y.o. female with current medical problems including obesity, PCOS, JOEY, heart palpitations, near-syncope, tachycardia    Interim History:  Ms. Hill was last seen in this cardiology office by Dr. Abad on 8/5/22 for concern of palpitations and tachycardia, possible POTS.  Her PCP had ordered her a previous event monitor that did not show any tachycardia.  She is scheduled for a tilt table test on November 30.     Was in the hospital on 9/1/22 for tachycardia and near syncope, found to have slight orthostatic hypotension and increase in heart rate of 30 with standing.  Recommended she have a cardiac monitor for extended period of time ordered and follow-up with cardiology.    Still feeling fast HR, palpitations, HR with her apple Watch up into the 140's. Feels it when she is bending over or crouching down then back up.  Also feeling fast heart rate when she is doing activities. Feels palpitations all throughout the day. Feels HR faster and more palpitations after eating.  Her and her mother are concerned she may need to have a referral to [facility] to see a POTS specialist.    Review of systems:  All others systems reviewed and negative except for what is outlined in the above HPI    Past Medical History:   Diagnosis Date    Anemia     hx of borderline anemia    Anxiety     Celiac disease     Daytime sleepiness     Depression     Insomnia     Insulin resistance     Migraine     Morning headache     PCOS (polycystic ovarian syndrome)     Snoring     Vertigo     Vitamin D deficiency      Past Surgical History:   Procedure Laterality Date    MATTHIAS BY LAPAROSCOPY N/A 9/13/2018    Procedure: MATTHIAS BY  LAPAROSCOPY;  Surgeon: Hayden Wilhelm M.D.;  Location: SURGERY [facility];  Service: General     Family History   Problem Relation Age of Onset    Thyroid Mother     Depression Father     Alcohol abuse Father     No Known Problems Maternal Grandmother     Heart Attack Maternal Grandfather 66    Cancer Paternal Grandmother         Breast , esophageal    Stroke Paternal Grandmother 66    Alcohol abuse Paternal Grandfather     Lung Disease Paternal Grandfather     Sleep Apnea Brother      Social History     Socioeconomic History    Marital status: Single     Spouse name: Not on file    Number of children: Not on file    Years of education: Not on file    Highest education level: Not on file   Occupational History    Occupation: CNA [employer]     Comment: CNA ; Nursing student   Tobacco Use    Smoking status: Never    Smokeless tobacco: Never   Vaping Use    Vaping Use: Never used   Substance and Sexual Activity    Alcohol use: Yes     Comment: occasionally    Drug use: No    Sexual activity: Never     Partners: Male, Female     Birth control/protection: Pill   Other Topics Concern    Not on file   Social History Narrative    Not on file     Social Determinants of Health     Financial Resource Strain: Not on file   Food Insecurity: Not on file   Transportation Needs: Not on file   Physical Activity: Not on file   Stress: Not on file   Social Connections: Not on file   Intimate Partner Violence: Not on file   Housing Stability: Not on file     Allergies   Allergen Reactions    Gluten Meal Unspecified     Stomach problems      Current Outpatient Medications   Medication Sig Dispense Refill    metFORMIN ER (GLUCOPHAGE XR) 500 MG TABLET SR 24 HR Take 2,000 mg by mouth every day.      traZODone (DESYREL) 100 MG Tab Take 100 mg by mouth at bedtime as needed for Sleep.      betamethasone dipropionate (DIPROLENE) 0.05 % Ointment Apply 1 Application topically every day. Apply to hands      topiramate (TOPAMAX) 50 MG  "tablet Take 50 mg by mouth every day.      sertraline (ZOLOFT) 100 MG Tab Take 2 Tablets by mouth every day. 60 Tablet 1    tretinoin (RETIN-A) 0.025 % gel APPLY 1 APPLICATION TOPICALLY EVERY BEDTIME. START WITH 2 -3 APPS PER WEEK AND WORK UP AS TOLERATED 45 g 0    Norgestim-Eth Estrad Triphasic 0.18/0.215/0.25 MG-25 MCG Tab Take 1 Tablet by mouth every day.      VITAMIN D PO Take 1 Tablet by mouth every day.      albuterol 108 (90 Base) MCG/ACT Aero Soln inhalation aerosol Inhale 2 Puffs by mouth every four hours as needed for Shortness of Breath. 1 Inhaler 0     No current facility-administered medications for this visit.       Physical Exam:  Ambulatory Vitals  /86 (BP Location: Left arm, Patient Position: Sitting, BP Cuff Size: Adult)   Pulse 94   Resp 14   Ht 1.702 m (5' 7\")   Wt 112 kg (247 lb)   SpO2 97%    BP Readings from Last 4 Encounters:   09/20/22 124/86   09/08/22 (P) 124/64   09/06/22 128/76   09/02/22 117/66       Weight/BMI: Body mass index is 38.69 kg/m².  Wt Readings from Last 4 Encounters:   09/20/22 112 kg (247 lb)   09/08/22 111 kg (244 lb 12.8 oz)   09/06/22 110 kg (242 lb 8.1 oz)   09/02/22 117 kg (257 lb 15 oz)       General: No apparent distress. Well nourished. BMI 38.7   Neck: No JVD. No caroid bruits, trachea midline  Lungs: CTAB. Normal effort, without crackles/rhonchi, no wheezing  Heart: RRR. Normal S1/S2, no murmur, no rub. no lower extremity edema. 2+ radial pulses, 2+ DT pulses  Ext: No clubbing or cyanosis.  Abdomen: soft, non tender, non distended, no partha hepatomegaly.  Neurological: No focal deficits, no facial asymmetry.  Normal speech.  Psychiatric: Appropriate affect, alert and oriented x 4.   Skin: Warm and dry, no rash.    Lab Data Review:  Lab Results   Component Value Date/Time    CHOLSTRLTOT 185 11/04/2019 08:50 AM    LDL 86 11/04/2019 08:50 AM    HDL 67 11/04/2019 08:50 AM    TRIGLYCERIDE 161 (H) 11/04/2019 08:50 AM       Lab Results   Component Value " Date/Time    SODIUM 136 2022 04:57 AM    POTASSIUM 3.6 2022 04:57 AM    CHLORIDE 108 2022 04:57 AM    CO2 19 (L) 2022 04:57 AM    GLUCOSE 89 2022 04:57 AM    BUN 11 2022 04:57 AM    CREATININE 0.64 2022 04:57 AM     Lab Results   Component Value Date/Time    ALKPHOSPHAT 68 2022 08:26 PM    ASTSGOT 13 2022 08:26 PM    ALTSGPT 13 2022 08:26 PM    TBILIRUBIN 0.2 2022 08:26 PM      Lab Results   Component Value Date/Time    WBC 7.6 2022 04:57 AM       Cardiac Imaging and Procedures Review:      EKG 22: My Personal interpretation reveals SR 81    Echo 22:  No prior study is available for comparison.   The left ventricle is normal in size and thickness.  The left ventricular ejection fraction is visually estimated to be 65%.  Normal inferior vena cava size and inspiratory collapse.    Cardiac event monitor 8/3/22  Summary:   1. The predominant rhythm was sinus rhythm.   2. Rare PACs <0.1%.  Rare PVCs <0.1%.   3. No evidence of tachyarrhythmias or bradyarrhythmias.   4. Symptoms correlated with sinus rhythm with HR  bpm without ectopy    Assessment and Clinical Decision Makin. Heart palpitations  Holter Monitor Study        The following treatment plan was discussed    -Obtain 2-week Zio patch, will have this placed today  -Follow-up with cardiology after tilt table test has been performed  -Advise she wear compression stockings which she has not started to do yet  -Increase water intake and also electrolyte intake such as Gatorade  -Please contact office if any syncopal events    Plan reviewed in detail with the patient, verbalizes understanding and is in agreement.  Pt is to follow up with Dr. Abad in 3 months  Encouraged Pt to follow up with us over the phone or electronically using my [employer] as cardiac issues/concerns arise.      PLEASE NOTE: This dictation was created using voice recognition software. I have made every  reasonable attempt to correct obvious errors, but I expect that there are errors of grammar and possibly content that I did not discover before finalizing the note.       MARKY Gracia.   [facility]  [phone]    Collaborating Physician: Dr Vidal

## 2022-09-20 NOTE — PROGRESS NOTES
Patient enrolled in the 14 day ePatch Holter monitoring program, pt has sensitivity towards tape and adhesive. Per Vera Naylor A.P.R.N. >In clinic hook up, monitor S/N [id number].   >Pending EOS.

## 2022-09-22 ENCOUNTER — HOSPITAL ENCOUNTER (OUTPATIENT)
Dept: LAB | Facility: MEDICAL CENTER | Age: 27
End: 2022-09-22
Attending: INTERNAL MEDICINE
Payer: COMMERCIAL

## 2022-09-22 ENCOUNTER — PATIENT MESSAGE (OUTPATIENT)
Dept: ENDOCRINOLOGY | Facility: MEDICAL CENTER | Age: 27
End: 2022-09-22
Payer: COMMERCIAL

## 2022-09-22 DIAGNOSIS — E28.2 PCOS (POLYCYSTIC OVARIAN SYNDROME): ICD-10-CM

## 2022-09-22 LAB
FERRITIN SERPL-MCNC: 24.5 NG/ML (ref 10–291)
FOLATE SERPL-MCNC: 4 NG/ML
IRON SATN MFR SERPL: 40 % (ref 15–55)
IRON SERPL-MCNC: 170 UG/DL (ref 40–170)
TIBC SERPL-MCNC: 425 UG/DL (ref 250–450)
UIBC SERPL-MCNC: 255 UG/DL (ref 110–370)
VIT B12 SERPL-MCNC: 346 PG/ML (ref 211–911)

## 2022-09-22 PROCEDURE — 83540 ASSAY OF IRON: CPT

## 2022-09-22 PROCEDURE — 82607 VITAMIN B-12: CPT

## 2022-09-22 PROCEDURE — 82728 ASSAY OF FERRITIN: CPT

## 2022-09-22 PROCEDURE — 82784 ASSAY IGA/IGD/IGG/IGM EACH: CPT

## 2022-09-22 PROCEDURE — 82746 ASSAY OF FOLIC ACID SERUM: CPT

## 2022-09-22 PROCEDURE — 36415 COLL VENOUS BLD VENIPUNCTURE: CPT

## 2022-09-22 PROCEDURE — 83550 IRON BINDING TEST: CPT

## 2022-09-22 PROCEDURE — 83516 IMMUNOASSAY NONANTIBODY: CPT

## 2022-09-22 RX ORDER — METFORMIN HYDROCHLORIDE 500 MG/1
2000 TABLET, EXTENDED RELEASE ORAL DAILY
Qty: 120 TABLET | Refills: 2 | Status: SHIPPED | OUTPATIENT
Start: 2022-09-22 | End: 2022-12-19

## 2022-09-23 DIAGNOSIS — F41.1 GENERALIZED ANXIETY DISORDER: ICD-10-CM

## 2022-09-23 DIAGNOSIS — F33.1 MODERATE EPISODE OF RECURRENT MAJOR DEPRESSIVE DISORDER (HCC): ICD-10-CM

## 2022-09-23 RX ORDER — SERTRALINE HYDROCHLORIDE 100 MG/1
200 TABLET, FILM COATED ORAL DAILY
Qty: 60 TABLET | Refills: 1 | Status: SHIPPED | OUTPATIENT
Start: 2022-09-23 | End: 2022-10-18

## 2022-09-24 LAB — IGA SERPL-MCNC: 215 MG/DL (ref 68–408)

## 2022-09-26 LAB — GLIADIN PEPTIDE+TTG IGA+IGG SER QL IA: 7 UNITS (ref 0–19)

## 2022-09-27 ENCOUNTER — OFFICE VISIT (OUTPATIENT)
Dept: BEHAVIORAL HEALTH | Facility: CLINIC | Age: 27
End: 2022-09-27
Payer: COMMERCIAL

## 2022-09-27 DIAGNOSIS — F33.1 MODERATE EPISODE OF RECURRENT MAJOR DEPRESSIVE DISORDER (HCC): ICD-10-CM

## 2022-09-27 DIAGNOSIS — F41.1 GENERALIZED ANXIETY DISORDER: ICD-10-CM

## 2022-09-27 PROCEDURE — 90834 PSYTX W PT 45 MINUTES: CPT | Performed by: SOCIAL WORKER

## 2022-09-27 NOTE — PROGRESS NOTES
"[facility]   Therapy Progress Note        Name: Diana Hill  MRN: [id number]  : 1995  Age: 27 y.o.  Date of assessment: 2022  PCP: DANIELE Resendiz  Persons in attendance: Patient  Total session time: 40 minutes      Topics addressed in psychotherapy include: Diana was late today. She reports that she saw the new cardiologist but she was not helpful either. She reported she does not understand the diease and she should return to the first cardiologist. They are still doing the test in [facility] and do not want to start any treatments until she has that test. This will be her second monitor and she is turning it in 10/4. She reports still feeling episodes and has a rask from breaking out with the monitor.  \" I am so tired and no one still listens. I have deceided to put off school as it is just too much with all of this esvin stuff. My brother is still leaving the [facility] and my younger brother we learned is not Autistic but has severe OCD. I feel so bad that he has been expereincing such severe anxiety and we just had no idea how severe it was and now the whole family will be part of his treatment plan.\"    Encouraged building in self-care to schedule and calling advice line in [place] to just get advice about her symptoms to help her have some idea what treatment is generally offered.    Objective Observations:   Participation:Active verbal participation   Grooming:Casual and Neat   Cognition:Alert   Eye Contact:Good   Mood:Anxious   Affect:Congruent with content   Thought Process:Goal-directed   Speech:Rate within normal limits and Volume within normal limits    Current Risk:   Suicide: not indicated   Homicide: not indicated   Self-Harm: not indicated   Relapse: not indicated   Safety Plan Reviewed: not applicable    Care Plan Updated: No    Does patient express agreement with the above plan? Yes     Diagnosis:  1. Generalized anxiety disorder    2. Moderate episode of " recurrent major depressive disorder (HCC)        Referral appointment(s) scheduled?  Made earlier        Clyde Minaya L.C.S.W.

## 2022-10-15 DIAGNOSIS — F33.1 MODERATE EPISODE OF RECURRENT MAJOR DEPRESSIVE DISORDER (HCC): ICD-10-CM

## 2022-10-15 DIAGNOSIS — F41.1 GENERALIZED ANXIETY DISORDER: ICD-10-CM

## 2022-10-17 ENCOUNTER — TELEPHONE (OUTPATIENT)
Dept: CARDIOLOGY | Facility: MEDICAL CENTER | Age: 27
End: 2022-10-17
Payer: COMMERCIAL

## 2022-10-17 NOTE — TELEPHONE ENCOUNTER
Pt has upcoming appt 11/16/22 with Dr. Jarvis.    Received request via: Pharmacy    Was the patient seen in the last year in this department? Yes    Does the patient have an active prescription (recently filled or refills available) for medication(s) requested? No

## 2022-10-18 PROCEDURE — 93228 REMOTE 30 DAY ECG REV/REPORT: CPT | Performed by: INTERNAL MEDICINE

## 2022-10-18 PROCEDURE — 93224 XTRNL ECG REC UP TO 48 HRS: CPT | Performed by: INTERNAL MEDICINE

## 2022-10-18 RX ORDER — SERTRALINE HYDROCHLORIDE 100 MG/1
200 TABLET, FILM COATED ORAL DAILY
Qty: 60 TABLET | Refills: 1 | Status: SHIPPED | OUTPATIENT
Start: 2022-10-18 | End: 2022-11-17

## 2022-10-25 ENCOUNTER — NON-PROVIDER VISIT (OUTPATIENT)
Dept: SLEEP MEDICINE | Facility: MEDICAL CENTER | Age: 27
End: 2022-10-25
Attending: NURSE PRACTITIONER
Payer: COMMERCIAL

## 2022-10-25 VITALS — WEIGHT: 248.7 LBS | BODY MASS INDEX: 39.03 KG/M2 | HEIGHT: 67 IN

## 2022-10-25 DIAGNOSIS — R06.02 SHORTNESS OF BREATH: ICD-10-CM

## 2022-10-25 DIAGNOSIS — G47.33 OSA (OBSTRUCTIVE SLEEP APNEA): ICD-10-CM

## 2022-10-25 DIAGNOSIS — Z86.16 HISTORY OF COVID-19: ICD-10-CM

## 2022-10-25 PROCEDURE — 94060 EVALUATION OF WHEEZING: CPT | Performed by: STUDENT IN AN ORGANIZED HEALTH CARE EDUCATION/TRAINING PROGRAM

## 2022-10-25 PROCEDURE — 94729 DIFFUSING CAPACITY: CPT | Performed by: STUDENT IN AN ORGANIZED HEALTH CARE EDUCATION/TRAINING PROGRAM

## 2022-10-25 PROCEDURE — 94726 PLETHYSMOGRAPHY LUNG VOLUMES: CPT | Performed by: STUDENT IN AN ORGANIZED HEALTH CARE EDUCATION/TRAINING PROGRAM

## 2022-10-25 ASSESSMENT — PULMONARY FUNCTION TESTS
FEV1: 3.81
FEV1/FVC: 90
FEV1/FVC: 84
FEV1/FVC_PERCENT_PREDICTED: 98
FEV1_PERCENT_CHANGE: -1
FVC_PREDICTED: 4.23
FEV1/FVC_PERCENT_PREDICTED: 106
FEV1_LLN: 3.02
FEV1_PERCENT_PREDICTED: 111
FVC: 4.55
FEV1: 4.01
FEV1/FVC_PERCENT_LLN: 72
FEV1/FVC_PERCENT_PREDICTED: 104
FVC_PERCENT_PREDICTED: 105
FEV1/FVC_PERCENT_PREDICTED: 86
FEV1/FVC_PERCENT_PREDICTED: 97
FVC_LLN: 3.53
FEV1_PREDICTED: 3.62
FEV1/FVC_PERCENT_CHANGE: -500
FEV1/FVC_PREDICTED: 86
FEV1/FVC: 84
FEV1/FVC: 89.51
FVC_PERCENT_PREDICTED: 107
FVC: 4.48
FEV1_PERCENT_CHANGE: 5
FEV1/FVC_PERCENT_CHANGE: 6
FEV1_PERCENT_PREDICTED: 105

## 2022-10-25 ASSESSMENT — FIBROSIS 4 INDEX: FIB4 SCORE: 0.4

## 2022-10-25 NOTE — PROCEDURES
Tech: Dejah Rollins, RT  Good patient effort & cooperation.  Test was performed on the [employer] Body Plethysmograph- Elite DX system.  The predicted sets used for Spirometry are GLI-2012, for Lung Volumes are ITS, and for DLCO is GLI 2017.  The results of this test meet the ATS standards for acceptability and repeatability.  The DLCO was uncorrected for Hb.  A bronchodilator of Ventolin HFA 2 puffs via spacer was administered.  DLCO was performed during dilation period.    Interpretation:     No obstruction or restriction.  No significant bronchodilator response.  Normal diffusion capacity.      ISonja MD, am the author of this note.     Sonja Morales MD  Pulmonary and Critical Care Medicine  [facility]

## 2022-11-16 ENCOUNTER — APPOINTMENT (OUTPATIENT)
Dept: BEHAVIORAL HEALTH | Facility: CLINIC | Age: 27
End: 2022-11-16
Payer: COMMERCIAL

## 2022-11-17 DIAGNOSIS — F41.1 GENERALIZED ANXIETY DISORDER: ICD-10-CM

## 2022-11-17 DIAGNOSIS — F33.1 MODERATE EPISODE OF RECURRENT MAJOR DEPRESSIVE DISORDER (HCC): ICD-10-CM

## 2022-11-17 RX ORDER — SERTRALINE HYDROCHLORIDE 100 MG/1
200 TABLET, FILM COATED ORAL DAILY
Qty: 60 TABLET | Refills: 1 | Status: SHIPPED | OUTPATIENT
Start: 2022-11-17 | End: 2023-02-10 | Stop reason: SDUPTHER

## 2022-11-30 ENCOUNTER — HOSPITAL ENCOUNTER (OUTPATIENT)
Dept: CARDIOLOGY | Facility: MEDICAL CENTER | Age: 27
End: 2022-11-30
Attending: NURSE PRACTITIONER
Payer: COMMERCIAL

## 2022-11-30 DIAGNOSIS — R00.2 HEART PALPITATIONS: ICD-10-CM

## 2022-11-30 PROCEDURE — 93660 TILT TABLE EVALUATION: CPT

## 2022-11-30 NOTE — PROGRESS NOTES
TILT TABLE:  Patient had PASSIVE TILT TABLE EXAM today.    Patient NPO for Exam, has  home.   Patient was NEGATIVE for passing out.       Consent Signed. PIV initiated.   Patient Given Verbal instructions/education regarding exam.   Patient had 20 mins of passive phase, followed by 5 mins of recovery, NO EVENT.    Patient Vitals: HR 50s-100s, SBP 130s-100s.    Patient had self-reported symptoms, see hard chart.   Patient stated “I feel ready to go home”.    Patient given 2 bottles of water.    Patient vitals returned to baseline.    Patient given verbal discharge instructions per protocol.   Patient PIV removed.   Patient ambulated self to [facility], escorted by RN, met by family member to drive patient home.    Patient of JOURDAN Okeefe.  PETR Macias who was notified via in person regarding EKG tracings and findings  RN placed EKG tracings and patient documents in box to be read.

## 2022-12-01 NOTE — PROCEDURES
TILT TABLE TESTING.  DOS: 11/30/2022    Summary:    Tilt table test is negative.  Stable hemodynamics throughout protocol.  No malignant findings on EKG  tracing.    Electronically Signed by: Tonya Corral M.D.    11/30/2022  8:36 PM

## 2022-12-07 ENCOUNTER — HOSPITAL ENCOUNTER (OUTPATIENT)
Facility: MEDICAL CENTER | Age: 27
End: 2022-12-07
Attending: NURSE PRACTITIONER
Payer: COMMERCIAL

## 2022-12-07 ENCOUNTER — OFFICE VISIT (OUTPATIENT)
Dept: URGENT CARE | Facility: CLINIC | Age: 27
End: 2022-12-07
Payer: COMMERCIAL

## 2022-12-07 VITALS
BODY MASS INDEX: 37.67 KG/M2 | WEIGHT: 240 LBS | HEIGHT: 67 IN | HEART RATE: 73 BPM | SYSTOLIC BLOOD PRESSURE: 100 MMHG | RESPIRATION RATE: 16 BRPM | OXYGEN SATURATION: 97 % | TEMPERATURE: 98.3 F | DIASTOLIC BLOOD PRESSURE: 70 MMHG

## 2022-12-07 DIAGNOSIS — J02.9 PHARYNGITIS, UNSPECIFIED ETIOLOGY: ICD-10-CM

## 2022-12-07 LAB
INT CON NEG: NORMAL
INT CON POS: NORMAL
S PYO AG THROAT QL: NORMAL

## 2022-12-07 PROCEDURE — 87880 STREP A ASSAY W/OPTIC: CPT | Performed by: NURSE PRACTITIONER

## 2022-12-07 PROCEDURE — 87070 CULTURE OTHR SPECIMN AEROBIC: CPT

## 2022-12-07 PROCEDURE — 99213 OFFICE O/P EST LOW 20 MIN: CPT | Performed by: NURSE PRACTITIONER

## 2022-12-07 ASSESSMENT — FIBROSIS 4 INDEX: FIB4 SCORE: 0.4

## 2022-12-07 ASSESSMENT — ENCOUNTER SYMPTOMS
CHILLS: 0
COUGH: 0
HEADACHES: 1
FEVER: 0
SORE THROAT: 1
MYALGIAS: 1
VOMITING: 0
DIARRHEA: 0
SINUS PAIN: 1

## 2022-12-07 NOTE — PROGRESS NOTES
Subjective:     Diana Hill is a 27 y.o. female who presents for Nasal Congestion (Nasal congestion, Rt. Ear pain, sore throat x4 days.)      URI   This is a new problem. The current episode started in the past 7 days (Diana is a pleasant 27 year old female who presents to  with URI symptoms X 3 days). The problem has been unchanged. Associated symptoms include congestion, ear pain, headaches, sinus pain and a sore throat. Pertinent negatives include no coughing, diarrhea or vomiting. She has tried acetaminophen and NSAIDs for the symptoms. The treatment provided mild relief.       Review of Systems   Constitutional:  Positive for malaise/fatigue. Negative for chills and fever.   HENT:  Positive for congestion, ear pain, sinus pain and sore throat.    Respiratory:  Negative for cough.    Gastrointestinal:  Negative for diarrhea and vomiting.   Musculoskeletal:  Positive for myalgias.   Neurological:  Positive for headaches.     PMH:   Past Medical History:   Diagnosis Date    Anemia     hx of borderline anemia    Anxiety     Celiac disease     Daytime sleepiness     Depression     Insomnia     Insulin resistance     Migraine     Morning headache     PCOS (polycystic ovarian syndrome)     Snoring     Vertigo     Vitamin D deficiency      ALLERGIES:   Allergies   Allergen Reactions    Gluten Meal Unspecified     Stomach problems      SURGHX:   Past Surgical History:   Procedure Laterality Date    MATTHIAS BY LAPAROSCOPY N/A 9/13/2018    Procedure: MATTHIAS BY LAPAROSCOPY;  Surgeon: Hayden Wilhelm M.D.;  Location: SURGERY [facility];  Service: General     SOCHX:   Social History     Socioeconomic History    Marital status: Single   Occupational History    Occupation: [employer]     Comment: CNA ; Nursing student   Tobacco Use    Smoking status: Never    Smokeless tobacco: Never   Vaping Use    Vaping Use: Never used   Substance and Sexual Activity    Alcohol use: Yes     Comment: occasionally    Drug  "use: No    Sexual activity: Never     Partners: Male, Female     Birth control/protection: Pill     FH:   Family History   Problem Relation Age of Onset    Thyroid Mother     Depression Father     Alcohol abuse Father     No Known Problems Maternal Grandmother     Heart Attack Maternal Grandfather 66    Cancer Paternal Grandmother         Breast , esophageal    Stroke Paternal Grandmother 66    Alcohol abuse Paternal Grandfather     Lung Disease Paternal Grandfather     Sleep Apnea Brother          Objective:   /70   Pulse 73   Temp 36.8 °C (98.3 °F) (Temporal)   Resp 16   Ht 1.702 m (5' 7\")   Wt 109 kg (240 lb)   LMP 12/05/2022   SpO2 97%   BMI 37.59 kg/m²     Physical Exam  Vitals and nursing note reviewed.   Constitutional:       General: She is not in acute distress.     Appearance: Normal appearance. She is not ill-appearing.   HENT:      Head: Normocephalic and atraumatic.      Right Ear: Tympanic membrane, ear canal and external ear normal. There is no impacted cerumen.      Left Ear: Tympanic membrane, ear canal and external ear normal. There is no impacted cerumen.      Nose: No congestion or rhinorrhea.      Mouth/Throat:      Mouth: Mucous membranes are moist.      Pharynx: Uvula midline. Posterior oropharyngeal erythema present. No pharyngeal swelling, oropharyngeal exudate or uvula swelling.      Tonsils: No tonsillar exudate or tonsillar abscesses. 1+ on the right. 1+ on the left.   Eyes:      Extraocular Movements: Extraocular movements intact.      Pupils: Pupils are equal, round, and reactive to light.   Cardiovascular:      Rate and Rhythm: Normal rate and regular rhythm.      Pulses: Normal pulses.      Heart sounds: Normal heart sounds.   Pulmonary:      Effort: Pulmonary effort is normal. No respiratory distress.      Breath sounds: Normal breath sounds. No stridor. No wheezing, rhonchi or rales.   Chest:      Chest wall: No tenderness.   Abdominal:      General: Abdomen is flat. " Bowel sounds are normal.      Palpations: Abdomen is soft.      Tenderness: There is no abdominal tenderness. There is no right CVA tenderness or left CVA tenderness.   Musculoskeletal:         General: Normal range of motion.      Cervical back: Normal range of motion and neck supple. Tenderness present.   Lymphadenopathy:      Cervical: Cervical adenopathy present.   Skin:     General: Skin is warm and dry.      Capillary Refill: Capillary refill takes less than 2 seconds.   Neurological:      General: No focal deficit present.      Mental Status: She is alert and oriented to person, place, and time. Mental status is at baseline.   Psychiatric:         Mood and Affect: Mood normal.         Behavior: Behavior normal.         Thought Content: Thought content normal.         Judgment: Judgment normal.     POCT strep: Negative  Assessment/Plan:   Assessment    1. Pharyngitis, unspecified etiology  CULTURE THROAT    POCT Rapid Strep A      Her symptoms are likely viral.  Throat culture ordered for further evaluation of atypical bacterial infection.  Strep was negative in the clinic today.  Supportive treatment discussed.  Her father was in the clinic today and is suffering from otitis media.  She does endorse ear pain today.  We will consider placing on antibiotics if symptoms worsen in the next 3 days.  She is in agreement with this plan of care today.    AVS handout given and reviewed with patient. Pt educated on red flags and when to seek treatment back in ER or UC.

## 2022-12-08 DIAGNOSIS — J02.9 PHARYNGITIS, UNSPECIFIED ETIOLOGY: ICD-10-CM

## 2022-12-10 LAB
BACTERIA SPEC RESP CULT: NORMAL
SIGNIFICANT IND 70042: NORMAL
SITE SITE: NORMAL
SOURCE SOURCE: NORMAL

## 2022-12-13 ENCOUNTER — OFFICE VISIT (OUTPATIENT)
Dept: URGENT CARE | Facility: CLINIC | Age: 27
End: 2022-12-13
Payer: COMMERCIAL

## 2022-12-13 VITALS
TEMPERATURE: 97.4 F | DIASTOLIC BLOOD PRESSURE: 72 MMHG | HEIGHT: 67 IN | OXYGEN SATURATION: 96 % | RESPIRATION RATE: 16 BRPM | HEART RATE: 92 BPM | SYSTOLIC BLOOD PRESSURE: 118 MMHG | BODY MASS INDEX: 39.21 KG/M2 | WEIGHT: 249.8 LBS

## 2022-12-13 DIAGNOSIS — L03.111 CELLULITIS OF RIGHT AXILLA: ICD-10-CM

## 2022-12-13 DIAGNOSIS — L02.421 FURUNCLE OF RIGHT AXILLA: ICD-10-CM

## 2022-12-13 DIAGNOSIS — J02.0 STREP PHARYNGITIS: ICD-10-CM

## 2022-12-13 LAB
INT CON NEG: NORMAL
INT CON POS: NORMAL
S PYO AG THROAT QL: POSITIVE

## 2022-12-13 PROCEDURE — 99213 OFFICE O/P EST LOW 20 MIN: CPT | Performed by: PHYSICIAN ASSISTANT

## 2022-12-13 PROCEDURE — 87880 STREP A ASSAY W/OPTIC: CPT | Performed by: PHYSICIAN ASSISTANT

## 2022-12-13 RX ORDER — CEPHALEXIN 500 MG/1
500 CAPSULE ORAL 2 TIMES DAILY
Qty: 20 CAPSULE | Refills: 0 | Status: SHIPPED | OUTPATIENT
Start: 2022-12-13 | End: 2022-12-23

## 2022-12-13 ASSESSMENT — ENCOUNTER SYMPTOMS
CHILLS: 0
DIAPHORESIS: 0
DIZZINESS: 0
COUGH: 0
HEADACHES: 0
SINUS PAIN: 0
EYE PAIN: 0
VOMITING: 0
EYE DISCHARGE: 0
ABDOMINAL PAIN: 0
SORE THROAT: 1
WHEEZING: 0
NAUSEA: 0
EYE REDNESS: 0
DIARRHEA: 0
SHORTNESS OF BREATH: 0
CONSTIPATION: 0
FEVER: 0

## 2022-12-13 ASSESSMENT — PAIN SCALES - GENERAL: PAINLEVEL: 6=MODERATE PAIN

## 2022-12-13 ASSESSMENT — FIBROSIS 4 INDEX: FIB4 SCORE: 0.4

## 2022-12-13 NOTE — PROGRESS NOTES
Subjective:     Diana Hill  is a 27 y.o. female who presents for Otalgia (X7 days ear pain and throat pain, was seen last Wed -strep and culture, swollen lymph nodes hard to swallow, ) and Bump (Pt noticed Lump on Sunday, under armpit by top of breast, fatigue, painful, )       She presents today with ongoing otalgia and pharyngitis symptoms over the last 7 days.  Was seen in the urgent care on 12/7/2022 for the symptoms; throat culture and strep test at that time were negative, no antibiotics were given at that time.  Her symptoms have been worsening since that date.  Has been using over-the-counter medications for symptom support.  Is having some pain with swallowing, denies dysphagia.  No recent known close sick contacts.    She is also experiencing a right axilla skin mass that is painful to palpation.  Symptoms began 2 days ago and have been worsening since onset.  She notes surrounding induration around the skin mass.     Review of Systems   Constitutional:  Negative for chills, diaphoresis, fever and malaise/fatigue.   HENT:  Positive for ear pain and sore throat. Negative for congestion, ear discharge and sinus pain.    Eyes:  Negative for pain, discharge and redness.   Respiratory:  Negative for cough, shortness of breath and wheezing.    Cardiovascular:  Negative for chest pain.   Gastrointestinal:  Negative for abdominal pain, constipation, diarrhea, nausea and vomiting.   Skin:         Furuncle right axilla   Neurological:  Negative for dizziness and headaches.    Allergies   Allergen Reactions    Gluten Meal Unspecified     Stomach problems      Past Medical History:   Diagnosis Date    Anemia     hx of borderline anemia    Anxiety     Celiac disease     Daytime sleepiness     Depression     Insomnia     Insulin resistance     Migraine     Morning headache     PCOS (polycystic ovarian syndrome)     Snoring     Vertigo     Vitamin D deficiency         Objective:   /72   Pulse 92   Temp  "36.3 °C (97.4 °F) (Temporal)   Resp 16   Ht 1.702 m (5' 7\")   Wt 113 kg (249 lb 12.8 oz)   LMP 12/05/2022   SpO2 96%   BMI 39.12 kg/m²   Physical Exam  Vitals and nursing note reviewed.   Constitutional:       General: She is not in acute distress.     Appearance: Normal appearance. She is not ill-appearing, toxic-appearing or diaphoretic.   HENT:      Head: Normocephalic.      Right Ear: Tympanic membrane, ear canal and external ear normal. There is no impacted cerumen.      Left Ear: Tympanic membrane, ear canal and external ear normal. There is no impacted cerumen.      Nose: No congestion or rhinorrhea.      Mouth/Throat:      Mouth: Mucous membranes are moist.      Pharynx: No oropharyngeal exudate or posterior oropharyngeal erythema.   Eyes:      General:         Right eye: No discharge.         Left eye: No discharge.      Conjunctiva/sclera: Conjunctivae normal.   Cardiovascular:      Rate and Rhythm: Normal rate and regular rhythm.   Pulmonary:      Effort: Pulmonary effort is normal. No respiratory distress.      Breath sounds: Normal breath sounds. No stridor. No wheezing or rhonchi.   Musculoskeletal:      Cervical back: Neck supple.   Lymphadenopathy:      Cervical: No cervical adenopathy.   Skin:            Comments: A 0.25x0.25 furuncle present over the above marked region of the right axilla.  No fluctuance but there is surrounding induration.  No active bleeding or drainage.  Tenderness to palpation.   Neurological:      General: No focal deficit present.      Mental Status: She is alert and oriented to person, place, and time.   Psychiatric:         Mood and Affect: Mood normal.         Behavior: Behavior normal.         Thought Content: Thought content normal.         Judgment: Judgment normal.           Diagnostic testing:    POC strep-positive    Assessment/Plan:     Encounter Diagnoses   Name Primary?    Strep pharyngitis     Furuncle of right axilla     Cellulitis of right axilla  "          Plan for care for today's complaint includes Keflex for strep pharyngitis and cellulitis of the right axilla.  Did choose this antibiotic as it will cover for both complaints and there is a current shortage of amoxicillin.  There is an infected furuncle present in the right axilla without any fluctuance, did withhold on I&D procedure today.  Continue to monitor symptoms and return to urgent care or follow-up with primary care provider if symptoms remain ongoing.  Follow-up in the emergency department if symptoms become severe, ER precautions discussed in office today..  Prescription for Keflex provided.    See AVS Instructions below for written guidance provided to patient on after-visit management and care in addition to our verbal discussion during the visit.    Please note that this dictation was created using voice recognition software. I have attempted to correct all errors, but there may be sound-alike, spelling, grammar and possibly content errors that I did not discover before finalizing the note.    Malcom Dean PA-C

## 2022-12-14 ENCOUNTER — HOSPITAL ENCOUNTER (EMERGENCY)
Facility: MEDICAL CENTER | Age: 27
End: 2022-12-14
Attending: EMERGENCY MEDICINE
Payer: COMMERCIAL

## 2022-12-14 VITALS
DIASTOLIC BLOOD PRESSURE: 77 MMHG | WEIGHT: 251.77 LBS | HEART RATE: 89 BPM | HEIGHT: 67 IN | OXYGEN SATURATION: 96 % | RESPIRATION RATE: 18 BRPM | SYSTOLIC BLOOD PRESSURE: 121 MMHG | TEMPERATURE: 98.1 F | BODY MASS INDEX: 39.52 KG/M2

## 2022-12-14 DIAGNOSIS — L02.91 ABSCESS: ICD-10-CM

## 2022-12-14 PROCEDURE — 303977 HCHG I & D

## 2022-12-14 PROCEDURE — 99282 EMERGENCY DEPT VISIT SF MDM: CPT

## 2022-12-14 PROCEDURE — 700101 HCHG RX REV CODE 250: Performed by: EMERGENCY MEDICINE

## 2022-12-14 RX ORDER — LIDOCAINE HYDROCHLORIDE AND EPINEPHRINE 10; 10 MG/ML; UG/ML
10 INJECTION, SOLUTION INFILTRATION; PERINEURAL ONCE
Status: COMPLETED | OUTPATIENT
Start: 2022-12-14 | End: 2022-12-14

## 2022-12-14 RX ORDER — SULFAMETHOXAZOLE AND TRIMETHOPRIM 800; 160 MG/1; MG/1
1 TABLET ORAL 2 TIMES DAILY
Qty: 10 TABLET | Refills: 0 | Status: SHIPPED | OUTPATIENT
Start: 2022-12-14 | End: 2022-12-19

## 2022-12-14 RX ADMIN — LIDOCAINE HYDROCHLORIDE,EPINEPHRINE BITARTRATE 10 ML: 10; .01 INJECTION, SOLUTION INFILTRATION; PERINEURAL at 17:30

## 2022-12-14 ASSESSMENT — FIBROSIS 4 INDEX: FIB4 SCORE: 0.4

## 2022-12-15 NOTE — ED NOTES
Pt wound bandaged    Pt given d/c paperwork and RX p/u information; pt verbalized understanding all information given. Pt ambulated out of the ER w/o difficulty w/ family

## 2022-12-15 NOTE — ED PROVIDER NOTES
ED Provider Note    CHIEF COMPLAINT   Chief Complaint   Patient presents with    Abscess     Rt axilla abscess onset last Sunday. Seen at  yesterday and started on Keflex but feels that it is getting worse. Site appears closed, not actively draining and has mild redness. Pt c/o 7/10 pain to site.        HPI   Diana Hill is a 27 y.o. female who presents with painful erythematous swollen area right axilla.  First occurrence.  Onset 4 days ago, started Keflex by urgent care.  Pain and redness have slightly worsened.  No fever, no arthralgia.  She states she is up-to-date on her tetanus shot.  No known injury or insect bite.    REVIEW OF SYSTEMS   Constitutional: No fever  Skin erythematous painful swelling    PAST MEDICAL HISTORY   Past Medical History:   Diagnosis Date    Anemia     hx of borderline anemia    Anxiety     Celiac disease     Daytime sleepiness     Depression     Insomnia     Insulin resistance     Migraine     Morning headache     PCOS (polycystic ovarian syndrome)     Snoring     Vertigo     Vitamin D deficiency        FAMILY HISTORY  Family History   Problem Relation Age of Onset    Thyroid Mother     Depression Father     Alcohol abuse Father     No Known Problems Maternal Grandmother     Heart Attack Maternal Grandfather 66    Cancer Paternal Grandmother         Breast , esophageal    Stroke Paternal Grandmother 66    Alcohol abuse Paternal Grandfather     Lung Disease Paternal Grandfather     Sleep Apnea Brother        SOCIAL HISTORY  Social History     Socioeconomic History    Marital status: Single   Occupational History    Occupation: [employer]     Comment: CNA ; Nursing student   Tobacco Use    Smoking status: Never    Smokeless tobacco: Never   Vaping Use    Vaping Use: Never used   Substance and Sexual Activity    Alcohol use: Yes     Comment: occasionally    Drug use: No    Sexual activity: Never     Partners: Male, Female     Birth control/protection: Pill        SURGICAL  "HISTORY  Past Surgical History:   Procedure Laterality Date    MATTHIAS BY LAPAROSCOPY N/A 9/13/2018    Procedure: MATTHIAS BY LAPAROSCOPY;  Surgeon: Hayden Wilhelm M.D.;  Location: SURGERY [facility];  Service: General       CURRENT MEDICATIONS   Home Medications    **Home medications have not yet been reviewed for this encounter**         ALLERGIES   Allergies   Allergen Reactions    Gluten Meal Unspecified     Stomach problems        PHYSICAL EXAM  VITAL SIGNS: BP (!) 142/78   Pulse 87   Temp 36.7 °C (98.1 °F) (Temporal)   Resp 18   Ht 1.702 m (5' 7\")   Wt 114 kg (251 lb 12.3 oz)   LMP 12/05/2022   SpO2 95%   BMI 39.43 kg/m²   Constitutional: Well developed, Well nourished, No acute distress, Non-toxic appearance.   Cardiac: Normal heart rate, Normal rhythm   Pulmonary: Normal breath sounds  Skin: Small indurated lump right axilla, nonfluctuant.  No lymphangitis.  Area of erythema is approximately 2 cm.  Tenderness ascends within the axilla toward the clavicle  Vascular: Normal capillary refill, No cyanosis.     Incision and Drainage Procedure Note    Indication: axillary abscess    Procedure: The patient was positioned appropriately and the skin over the incision site was prepped with betadine and draped in a sterile fashion. Local anesthesia was obtained by infiltration using 1% Lidocaine with epinephrine.  An incision was then made over the apex of the lesion and approximately 3 cc of purulent material was expressed. Loculations were broken up using sterile Q-tip. The drainage cavity was then packed with sterile gauze. The patient’s tetanus status was up to date and did not require a booster dose.    The patient tolerated the procedure well.    Complications: None    COURSE & MEDICAL DECISION MAKING  Pertinent Labs & Imaging studies reviewed. (See chart for details)  Patient currently on Keflex, right axillary abscess worsening despite the antibiotics.  Area aspirated after Betadine prep and " numbing, return of pus through the puncture, therefore incision was made.  Packing placed.  I have added Bactrim for coverage of MRSA given nature of her abscess.  She is advised to have the packing changed removed in 2 days.  The patient tolerated the procedure well and is discharged in good condition      Diagnosis:  1. Abscess  sulfamethoxazole-trimethoprim (BACTRIM DS) 800-160 MG tablet              Electronically signed by: Panda Dunn M.D., 12/14/2022 5:06 PM

## 2022-12-15 NOTE — ED TRIAGE NOTES
"Chief Complaint   Patient presents with    Abscess     Rt axilla abscess onset last Sunday. Seen at  yesterday and started on Keflex but feels that it is getting worse. Site appears closed, not actively draining and has mild redness. Pt c/o 7/10 pain to site.      BP (!) 142/78   Pulse 87   Temp 36.7 °C (98.1 °F) (Temporal)   Resp 18   Ht 1.702 m (5' 7\")   Wt 114 kg (251 lb 12.3 oz)   LMP 12/05/2022   SpO2 95%   BMI 39.43 kg/m²     Pt AO4, amb to triage by self.   "

## 2022-12-20 NOTE — ASSESSMENT & PLAN NOTE
Patient is here for hospital follow-up.  She was admitted to [facility] on 9/1/2022 monitored on telemetry for 1 day discharged the next day.  She was admitted for lightheadedness, dizziness and rapid heartbeat.  She was being treated with antibiotics for strep infection and in process of ruling out POTS.  She had 3 separate episodes of presyncope lasting 30 minutes, associated symptoms of tunnel vision.  Her tilt table test is scheduled for November.  She had HR monitor device fluctuating from .  Her cardiac work-up was negative, labs unremarkable, EKG normal sinus rhythm, x-ray normal, echocardiogram normal with EF of 65%, troponins negative, D-dimer low.  She was monitored on telemetry with some PACs and PVCs.  She was mildly orthostatic at the time of admission, received IV fluids, repeat orthostatics were normal.  She was discharged in stable condition and was advised to follow-up with cardiology with whom she is scheduled on 9/20/2022.  Recommended Zio patch.  Today her vitals are within normal limits, /64, pulse 82.   pt reports no improvement in symptoms after hospital discharge.   Pt is followed with neurology for migraines, pending MRI of the brain.  Also following with therapist and psychiatrist, anxiety and depression under control.  Was also evaluated by [facility] endocrinology for abnormal findings.    3 tests pending:  - MRI brain  - zio patch 14 day monitoring   - Table tilt test   Pt provided turkey sandwich; OK per MARIANO BARBOUR.

## 2023-01-10 ENCOUNTER — HOSPITAL ENCOUNTER (OUTPATIENT)
Facility: MEDICAL CENTER | Age: 28
End: 2023-01-10
Attending: EMERGENCY MEDICINE
Payer: COMMERCIAL

## 2023-01-10 ENCOUNTER — OFFICE VISIT (OUTPATIENT)
Dept: URGENT CARE | Facility: CLINIC | Age: 28
End: 2023-01-10
Payer: COMMERCIAL

## 2023-01-10 VITALS
BODY MASS INDEX: 40.24 KG/M2 | DIASTOLIC BLOOD PRESSURE: 78 MMHG | WEIGHT: 256.4 LBS | HEART RATE: 96 BPM | RESPIRATION RATE: 16 BRPM | TEMPERATURE: 97.9 F | SYSTOLIC BLOOD PRESSURE: 112 MMHG | OXYGEN SATURATION: 99 % | HEIGHT: 67 IN

## 2023-01-10 DIAGNOSIS — J02.9 ACUTE PHARYNGITIS, UNSPECIFIED ETIOLOGY: ICD-10-CM

## 2023-01-10 LAB
INT CON NEG: NEGATIVE
INT CON POS: POSITIVE
S PYO AG THROAT QL: NEGATIVE

## 2023-01-10 PROCEDURE — 87070 CULTURE OTHR SPECIMN AEROBIC: CPT

## 2023-01-10 PROCEDURE — 87880 STREP A ASSAY W/OPTIC: CPT | Performed by: EMERGENCY MEDICINE

## 2023-01-10 PROCEDURE — 99213 OFFICE O/P EST LOW 20 MIN: CPT | Performed by: EMERGENCY MEDICINE

## 2023-01-10 ASSESSMENT — ENCOUNTER SYMPTOMS
HEADACHES: 1
SORE THROAT: 1
COUGH: 0
CHILLS: 0
SINUS PAIN: 1
SHORTNESS OF BREATH: 1
FEVER: 0

## 2023-01-10 ASSESSMENT — FIBROSIS 4 INDEX: FIB4 SCORE: 0.4

## 2023-01-10 NOTE — PROGRESS NOTES
"  Subjective:     Diana Hill  is a 27 y.o. female who presents for Cold Symptoms ([id number] sore throat, stuffy nose, headache )       Patient is a 27-year-old female who presents for evaluation of sore throat for 10 days, rhinorrhea, and with swallowing.  She states she has tonsil stones on the right, and is able to express those almost daily.  She also reports some body aches.  Review of the old chart reveals she was seen on December 13, diagnosed with strep at that time and treated with antibiotics.  Based on her timeline, her symptoms recurred a matter of days after completing her antibiotics.  She denies any other systemic symptoms, no nausea, no vomiting, no abdominal pain, no rash, no specific known exposure.  This is her third episode of strep in the last 6 months.   Review of Systems   Constitutional:  Negative for chills and fever.   HENT:  Positive for congestion, sinus pain and sore throat. Negative for ear pain.    Respiratory:  Positive for shortness of breath. Negative for cough.    Neurological:  Positive for headaches.    Allergies   Allergen Reactions    Gluten Meal Unspecified     Stomach problems      Past Medical History:   Diagnosis Date    Anemia     hx of borderline anemia    Anxiety     Celiac disease     Daytime sleepiness     Depression     Insomnia     Insulin resistance     Migraine     Morning headache     PCOS (polycystic ovarian syndrome)     Snoring     Vertigo     Vitamin D deficiency         Objective:   /78 (BP Location: Right arm, Patient Position: Sitting, BP Cuff Size: Adult)   Pulse 96   Temp 36.6 °C (97.9 °F) (Temporal)   Resp 16   Ht 1.702 m (5' 7\")   Wt 116 kg (256 lb 6.4 oz)   SpO2 99%   BMI 40.16 kg/m²   Physical Exam  Vitals and nursing note reviewed.   Constitutional:       General: She is not in acute distress.     Appearance: Normal appearance. She is not ill-appearing or toxic-appearing.   HENT:      Head: Normocephalic and atraumatic.      Right " Ear: Tympanic membrane, ear canal and external ear normal. There is no impacted cerumen.      Left Ear: Tympanic membrane, ear canal and external ear normal. There is no impacted cerumen.      Nose: No congestion or rhinorrhea.      Mouth/Throat:      Mouth: Mucous membranes are moist.      Pharynx: Posterior oropharyngeal erythema present. No oropharyngeal exudate.      Comments: Widely patent airway, cryptic tonsils  Eyes:      General: No scleral icterus.        Right eye: No discharge.         Left eye: No discharge.      Conjunctiva/sclera: Conjunctivae normal.   Cardiovascular:      Rate and Rhythm: Normal rate and regular rhythm.      Heart sounds: Normal heart sounds. No murmur heard.  Pulmonary:      Effort: Pulmonary effort is normal. No respiratory distress.      Breath sounds: Normal breath sounds. No wheezing or rhonchi.   Abdominal:      Palpations: Abdomen is soft.      Tenderness: There is no abdominal tenderness.   Musculoskeletal:      Cervical back: Normal range of motion and neck supple. Tenderness present.      Right lower leg: No edema.      Left lower leg: No edema.   Lymphadenopathy:      Cervical: Cervical adenopathy (right only) present.   Skin:     General: Skin is warm and dry.      Coloration: Skin is not jaundiced.      Findings: No rash.   Neurological:      General: No focal deficit present.      Mental Status: She is alert and oriented to person, place, and time.   Psychiatric:         Mood and Affect: Mood normal.         Behavior: Behavior normal.         Thought Content: Thought content normal.         Assessment/Plan:     Encounter Diagnoses   Name Primary?    Acute pharyngitis, unspecified etiology        Pt with pharyngitis, recurrent, with URI symptoms.  Nontoxic appearing, well hydrated.  May be strep colonized which is why she did not improve with last course of abx.  Will check POCT strep, treat only if positive.  If negative given her recurrent sx, will send throat cx as  well to assure she does not have a resistant organism or false negative.  Pt declined need for symptomatic tx for throat pain, will continue w OTC remedies.      Assessment    1. Acute pharyngitis, unspecified etiology  - POCT Rapid Strep A    Other orders  - MAGNESIUM PO; Take  by mouth.  - BIOTIN PO; Take  by mouth.    POCT strep NEGATIVE    See AVS Instructions below for written guidance provided to patient on after-visit management and care in addition to our verbal discussion during the visit.    Please note that this dictation was created using voice recognition software. I have attempted to correct all errors, but there may be sound-alike, spelling, grammar and possibly content errors that I did not discover before finalizing the note.

## 2023-01-10 NOTE — PATIENT INSTRUCTIONS
Rapid strep negative.    We will send a throat culture to be sure this is not a false negative.  You will be treated with antibiotics only if strep culture is positive.

## 2023-01-12 ENCOUNTER — OFFICE VISIT (OUTPATIENT)
Dept: SLEEP MEDICINE | Facility: MEDICAL CENTER | Age: 28
End: 2023-01-12
Payer: COMMERCIAL

## 2023-01-12 VITALS
HEIGHT: 67 IN | BODY MASS INDEX: 39.87 KG/M2 | HEART RATE: 83 BPM | WEIGHT: 254 LBS | SYSTOLIC BLOOD PRESSURE: 110 MMHG | DIASTOLIC BLOOD PRESSURE: 62 MMHG | OXYGEN SATURATION: 97 % | RESPIRATION RATE: 16 BRPM

## 2023-01-12 DIAGNOSIS — Z86.16 HISTORY OF COVID-19: ICD-10-CM

## 2023-01-12 DIAGNOSIS — R06.2 WHEEZING: ICD-10-CM

## 2023-01-12 PROCEDURE — 99213 OFFICE O/P EST LOW 20 MIN: CPT | Performed by: INTERNAL MEDICINE

## 2023-01-12 ASSESSMENT — ENCOUNTER SYMPTOMS
COUGH: 0
PALPITATIONS: 0
SHORTNESS OF BREATH: 0
MYALGIAS: 0
FOCAL WEAKNESS: 0
DEPRESSION: 0
PHOTOPHOBIA: 0
WEAKNESS: 0
HEARTBURN: 0
DOUBLE VISION: 0
DIAPHORESIS: 0
SINUS PAIN: 0
HEADACHES: 0
ABDOMINAL PAIN: 0
SPUTUM PRODUCTION: 0
HEMOPTYSIS: 0
ORTHOPNEA: 0
EYE PAIN: 0
DIZZINESS: 0
NECK PAIN: 0
PND: 0
CHILLS: 0
STRIDOR: 0
CONSTIPATION: 0
DIARRHEA: 0
EYE DISCHARGE: 0
BACK PAIN: 0
FEVER: 0
SORE THROAT: 0
BLURRED VISION: 0
TREMORS: 0
SPEECH CHANGE: 0
WEIGHT LOSS: 0
EYE REDNESS: 0
FALLS: 0
CLAUDICATION: 0
VOMITING: 0
WHEEZING: 0
NAUSEA: 0

## 2023-01-12 ASSESSMENT — FIBROSIS 4 INDEX: FIB4 SCORE: 0.4

## 2023-01-12 NOTE — PROGRESS NOTES
Chief Complaint   Patient presents with    \[facility]\"" Care     Referral darrin Rivera DX SOB, History of Covid.     Results     CT Chest Thorax 9/12/22, PFT 10/25/22        HPI: This patient is a 27 y.o. female presenting for evaluation of SOB and wheezing post-covid. PMHx is significant for JOEY, never tx with CPAP, PCOS on metformin for associated insulin resistance, vertigo and depression. She also has a hx of asthma following hospitalization for respiratory illness at the age of 11.  She uses Shell prn since then but typically only when ill and not more than once per  year prior to covid 19 infx in Jan 2022 after which she began experiencing episodic SOB and wheezing. Sxs were primarily at night in the beginning but progressed to daytime use of her SHELL 3-4 days per week. She contracted covid a second time in July after which she noted worsening of sxs. She was never tx with oral or inhaled steroids. She sees cardiology for w/u of possibly postural tachycardia and it was recommended she have pulmonary evaluation. PFTs from 9/12 are wnls and CT chest 9/12/22 wnls. Currently sxs have improved but she is not exercising in part related to cardiac sxs so feels that has somewhat artificially decreased SHELL use. She uses albuterol 2x/week on average now. Only triggers currently are laughing and exercise. 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 She has 5 dogs, no cats or birds.    Past Medical History:   Diagnosis Date    Anemia     hx of borderline anemia    Anxiety     Celiac disease     Daytime sleepiness     Depression     Insomnia     Insulin resistance     Migraine     Morning headache     PCOS (polycystic ovarian syndrome)     Snoring     Vertigo     Vitamin D deficiency        Social History     Socioeconomic History    Marital status: Single     Spouse name: Not on file    Number of children: Not on file    Years of education: Not on file     Highest education level: Not on file   Occupational History    Occupation: CNA [employer]     Comment: CNA ; Nursing student   Tobacco Use    Smoking status: Never     Passive exposure: Current (past and Currently)    Smokeless tobacco: Never   Vaping Use    Vaping Use: Never used   Substance and Sexual Activity    Alcohol use: Yes     Comment: occasionally    Drug use: No    Sexual activity: Never     Partners: Male, Female     Birth control/protection: Pill   Other Topics Concern    Not on file   Social History Narrative    Not on file     Social Determinants of Health     Financial Resource Strain: Not on file   Food Insecurity: Not on file   Transportation Needs: Not on file   Physical Activity: Not on file   Stress: Not on file   Social Connections: Not on file   Intimate Partner Violence: Not on file   Housing Stability: Not on file       Family History   Problem Relation Age of Onset    Thyroid Mother     Depression Father     Alcohol abuse Father     No Known Problems Maternal Grandmother     Heart Attack Maternal Grandfather 66    Cancer Paternal Grandmother         Breast , esophageal    Stroke Paternal Grandmother 66    Alcohol abuse Paternal Grandfather     Lung Disease Paternal Grandfather     Sleep Apnea Brother        Current Outpatient Medications on File Prior to Visit   Medication Sig Dispense Refill    MAGNESIUM PO Take  by mouth.      BIOTIN PO Take  by mouth.      metFORMIN ER (GLUCOPHAGE XR) 500 MG TABLET SR 24 HR Take 4 Tablets by mouth every day. Please make an appointment for any future refills 360 Tablet 0    sertraline (ZOLOFT) 100 MG Tab TAKE 2 TABLETS BY MOUTH EVERY DAY 60 Tablet 1    traZODone (DESYREL) 100 MG Tab Take 100 mg by mouth at bedtime as needed for Sleep.      betamethasone dipropionate (DIPROLENE) 0.05 % Ointment Apply 1 Application topically every day. Apply to hands      topiramate (TOPAMAX) 50 MG tablet Take 50 mg by mouth every day.      tretinoin (RETIN-A) 0.025 % gel  "APPLY 1 APPLICATION TOPICALLY EVERY BEDTIME. START WITH 2 -3 APPS PER WEEK AND WORK UP AS TOLERATED 45 g 0    Norgestim-Eth Estrad Triphasic 0.18/0.215/0.25 MG-25 MCG Tab Take 1 Tablet by mouth every day.      VITAMIN D PO Take 1 Tablet by mouth every day.      albuterol 108 (90 Base) MCG/ACT Aero Soln inhalation aerosol Inhale 2 Puffs by mouth every four hours as needed for Shortness of Breath. 1 Inhaler 0     No current facility-administered medications on file prior to visit.       Allergies: Gluten meal    ROS:   Review of Systems   Constitutional:  Negative for chills, diaphoresis, fever, malaise/fatigue and weight loss.   HENT:  Negative for congestion, ear discharge, ear pain, hearing loss, nosebleeds, sinus pain, sore throat and tinnitus.    Eyes:  Negative for blurred vision, double vision, photophobia, pain, discharge and redness.   Respiratory:  Negative for cough, hemoptysis, sputum production, shortness of breath, wheezing and stridor.    Cardiovascular:  Negative for chest pain, palpitations, orthopnea, claudication, leg swelling and PND.   Gastrointestinal:  Negative for abdominal pain, constipation, diarrhea, heartburn, nausea and vomiting.   Genitourinary:  Negative for dysuria and urgency.   Musculoskeletal:  Negative for back pain, falls, joint pain, myalgias and neck pain.   Skin:  Negative for itching and rash.   Neurological:  Negative for dizziness, tremors, speech change, focal weakness, weakness and headaches.   Endo/Heme/Allergies:  Negative for environmental allergies.   Psychiatric/Behavioral:  Negative for depression.      /62 (BP Location: Right arm, Patient Position: Sitting, BP Cuff Size: Large adult)   Pulse 83   Resp 16   Ht 1.702 m (5' 7\")   Wt 115 kg (254 lb)   SpO2 97%     Physical Exam:  Physical Exam  Constitutional:       General: She is not in acute distress.     Appearance: Normal appearance. She is well-developed and normal weight.   HENT:      Head: " Normocephalic and atraumatic.      Right Ear: External ear normal.      Left Ear: External ear normal.      Nose: Nose normal. No congestion.      Mouth/Throat:      Mouth: Mucous membranes are moist.      Pharynx: Oropharynx is clear. No oropharyngeal exudate.   Eyes:      General: No scleral icterus.     Extraocular Movements: Extraocular movements intact.      Conjunctiva/sclera: Conjunctivae normal.      Pupils: Pupils are equal, round, and reactive to light.   Neck:      Vascular: No JVD.      Trachea: No tracheal deviation.   Cardiovascular:      Rate and Rhythm: Normal rate and regular rhythm.      Heart sounds: Normal heart sounds. No murmur heard.    No friction rub. No gallop.   Pulmonary:      Effort: Pulmonary effort is normal. No accessory muscle usage or respiratory distress.      Breath sounds: Normal breath sounds. No wheezing or rales.   Abdominal:      General: There is no distension.      Palpations: Abdomen is soft.      Tenderness: There is no abdominal tenderness.   Musculoskeletal:         General: No tenderness or deformity. Normal range of motion.      Cervical back: Normal range of motion and neck supple.      Right lower leg: No edema.      Left lower leg: No edema.   Lymphadenopathy:      Cervical: No cervical adenopathy.   Skin:     General: Skin is warm and dry.      Findings: No rash.      Nails: There is no clubbing.   Neurological:      Mental Status: She is alert and oriented to person, place, and time.      Cranial Nerves: No cranial nerve deficit.      Gait: Gait normal.   Psychiatric:         Behavior: Behavior normal.       PFTs as reviewed by me personally: as per hPI    Imaging as reviewed by me personally:as per HPI    Assessment:  1. Wheezing        2. History of COVID-19            Plan:  Pt has long standing hx of mild, intermittent asthma that likely worsened following covid. Sxs are reasonably well controlled right now with just MARGARITO with normal imaging and PFTs. IF her  rescue inhaler use or sxs increase, would recommend ICS but we decided to hold off for today. F/u in spring when weather changes or if sxs worses.  Mild and viral illnesses, particularly covid, are known to cause post-infx RAD as well as exacerbate underlying asthma  Return in about 4 months (around 5/12/2023) for asthma, post covid.

## 2023-02-08 ENCOUNTER — OFFICE VISIT (OUTPATIENT)
Dept: MEDICAL GROUP | Facility: MEDICAL CENTER | Age: 28
End: 2023-02-08
Payer: COMMERCIAL

## 2023-02-08 VITALS
BODY MASS INDEX: 40.14 KG/M2 | OXYGEN SATURATION: 98 % | DIASTOLIC BLOOD PRESSURE: 70 MMHG | WEIGHT: 255.73 LBS | RESPIRATION RATE: 16 BRPM | HEIGHT: 67 IN | HEART RATE: 97 BPM | TEMPERATURE: 97.9 F | SYSTOLIC BLOOD PRESSURE: 108 MMHG

## 2023-02-08 DIAGNOSIS — R10.9 ABDOMINAL PAIN, UNSPECIFIED ABDOMINAL LOCATION: ICD-10-CM

## 2023-02-08 DIAGNOSIS — R10.9 CHRONIC RIGHT FLANK PAIN: ICD-10-CM

## 2023-02-08 DIAGNOSIS — G89.29 CHRONIC RIGHT FLANK PAIN: ICD-10-CM

## 2023-02-08 DIAGNOSIS — J02.9 SORE THROAT: ICD-10-CM

## 2023-02-08 LAB
APPEARANCE UR: CLEAR
BILIRUB UR STRIP-MCNC: NEGATIVE MG/DL
COLOR UR AUTO: YELLOW
GLUCOSE UR STRIP.AUTO-MCNC: NEGATIVE MG/DL
KETONES UR STRIP.AUTO-MCNC: NEGATIVE MG/DL
LEUKOCYTE ESTERASE UR QL STRIP.AUTO: NEGATIVE
NITRITE UR QL STRIP.AUTO: NEGATIVE
PH UR STRIP.AUTO: 5.5 [PH] (ref 5–8)
PROT UR QL STRIP: NEGATIVE MG/DL
RBC UR QL AUTO: NORMAL
SP GR UR STRIP.AUTO: >=1.03
UROBILINOGEN UR STRIP-MCNC: NORMAL MG/DL

## 2023-02-08 PROCEDURE — 81002 URINALYSIS NONAUTO W/O SCOPE: CPT | Performed by: STUDENT IN AN ORGANIZED HEALTH CARE EDUCATION/TRAINING PROGRAM

## 2023-02-08 PROCEDURE — 99214 OFFICE O/P EST MOD 30 MIN: CPT | Performed by: STUDENT IN AN ORGANIZED HEALTH CARE EDUCATION/TRAINING PROGRAM

## 2023-02-08 ASSESSMENT — FIBROSIS 4 INDEX: FIB4 SCORE: 0.4

## 2023-02-08 NOTE — PROGRESS NOTES
Subjective:     CC: sore throat, flank pain    HPI:   Diana presents today for sore throat, flank pain    Problem   Sore Throat    Chronic condition. She reports recurrent issues with sore throat and was previously treated for strep throat 08/2022. She would like to see ENT.    Character: sore throat  Onset: 08/2022 when she had strep throat at that time  Location: right side of throat  Duration: intermittent  Exacerbating factors: unknown  Relieving factors: over the counter Tylenol, ibuprofen, decongestants  Associated symptoms: swollen lymph nodes, runny nose, sinus congestion  Severity: persistent    Denies fever, cough.     Chronic Right Flank Pain    Chronic condition.    Character: aching pain  Onset: since 06/2022  Location: right flank  Duration: intermittent  Exacerbating factors: unknown  Relieving factors:  Hot pad, tylenol, ibuprofen  Associated symptoms: increased urinary frequency when she has the pain  Severity: 5/10 on average, 7/10 at its worst    Denies fever, abdominal pain, painful urination, hematuria.         Current Outpatient Medications Ordered in Epic   Medication Sig Dispense Refill    MAGNESIUM PO Take  by mouth.      BIOTIN PO Take  by mouth.      metFORMIN ER (GLUCOPHAGE XR) 500 MG TABLET SR 24 HR Take 4 Tablets by mouth every day. Please make an appointment for any future refills 360 Tablet 0    sertraline (ZOLOFT) 100 MG Tab TAKE 2 TABLETS BY MOUTH EVERY DAY 60 Tablet 1    traZODone (DESYREL) 100 MG Tab Take 100 mg by mouth at bedtime as needed for Sleep.      betamethasone dipropionate (DIPROLENE) 0.05 % Ointment Apply 1 Application topically every day. Apply to hands      topiramate (TOPAMAX) 50 MG tablet Take 50 mg by mouth every day.      tretinoin (RETIN-A) 0.025 % gel APPLY 1 APPLICATION TOPICALLY EVERY BEDTIME. START WITH 2 -3 APPS PER WEEK AND WORK UP AS TOLERATED 45 g 0    Norgestim-Eth Estrad Triphasic 0.18/0.215/0.25 MG-25 MCG Tab Take 1 Tablet by mouth every day.       "VITAMIN D PO Take 1 Tablet by mouth every day.      albuterol 108 (90 Base) MCG/ACT Aero Soln inhalation aerosol Inhale 2 Puffs by mouth every four hours as needed for Shortness of Breath. 1 Inhaler 0     No current Epic-ordered facility-administered medications on file.     ROS:  Gen: no fevers/chills  ENT: + sore throat  Pulm: no sob, no cough  CV: no chest pain, no palpitations  GI: no nausea/vomiting, no diarrhea  : no dysuria  MSk: + chronic right flank pain  Neuro: no headaches    Objective:     Exam:  /70 (BP Location: Left arm, Patient Position: Sitting, BP Cuff Size: Adult)   Pulse 97   Temp 36.6 °C (97.9 °F) (Temporal)   Resp 16   Ht 1.702 m (5' 7\")   Wt 116 kg (255 lb 11.7 oz)   SpO2 98%   BMI 40.05 kg/m²  Body mass index is 40.05 kg/m².    Gen: Alert and oriented, No apparent distress.  HEENT: Normocephalic. Ear canals are clear bilaterally, tympanic membranes are benign, nasal mucosa benign, oropharynx is without erythema, edema or exudates.  Neck: Neck is supple without lymphadenopathy.  Lungs: Normal effort, CTA bilaterally, no wheezes, rhonchi, or rales  CV: Regular rate and rhythm. No murmurs, rubs, or gallops.  Ext: No clubbing, cyanosis, edema.  MSK:   + right CVA tenderness    Labs:   POC urine negative    US renal 06/2022  IMPRESSION:  1.  No hydronephrosis identified  2.  No renal stones noted  3.  There is a 4.3 mm echogenic focus mid superior pole right kidney which may represent a small angiomyolipoma. Would suggest 6 month right renal ultrasound follow-up to ensure stability    Assessment & Plan:     27 y.o. female with the following -     1. Chronic right flank pain  Chronic condition, persistent since 08/2022. Unclear etiology and prognosis at this time. She did have small angiomyolipoma on right kidney based on US 06/2022. Plan to evaluate with CT abd/pel per order to reassess and rule out other intraabdominal pathologies.  - POCT Urinalysis  - CT-ABDOMEN-PELVIS W/O; " Future  - Basic Metabolic Panel; Future    2. Abdominal pain, unspecified abdominal location  - CT-ABDOMEN-PELVIS W/O; Future    3. Sore throat  Chronic condition, persistent and intermittent. Refer to ENT for further evaluation and/or management.  - Referral to ENT      Return if symptoms worsen or fail to improve.    Please note that this dictation was created using voice recognition software. I have made every reasonable attempt to correct obvious errors, but I expect that there are errors of grammar and possibly content that I did not discover before finalizing the note.

## 2023-02-10 DIAGNOSIS — F33.1 MODERATE EPISODE OF RECURRENT MAJOR DEPRESSIVE DISORDER (HCC): ICD-10-CM

## 2023-02-10 DIAGNOSIS — F41.1 GENERALIZED ANXIETY DISORDER: ICD-10-CM

## 2023-02-10 RX ORDER — SERTRALINE HYDROCHLORIDE 100 MG/1
200 TABLET, FILM COATED ORAL DAILY
Qty: 60 TABLET | Refills: 0 | Status: SHIPPED | OUTPATIENT
Start: 2023-02-10 | End: 2023-02-24 | Stop reason: SDUPTHER

## 2023-02-10 NOTE — TELEPHONE ENCOUNTER
Received request via: Pharmacy    Was the patient seen in the last year in this department? Yes    Does the patient have an active prescription (recently filled or refills available) for medication(s) requested? No    Does the patient have shelter Plus and need 100 day supply (blood pressure, diabetes and cholesterol meds only)? Medication is not for cholesterol, blood pressure or diabetes and Patient does not have SCP

## 2023-02-11 ENCOUNTER — HOSPITAL ENCOUNTER (OUTPATIENT)
Dept: RADIOLOGY | Facility: MEDICAL CENTER | Age: 28
End: 2023-02-11
Attending: STUDENT IN AN ORGANIZED HEALTH CARE EDUCATION/TRAINING PROGRAM
Payer: COMMERCIAL

## 2023-02-11 DIAGNOSIS — R10.9 ABDOMINAL PAIN, UNSPECIFIED ABDOMINAL LOCATION: ICD-10-CM

## 2023-02-11 DIAGNOSIS — G89.29 CHRONIC RIGHT FLANK PAIN: ICD-10-CM

## 2023-02-11 DIAGNOSIS — R10.9 CHRONIC RIGHT FLANK PAIN: ICD-10-CM

## 2023-02-11 PROCEDURE — 74176 CT ABD & PELVIS W/O CONTRAST: CPT

## 2023-02-24 ENCOUNTER — TELEMEDICINE (OUTPATIENT)
Dept: MEDICAL GROUP | Facility: MEDICAL CENTER | Age: 28
End: 2023-02-24
Payer: COMMERCIAL

## 2023-02-24 ENCOUNTER — TELEMEDICINE (OUTPATIENT)
Dept: BEHAVIORAL HEALTH | Facility: CLINIC | Age: 28
End: 2023-02-24
Payer: COMMERCIAL

## 2023-02-24 VITALS — BODY MASS INDEX: 39.24 KG/M2 | WEIGHT: 250 LBS | HEIGHT: 67 IN

## 2023-02-24 DIAGNOSIS — G90.A POTS (POSTURAL ORTHOSTATIC TACHYCARDIA SYNDROME): ICD-10-CM

## 2023-02-24 DIAGNOSIS — F90.2 ADHD (ATTENTION DEFICIT HYPERACTIVITY DISORDER), COMBINED TYPE: ICD-10-CM

## 2023-02-24 DIAGNOSIS — F41.1 GENERALIZED ANXIETY DISORDER: ICD-10-CM

## 2023-02-24 DIAGNOSIS — R42 DIZZINESS: ICD-10-CM

## 2023-02-24 DIAGNOSIS — I95.1 ORTHOSTASIS: ICD-10-CM

## 2023-02-24 DIAGNOSIS — R42 VERTIGO: ICD-10-CM

## 2023-02-24 DIAGNOSIS — F33.1 MODERATE EPISODE OF RECURRENT MAJOR DEPRESSIVE DISORDER (HCC): ICD-10-CM

## 2023-02-24 PROCEDURE — 99214 OFFICE O/P EST MOD 30 MIN: CPT | Mod: 95 | Performed by: PSYCHIATRY & NEUROLOGY

## 2023-02-24 PROCEDURE — 99214 OFFICE O/P EST MOD 30 MIN: CPT | Mod: 95 | Performed by: NURSE PRACTITIONER

## 2023-02-24 RX ORDER — ATOMOXETINE 25 MG/1
25 CAPSULE ORAL DAILY
Qty: 30 CAPSULE | Refills: 0 | Status: SHIPPED | OUTPATIENT
Start: 2023-02-24 | End: 2023-03-20

## 2023-02-24 RX ORDER — SERTRALINE HYDROCHLORIDE 100 MG/1
100 TABLET, FILM COATED ORAL 2 TIMES DAILY
Qty: 180 TABLET | Refills: 0 | Status: SHIPPED | OUTPATIENT
Start: 2023-02-24 | End: 2023-04-05 | Stop reason: SDUPTHER

## 2023-02-24 ASSESSMENT — PATIENT HEALTH QUESTIONNAIRE - PHQ9
SUM OF ALL RESPONSES TO PHQ9 QUESTIONS 1 AND 2: 0
SUM OF ALL RESPONSES TO PHQ QUESTIONS 1-9: 4
8. MOVING OR SPEAKING SO SLOWLY THAT OTHER PEOPLE COULD HAVE NOTICED. OR THE OPPOSITE, BEING SO FIGETY OR RESTLESS THAT YOU HAVE BEEN MOVING AROUND A LOT MORE THAN USUAL: NOT AT ALL
4. FEELING TIRED OR HAVING LITTLE ENERGY: SEVERAL DAYS
1. LITTLE INTEREST OR PLEASURE IN DOING THINGS: NOT AT ALL
9. THOUGHTS THAT YOU WOULD BE BETTER OFF DEAD, OR OF HURTING YOURSELF: NOT AT ALL
6. FEELING BAD ABOUT YOURSELF - OR THAT YOU ARE A FAILURE OR HAVE LET YOURSELF OR YOUR FAMILY DOWN: NOT AL ALL
2. FEELING DOWN, DEPRESSED, IRRITABLE, OR HOPELESS: NOT AT ALL
5. POOR APPETITE OR OVEREATING: SEVERAL DAYS
3. TROUBLE FALLING OR STAYING ASLEEP OR SLEEPING TOO MUCH: NOT AT ALL
7. TROUBLE CONCENTRATING ON THINGS, SUCH AS READING THE NEWSPAPER OR WATCHING TELEVISION: MORE THAN HALF THE DAYS

## 2023-02-24 ASSESSMENT — FIBROSIS 4 INDEX: FIB4 SCORE: 0.4

## 2023-02-24 NOTE — ASSESSMENT & PLAN NOTE
Chronic problem.  Followed by psychiatrist.  Was recently prescribed Strattera for ADHD.  Otherwise denies depression and anxiety is well controlled.

## 2023-02-24 NOTE — ASSESSMENT & PLAN NOTE
Chronic problem. S/s started in teenage years, was first Dx'd by cardiology at 18. The most recent incidence was in September when pt had precyncopy event w/out LOC.  Pt was recently seen by Dr. Gonzales, Holter monitor confirmed tachycardia. ECHO from 9/2/22 normal. Was suggested to stay hydrated, build exercise tolerance.   She is requesting referral to Dysautonomia specialist.

## 2023-02-24 NOTE — PROGRESS NOTES
[facility]   Follow Up Assessment  This evaluation was conducted via Zoom using secure and encrypted videoconferencing technology. The patient was in their home in the [facility].    The patient's identity was confirmed and verbal consent was obtained for this virtual visit.    This visit was conducted via Zoom using secure and encrypted videoconferencing technology.  The patient was in a private location in the [facility].  The patient's identity was confirmed and verbal consent was obtained for this virtual visit.    This provider informed the patient their medical records are totally confidential except for the use by other providers involved in their care, or if the patient signs a release, or to report instances of child or elder abuse, or if it is determined they are an immediate risk to harm themselves or others.    Name: Diana Hill  MRN: [id number]  : 1995  Age: 27 y.o.  Date of assessment: 2023  PCP: DANIELE Resendiz    Subjective:  Chart reviewed prior to the virtual visit in her home.  She has been followed by Dr. Jurado-last seen on 2022.  She has been treated for depression and anxiety but wonders if she has ADHD and/or OCD.  Her ADHD symptoms include restlessness, distractibility, impulsiveness, and difficulty focusing on movies and books since childhood.  2 of her 3 brothers have ADHD.  She sees her self as having OCD with obsessional tendencies rather than any rituals.  She is currently on sertraline 200 mg at bedtime and prefers not to change that at this time.  We briefly discussed the trial on Luvox..   He is agreeable to trying Strattera 25 mg a.m. for ADHD.    Objective:  He is alert, oriented, and cooperative.  Relatedness is good.  Grooming is good.  Speech is normal rate.  Anxious.  Memory is good.  Insight and judgment are good.  No indication of psychotic thinking.    Current Risk:       Suicidal: Not suicidal       Homicidal: Not  homicidal       Self-Harm: No plan to harm self       Relapse(Low/Moderate/High):: Moderate       Crisis Safety Plan Reviewed: Discussed with patient    Diagnosis:   Major depressive disorder, recurrent  ADHD  Generalized anxiety disorder  Rule out OCD    Treatment Plan:  The current treatment plan consists of a follow-up visit in 3 weeks and then monthly psychiatric sessions designed to evaluate her depression, ADHD, anxiety, and possible OCD.    Duration will be for a minimum of 12 months and will be reviewed at each visit.    Goals: Remission of depression and control of anxiety with improvement in ADHD symptoms.  Continue sertraline 200 mg at bedtime.  Start Strattera 25 mg a.m.  Possible side effects including headaches discussed.      Patrice De La Cruz M.D.    This note was created using voice recognition software (Dragon). The accuracy of the dictation is limited by the abilities of the software. I have reviewed the note prior to signing, however some errors in grammar and context are still possible. If you have any questions related to this note please do not hesitate to contact our office.

## 2023-02-24 NOTE — ASSESSMENT & PLAN NOTE
Chronic problem. Pt is working on her lifestyle, trying to eat healthy and exercise daily, walking. She was advised by cardiology to optimize her daily activity to tolerance and preference.

## 2023-02-24 NOTE — PROGRESS NOTES
Virtual Visit: Established Patient   This visit was conducted via Zoom using secure and encrypted videoconferencing technology.   The patient was in their home in the state of [place].    The patient's identity was confirmed and verbal consent was obtained for this virtual visit.     Subjective:   CC:   Chief Complaint   Patient presents with    Referral Needed       Diana Hill is a 27 y.o. female presenting for evaluation and management of:    POTS (postural orthostatic tachycardia syndrome)  Chronic problem. S/s started in teenage years, was first Dx'd by cardiology at 18. The most recent incidence was in September when pt had precyncopy event w/out LOC.  Pt was recently seen by Dr. Gonzales, Holter monitor confirmed tachycardia. ECHO from 9/2/22 normal. Was suggested to stay hydrated, build exercise tolerance.   She is requesting referral to Dysautonomia specialist.     ADHD (attention deficit hyperactivity disorder), combined type  Chronic problem.  Followed by psychiatrist.  Was recently prescribed Strattera for ADHD.  Otherwise denies depression and anxiety is well controlled.    BMI 38.0-38.9,adult  Chronic problem. Pt is working on her lifestyle, trying to eat healthy and exercise daily, walking. She was advised by cardiology to optimize her daily activity to tolerance and preference.     ROS     Current medicines (including changes today)  Current Outpatient Medications   Medication Sig Dispense Refill    sertraline (ZOLOFT) 100 MG Tab Take 1 Tablet by mouth 2 times a day for 90 days. 180 Tablet 0    atomoxetine (STRATTERA) 25 MG capsule Take 1 Capsule by mouth every day for 30 days. 30 Capsule 0    MAGNESIUM PO Take  by mouth.      BIOTIN PO Take  by mouth.      metFORMIN ER (GLUCOPHAGE XR) 500 MG TABLET SR 24 HR Take 4 Tablets by mouth every day. Please make an appointment for any future refills 360 Tablet 0    traZODone (DESYREL) 100 MG Tab Take 100 mg by mouth at bedtime as needed for Sleep.       betamethasone dipropionate (DIPROLENE) 0.05 % Ointment Apply 1 Application topically every day. Apply to hands      topiramate (TOPAMAX) 50 MG tablet Take 50 mg by mouth every day.      tretinoin (RETIN-A) 0.025 % gel APPLY 1 APPLICATION TOPICALLY EVERY BEDTIME. START WITH 2 -3 APPS PER WEEK AND WORK UP AS TOLERATED 45 g 0    Norgestim-Eth Estrad Triphasic 0.18/0.215/0.25 MG-25 MCG Tab Take 1 Tablet by mouth every day.      VITAMIN D PO Take 1 Tablet by mouth every day.      albuterol 108 (90 Base) MCG/ACT Aero Soln inhalation aerosol Inhale 2 Puffs by mouth every four hours as needed for Shortness of Breath. 1 Inhaler 0     No current facility-administered medications for this visit.       Patient Active Problem List    Diagnosis Date Noted    ADHD (attention deficit hyperactivity disorder), combined type 02/24/2023    POTS (postural orthostatic tachycardia syndrome) 02/24/2023    Sore throat 02/08/2023    Chronic right flank pain 02/08/2023    BMI 38.0-38.9,adult 09/02/2022    Orthostasis 09/02/2022    Near syncope 09/01/2022    Heart palpitations 07/27/2022    Breast lump in female 06/16/2022    Bilateral flank pain 04/26/2022    Generalized anxiety disorder 11/17/2021    Moderate episode of recurrent major depressive disorder (HCC) 11/17/2021    Plantar wart, left foot 10/21/2021    Medication management 10/21/2021    Chronic fatigue syndrome 08/17/2021    Intractable migraine with aura without status migrainosus 08/10/2021    Obstructive sleep apnea syndrome 08/10/2021    Vestibular migraine 08/10/2021    Otalgia of left ear 04/27/2021    History of canker sores 04/27/2021    Polyarthralgia 03/25/2021    Brittle nails 03/25/2021    Cervical lymphadenopathy 12/29/2020    Subclinical hypothyroidism 11/23/2020    Insulin resistance 11/23/2020    Anemia 11/23/2020    Morbid obesity (HCC) 10/07/2020    Skin rash 09/15/2020    Administrative encounter 09/15/2020    Hospital discharge follow-up 09/02/2020    Rash  "of hands 08/12/2020    Snoring 08/12/2020    Vertigo 03/03/2020    PCOS (polycystic ovarian syndrome) 11/19/2019    Vitamin D deficiency 11/19/2019    High triglycerides 11/19/2019        Objective:   Ht 1.702 m (5' 7\")   Wt 113 kg (250 lb)   BMI 39.16 kg/m²     Physical Exam:  Constitutional: Alert, no distress, well-groomed.  Skin: No rashes in visible areas.  Eye: Round. Conjunctiva clear, lids normal. No icterus.   ENMT: Lips pink without lesions, good dentition, moist mucous membranes. Phonation normal.  Neck: No masses, no thyromegaly. Moves freely without pain.  Respiratory: Unlabored respiratory effort, no cough or audible wheeze  Psych: Alert and oriented x3, normal affect and mood.     Assessment and Plan:   The following treatment plan was discussed:   1. POTS (postural orthostatic tachycardia syndrome)  - Referral to Neurology  - REFERRAL TO CARDIOLOGY    2. ADHD (attention deficit hyperactivity disorder), combined type  Stable. Followed by [facility]    3. Vertig0  - Referral to Neurology  - REFERRAL TO CARDIOLOGY    4. Orthostasis  - Referral to Neurology  - REFERRAL TO CARDIOLOGY    5. Dizziness  - Referral to Neurology  - REFERRAL TO CARDIOLOGY    6. BMI 38.0-38.9,adult  Stable. Pt is working on her lifestyle, continue       Discussed with patient possible alternative diagnoses, patient is to take all medications as prescribed.      If symptoms persist FU w/PCP, if symptoms worsen go to emergency room.      If experiencing any side effects from prescribed medications report to the office immediately or go to emergency room.     Reviewed indication, dosage, usage and potential adverse effects of prescribed medications.      Reviewed risks and benefits of treatment plan. Patient verbalizes understanding of all instruction and verbally agrees to plan.     Discussed plan with the patient, and patient agrees to the above.      I personally reviewed prior external notes and test results pertinent to today's " visit.     Follow-up: 3-6 mos

## 2023-03-05 ENCOUNTER — OFFICE VISIT (OUTPATIENT)
Dept: URGENT CARE | Facility: CLINIC | Age: 28
End: 2023-03-05
Payer: COMMERCIAL

## 2023-03-05 VITALS
TEMPERATURE: 97.8 F | RESPIRATION RATE: 18 BRPM | BODY MASS INDEX: 38.77 KG/M2 | HEART RATE: 101 BPM | DIASTOLIC BLOOD PRESSURE: 86 MMHG | OXYGEN SATURATION: 96 % | HEIGHT: 67 IN | SYSTOLIC BLOOD PRESSURE: 124 MMHG | WEIGHT: 247 LBS

## 2023-03-05 DIAGNOSIS — J00 RHINOPHARYNGITIS: ICD-10-CM

## 2023-03-05 LAB
INT CON NEG: NEGATIVE
INT CON POS: POSITIVE
S PYO AG THROAT QL: NEGATIVE

## 2023-03-05 PROCEDURE — 87880 STREP A ASSAY W/OPTIC: CPT | Performed by: PHYSICIAN ASSISTANT

## 2023-03-05 PROCEDURE — 99213 OFFICE O/P EST LOW 20 MIN: CPT | Performed by: PHYSICIAN ASSISTANT

## 2023-03-05 ASSESSMENT — ENCOUNTER SYMPTOMS
ABDOMINAL PAIN: 0
CHILLS: 0
SHORTNESS OF BREATH: 0
COUGH: 0
SORE THROAT: 1
NAUSEA: 0
SPUTUM PRODUCTION: 0
MYALGIAS: 0
DIZZINESS: 0
SINUS PAIN: 0
HEADACHES: 0
DIARRHEA: 0
FEVER: 0
WHEEZING: 0
VOMITING: 0
DIAPHORESIS: 0

## 2023-03-05 ASSESSMENT — FIBROSIS 4 INDEX: FIB4 SCORE: 0.4

## 2023-03-06 NOTE — PROGRESS NOTES
Subjective:     CHIEF COMPLAINT  Chief Complaint   Patient presents with    Pharyngitis     White stuff back of throat.Right side , running nose ,Post nasal drip.  X2 days no fever,       HPI  Diana Hill is a very pleasant 27 y.o. female who presents to clinic with sore throat and runny nose x2 days.  Describes mild pain on swallowing.  No difficulty swallowing or handling secretions.  She has not been running a fever.  No body aches or chills.  No cough.  Rhinorrhea is clear.  Not currently taking any over-the-counter medications for her symptoms.  No known direct ill contacts.    REVIEW OF SYSTEMS  Review of Systems   Constitutional:  Negative for chills, diaphoresis, fever and malaise/fatigue.   HENT:  Positive for sore throat. Negative for congestion, ear pain and sinus pain.         Clear rhinorrhea   Respiratory:  Negative for cough, sputum production, shortness of breath and wheezing.    Gastrointestinal:  Negative for abdominal pain, diarrhea, nausea and vomiting.   Musculoskeletal:  Negative for myalgias.   Neurological:  Negative for dizziness and headaches.   Endo/Heme/Allergies:  Negative for environmental allergies.     PAST MEDICAL HISTORY  Patient Active Problem List    Diagnosis Date Noted    ADHD (attention deficit hyperactivity disorder), combined type 02/24/2023    POTS (postural orthostatic tachycardia syndrome) 02/24/2023    Sore throat 02/08/2023    Chronic right flank pain 02/08/2023    BMI 38.0-38.9,adult 09/02/2022    Orthostasis 09/02/2022    Near syncope 09/01/2022    Heart palpitations 07/27/2022    Breast lump in female 06/16/2022    Bilateral flank pain 04/26/2022    Generalized anxiety disorder 11/17/2021    Moderate episode of recurrent major depressive disorder (HCC) 11/17/2021    Plantar wart, left foot 10/21/2021    Medication management 10/21/2021    Chronic fatigue syndrome 08/17/2021    Intractable migraine with aura without status migrainosus 08/10/2021    Obstructive  sleep apnea syndrome 08/10/2021    Vestibular migraine 08/10/2021    Otalgia of left ear 04/27/2021    History of canker sores 04/27/2021    Polyarthralgia 03/25/2021    Brittle nails 03/25/2021    Cervical lymphadenopathy 12/29/2020    Subclinical hypothyroidism 11/23/2020    Insulin resistance 11/23/2020    Anemia 11/23/2020    Morbid obesity (HCC) 10/07/2020    Skin rash 09/15/2020    Administrative encounter 09/15/2020    Hospital discharge follow-up 09/02/2020    Rash of hands 08/12/2020    Snoring 08/12/2020    Vertigo 03/03/2020    PCOS (polycystic ovarian syndrome) 11/19/2019    Vitamin D deficiency 11/19/2019    High triglycerides 11/19/2019       SURGICAL HISTORY   has a past surgical history that includes nadeen by laparoscopy (N/A, 9/13/2018).    ALLERGIES  Allergies   Allergen Reactions    Gluten Meal Unspecified     Stomach problems        CURRENT MEDICATIONS  Home Medications       Reviewed by Dontrell Conley P.A.-C. (Physician Assistant) on 03/05/23 at 1607  Med List Status: <None>     Medication Last Dose Status   albuterol 108 (90 Base) MCG/ACT Aero Soln inhalation aerosol Taking Active   atomoxetine (STRATTERA) 25 MG capsule Taking Active   betamethasone dipropionate (DIPROLENE) 0.05 % Ointment Taking Active   BIOTIN PO Taking Active   MAGNESIUM PO Taking Active   metFORMIN ER (GLUCOPHAGE XR) 500 MG TABLET SR 24 HR Taking Active   Norgestim-Eth Estrad Triphasic 0.18/0.215/0.25 MG-25 MCG Tab Taking Active   sertraline (ZOLOFT) 100 MG Tab Taking Active   topiramate (TOPAMAX) 50 MG tablet Taking Active   traZODone (DESYREL) 100 MG Tab PRN Active   tretinoin (RETIN-A) 0.025 % gel PRN Active   VITAMIN D PO Not Taking Active                    SOCIAL HISTORY  Social History     Tobacco Use    Smoking status: Never     Passive exposure: Current (past and Currently)    Smokeless tobacco: Never   Vaping Use    Vaping Use: Never used   Substance and Sexual Activity    Alcohol use: Yes     Comment:  "occasionally    Drug use: No    Sexual activity: Never     Partners: Male, Female     Birth control/protection: Pill       FAMILY HISTORY  Family History   Problem Relation Age of Onset    Thyroid Mother     Depression Father     Alcohol abuse Father     No Known Problems Maternal Grandmother     Heart Attack Maternal Grandfather 66    Cancer Paternal Grandmother         Breast , esophageal    Stroke Paternal Grandmother 66    Alcohol abuse Paternal Grandfather     Lung Disease Paternal Grandfather     Sleep Apnea Brother           Objective:     VITAL SIGNS: /86 (BP Location: Left arm, Patient Position: Sitting, BP Cuff Size: Adult)   Pulse (!) 101   Temp 36.6 °C (97.8 °F) (Temporal)   Resp 18   Ht 1.702 m (5' 7\")   Wt 112 kg (247 lb)   SpO2 96%   BMI 38.69 kg/m²     PHYSICAL EXAM  Physical Exam  Constitutional:       General: She is not in acute distress.     Appearance: Normal appearance. She is not ill-appearing, toxic-appearing or diaphoretic.   HENT:      Head: Normocephalic and atraumatic.      Right Ear: Tympanic membrane, ear canal and external ear normal.      Left Ear: Tympanic membrane, ear canal and external ear normal.      Nose: Rhinorrhea present. No congestion.      Mouth/Throat:      Mouth: Mucous membranes are moist.      Pharynx: Posterior oropharyngeal erythema present. No oropharyngeal exudate.      Comments: Posterior oropharynx minimally erythematous.  No edema or exudate.  Midline uvula without deviation.  Eyes:      Conjunctiva/sclera: Conjunctivae normal.   Cardiovascular:      Rate and Rhythm: Normal rate and regular rhythm.      Pulses: Normal pulses.      Heart sounds: Normal heart sounds.   Pulmonary:      Effort: Pulmonary effort is normal.      Breath sounds: Normal breath sounds. No wheezing.   Musculoskeletal:      Cervical back: Normal range of motion. No muscular tenderness.   Lymphadenopathy:      Cervical: No cervical adenopathy.   Skin:     General: Skin is warm " and dry.      Capillary Refill: Capillary refill takes less than 2 seconds.   Neurological:      Mental Status: She is alert.   Psychiatric:         Mood and Affect: Mood normal.         Thought Content: Thought content normal.     POCT strep: Negative    Assessment/Plan:     1. Rhinopharyngitis  - POCT Rapid Strep A      MDM/Comments:    Rapid strep in clinic returned negative.  Discussed likely viral etiology.  Supportive recommendations discussed.  Tylenol/ibuprofen for pain.  Increase fluid intake.  Throat lozenges/topical spray as needed.  Return precautions for any persistence or worsening of symptoms.    Differential diagnosis, natural history, supportive care, and indications for immediate follow-up discussed. All questions answered. Patient agrees with the plan of care.    Follow-up as needed if symptoms worsen or fail to improve to PCP, Urgent care or Emergency Room.    I have personally reviewed prior external notes and test results pertinent to today's visit.  I have independently reviewed and interpreted all diagnostics ordered during this urgent care acute visit.   Discussed management options (risks,benefits, and alternatives to treatment). Pt expresses understanding and the treatment plan was agreed upon. Questions were encouraged and answered to pt's satisfaction.    Please note that this dictation was created using voice recognition software. I have made a reasonable attempt to correct obvious errors, but I expect that there are errors of grammar and possibly content that I did not discover before finalizing the note.

## 2023-03-17 ENCOUNTER — APPOINTMENT (OUTPATIENT)
Dept: BEHAVIORAL HEALTH | Facility: CLINIC | Age: 28
End: 2023-03-17
Payer: COMMERCIAL

## 2023-03-20 RX ORDER — ATOMOXETINE 25 MG/1
CAPSULE ORAL
Qty: 30 CAPSULE | Refills: 0 | Status: SHIPPED | OUTPATIENT
Start: 2023-03-20 | End: 2023-04-05

## 2023-04-03 DIAGNOSIS — E28.2 PCOS (POLYCYSTIC OVARIAN SYNDROME): ICD-10-CM

## 2023-04-03 RX ORDER — METFORMIN HYDROCHLORIDE 500 MG/1
2000 TABLET, EXTENDED RELEASE ORAL DAILY
Qty: 360 TABLET | Refills: 1 | Status: SHIPPED | OUTPATIENT
Start: 2023-04-03 | End: 2023-06-22 | Stop reason: SDUPTHER

## 2023-04-05 ENCOUNTER — TELEMEDICINE (OUTPATIENT)
Dept: BEHAVIORAL HEALTH | Facility: CLINIC | Age: 28
End: 2023-04-05
Payer: COMMERCIAL

## 2023-04-05 DIAGNOSIS — F90.2 ADHD (ATTENTION DEFICIT HYPERACTIVITY DISORDER), COMBINED TYPE: ICD-10-CM

## 2023-04-05 DIAGNOSIS — F33.1 MODERATE EPISODE OF RECURRENT MAJOR DEPRESSIVE DISORDER (HCC): ICD-10-CM

## 2023-04-05 DIAGNOSIS — F41.1 GENERALIZED ANXIETY DISORDER: ICD-10-CM

## 2023-04-05 PROCEDURE — 99214 OFFICE O/P EST MOD 30 MIN: CPT | Mod: 95 | Performed by: PSYCHIATRY & NEUROLOGY

## 2023-04-05 RX ORDER — SERTRALINE HYDROCHLORIDE 100 MG/1
100 TABLET, FILM COATED ORAL NIGHTLY
Qty: 180 TABLET | Refills: 0 | Status: SHIPPED | OUTPATIENT
Start: 2023-04-05 | End: 2023-07-04

## 2023-04-05 NOTE — PROGRESS NOTES
[facility]   Follow Up Assessment  This evaluation was conducted via Zoom using secure and encrypted videoconferencing technology. The patient was in their home in the [place].    The patient's identity was confirmed and verbal consent was obtained for this virtual visit.    This visit was conducted via Zoom using secure and encrypted videoconferencing technology.  The patient was in a private location in the [place].  The patient's identity was confirmed and verbal consent was obtained for this virtual visit.    This provider informed the patient their medical records are totally confidential except for the use by other providers involved in their care, or if the patient signs a release, or to report instances of child or elder abuse, or if it is determined they are an immediate risk to harm themselves or others.    Name: Diana Hill  MRN: [id number]  : 1995  Age: 27 y.o.  Date of assessment: 2023  PCP: DANIELE Resendiz    Subjective:  Chart reviewed prior to the virtual visit in her home.  She was last seen on .  She was not able to tolerate Strattera 25 mg a.m. which she tried for 4 days.  It caused tachycardia.  Zoloft 200 mg at bedtime is helping her depression and anxiety and she prefers to continue that dose.  She will be scheduling an appointment at a specialty clinic in [place] for her cardiac symptoms.    Objective:  She is alert, oriented, and cooperative.  Relatedness is good.  Grooming is good.  Speech is normal rate.  Anxious.  Memory is good.  Insight and judgment are good.  No indication of psychotic thinking.    Current Risk:       Suicidal: Not suicidal       Homicidal: Not homicidal       Self-Harm: No plan to harm self       Relapse(Low/Moderate/High):: Moderate       Crisis Safety Plan Reviewed: Discussed with patient    Diagnosis:   Major depressive disorder, recurrent  Generalized anxiety disorder  ADHD  Rule out OCD    Treatment  Plan:  The current treatment plan consists of quarterly psychiatric sessions designed to evaluate her depression, anxiety, and ADHD.    Duration will be for a minimum of 12 months and will be reviewed at each visit.    Goals: Remission of depression and control of anxiety with improvement in ADHD symptoms in order to prevent relapse due to the chronic nature of her behavioral health problems and mental illness.  Strattera 25 mg was discontinued after 4 days.  Continue Zoloft 200 mg at bedtime.      Patrice De La Cruz M.D.    This note was created using voice recognition software (Dragon). The accuracy of the dictation is limited by the abilities of the software. I have reviewed the note prior to signing, however some errors in grammar and context are still possible. If you have any questions related to this note please do not hesitate to contact our office.

## 2023-04-10 NOTE — PATIENT INSTRUCTIONS
[facility]  CARDIOLOGY OFFICE VISIT        PRIMARY CARE PROVIDER  SOPHIA Winters  Last seen: 04/04/2023    SUBJECTIVE  CHIEF COMPLAINT / REASON FOR VISIT: ***      Pertinent ED Visits/Hospitalizations:  N/A      HISTORY OF PRESENT ILLNESS  Pleasant 70 year old female seen in consult for Lightheadedness, and Syncope and collapse at the request of SOPHIA Winters on 04/04/2023.     Today patient reports***       ALLERGIES  ALLERGIES:   Allergen Reactions   • Sathya-Z ANXIETY   • Levaquin Other (See Comments)     Restlessness, nervousness, lightheadedness, depression, and trouble sleeping   • Tecfidera Other (See Comments)     Lymphopenia (400)       MEDICATIONS  Current Outpatient Medications   Medication Sig Dispense Refill   • hydrOXYzine (ATARAX) 25 MG tablet Take 1 tablet by mouth every 4 hours as needed for Anxiety. 270 tablet 3   • cefdinir (OMNICEF) 300 MG capsule Take 1 capsule by mouth in the morning and 1 capsule in the evening. Do all this for 7 days. 14 capsule 0   • nitrofurantoin, macrocrystal-monohydrate, (MACROBID) 100 MG capsule Take 1 capsule by mouth in the morning and 1 capsule in the evening. Do all this for 10 days. 20 capsule 0   • estradiol (VAGIFEM) 10 MCG vaginal tablet Place 1 tablet vaginally 2 days a week. 14 tablet 3   • ALPRAZolam (XANAX) 0.5 MG tablet TAKE 1 TABLET BY MOUTH 3 TIMES DAILY AS NEEDED FOR SLEEP OR ANXIETY. 90 tablet 0   • tolterodine (DETROL LA) 2 MG 24 hr capsule Take 1 capsule by mouth daily. 90 capsule 1   • amitriptyline (ELAVIL) 25 MG tablet Take 1 tablet by mouth nightly. 90 tablet 3   • losartan-hydrochlorothiazide (HYZAAR) 50-12.5 MG per tablet TAKE 1 TABLET BY MOUTH 1 time DAILY 90 tablet 3   • mirtazapine (REMERON) 15 MG tablet TAKE 1 TABLET BY MOUTH NIGHTLY. 90 tablet 3   • atorvastatin (LIPITOR) 40 MG tablet TAKE 1 TABLET BY MOUTH DAILY 90 tablet 3   • famotidine (PEPCID) 20 MG tablet Take 1 tablet by mouth 2 times daily as needed  Self-Isolating at Home  If it is determined that you do not need to be hospitalized and can be isolated at home please follow the follow the prevention steps below as based on CDC guidelines.  Stay home except to get medical care  People who are mildly ill with unconfirmed COVID-19 or have any other infectious respiratory illness are able to isolate at home during their illness. You should restrict activities outside your home, except for getting medical care. Do not go to work, school, or public areas. Avoid using public transportation, ride-sharing, or taxis.  Call ahead before visiting your doctor  If you have a medical appointment, call the healthcare provider and tell them that you have or may have unconfirmed COVID-19 or another possibly contagious respiratory illness. This will help the healthcare provider’s office take steps to keep other people from getting infected or exposed.  Separate yourself from other people and animals in your home  As much as possible, you should stay in a specific room and away from other people in your home. Also, you should use a separate bathroom, if available.  You should restrict contact with pets and other animals while you are sick, just like you would around other people. When possible, have another member of your household care for your animals while you are sick. If you must care for your pet or be around animals while you are sick, wash your hands before and after you interact with pets.  Wear a facemask  You should wear a facemask when you are around other people (e.g., sharing a room or vehicle) or pets and before you enter a healthcare provider’s office. If you are not able to wear a facemask (for example, because it causes trouble breathing), then people who live with you should not stay in the same room with you, or they should wear a facemask if they enter your room.  Cover your coughs and sneezes  Cover your mouth and nose with a tissue when you cough or sneeze.  (GERD). 180 tablet 1   • Teriflunomide (Aubagio) 14 MG Tab Take 14 mg by mouth daily. Indications: Multiple Sclerosis 90 tablet 3   • meloxicam (MOBIC) 15 MG tablet Take 1 tablet by mouth daily as needed for Pain. 90 tablet 1   • metoPROLOL succinate (TOPROL-XL) 25 MG 24 hr tablet TAKE 1 TABLET BY MOUTH DAILY. 90 tablet 1   • fexofenadine (ALLEGRA) 60 MG tablet Take 60 mg by mouth daily.     • sertraline (ZOLOFT) 100 MG tablet TAKE 1 & 1/2 TABLETS BY MOUTH DAILY 135 tablet 3   • fluticasone-salmeterol (Advair Diskus) 250-50 MCG/ACT inhaler Inhale 1 puff into the lungs in the morning and 1 puff before bedtime. 1 each 11   • fluticasone (FLONASE) 50 MCG/ACT nasal spray Spray 2 sprays in each nostril daily. 16 g 12   • pantoprazole (PROTONIX) 40 MG tablet TAKE 1 TABLET BY MOUTH DAILY 90 tablet 3   • HYDROcodone-acetaminophen (NORCO) 5-325 MG per tablet TAKE 1 TABLET BY MOUTH EVERY 6 HOURS AS NEEDED FOR PAIN. 12 tablet 0   • CRANBERRY PO Take 1 tablet by mouth daily.     • Misc. Devices Kit Lift chair 1 kit 0   • meclizine (ANTIVERT) 25 MG tablet Take 1 tablet by mouth 3 times daily as needed for Dizziness. 30 tablet 0   • Ascorbic Acid (vitamin C) 500 MG tablet Take 500 mg by mouth daily.     • chlorthalidone (THALITONE) 25 MG tablet Take 1 tablet by mouth daily as needed (edema). 90 tablet 1   • Cholecalciferol (VITAMIN D3) 125 mcg (5,000 units) capsule Take 1 capsule by mouth daily. 100 capsule 11   • Thiamine HCl (VITAMIN B-1) 100 MG tablet Take 100 mg by mouth daily. Summer months     • CALCIUM CARBONATE-VITAMIN D PO Take 1 tablet by mouth daily.     • cyanocobalamin (VITAMIN B-12) 100 MCG tablet Take 100 mcg by mouth daily.     • ZINC SULFATE PO Take 1 tablet by mouth daily.      • Diphenhydramine-APAP, sleep, (TYLENOL PM EXTRA STRENGTH PO) Take 2 tablets by mouth at bedtime. As needed     • Omega-3 Fatty Acids (FISH OIL) 1000 MG capsule 1000mg bid with meals. 1 capsule 0   • Multivitamins OR TABS Take 1 tablet by  Throw used tissues in a lined trash can. Immediately wash your hands with soap and water for at least 20 seconds or, if soap and water are not available, clean your hands with an alcohol-based hand  that contains at least 60% alcohol.  Clean your hands often  Wash your hands often with soap and water for at least 20 seconds, especially after blowing your nose, coughing, or sneezing; going to the bathroom; and before eating or preparing food. If soap and water are not readily available, use an alcohol-based hand  with at least 60% alcohol, covering all surfaces of your hands and rubbing them together until they feel dry.  Soap and water are the best option if hands are visibly dirty. Avoid touching your eyes, nose, and mouth with unwashed hands.  Avoid sharing personal household items  You should not share dishes, drinking glasses, cups, eating utensils, towels, or bedding with other people or pets in your home. After using these items, they should be washed thoroughly with soap and water.  Clean all “high-touch” surfaces everyday  High touch surfaces include counters, tabletops, doorknobs, bathroom fixtures, toilets, phones, keyboards, tablets, and bedside tables. Also, clean any surfaces that may have blood, stool, or body fluids on them. Use a household cleaning spray or wipe, according to the label instructions. Labels contain instructions for safe and effective use of the cleaning product including precautions you should take when applying the product, such as wearing gloves and making sure you have good ventilation during use of the product.  Monitor your symptoms  Seek prompt medical attention if your illness is worsening (e.g., difficulty breathing). Before seeking care, call your healthcare provider and tell them that you have, or are being evaluated for, unconfirmed COVID-19 or another infectious respiratory illness. Put on a facemask before you enter the facility. These steps will help  mouth daily. 30 0   • Glucosamine Chondr 500 Complex OR CAPS Take 2 tablets by mouth daily. 30 0   • ASPIRIN 81 MG PO TABS 1 TABLET DAILY 30 0     No current facility-administered medications for this visit.       MEDICAL HISTORY  Past Medical History:   Diagnosis Date   • Agatston coronary artery calcium score less than 100 09/07/2022    CT Calcium score 14 (9/7/2022)   • Anemia 09/2020    Iron, ferritin and folate levels normal 10/2020, vitamin B12 level normal in 09/2020, saw matias Contreras to continue Aubagio   • Anxiety 08/23/2012   • BENIGN NEOPLASM CHOROID 08/28/2009   • Bigeminal rhythm 05/14/2020 5/13/2020 Chest Pain  ER visit - diagnosed with Bigeminal rhythm - service to cardiology was given    • Bilateral leg edema 09/13/2014   • Choroidal nevus of left eye 08/28/2009   • Chronic diastolic heart failure (CMD) 2/28/2023    Stage 1 (mild)   • Closed fracture of greater tuberosity of left humerus 07/06/2017    ER 7/6/2017   • Combined forms of age-related cataract of both eyes 07/09/2018    s/p cataract removal and IOL implants bilaterally 2022   • Constipation 08/23/2012   • Depression 08/23/2012   • Dislocation of left shoulder joint 07/12/2017   • Essential hypertension 08/12/2021   • Frequent PVCs 05/2020   • GERD (gastroesophageal reflux disease) 08/23/2012   • Headaches (chronic)    • Heat sensitivity 10/03/2020   • Hiatal hernia 09/07/2022    Seen on CT 9/2022   • Hyperlipidemia LDL goal <100 11/14/2015   • Insomnia 08/23/2012   • Insulin resistance 12/15/2021   • Knee joint replacement - L, 9/17/12 09/17/2012   • Lymphopenia 02/2016    from Tecfidera   • Migraine headache 08/23/2012   • Mild anemia 06/26/2015    resolved 2019   • Morbid obesity (CMD) 09/17/2012   • Multiple sclerosis (CMD)     started Aubagio 05/2016   • Neurogenic bladder 08/23/2012    Dr Sanderson, started Botox 2022   • Neutropenia (CMD) 03/30/2021   • Optic neuritis, unspecified 02/29/2008    left eye   • Osteoarthritis of right  the healthcare provider’s office to keep other people in the office or waiting room from getting infected or exposed. Ask your healthcare provider to call the local or state health department. Persons who are placed under active monitoring or facilitated self-monitoring should follow instructions provided by their local health department or occupational health professionals, as appropriate. When working with your local health department check their available hours.  If you have a medical emergency and need to call 911, notify the dispatch personnel that you have, or are being evaluated for unconfirmed COVID-19 or another infectious respiratory illness. If possible, put on a facemask before emergency medical services arrive.  Discontinuing home isolation  Patients with unconfirmed COVID-19 or other infectious respiratory illnesses should remain under home isolation precautions until the risk of secondary transmission to others is thought to be low. In general that means 72 hours after fever resolves without the use of fever reducing medications, AND symptoms have improved AND at least 7 days since symptoms first appeared. If you have questions or concerns consult your healthcare providers or your local health department.  Per CDC guidelines, you are not required to provide a healthcare provider’s note to validate your illness or to return to work, as healthcare provider offices and medical facilities may be extremely busy and not able to provide such documentation in a timely way.       knee 01/31/2022    Kenalog injection 1/26/2022   • OSTEOARTHROS NOS-L/LEG-L knee, severe PF 09/17/2008    s/p left knee replacement 2012   • Sinusitis (recurrent)    • Urinary incontinence    • Urinary tract infection 09/16/2022    taking omnicef antibiotic for this.        SURGICAL HISTORY  Past Surgical History:   Procedure Laterality Date   • --------------eye-------------      millicent eye surgery with laser to reduce eye pressure   • Cardiac stress test complete  12/2017   • Cystoscopy  01/18/2023    Botox 100 units   • Cystoscopy  05/31/2022    Botox 100 units   • Echo heart resting  11/2017   • Extracapsular cataract removal w insert io lens prosth wo ecp Right 09/2022   • Extracapsular cataract removal w insert io lens prosth wo ecp Left 10/2022   • Iridotomy/iridectomy by laser Bilateral 2017   • Orif humerus fracture Left 2017   • Remove tonsils/adenoids,<15 y/o      T & A, age 15   • Total knee replacement Left 09/17/2012        SOCIAL HISTORY  Social History     Tobacco Use   • Smoking status: Never   • Smokeless tobacco: Never   Vaping Use   • Vaping Use: never used   Substance Use Topics   • Alcohol use: Not Currently     Alcohol/week: 0.0 - 1.0 standard drinks     Comment: occasional-not weekly   • Drug use: Never     Comment: denies        FAMILY HISTORY  Family History   Problem Relation Age of Onset   • Cancer Mother    • Diabetes Mother    • Other Father         CHF   • Cancer Sister         breast cancer   • Hypertension Sister         CHF and Afib    • * Sister         Fibromyalgia   • COPD Brother    • Heart Sister         AFIB and CHF   • COPD Sister         Review of Systems:   GENERAL: Negative for: fever and chills  NEURO: Negative for: {ROS - NEURO:585658::\"lightheadedness\",\"dizziness \"}  HEENT: Negative for: {ROS HEENT:178928::\"blurred vision\",\"epistaxis\"}  PULMONARY: Negative for: {ROS PULM:694612::\"shortness of breath\",\"wheezing\",\"cough\",\"sputum production\",\"hemoptysis \"}  CV: Negative for:  {ROS - CV:355119::\"syncope\",\"angina\",\"palpitations\",\"claudication\"}  GI: Negative for: {ROS - GI:829110::\"hematemesis \",\"melena\"}  : Negative for: {ROS - :607998::\"dysuria\",\"frequency\",\"hematuria\"}  MS: Negative for: {ROS - MUSCULOSKELETAL:904454::\"muscle weakness\",\"joint stiffness\",\"swelling \",\"myalgias\"}  DERMATOLOGY: Negative for:  {ROS - SKIN:440414::\"rashes\",\"sores\",\"lumps\",\"lesions\",\"color changes\"}  ENDOCRINE: Negative for:  {ROS - ENDO:107161::\"heat-cold intolerance \",\"excessive sweating \",\"polyuria \",\"polydipsia\",\"polyphagia\"}  HEME/LYMPH: Negative for: {ROS - HEME:111947::\"anemia\",\"easy bruising\",\"bleeding \",\"transfusions\",\"lymphadenopathy \"}    VITALS  There were no vitals taken for this visit.     Physical Exam:  General: {GENERAL:206620::\"comfortable\",\"in no acute distress\"}  HEENT:   {PE HEENT:206622::\"no pallor, jaundice\"}  Neck:  {PE NECK:206623::\"supple\",\"no carotid bruits\",\"no JVD\"}  Lungs:  {PE LUNGS:139046::\"clear to auscultation\",\"good air entry\",\"no rales or wheezes\",\"no rhonchi\"}  Cardiovascular:   {PE CARDIO:206626::\"regular rate and rhythm\",\"no S1 and S2 normal\",\"no S3 or S4\"}  Abdomen:   {PE ABD:206627::\"soft, non-tender, nondistended\"}  Extremities:   {EXTREMITIES NEGATIVES LIST:206632::\"no edema\",\"no clubbing\"}  Neuro:  {PE NEURO:190352::\"awake, alert, oriented X3\"}  Skin:   {SKIN EXAM:206634::\"color, texture, turgor are normal\",\"no bruises or rashes\"}     Lab Results   Component Value Date    SODIUM 139 03/27/2023    POTASSIUM 3.9 03/27/2023    BUN 29 (H) 03/27/2023    CREATININE 0.90 03/27/2023    WBC 4.3 03/27/2023    HCT 29.4 (L) 03/27/2023    HGB 9.5 (L) 03/27/2023     03/27/2023    INR 0.9 07/31/2017    RAPDTR <0.10 03/27/2023    PTT 33 (H) 08/20/2012    GLUCOSE 162 (H) 03/27/2023    TSH 1.534 02/28/2023    NTPROB 151 (H) 06/25/2020    CHOLESTEROL 234 (H) 02/28/2023    HDL 79 02/28/2023    CALCLDL 90 02/28/2023    TRIGLYCERIDE 327 (H) 02/28/2023    MG 2.3 02/28/2023     GFRA 90 09/05/2019    GFRNA 78 09/05/2019    AST 20 03/27/2023    GPT 21 03/27/2023    ALKPT 84 03/27/2023    BILIRUBIN 0.5 03/27/2023       Lab Results   Component Value Date    NTPROB 151 (H) 06/25/2020        DIAGNOSTICS:  04/13/2023 Holter Monitor  ***    04/13/2023 Echocardiogram  ***    03/27/2023 12-Lead EKG  Normal sinus rhythm   HR 86   No ST elevation or ectopy noted   Brit Conner PA-C   Working under the direct supervision of Dr. Valentín Turner    09/06/2022 CT Cardiac Calcium  FINDINGS:  Agatston Coronary Calcium Score  LMA: 0  LAD: 14  LCX: 0  RCA: 0  Total: 14  Percentile: 57%  Aortic valve score: 156  Great Vessels  Ascending Aorta: 38 mm  Descending Aorta: 25 mm  Main Pulmonary Artery: 27 mm  Extra-coronary Calcification: Mild thoracic aortic calcification.  Heart/Pericardium: Normal.  Other Findings: Moderate-sized title hernia.  : No additional finding.    06/01/2020 NM Stress Test  IMPRESSION   Nonischemic response electrocardiographically   Regadenoson related symptoms that resolve promptly in recovery   Frequent PVCs   Myocardial perfusion scan pending.   KEILA Glynn   NM findings:  1. Normal myocardial perfusion scans following a regadenoson injection and at rest.  2. Normal post-regadenoson LV systolic function.  3. Cardiac Risk Assessment: Low.    05/19/2020 Holter Monitor  Interpretation   1. The predominant rhythm during recording is sinus with average heart rate 82 beats per minute.   Minimum heart rate recorded is 57 beats per minute, sinus bradycardia at 7:49 a.m.   Maximum heart rate is 118 beats per minute, sinus tachycardia at 7:32 p.m.   Frequent isolated premature ventricular complexes are recorded during the study, total number:  38,335  (16.2%)     There are frequent premature atrial complexes seen, total number:  321  (0.1%)   A 7 beat run of narrow complex tachycardia-atrial tachycardia occurred at 8:04 p.m. with heart rate 115 beats per minute   Normal  atrioventricular and intraventricular conduction is present   There is no significant sinus pause   Symptoms of chest pain and palpitation are reported during sinus rhythm with PVCs.   No significant ST segment changes are noted   The overall quality of study is fair      Assessment:  Lightheadedness  Syncope and collapse  Chronic Diastolic Heart Failure  • Echo (04/13/2023) - EF ***%, ***  Hyperlipidemia  • Last Lipid Panel (02/28/2023) - LDL 90  • On Atorvastatin (Lipitor)   Hypertension    Plan:  ***  Follow up in cardiology clinic in *** or sooner if symptomatic      ***    {Card Provider Signature:172944}  04/10/23    We would like to thank SOPHIA Winters for involving us in the care of Normatu Beavers.

## 2023-04-21 ENCOUNTER — OFFICE VISIT (OUTPATIENT)
Dept: BEHAVIORAL HEALTH | Facility: CLINIC | Age: 28
End: 2023-04-21
Payer: COMMERCIAL

## 2023-04-21 DIAGNOSIS — F41.1 GENERALIZED ANXIETY DISORDER: ICD-10-CM

## 2023-04-21 DIAGNOSIS — F33.1 MODERATE EPISODE OF RECURRENT MAJOR DEPRESSIVE DISORDER (HCC): ICD-10-CM

## 2023-04-21 PROCEDURE — 90834 PSYTX W PT 45 MINUTES: CPT | Mod: 95 | Performed by: SOCIAL WORKER

## 2023-04-21 ASSESSMENT — ANXIETY QUESTIONNAIRES
GAD7 TOTAL SCORE: 19
5. BEING SO RESTLESS THAT IT IS HARD TO SIT STILL: NEARLY EVERY DAY
3. WORRYING TOO MUCH ABOUT DIFFERENT THINGS: NEARLY EVERY DAY
IF YOU CHECKED OFF ANY PROBLEMS ON THIS QUESTIONNAIRE, HOW DIFFICULT HAVE THESE PROBLEMS MADE IT FOR YOU TO DO YOUR WORK, TAKE CARE OF THINGS AT HOME, OR GET ALONG WITH OTHER PEOPLE: VERY DIFFICULT
1. FEELING NERVOUS, ANXIOUS, OR ON EDGE: NEARLY EVERY DAY
7. FEELING AFRAID AS IF SOMETHING AWFUL MIGHT HAPPEN: SEVERAL DAYS
2. NOT BEING ABLE TO STOP OR CONTROL WORRYING: NEARLY EVERY DAY
6. BECOMING EASILY ANNOYED OR IRRITABLE: NEARLY EVERY DAY
4. TROUBLE RELAXING: NEARLY EVERY DAY

## 2023-04-21 NOTE — PROGRESS NOTES
"[facility]   Therapy Progress Note      This visit was conducted via Zoom using secure and encrypted videoconferencing technology.  The patient was in a private location in the state [facility].  The patient's identity was confirmed and verbal consent was obtained for this virtual visit.  Place of Service: POS 10 -The patient is at Home during their visit           Name: Diana Hill  MRN: [id number]  : 1995  Age: 27 y.o.  Date of assessment: 2023  PCP: DANIELE Resendiz  Persons in attendance: Patient  Total session time: 45 minutes    Objective Observations:   Participation:Active verbal participation   Grooming:Casual and Neat   Cognition:Alert   Eye Contact:Good   Mood:Anxious   Affect:Congruent with content   Thought Process:Goal-directed   Speech:Rate within normal limits and Volume within normal limits    Current Risk:   Suicide: not indicated   Homicide: not indicated   Self-Harm: not indicated   Relapse: not indicated   Safety Plan Reviewed: not applicable    Topics addressed in psychotherapy include: \"I am going to see a specialist in [place] to see if they know any medication that would help with the ADHD. I got fired for my attendance. Have been unemployed for 2 months. Looking for part-time work for now. I am doing projects around the house. I have to read the books in my house and cannot buy anymore. I do the cooking and cleaning now to help. I am doing a work out routine. Relationships are on hold due to heart condition.  The cardiologists did diagnosis with POTs and no one here specializes in it.  All of my tests show fine on my heart, and it more neurological thing, electrical impulses. I will not be covered by my parents anymore when I turn 28. I will apply for Medicaid.  My brother lives in [place] and he is really homesick. His wife wants to move back to Law school and they hope to move back to this area in the next couple of years.    Care Plan Updated:  Yes,  " reviewed  went over mindfulness techniques.    Does patient express agreement with the above plan? Yes     Diagnosis:  1. Generalized anxiety disorder    2. Moderate episode of recurrent major depressive disorder (HCC)        Referral appointment(s) scheduled?  Made prior        Clyde Minaya L.C.S.W.

## 2023-05-11 ENCOUNTER — OFFICE VISIT (OUTPATIENT)
Dept: BEHAVIORAL HEALTH | Facility: CLINIC | Age: 28
End: 2023-05-11
Payer: COMMERCIAL

## 2023-05-11 DIAGNOSIS — F33.1 MODERATE EPISODE OF RECURRENT MAJOR DEPRESSIVE DISORDER (HCC): ICD-10-CM

## 2023-05-11 DIAGNOSIS — F41.1 GENERALIZED ANXIETY DISORDER: ICD-10-CM

## 2023-05-11 DIAGNOSIS — F90.2 ADHD (ATTENTION DEFICIT HYPERACTIVITY DISORDER), COMBINED TYPE: ICD-10-CM

## 2023-05-11 PROCEDURE — 90834 PSYTX W PT 45 MINUTES: CPT | Mod: 95 | Performed by: SOCIAL WORKER

## 2023-05-11 NOTE — PROGRESS NOTES
[facility]   Therapy Progress Note      This visit was conducted via Zoom using secure and encrypted videoconferencing technology.  The patient was in a private location in the state of [place].  The patient's identity was confirmed and verbal consent was obtained for this virtual visit.  Place of Service: POS 10 -The patient is at Home during their visit         Name: Diana Hill  MRN: [id number]  : 1995  Age: 27 y.o.  Date of assessment: 2023  PCP: DANIELE Resendiz  Persons in attendance: Patient  Total session time: 40 minutes    Objective Observations:   Participation:Active verbal participation, Engaged, and Open to feedback   Grooming:Casual and Neat   Cognition:Alert   Eye Contact:Good   Mood:Euthymic   Affect:Congruent with content   Thought Process:Logical and Goal-directed   Speech:Rate within normal limits and Volume within normal limits    Current Risk:   Suicide: not indicated   Homicide: not indicated   Self-Harm: not indicated   Relapse: not indicated   Safety Plan Reviewed: not applicable    Topics addressed in psychotherapy include:I am thinking it is okay to start looking for a job, but my car is broken. WE are looking for parts. I am feeling isolated, not having a car and not able to help as much as she would like. I am trying to eat right, light exercise and the do all the correct things to my body.  Symptoms are worse when not sleeping. Trouble staying asleep. Sleep stores, brown noise  Going to see her brother in July. He called to talk yesterday.    Working on [employer], and doing some work in shed, redecorate my bedroom. Family day yesterday it was dad's birthday and everyone had off. My dog has bowl obstruction but we cannot afford the surgery. So we trying laxatives, lots of water. Offered some help to look for sites for assistance with pet insurance.    Encouraged she apply for insurance as a priority as hers will be ending in August. Also discussed  [facility] mentions he works with patients with POTs maybe discuss with her physician is this is a possibility as a step bridge until she can get in at [facility].  Was clear this writer was not endorsing only heard about it and encouraged she speak with her doctors as this is always a good first step if seeking other types of intervention.  Got sock after our last appointment.   Care Plan Updated: Yes, continue to build in healthy habits, getting outside to lessen isolation feelings.    Does patient express agreement with the above plan? Yes     Diagnosis:  1. Moderate episode of recurrent major depressive disorder (HCC)    2. Generalized anxiety disorder    3. ADHD (attention deficit hyperactivity disorder), combined type        Referral appointment(s) scheduled?  Made prior        Clyde Minaya L.C.S.W.

## 2023-05-18 ENCOUNTER — APPOINTMENT (OUTPATIENT)
Dept: BEHAVIORAL HEALTH | Facility: CLINIC | Age: 28
End: 2023-05-18
Payer: COMMERCIAL

## 2023-06-22 ENCOUNTER — OFFICE VISIT (OUTPATIENT)
Dept: ENDOCRINOLOGY | Facility: MEDICAL CENTER | Age: 28
End: 2023-06-22
Attending: INTERNAL MEDICINE
Payer: COMMERCIAL

## 2023-06-22 VITALS
SYSTOLIC BLOOD PRESSURE: 98 MMHG | BODY MASS INDEX: 38.77 KG/M2 | DIASTOLIC BLOOD PRESSURE: 68 MMHG | WEIGHT: 247 LBS | HEART RATE: 117 BPM | HEIGHT: 67 IN | OXYGEN SATURATION: 99 %

## 2023-06-22 DIAGNOSIS — E28.2 PCOS (POLYCYSTIC OVARIAN SYNDROME): ICD-10-CM

## 2023-06-22 DIAGNOSIS — R73.03 PRE-DIABETES: ICD-10-CM

## 2023-06-22 DIAGNOSIS — E55.9 VITAMIN D DEFICIENCY: ICD-10-CM

## 2023-06-22 DIAGNOSIS — E66.9 OBESITY (BMI 30-39.9): ICD-10-CM

## 2023-06-22 DIAGNOSIS — R79.89 ABNORMAL THYROID BLOOD TEST: ICD-10-CM

## 2023-06-22 LAB
HBA1C MFR BLD: 5.5 % (ref ?–5.8)
POCT INT CON NEG: NEGATIVE
POCT INT CON POS: POSITIVE

## 2023-06-22 PROCEDURE — 99214 OFFICE O/P EST MOD 30 MIN: CPT | Performed by: INTERNAL MEDICINE

## 2023-06-22 PROCEDURE — 3078F DIAST BP <80 MM HG: CPT | Performed by: INTERNAL MEDICINE

## 2023-06-22 PROCEDURE — 83036 HEMOGLOBIN GLYCOSYLATED A1C: CPT | Performed by: INTERNAL MEDICINE

## 2023-06-22 PROCEDURE — 3074F SYST BP LT 130 MM HG: CPT | Performed by: INTERNAL MEDICINE

## 2023-06-22 PROCEDURE — 99211 OFF/OP EST MAY X REQ PHY/QHP: CPT | Performed by: INTERNAL MEDICINE

## 2023-06-22 RX ORDER — DEXAMETHASONE 1 MG
1 TABLET ORAL
Qty: 2 TABLET | Refills: 0 | Status: SHIPPED | OUTPATIENT
Start: 2023-06-22 | End: 2023-06-22

## 2023-06-22 RX ORDER — METFORMIN HYDROCHLORIDE 500 MG/1
2000 TABLET, EXTENDED RELEASE ORAL DAILY
Qty: 360 TABLET | Refills: 1 | Status: SHIPPED | OUTPATIENT
Start: 2023-06-22 | End: 2024-01-04 | Stop reason: SDUPTHER

## 2023-06-22 RX ORDER — PHENTERMINE HYDROCHLORIDE 37.5 MG/1
37.5 CAPSULE ORAL EVERY MORNING
Qty: 30 CAPSULE | Refills: 5 | Status: SHIPPED | OUTPATIENT
Start: 2023-06-22 | End: 2023-07-22

## 2023-06-22 ASSESSMENT — FIBROSIS 4 INDEX: FIB4 SCORE: 0.4

## 2023-06-22 NOTE — PROGRESS NOTES
Chief Complaint: Follow up for history of abnormal thyroid tests, history of PCOS, history of obesity and insulin resistance syndrome    HPI:     Diana Hill is a 27 y.o. female here for follow up of the above medical issue.  Her other comorbid issues include migraines treated with Topamax, depression treated with Zoloft    We initially saw the patient as a telehealth consultation back in November 2020.  She had a normal TSH of 2.2 with a slightly low free T4 of 0.87 with normal total T3 levels on August 31, 2020    She previously complained of fatigue, being easily jolted, having migraines, hair loss and dry skin.   She had seen neurology and had a negative work-up    Repeat serial testing of her thyroid function however has shown no evidence of endogenous thyroid abnormality such as hypothyroidism     Latest Reference Range & Units 08/31/20 21:33 11/24/20 10:49 02/03/22 13:19 06/27/22 12:26 06/27/22 12:30   TSH 0.380 - 5.330 uIU/mL 2.250 2.800 1.350 1.670 1.640   Free T-4 0.93 - 1.70 ng/dL 0.87 (L)  1.10  0.99   Free T4 by [facility] - TMS 1.1 - 2.4 ng/dL   1.3  1.3         We respect her PCOS work-up done showed normal 17 hydroxyprogesterone level and normal prolactin levels.  elevated baseline testosterone levels compatible with PCOS    She remains on Metformin extended release 1000 mg twice a day She is on oral contraceptives as well.    She reports regular menstrual cycles are regular bleeding and denies excessive facial hair or body hair    Her total testosterone was 31 on 2022  A1c today is 5.5% on 6/2/22023      She is obese with BMI of 38  She used to weight 280 before Metformin  She now weights 247 after starting metformin  She reports having tried and failed diet and exercise for 6 months but has done well after starting metformin.  Today we talked about additional therapies for weight management because she has reached a plateau with her weight loss with metformin      Patient's medications,  allergies, and social histories were reviewed and updated as appropriate.      ROS:     CONS:     No fever, no chills   EYES:     No diplopia, no blurry vision   CV:           No chest pain, no palpitations   PULM:     No SOB, no cough, no hemoptysis.   GI:            No nausea, no vomiting, no diarrhea, no constipation   ENDO:     No polyuria, no polydipsia, no heat intolerance, no cold intolerance       Past Medical History:  Problem List:  2023-02: ADHD (attention deficit hyperactivity disorder), combined   type  2023-02: POTS (postural orthostatic tachycardia syndrome)  2023-02: Sore throat  2023-02: Chronic right flank pain  2022-09: BMI 38.0-38.9,adult  2022-09: Orthostasis  2022-09: Near syncope  2022-07: Heart palpitations  2022-06: Breast lump in female  2022-04: Bilateral flank pain  2021-11: Generalized anxiety disorder  2021-11: Moderate episode of recurrent major depressive disorder (HCC)  2021-10: Plantar wart, left foot  2021-10: Medication management  2021-08: Chronic fatigue syndrome  2021-08: Intractable migraine with aura without status migrainosus  2021-08: Obstructive sleep apnea syndrome  2021-08: Vestibular migraine  2021-04: Otalgia of left ear  2021-04: History of canker sores  2021-04: Dysthymia  2021-03: Polyarthralgia  2021-03: Brittle nails  2020-12: Cervical lymphadenopathy  2020-11: Subclinical hypothyroidism  2020-11: Insulin resistance  2020-11: Anemia  2020-10: Morbid obesity (HCC)  2020-09: Skin rash  2020-09: Administrative encounter  2020-09: Hospital discharge follow-up  2020-08: Rash of hands  2020-08: Snoring  2020-03: Vertigo  2019-11: PCOS (polycystic ovarian syndrome)  2019-11: Vitamin D deficiency  2019-11: High triglycerides  2019-11: Anxiety and depression  2018-09: Biliary colic      Past Surgical History:  Past Surgical History:   Procedure Laterality Date    MATTHIAS BY LAPAROSCOPY N/A 9/13/2018    Procedure: MATTHIAS BY LAPAROSCOPY;  Surgeon: Hayden Wilhelm M.D.;   "Location: SURGERY [facility];  Service: General        Allergies:  Gluten meal     Social History:  Social History     Tobacco Use    Smoking status: Never     Passive exposure: Current (past and Currently)    Smokeless tobacco: Never   Vaping Use    Vaping Use: Never used   Substance Use Topics    Alcohol use: Yes     Comment: occasionally    Drug use: No        Family History:   family history includes Alcohol abuse in her father and paternal grandfather; Cancer in her paternal grandmother; Depression in her father; Heart Attack (age of onset: 66) in her maternal grandfather; Lung Disease in her paternal grandfather; No Known Problems in her maternal grandmother; Sleep Apnea in her brother; Stroke (age of onset: 66) in her paternal grandmother; Thyroid in her mother.      PHYSICAL EXAM:   Vital signs: BP 98/68 (BP Location: Right arm, Patient Position: Sitting, BP Cuff Size: Large adult)   Pulse (!) 117   Ht 1.702 m (5' 7\")   Wt 112 kg (247 lb)   SpO2 99%   BMI 38.69 kg/m²   GENERAL: Obese female in no apparent distress.   EYE:  No ocular asymmetry, PERRLA  HENT: Pink, moist mucous membranes.    NECK: No thyromegaly.   CARDIOVASCULAR:  No murmurs  LUNGS: Clear breath sounds  ABDOMEN: Soft, nontender   EXTREMITIES: No clubbing, cyanosis, or edema.   NEUROLOGICAL: No gross focal motor abnormalities   LYMPH: No cervical adenopathy palpated.   SKIN: No rashes, lesions.       Labs:  Lab Results   Component Value Date/Time    SODIUM 136 09/02/2022 04:57 AM    POTASSIUM 3.6 09/02/2022 04:57 AM    CHLORIDE 108 09/02/2022 04:57 AM    CO2 19 (L) 09/02/2022 04:57 AM    ANION 9.0 09/02/2022 04:57 AM    GLUCOSE 89 09/02/2022 04:57 AM    BUN 11 09/02/2022 04:57 AM    CREATININE 0.64 09/02/2022 04:57 AM    CALCIUM 8.6 09/02/2022 04:57 AM    ASTSGOT 13 09/01/2022 08:26 PM    ALTSGPT 13 09/01/2022 08:26 PM    TBILIRUBIN 0.2 09/01/2022 08:26 PM    ALBUMIN 3.8 09/01/2022 08:26 PM    TOTPROTEIN 6.7 09/01/2022 08:26 PM    " GLOBULIN 2.9 09/01/2022 08:26 PM    AGRATIO 1.3 09/01/2022 08:26 PM       Lab Results   Component Value Date/Time    SODIUM 137 03/26/2021 0959    POTASSIUM 4.2 03/26/2021 0959    CHLORIDE 102 03/26/2021 0959    CO2 23 03/26/2021 0959    GLUCOSE 89 03/26/2021 0959    BUN 12 03/26/2021 0959    CREATININE 0.58 03/26/2021 0959    CALCIUM 9.2 03/26/2021 0959    ANION 12.0 03/26/2021 0959       Lab Results   Component Value Date/Time    CHOLSTRLTOT 185 11/04/2019 0850    TRIGLYCERIDE 161 (H) 11/04/2019 0850    HDL 67 11/04/2019 0850    LDL 86 11/04/2019 0850       Lab Results   Component Value Date/Time    TSHULTRASEN 2.800 11/24/2020 1049     Lab Results   Component Value Date/Time    FREET4 0.87 (L) 08/31/2020 2133     Lab Results   Component Value Date/Time    FREET3 3.16 08/28/2018 1120     No results found for: THYSTIMIG    Lab Results   Component Value Date/Time    MICROSOMALA <9.0 09/15/2020 1126         Imaging:      ASSESSMENT/PLAN:     1. PCOS (polycystic ovarian syndrome)  Stable  Continue metformin oral contraceptives we will check her testosterone levels with her next labs    2. Pre-diabetes  Controlled A1c is 5.5% continue metformin    3. Vitamin D deficiency  Stable   Vitamin D labs were reviewed with patient  Continue current supplements  Continue monitoring levels       4. Obesity (BMI 30-39.9)  Stable   I want her to complete a midnight saliva cortisol test x3 to screen for abnormal cortisol secretion or Cushing's disease as a secondary cause of her obesity  Continue metformin  Start phentermine  We discussed any anticipated side effects as well as the risks and benefits of this medication. Patient demonstrated understanding of appropriate medication use and  it's indication.      5. Abnormal thyroid blood test  Resolved this is not an issue she is clinically and biochemically euthyroid      Return in about 6 months (around 12/22/2023).      Thank you kindly for allowing me to participate in the thyroid  care plan for this patient.    Wilman Crow MD, FACE, [facility]      CC:   MARKY Resendiz.

## 2023-06-25 ENCOUNTER — HOSPITAL ENCOUNTER (OUTPATIENT)
Facility: MEDICAL CENTER | Age: 28
End: 2023-06-25
Attending: INTERNAL MEDICINE
Payer: COMMERCIAL

## 2023-06-25 PROCEDURE — 82533 TOTAL CORTISOL: CPT

## 2023-06-27 ENCOUNTER — HOSPITAL ENCOUNTER (OUTPATIENT)
Facility: MEDICAL CENTER | Age: 28
End: 2023-06-27
Attending: INTERNAL MEDICINE
Payer: COMMERCIAL

## 2023-06-27 PROCEDURE — 82533 TOTAL CORTISOL: CPT | Mod: 91

## 2023-06-27 PROCEDURE — 82533 TOTAL CORTISOL: CPT

## 2023-07-02 LAB
CORTIS SAL-MCNC: 0.04 UG/DL
CORTIS SAL-MCNC: 0.07 UG/DL
CORTIS SAL-MCNC: 0.07 UG/DL

## 2023-07-11 ENCOUNTER — APPOINTMENT (OUTPATIENT)
Dept: ADMISSIONS | Facility: MEDICAL CENTER | Age: 28
End: 2023-07-11
Attending: OTOLARYNGOLOGY
Payer: COMMERCIAL

## 2023-07-31 ENCOUNTER — PRE-ADMISSION TESTING (OUTPATIENT)
Dept: ADMISSIONS | Facility: MEDICAL CENTER | Age: 28
End: 2023-07-31
Attending: OTOLARYNGOLOGY
Payer: COMMERCIAL

## 2023-07-31 RX ORDER — SERTRALINE HYDROCHLORIDE 100 MG/1
200 TABLET, FILM COATED ORAL DAILY
COMMUNITY
End: 2023-09-14

## 2023-08-12 ENCOUNTER — OFFICE VISIT (OUTPATIENT)
Dept: URGENT CARE | Facility: CLINIC | Age: 28
End: 2023-08-12
Payer: COMMERCIAL

## 2023-08-12 VITALS
RESPIRATION RATE: 16 BRPM | WEIGHT: 247.5 LBS | DIASTOLIC BLOOD PRESSURE: 74 MMHG | HEART RATE: 95 BPM | SYSTOLIC BLOOD PRESSURE: 108 MMHG | BODY MASS INDEX: 38.84 KG/M2 | TEMPERATURE: 98.4 F | OXYGEN SATURATION: 97 % | HEIGHT: 67 IN

## 2023-08-12 DIAGNOSIS — H60.392 OTHER INFECTIVE ACUTE OTITIS EXTERNA OF LEFT EAR: ICD-10-CM

## 2023-08-12 PROCEDURE — 3078F DIAST BP <80 MM HG: CPT | Performed by: NURSE PRACTITIONER

## 2023-08-12 PROCEDURE — 3074F SYST BP LT 130 MM HG: CPT | Performed by: NURSE PRACTITIONER

## 2023-08-12 PROCEDURE — 99213 OFFICE O/P EST LOW 20 MIN: CPT | Performed by: NURSE PRACTITIONER

## 2023-08-12 RX ORDER — AMOXICILLIN AND CLAVULANATE POTASSIUM 875; 125 MG/1; MG/1
1 TABLET, FILM COATED ORAL 2 TIMES DAILY
Qty: 14 TABLET | Refills: 0 | Status: SHIPPED | OUTPATIENT
Start: 2023-08-12 | End: 2023-08-19

## 2023-08-12 ASSESSMENT — ENCOUNTER SYMPTOMS: FEVER: 0

## 2023-08-12 ASSESSMENT — FIBROSIS 4 INDEX: FIB4 SCORE: 0.41

## 2023-08-12 NOTE — PATIENT INSTRUCTIONS
-Take antibiotic as directed.  -Oral hydration.  -Ibuprofen or tylenol as directed for pain and/or fevers.   -Follow up with primary care provider.    Follow up for failure to improve after 48 to 72 hours of antibiotic therapy, worsening symptoms, persistent fevers, persistent ear drainage, persistent or increased pain, lethargy, severe headache or stiff neck, persistent vomiting, or any other concerns.

## 2023-08-12 NOTE — PROGRESS NOTES
Subjective:     Diana Hill is a 28 y.o. female who presents for Otalgia (Pt states has a sore in L ear, infection concern x 7/23/23)      States it started with feeling pain in her left ear with sleeping with ear buds in, has a red bump. Has been oozing. Reports increased swelling and pain to anterior ear on wednesday. Muffled hearing. Had been applying an antibiotic ointment.     Otalgia   There is pain in the left ear. The current episode started 1 to 4 weeks ago. The problem has been gradually worsening. The pain is at a severity of 2/10 (Increased pain to touch.). Associated symptoms include ear discharge.       Past Medical History:   Diagnosis Date    Anemia     hx of borderline anemia    Anxiety     Asthma     Bowel habit changes 07/31/2023    IBS    Breath shortness     Cataract     Celiac disease     Daytime sleepiness     Dental disorder 07/31/2023    Upper flipper    Depression     Insomnia     Insulin resistance     Migraine     Morning headache     PCOS (polycystic ovarian syndrome)     POTS (postural orthostatic tachycardia syndrome)     Sleep apnea     Snoring     Vertigo     Vitamin D deficiency        Past Surgical History:   Procedure Laterality Date    MATTHIAS BY LAPAROSCOPY N/A 9/13/2018    Procedure: MATTHIAS BY LAPAROSCOPY;  Surgeon: Hayden Wilhelm M.D.;  Location: SURGERY [facility];  Service: General       Social History     Socioeconomic History    Marital status: Single     Spouse name: Not on file    Number of children: Not on file    Years of education: Not on file    Highest education level: Not on file   Occupational History    Occupation: [employer]     Comment: CNA ; Nursing student   Tobacco Use    Smoking status: Never     Passive exposure: Current (past and Currently)    Smokeless tobacco: Never   Vaping Use    Vaping Use: Never used   Substance and Sexual Activity    Alcohol use: Yes     Comment: once/year    Drug use: No    Sexual activity: Never     Partners:  "Male, Female     Birth control/protection: Pill   Other Topics Concern    Not on file   Social History Narrative    Not on file     Social Determinants of Health     Financial Resource Strain: Not on file   Food Insecurity: Not on file   Transportation Needs: Not on file   Physical Activity: Not on file   Stress: Stress Concern Present (11/19/2019)    [employer] - Occupational Stress Questionnaire     Feeling of Stress : To some extent   Social Connections: Not on file   Intimate Partner Violence: Not on file   Housing Stability: Not on file        Family History   Problem Relation Age of Onset    Thyroid Mother     Depression Father     Alcohol abuse Father     No Known Problems Maternal Grandmother     Heart Attack Maternal Grandfather 66    Cancer Paternal Grandmother         Breast , esophageal    Stroke Paternal Grandmother 66    Alcohol abuse Paternal Grandfather     Lung Disease Paternal Grandfather     Sleep Apnea Brother         Allergies   Allergen Reactions    Gluten Meal Unspecified     Stomach problems        Review of Systems   Constitutional:  Negative for fever.   HENT:  Positive for ear discharge and ear pain.    All other systems reviewed and are negative.       Objective:   /74 (BP Location: Right arm, Patient Position: Sitting, BP Cuff Size: Large adult)   Pulse 95   Temp 36.9 °C (98.4 °F) (Temporal)   Resp 16   Ht 1.702 m (5' 7\")   Wt 112 kg (247 lb 8 oz)   LMP 07/26/2023 (Exact Date)   SpO2 97%   BMI 38.76 kg/m²     Physical Exam  Constitutional:       Appearance: She is not toxic-appearing.   HENT:      Left Ear: Drainage, swelling and tenderness present.  No middle ear effusion. No mastoid tenderness. Tympanic membrane is not perforated or erythematous.   Pulmonary:      Effort: Pulmonary effort is normal.   Musculoskeletal:      Cervical back: Neck supple.   Skin:     General: Skin is dry.   Neurological:      Mental Status: She is alert and " oriented to person, place, and time.   Psychiatric:         Mood and Affect: Mood normal.         Behavior: Behavior normal.         Assessment/Plan:   1. Other infective acute otitis externa of left ear  - amoxicillin-clavulanate (AUGMENTIN) 875-125 MG Tab; Take 1 Tablet by mouth 2 times a day for 7 days.  Dispense: 14 Tablet; Refill: 0    -Take antibiotic as directed.  -Oral hydration.  -Ibuprofen or tylenol as directed for pain and/or fevers.   -Follow up with primary care provider.    Follow up for failure to improve after 48 to 72 hours of antibiotic therapy, worsening symptoms, persistent fevers, persistent ear drainage, persistent or increased pain, lethargy, severe headache or stiff neck, persistent vomiting, or any other concerns.    -Stable vitals.     Differential diagnosis, natural history, supportive care, and indications for immediate follow-up discussed.

## 2023-08-15 ENCOUNTER — ANESTHESIA EVENT (OUTPATIENT)
Dept: SURGERY | Facility: MEDICAL CENTER | Age: 28
End: 2023-08-15
Payer: COMMERCIAL

## 2023-08-15 ENCOUNTER — HOSPITAL ENCOUNTER (OUTPATIENT)
Facility: MEDICAL CENTER | Age: 28
End: 2023-08-15
Attending: OTOLARYNGOLOGY | Admitting: OTOLARYNGOLOGY
Payer: COMMERCIAL

## 2023-08-15 ENCOUNTER — ANESTHESIA (OUTPATIENT)
Dept: SURGERY | Facility: MEDICAL CENTER | Age: 28
End: 2023-08-15
Payer: COMMERCIAL

## 2023-08-15 VITALS
SYSTOLIC BLOOD PRESSURE: 121 MMHG | WEIGHT: 244.49 LBS | BODY MASS INDEX: 38.37 KG/M2 | OXYGEN SATURATION: 95 % | RESPIRATION RATE: 18 BRPM | DIASTOLIC BLOOD PRESSURE: 70 MMHG | TEMPERATURE: 98.5 F | HEIGHT: 67 IN | HEART RATE: 79 BPM

## 2023-08-15 DIAGNOSIS — H92.02 OTALGIA OF LEFT EAR: ICD-10-CM

## 2023-08-15 DIAGNOSIS — J35.01 CHRONIC TONSILLITIS: ICD-10-CM

## 2023-08-15 LAB
GLUCOSE BLD STRIP.AUTO-MCNC: 91 MG/DL (ref 65–99)
HCG UR QL: NEGATIVE
PATHOLOGY CONSULT NOTE: NORMAL

## 2023-08-15 PROCEDURE — 700105 HCHG RX REV CODE 258: Mod: JZ | Performed by: OTOLARYNGOLOGY

## 2023-08-15 PROCEDURE — 82962 GLUCOSE BLOOD TEST: CPT

## 2023-08-15 PROCEDURE — 700111 HCHG RX REV CODE 636 W/ 250 OVERRIDE (IP): Performed by: ANESTHESIOLOGY

## 2023-08-15 PROCEDURE — 160035 HCHG PACU - 1ST 60 MINS PHASE I: Performed by: OTOLARYNGOLOGY

## 2023-08-15 PROCEDURE — 160048 HCHG OR STATISTICAL LEVEL 1-5: Performed by: OTOLARYNGOLOGY

## 2023-08-15 PROCEDURE — 700102 HCHG RX REV CODE 250 W/ 637 OVERRIDE(OP): Performed by: OTOLARYNGOLOGY

## 2023-08-15 PROCEDURE — 160009 HCHG ANES TIME/MIN: Performed by: OTOLARYNGOLOGY

## 2023-08-15 PROCEDURE — 700111 HCHG RX REV CODE 636 W/ 250 OVERRIDE (IP): Mod: JZ | Performed by: ANESTHESIOLOGY

## 2023-08-15 PROCEDURE — 160046 HCHG PACU - 1ST 60 MINS PHASE II: Performed by: OTOLARYNGOLOGY

## 2023-08-15 PROCEDURE — 700101 HCHG RX REV CODE 250: Performed by: ANESTHESIOLOGY

## 2023-08-15 PROCEDURE — 700102 HCHG RX REV CODE 250 W/ 637 OVERRIDE(OP): Performed by: ANESTHESIOLOGY

## 2023-08-15 PROCEDURE — 81025 URINE PREGNANCY TEST: CPT

## 2023-08-15 PROCEDURE — 160025 RECOVERY II MINUTES (STATS): Performed by: OTOLARYNGOLOGY

## 2023-08-15 PROCEDURE — 160002 HCHG RECOVERY MINUTES (STAT): Performed by: OTOLARYNGOLOGY

## 2023-08-15 PROCEDURE — 160038 HCHG SURGERY MINUTES - EA ADDL 1 MIN LEVEL 2: Performed by: OTOLARYNGOLOGY

## 2023-08-15 PROCEDURE — 160027 HCHG SURGERY MINUTES - 1ST 30 MINS LEVEL 2: Performed by: OTOLARYNGOLOGY

## 2023-08-15 PROCEDURE — A9270 NON-COVERED ITEM OR SERVICE: HCPCS | Performed by: ANESTHESIOLOGY

## 2023-08-15 PROCEDURE — 88304 TISSUE EXAM BY PATHOLOGIST: CPT

## 2023-08-15 PROCEDURE — A9270 NON-COVERED ITEM OR SERVICE: HCPCS | Performed by: OTOLARYNGOLOGY

## 2023-08-15 RX ORDER — LIDOCAINE HYDROCHLORIDE 40 MG/ML
SOLUTION TOPICAL PRN
Status: DISCONTINUED | OUTPATIENT
Start: 2023-08-15 | End: 2023-08-15 | Stop reason: SURG

## 2023-08-15 RX ORDER — SODIUM CHLORIDE, SODIUM LACTATE, POTASSIUM CHLORIDE, CALCIUM CHLORIDE 600; 310; 30; 20 MG/100ML; MG/100ML; MG/100ML; MG/100ML
INJECTION, SOLUTION INTRAVENOUS CONTINUOUS
Status: DISCONTINUED | OUTPATIENT
Start: 2023-08-15 | End: 2023-08-15 | Stop reason: HOSPADM

## 2023-08-15 RX ORDER — OFLOXACIN 3 MG/ML
5 SOLUTION AURICULAR (OTIC) 2 TIMES DAILY
Qty: 10 ML | Refills: 0 | Status: SHIPPED | OUTPATIENT
Start: 2023-08-15 | End: 2024-01-04

## 2023-08-15 RX ORDER — ONDANSETRON 2 MG/ML
INJECTION INTRAMUSCULAR; INTRAVENOUS PRN
Status: DISCONTINUED | OUTPATIENT
Start: 2023-08-15 | End: 2023-08-15 | Stop reason: SURG

## 2023-08-15 RX ORDER — SENNOSIDES 8.6 MG
1 TABLET ORAL 2 TIMES DAILY PRN
Qty: 28 TABLET | Refills: 1 | Status: SHIPPED | OUTPATIENT
Start: 2023-08-15 | End: 2023-08-29

## 2023-08-15 RX ORDER — LIDOCAINE HYDROCHLORIDE 20 MG/ML
INJECTION, SOLUTION EPIDURAL; INFILTRATION; INTRACAUDAL; PERINEURAL PRN
Status: DISCONTINUED | OUTPATIENT
Start: 2023-08-15 | End: 2023-08-15 | Stop reason: SURG

## 2023-08-15 RX ORDER — HYDROMORPHONE HYDROCHLORIDE 1 MG/ML
0.1 INJECTION, SOLUTION INTRAMUSCULAR; INTRAVENOUS; SUBCUTANEOUS
Status: DISCONTINUED | OUTPATIENT
Start: 2023-08-15 | End: 2023-08-15 | Stop reason: HOSPADM

## 2023-08-15 RX ORDER — MEPERIDINE HYDROCHLORIDE 25 MG/ML
6.25 INJECTION INTRAMUSCULAR; INTRAVENOUS; SUBCUTANEOUS
Status: DISCONTINUED | OUTPATIENT
Start: 2023-08-15 | End: 2023-08-15 | Stop reason: HOSPADM

## 2023-08-15 RX ORDER — ONDANSETRON 4 MG/1
4 TABLET, ORALLY DISINTEGRATING ORAL EVERY 6 HOURS PRN
Qty: 20 TABLET | Refills: 3 | Status: SHIPPED | OUTPATIENT
Start: 2023-08-15

## 2023-08-15 RX ORDER — HYDROMORPHONE HYDROCHLORIDE 1 MG/ML
0.2 INJECTION, SOLUTION INTRAMUSCULAR; INTRAVENOUS; SUBCUTANEOUS
Status: DISCONTINUED | OUTPATIENT
Start: 2023-08-15 | End: 2023-08-15 | Stop reason: HOSPADM

## 2023-08-15 RX ORDER — OXYCODONE HCL 5 MG/5 ML
10 SOLUTION, ORAL ORAL
Status: COMPLETED | OUTPATIENT
Start: 2023-08-15 | End: 2023-08-15

## 2023-08-15 RX ORDER — SUCCINYLCHOLINE CHLORIDE 20 MG/ML
INJECTION INTRAMUSCULAR; INTRAVENOUS PRN
Status: DISCONTINUED | OUTPATIENT
Start: 2023-08-15 | End: 2023-08-15 | Stop reason: SURG

## 2023-08-15 RX ORDER — HALOPERIDOL 5 MG/ML
1 INJECTION INTRAMUSCULAR
Status: DISCONTINUED | OUTPATIENT
Start: 2023-08-15 | End: 2023-08-15 | Stop reason: HOSPADM

## 2023-08-15 RX ORDER — OXYCODONE HYDROCHLORIDE 5 MG/1
5 TABLET ORAL EVERY 4 HOURS PRN
Qty: 42 TABLET | Refills: 0 | Status: SHIPPED | OUTPATIENT
Start: 2023-08-15 | End: 2023-08-22

## 2023-08-15 RX ORDER — OXYCODONE HCL 5 MG/5 ML
5 SOLUTION, ORAL ORAL
Status: COMPLETED | OUTPATIENT
Start: 2023-08-15 | End: 2023-08-15

## 2023-08-15 RX ORDER — DEXMEDETOMIDINE HYDROCHLORIDE 100 UG/ML
INJECTION, SOLUTION INTRAVENOUS PRN
Status: DISCONTINUED | OUTPATIENT
Start: 2023-08-15 | End: 2023-08-15 | Stop reason: SURG

## 2023-08-15 RX ORDER — ONDANSETRON 2 MG/ML
4 INJECTION INTRAMUSCULAR; INTRAVENOUS
Status: DISCONTINUED | OUTPATIENT
Start: 2023-08-15 | End: 2023-08-15 | Stop reason: HOSPADM

## 2023-08-15 RX ORDER — EPHEDRINE SULFATE 50 MG/ML
5 INJECTION, SOLUTION INTRAVENOUS
Status: DISCONTINUED | OUTPATIENT
Start: 2023-08-15 | End: 2023-08-15 | Stop reason: HOSPADM

## 2023-08-15 RX ORDER — DIPHENHYDRAMINE HYDROCHLORIDE 50 MG/ML
12.5 INJECTION INTRAMUSCULAR; INTRAVENOUS
Status: DISCONTINUED | OUTPATIENT
Start: 2023-08-15 | End: 2023-08-15 | Stop reason: HOSPADM

## 2023-08-15 RX ORDER — HYDROMORPHONE HYDROCHLORIDE 1 MG/ML
0.4 INJECTION, SOLUTION INTRAMUSCULAR; INTRAVENOUS; SUBCUTANEOUS
Status: DISCONTINUED | OUTPATIENT
Start: 2023-08-15 | End: 2023-08-15 | Stop reason: HOSPADM

## 2023-08-15 RX ADMIN — FENTANYL CITRATE 25 MCG: 50 INJECTION, SOLUTION INTRAMUSCULAR; INTRAVENOUS at 12:29

## 2023-08-15 RX ADMIN — SODIUM CHLORIDE, POTASSIUM CHLORIDE, SODIUM LACTATE AND CALCIUM CHLORIDE: 600; 310; 30; 20 INJECTION, SOLUTION INTRAVENOUS at 11:35

## 2023-08-15 RX ADMIN — IBUPROFEN 600 MG: 100 SUSPENSION ORAL at 12:46

## 2023-08-15 RX ADMIN — FENTANYL CITRATE 50 MCG: 50 INJECTION, SOLUTION INTRAMUSCULAR; INTRAVENOUS at 11:40

## 2023-08-15 RX ADMIN — PROPOFOL 50 MG: 10 INJECTION, EMULSION INTRAVENOUS at 11:48

## 2023-08-15 RX ADMIN — OXYCODONE HYDROCHLORIDE 5 MG: 5 SOLUTION ORAL at 12:29

## 2023-08-15 RX ADMIN — PROPOFOL 200 MG: 10 INJECTION, EMULSION INTRAVENOUS at 11:42

## 2023-08-15 RX ADMIN — FENTANYL CITRATE 75 MCG: 50 INJECTION, SOLUTION INTRAMUSCULAR; INTRAVENOUS at 11:48

## 2023-08-15 RX ADMIN — LIDOCAINE HYDROCHLORIDE 100 MG: 20 INJECTION, SOLUTION EPIDURAL; INFILTRATION; INTRACAUDAL at 11:42

## 2023-08-15 RX ADMIN — ONDANSETRON 10 MG: 2 INJECTION INTRAMUSCULAR; INTRAVENOUS at 11:47

## 2023-08-15 RX ADMIN — DEXMEDETOMIDINE 25 MCG: 100 INJECTION, SOLUTION INTRAVENOUS at 12:01

## 2023-08-15 RX ADMIN — ONDANSETRON 4 MG: 2 INJECTION INTRAMUSCULAR; INTRAVENOUS at 12:04

## 2023-08-15 RX ADMIN — SUCCINYLCHOLINE CHLORIDE 100 MG: 20 INJECTION, SOLUTION INTRAMUSCULAR; INTRAVENOUS at 11:42

## 2023-08-15 RX ADMIN — LIDOCAINE HYDROCHLORIDE 4 ML: 40 SOLUTION TOPICAL at 11:43

## 2023-08-15 ASSESSMENT — PAIN DESCRIPTION - PAIN TYPE
TYPE: SURGICAL PAIN

## 2023-08-15 ASSESSMENT — FIBROSIS 4 INDEX: FIB4 SCORE: 0.41

## 2023-08-15 NOTE — OR NURSING
1213 Patient arrived to PACU. Report received from anesthesia and OR RN. Patient on 6L of oxygen via mask. Placed on monitor. Patient sleeping.     1230 Patients brother updated on recovery. Patient medicated for pain. Patient tolerating sips of water.    1246 Ibuprofen given.     1300- Criteria met to transition to phase II recovery. Pt tolerating popsicle at this time.     1315- Discharge instructions reviewed and signed with patient and brother.     1325- Pt dressed without any assistance, steady on her feet.    1330- PIV removed with tip intact. Pt discharged home with all belongings.    
No

## 2023-08-15 NOTE — ANESTHESIA PREPROCEDURE EVALUATION
Case: [id number] Anesthesia Start Date/Time: 08/15/23 1135    Procedure: TONSILLECTOMY    Pre-op diagnosis: J01.012 J03.01 J35.01    Location: [facility] ROOM 22 / SURGERY SAME DAY [facility]    Surgeons: Vee Singh M.D.          Relevant Problems   ANESTHESIA   (positive) Obstructive sleep apnea syndrome      NEURO   (positive) Intractable migraine with aura without status migrainosus   (positive) Vestibular migraine      CARDIAC   (positive) Intractable migraine with aura without status migrainosus   (positive) Vestibular migraine      ENDO   (positive) Subclinical hypothyroidism      Other   (positive) Cervical lymphadenopathy   (positive) Chronic fatigue syndrome   (positive) Generalized anxiety disorder   (positive) Moderate episode of recurrent major depressive disorder (HCC)   (positive) Morbid obesity (HCC)   (positive) PCOS (polycystic ovarian syndrome)   (positive) POTS (postural orthostatic tachycardia syndrome)       Physical Exam    Airway   Mallampati: II  TM distance: >3 FB  Neck ROM: full       Cardiovascular - normal exam  Rhythm: regular  Rate: normal  (-) murmur     Dental - normal exam           Pulmonary - normal exam  Breath sounds clear to auscultation     Abdominal    Neurological - normal exam                 Anesthesia Plan    ASA 2       Plan - general       Airway plan will be ETT          Induction: intravenous    Postoperative Plan: Postoperative administration of opioids is intended.    Pertinent diagnostic labs and testing reviewed    Informed Consent:    Anesthetic plan and risks discussed with patient.    Use of blood products discussed with: patient whom consented to blood products.

## 2023-08-15 NOTE — DISCHARGE INSTR - OTHER INFO
Tonsillectomy Post-op Instructions     Medications  It is important to take the pain medication on a scheduled basis for at least the first week.  Continue taking ibuprofen and Tylenol as scheduled until you are not having any pain.  Most patients do better if they “stay ahead” of the pain.  Your body will get used to the effects of narcotic pain medications and they can cause constipation and sedation.  They should be used only as necessary and in combination with Tylenol and ibuprofen to maximize pain control and minimize side effects.  If the pain medicine you were prescribed is not working, please call during normal business hours.  Below is the recommended schedule for taking these medications. You should alternate the Tylenol and ibuprofen so that you are taking one of the other every 3 hours.    Tylenol 650mg every 6 hours   Ibuprofen 600mg every 6 hours   Oxycodone 5 mg every 4 hours as needed for severe pain only    Postoperative Pain  All patients have pain after a tonsillectomy, but everyone’s response to pain and pain level is different.  Children tend to have less pain and typically will require pain medicines for 7 days.  Adults tend to have pain for 10-14 days.  The pain can be lessened by staying well hydrated.  If in doubt, drink more water--it is almost impossible to be over-hydrated after tonsillectomy.  In children, it may take some “tough love” to stay sufficiently hydrated, especially for the first few days.  Additionally many patients find that cold food/drinks help reduce pain.  Avoid alcohol or citrus/acidic foods as these will burn.    Diet  It is important to eat a soft diet (no sharp or crunchy foods) for the first two weeks after surgery.  Start slowly, and don’t be surprised if children don’t want to eat anything for the first few days after surgery.  As long as they are staying well hydrated, this is not a concern.  It is better to stay hydrated than to get dehydrated and try to catch  up afterwards.  Please seek medical care if dehydration is a concern.      Bleeding  Approximately 3-5% of patients will have postoperative bleeding.  This usually occurs either on the day of surgery or 7-10 days later when the scab falls off.  If this occurs, seek medical care immediately at the nearest hospital.  Even if the bleeding stops on its own you need to be evaluated by a physician.  In adults, bleeding can usually be controlled with cautery at the bedside, in children this typically needs to be done in the operating room.    Other Concerns  Avoid heavy lifting or strenuous activities for 2 weeks after surgery.  In adults, we recommend 2 weeks off of work.    Foul-smelling breath is a normal part of the healing process and lasts for 1-2 weeks.  It is ok to brush your teeth normally but avoid alcohol-containing mouthwashes such as Listerine.    The area where the tonsils were will appear gray or filmy for 1-2 weeks after surgery.  This is normal and does not represent a throat infection.  Earache is normal and does not represent an ear infection.  Avoid vigorous throat clearing or coughing.  It will often feel like you need to clear your throat or that there is something stuck in the back of your throat, but this is part of the healing response.  If the adenoids were also removed, nasal congestion or snoring are expected for the first week or so.  Low grade fevers are expected and can be treated with Tylenol.  High fevers are not normal (>101.5).  If this occurs seek medical attention.  Productive cough or difficulty breathing are not normal, seek medical attention.  Neck stiffness/rigidity is not normal, seek medical attention.    Follow-up  Please call [phone] to schedule a follow-up appointment with Dr. Singh for 4 weeks postop.  For prescription refills or routine concerns, please call during office hours: Monday-Friday: 8AM - 5 PM

## 2023-08-15 NOTE — ANESTHESIA POSTPROCEDURE EVALUATION
Patient: Diana Hill    Procedure Summary     Date: 08/15/23 Room / Location: [facility] ROOM 22 / SURGERY SAME DAY [facility]    Anesthesia Start: 1135 Anesthesia Stop: 1218    Procedure: TONSILLECTOMY (Bilateral: Throat) Diagnosis: (Chronic Tonsillitis)    Surgeons: Vee Singh M.D. Responsible Provider: Markell Tom M.D.    Anesthesia Type: general ASA Status: 2          Final Anesthesia Type: general  Last vitals  BP   Blood Pressure: 121/70    Temp   36.9 °C (98.5 °F)    Pulse   79   Resp   18    SpO2   95 %      Anesthesia Post Evaluation    Patient location during evaluation: PACU  Patient participation: complete - patient participated  Level of consciousness: awake and alert    Airway patency: patent  Anesthetic complications: no  Cardiovascular status: hemodynamically stable  Respiratory status: acceptable  Hydration status: euvolemic    PONV: none          There were no known notable events for this encounter.     Nurse Pain Score: 2 (NPRS)

## 2023-08-15 NOTE — OP REPORT
PreOp Diagnosis: Chronic tonsillitis        PostOp Diagnosis: Same        Procedure(s):  TONSILLECTOMY - Wound Class: Clean Contaminated     Surgeon(s):  Vee Singh M.D.     Anesthesiologist/Type of Anesthesia:  Anesthesiologist: Markell Tom M.D./General     Surgical Staff:  Circulator: AILEEN Prince Circulator: AILEEN Lobo Scrub: Shila Romero  Scrub Person: Tammy Hughes C.N.A.     Specimens removed if any:  ID Type Source Tests Collected by Time Destination   A : Right Tonsil  Tissue Tonsil PATHOLOGY SPECIMEN Vee Singh M.D. 8/15/2023 11:46 AM     B : Left Tonsil  Tissue Tonsil PATHOLOGY SPECIMEN Vee Singh M.D. 8/15/2023 11:48 AM           Estimated Blood Loss: 10 mL     Findings: 1+ endophytic tonsils with stones     Complications: None    Procedure in Detail: The patient was positively identified in the preop area and informed consent was obtained. The patient was brought to the operating room and placed in a supine position on the OR table. General anesthesia was induced and they were endotracheally intubated.  The table was turned 90 degrees. The patient was draped in the standard fashion. A timeout procedure was completed.   A headdrape and mouth gag were inserted and the patient was placed in suspension from the Boucher stand. The right tonsil was grasped and retracted medially. Electrocautery was used to remove the tonsil from the tonsillar fossa.  Suction cautery was used for hemostasis.  This was repeated on the left. Tonsils were sent separately for pathology. Hemostasis was adequate with Valsalva and letting down the tonsil gag. The field was copiously irrigated and found to be hemostatic.  The mouth gag and head drape were removed. The patient was returned to anesthesia for emergence. All counts were correct.     Disposition: Home

## 2023-08-15 NOTE — DISCHARGE INSTRUCTIONS
HOME CARE INSTRUCTIONS    ACTIVITY: Rest and take it easy for the first 24 hours.  A responsible adult is recommended to remain with you during that time.  It is normal to feel sleepy.  We encourage you to not do anything that requires balance, judgment or coordination.    FOR 24 HOURS DO NOT:  Drive, operate machinery or run household appliances.  Drink beer or alcoholic beverages.  Make important decisions or sign legal documents.    SPECIAL INSTRUCTIONS: See Previous Pages    DIET: To avoid nausea, slowly advance diet as tolerated, avoiding spicy or greasy foods for the first day.  Add more substantial food to your diet according to your physician's instructions.  Babies can be fed formula or breast milk as soon as they are hungry.  INCREASE FLUIDS AND FIBER TO AVOID CONSTIPATION.    MEDICATIONS: Resume taking daily medication.  Take prescribed pain medication with food.  If no medication is prescribed, you may take non-aspirin pain medication if needed.  PAIN MEDICATION CAN BE VERY CONSTIPATING.  Take a stool softener or laxative such as senokot, pericolace, or milk of magnesia if needed.    Last pain medication given: Oxycodone given at 12:30pm and Ibuprofen given at 12:46pm. Take Tylenol next at 3:45pm    A follow-up appointment should be arranged with your doctor; call to schedule.    You should CALL YOUR PHYSICIAN if you develop:  Fever greater than 101 degrees F.  Pain not relieved by medication, or persistent nausea or vomiting.  Excessive bleeding (blood soaking through dressing) or unexpected drainage from the wound.  Extreme redness or swelling around the incision site, drainage of pus or foul smelling drainage.  Inability to urinate or empty your bladder within 8 hours.  Problems with breathing or chest pain.    You should call 911 if you develop problems with breathing or chest pain.  If you are unable to contact your doctor or surgical center, you should go to the nearest emergency room or urgent  [facility].    Physician's telephone #: Dr. Singh [phone]    MILD FLU-LIKE SYMPTOMS ARE NORMAL.  YOU MAY EXPERIENCE GENERALIZED MUSCLE ACHES, THROAT IRRITATION, HEADACHE AND/OR SOME NAUSEA.    If any questions arise, call your doctor.  If your doctor is not available, please feel free to call the Surgical Center at [phone].  The Center is open Monday through Friday from 7AM to 7PM.      A registered nurse may call you a few days after your surgery to see how you are doing after your procedure.    You may also receive a survey in the mail within the next two weeks and we ask that you take a few moments to complete the survey and return it to us.  Our goal is to provide you with very good care and we value your comments.     Depression / Suicide Risk    As you are discharged from this [facility] facility, it is important to learn how to keep safe from harming yourself.    Recognize the warning signs:  Abrupt changes in personality, positive or negative- including increase in energy   Giving away possessions  Change in eating patterns- significant weight changes-  positive or negative  Change in sleeping patterns- unable to sleep or sleeping all the time   Unwillingness or inability to communicate  Depression  Unusual sadness, discouragement and loneliness  Talk of wanting to die  Neglect of personal appearance   Rebelliousness- reckless behavior  Withdrawal from people/activities they love  Confusion- inability to concentrate     If you or a loved one observes any of these behaviors or has concerns about self-harm, here's what you can do:  Talk about it- your feelings and reasons for harming yourself  Remove any means that you might use to hurt yourself (examples: pills, rope, extension cords, firearm)  Get professional help from the community (Mental Health, Substance Abuse, psychological counseling)  Do not be alone:Call your Safe Contact- someone whom you trust who will be there for you.  Call  your local CRISIS HOTLINE 391-4825 or 697-496-0153  Call your local Children's Mobile Crisis Response Team [place] (549) 172-0927 or www.[employer]  Call the toll free National Suicide Prevention Hotlines   National Suicide Prevention Lifeline 587-551-WYXR (6895)  [facility] Line Network 800-SUICIDE (471-2667)    I acknowledge receipt and understanding of these Home Care instructions.

## 2023-08-15 NOTE — ANESTHESIA TIME REPORT
Anesthesia Start and Stop Event Times     Date Time Event    8/15/2023 1125 Ready for Procedure     1135 Anesthesia Start     1218 Anesthesia Stop        Responsible Staff  08/15/23    Name Role Begin End    Markell Tom M.D. Anesth 1135 1218        Overtime Reason:  no overtime (within assigned shift)    Comments:

## 2023-08-15 NOTE — OR SURGEON
Immediate Post OP Note    PreOp Diagnosis: Chronic tonsillitis      PostOp Diagnosis: Same      Procedure(s):  TONSILLECTOMY - Wound Class: Clean Contaminated    Surgeon(s):  Vee Singh M.D.    Anesthesiologist/Type of Anesthesia:  Anesthesiologist: Markell Tom M.D./General    Surgical Staff:  Circulator: Alyssa Musa R.N.  Relief Circulator: AILEEN Lobo Scrub: Shila Romero  Scrub Person: Tammy Hughes C.N.A.    Specimens removed if any:  ID Type Source Tests Collected by Time Destination   A : Right Tonsil  Tissue Tonsil PATHOLOGY SPECIMEN Vee Singh M.D. 8/15/2023 11:46 AM    B : Left Tonsil  Tissue Tonsil PATHOLOGY SPECIMEN Vee Singh M.D. 8/15/2023 11:48 AM        Estimated Blood Loss: 10 mL    Findings: 1+ endophytic tonsils with stones    Complications: None        8/15/2023 12:19 PM Vee Singh M.D.

## 2023-08-15 NOTE — ANESTHESIA PROCEDURE NOTES
Airway    Date/Time: 8/15/2023 11:43 AM    Performed by: Markell Tom M.D.  Authorized by: Markell Tom M.D.    Location:  OR  Urgency:  Elective  Indications for Airway Management:  Anesthesia      Spontaneous Ventilation: absent    Sedation Level:  Deep  Preoxygenated: Yes    Patient Position:  Sniffing  Mask Difficulty Assessment:  0 - not attempted  Final Airway Type:  Endotracheal airway  Final Endotracheal Airway:  ETT and ALBINO tube  Cuffed: Yes    Technique Used for Successful ETT Placement:  Direct laryngoscopy    Insertion Site:  Oral  Blade Type:  Vanessa  Laryngoscope Blade/Videolaryngoscope Blade Size:  3  ETT Size (mm):  7.0  Measured from:  Lips  Placement Verified by: auscultation and capnometry    Cormack-Lehane Classification:  Grade I - full view of glottis  Number of Attempts at Approach:  1  Number of Other Approaches Attempted:  0

## 2023-09-14 RX ORDER — SERTRALINE HYDROCHLORIDE 100 MG/1
TABLET, FILM COATED ORAL
Qty: 180 TABLET | Refills: 0 | Status: SHIPPED | OUTPATIENT
Start: 2023-09-14 | End: 2024-01-05 | Stop reason: SDUPTHER

## 2023-09-26 ENCOUNTER — OFFICE VISIT (OUTPATIENT)
Dept: URGENT CARE | Facility: CLINIC | Age: 28
End: 2023-09-26
Payer: MEDICAID

## 2023-09-26 VITALS
WEIGHT: 244 LBS | TEMPERATURE: 98.3 F | HEART RATE: 106 BPM | OXYGEN SATURATION: 94 % | RESPIRATION RATE: 16 BRPM | HEIGHT: 67 IN | BODY MASS INDEX: 38.3 KG/M2 | SYSTOLIC BLOOD PRESSURE: 118 MMHG | DIASTOLIC BLOOD PRESSURE: 76 MMHG

## 2023-09-26 DIAGNOSIS — L02.92 FURUNCLE: ICD-10-CM

## 2023-09-26 PROCEDURE — 99214 OFFICE O/P EST MOD 30 MIN: CPT | Performed by: REGISTERED NURSE

## 2023-09-26 PROCEDURE — 3074F SYST BP LT 130 MM HG: CPT | Performed by: REGISTERED NURSE

## 2023-09-26 PROCEDURE — 3078F DIAST BP <80 MM HG: CPT | Performed by: REGISTERED NURSE

## 2023-09-26 RX ORDER — CLINDAMYCIN HYDROCHLORIDE 300 MG/1
300 CAPSULE ORAL 3 TIMES DAILY
Qty: 21 CAPSULE | Refills: 0 | Status: SHIPPED | OUTPATIENT
Start: 2023-09-26 | End: 2023-10-03

## 2023-09-26 ASSESSMENT — ENCOUNTER SYMPTOMS
ABDOMINAL PAIN: 0
CHILLS: 0
HEADACHES: 0
FEVER: 0
DIZZINESS: 0
SHORTNESS OF BREATH: 0

## 2023-09-26 ASSESSMENT — FIBROSIS 4 INDEX: FIB4 SCORE: 0.41

## 2023-09-27 NOTE — PROGRESS NOTES
"Subjective:   Diana Hill is a 28 y.o. female who presents for Otalgia (L ear, redness, swelling, bump, drainage x 8/12/23, pt states symptoms not improving from last visit )    HPI  Patient is presenting for reevaluation after previous treatment plan failure.  She has had some drainage from a lesion at the distal aspect of the left ear external canal since 08/12/23. Has been treated with Augmentin and also ofloxacin ear drops.  With the prior medication symptoms did slightly improve but not fully resolved.  Notes a pustule that occasionally drains at the opening of external canal I previously mentioned.  No fevers, chills, neck stiffness, change in hearing.  Not using any OTC medications.  Does note a possible history of suspected MRSA in the past.  Immunizations current    Review of Systems   Constitutional:  Negative for chills and fever.   Respiratory:  Negative for shortness of breath.    Cardiovascular:  Negative for chest pain.   Gastrointestinal:  Negative for abdominal pain.   Skin:  Negative for rash.   Neurological:  Negative for dizziness and headaches.       Medications, Allergies, and current problem list reviewed today in Epic.     Objective:     /76 (BP Location: Left arm, Patient Position: Sitting, BP Cuff Size: Adult)   Pulse (!) 106   Temp 36.8 °C (98.3 °F) (Temporal)   Resp 16   Ht 1.702 m (5' 7\")   Wt 111 kg (244 lb)   SpO2 94%     Physical Exam  Vitals and nursing note reviewed.   Constitutional:       Appearance: Normal appearance. She is not ill-appearing or toxic-appearing.   HENT:      Right Ear: Hearing and tympanic membrane normal.      Left Ear: Hearing and tympanic membrane normal. No decreased hearing noted. No drainage. No mastoid tenderness. Tympanic membrane is not erythematous.      Ears:        Comments: Single pustular lesion, minimal surrounding erythema     Mouth/Throat:      Mouth: Mucous membranes are moist.   Eyes:      Pupils: Pupils are equal, round, " and reactive to light.   Cardiovascular:      Rate and Rhythm: Regular rhythm. Tachycardia present.      Heart sounds: No murmur heard.  Pulmonary:      Effort: Pulmonary effort is normal.      Breath sounds: Normal breath sounds.   Musculoskeletal:         General: Normal range of motion.      Cervical back: Normal range of motion.   Skin:     General: Skin is warm and dry.      Capillary Refill: Capillary refill takes less than 2 seconds.      Findings: No rash.   Neurological:      General: No focal deficit present.      Mental Status: She is alert and oriented to person, place, and time.      Cranial Nerves: Cranial nerves 2-12 are intact. No cranial nerve deficit.      Sensory: Sensation is intact.      Motor: Motor function is intact.      Coordination: Coordination is intact.      Gait: Gait is intact.   Psychiatric:         Mood and Affect: Mood normal.         Assessment/Plan:     Diagnosis and associated orders:     1. Furuncle  clindamycin (CLEOCIN) 300 MG Cap    mupirocin (BACTROBAN) 2 % Ointment           Comments/MDM:   Differential diagnosis discussed     Patient being reevaluated for prior treatment plan failure which included oral Augmentin and ofloxacin drops.  Symptoms were improving but did not fully resolve, has a pustule in the left external ear canal that drains and then comes back.  No change in hearing.  No fevers chills neck stiffness.  Possible history of MRSA skin infection.  Patient is normotensive and afebrile but she is tachycardic at 106 which is concerning for systemic involvement.  On exam she appears well and nontoxic, neurologically intact, normal nose and throat findings, has a single pustule the distal external left ear canal, no active drainage at this time there is some tenderness of the pustule but not the inner canal which is normal size and nonerythemic, TMs normal bilaterally.  Normal neck range of motion.  Remainder of exam is benign.  Given appearance we will treat with  topical mupirocin as well as p.o. clindamycin given her history of MRSA.  Discussed warm compresses.  Good hand hygiene.  Reviewed signs and symptoms that require immediate evaluation.    Return to clinic or go to ED if symptoms worsen or persist. Indications for ED discussed at length. Patient/Parent/Guardian voices understanding. Follow-up with your primary care provider in 3-5 days. Red flag symptoms discussed. All side effects of medication discussed including allergic response, GI upset, tendon injury, rash, sedation etc.    I personally reviewed prior external notes and test results pertinent to today's visit as well as additional imaging and testing completed in clinic today.     Please note that this dictation was created using voice recognition software. I have made every reasonable attempt to correct obvious errors, but I expect that there are errors of grammar and possibly content that I did not discover before finalizing the note.    This note was electronically signed by DANIELE Lockett

## 2023-12-15 ENCOUNTER — OFFICE VISIT (OUTPATIENT)
Dept: URGENT CARE | Facility: CLINIC | Age: 28
End: 2023-12-15
Payer: MEDICAID

## 2023-12-15 VITALS
RESPIRATION RATE: 14 BRPM | BODY MASS INDEX: 38.3 KG/M2 | WEIGHT: 244 LBS | HEART RATE: 88 BPM | TEMPERATURE: 98.4 F | HEIGHT: 67 IN | DIASTOLIC BLOOD PRESSURE: 70 MMHG | OXYGEN SATURATION: 97 % | SYSTOLIC BLOOD PRESSURE: 108 MMHG

## 2023-12-15 DIAGNOSIS — J06.9 VIRAL URI WITH COUGH: ICD-10-CM

## 2023-12-15 PROCEDURE — 3074F SYST BP LT 130 MM HG: CPT | Performed by: PHYSICIAN ASSISTANT

## 2023-12-15 PROCEDURE — 3078F DIAST BP <80 MM HG: CPT | Performed by: PHYSICIAN ASSISTANT

## 2023-12-15 PROCEDURE — 99213 OFFICE O/P EST LOW 20 MIN: CPT | Performed by: PHYSICIAN ASSISTANT

## 2023-12-15 ASSESSMENT — FIBROSIS 4 INDEX: FIB4 SCORE: 0.41

## 2023-12-15 NOTE — PROGRESS NOTES
"Subjective:   Diana Hill is a 28 y.o. female who presents for URI (Congestion, burning sensation in lungs, cough, body aches x 12/11, Pt states recently traveled)      HPI  The patient presents to the Urgent Care with complaints of URI symptoms onset 4 days ago.  She does have some sinus symptoms and of November which were waxing waning But she felt fine at that time.  She recently traveled out of the country to and 4 days ago developed a cough, burning in her lungs, worsening nasal congestion and sinus congestion.  Reports of headaches and bodyaches.  Recent sore throat.  Diarrhea and nausea.  Her mother was on the trip with her and also is sick.   Denies any fever, shortness of breath, vomiting.               Past Medical History:   Diagnosis Date    Anemia     hx of borderline anemia    Anxiety     Asthma     Bowel habit changes 07/31/2023    IBS    Breath shortness     Cataract     Celiac disease     Daytime sleepiness     Dental disorder 07/31/2023    Upper flipper    Depression     Diabetes (HCC)     Insomnia     Insulin resistance     Migraine     Morning headache     PCOS (polycystic ovarian syndrome)     POTS (postural orthostatic tachycardia syndrome)     Sleep apnea     Snoring     Vertigo     Vitamin D deficiency      Allergies   Allergen Reactions    Gluten Meal Unspecified     Stomach problems         Objective:     /70 (BP Location: Left arm, Patient Position: Sitting, BP Cuff Size: Adult)   Pulse 88   Temp 36.9 °C (98.4 °F) (Temporal)   Resp 14   Ht 1.702 m (5' 7\")   Wt 111 kg (244 lb)   SpO2 97%   BMI 38.22 kg/m²     Physical Exam  Vitals reviewed.   Constitutional:       General: She is not in acute distress.     Appearance: Normal appearance. She is not ill-appearing or toxic-appearing.   HENT:      Right Ear: Tympanic membrane normal.      Left Ear: Tympanic membrane normal.      Mouth/Throat:      Mouth: Mucous membranes are moist.      Pharynx: Oropharynx is clear. Uvula " midline. Posterior oropharyngeal erythema present. No pharyngeal swelling, oropharyngeal exudate or uvula swelling.      Tonsils: No tonsillar exudate or tonsillar abscesses.   Eyes:      Conjunctiva/sclera: Conjunctivae normal.   Cardiovascular:      Rate and Rhythm: Normal rate and regular rhythm.      Heart sounds: Normal heart sounds.   Pulmonary:      Effort: Pulmonary effort is normal. No respiratory distress.      Breath sounds: Normal breath sounds. No wheezing, rhonchi or rales.   Musculoskeletal:      Cervical back: Neck supple. No rigidity.   Lymphadenopathy:      Cervical: No cervical adenopathy.   Skin:     General: Skin is warm and dry.   Neurological:      General: No focal deficit present.      Mental Status: She is alert and oriented to person, place, and time.   Psychiatric:         Mood and Affect: Mood normal.         Behavior: Behavior normal.         Diagnosis and associated orders:     1. Viral URI with cough       Comments/MDM:     The patient's presenting symptoms and exam findings are consistent with a upper respiratory infection most likely viral etiology. They have a normal pulse oximetry on room air, afebrile, and a normal pulmonary exam. Overall, the patient is very well appearing. I do not feel that this patient would benefit from antibiotics at this time.   Recommended symptomatic and supportive care at this time that includes plenty of fluids, rest, Tylenol/Ibuprofen for pain/fever, warm salt water gargles for sore throat, OTC cough and decongestant medication, Flonase, nasal saline washes. If no improvement in 5-7 days or any worsening symptoms, recommend returning to the urgent care for re-evaluation.          I personally reviewed prior external notes and test results pertinent to today's visit. Pathogenesis of diagnosis discussed including typical length and natural progression. Supportive care, natural history, differential diagnoses, and indications for immediate follow-up  discussed. Patient expresses understanding and agrees to plan. Patient denies any other questions or concerns.     Follow-up with the primary care physician for recheck, reevaluation, and consideration of further management.    Please note that this dictation was created using voice recognition software. I have made a reasonable attempt to correct obvious errors, but I expect that there are errors of grammar and possibly content that I did not discover before finalizing the note.    This note was electronically signed by Nadeem Templeton PA-C

## 2023-12-29 DIAGNOSIS — E88.819 INSULIN RESISTANCE: ICD-10-CM

## 2023-12-29 DIAGNOSIS — E03.8 SUBCLINICAL HYPOTHYROIDISM: ICD-10-CM

## 2024-01-04 ENCOUNTER — OFFICE VISIT (OUTPATIENT)
Dept: ENDOCRINOLOGY | Facility: MEDICAL CENTER | Age: 29
End: 2024-01-04
Attending: INTERNAL MEDICINE
Payer: MEDICAID

## 2024-01-04 VITALS
OXYGEN SATURATION: 98 % | HEART RATE: 100 BPM | SYSTOLIC BLOOD PRESSURE: 112 MMHG | RESPIRATION RATE: 17 BRPM | HEIGHT: 67 IN | BODY MASS INDEX: 39.39 KG/M2 | DIASTOLIC BLOOD PRESSURE: 70 MMHG | WEIGHT: 251 LBS

## 2024-01-04 DIAGNOSIS — R73.03 PRE-DIABETES: ICD-10-CM

## 2024-01-04 DIAGNOSIS — E55.9 VITAMIN D DEFICIENCY: ICD-10-CM

## 2024-01-04 DIAGNOSIS — E66.9 OBESITY (BMI 30-39.9): ICD-10-CM

## 2024-01-04 DIAGNOSIS — E28.2 PCOS (POLYCYSTIC OVARIAN SYNDROME): ICD-10-CM

## 2024-01-04 LAB
HBA1C MFR BLD: 5.5 % (ref ?–5.8)
POCT INT CON NEG: NEGATIVE
POCT INT CON POS: POSITIVE

## 2024-01-04 PROCEDURE — 3074F SYST BP LT 130 MM HG: CPT | Performed by: INTERNAL MEDICINE

## 2024-01-04 PROCEDURE — 3078F DIAST BP <80 MM HG: CPT | Performed by: INTERNAL MEDICINE

## 2024-01-04 PROCEDURE — 83036 HEMOGLOBIN GLYCOSYLATED A1C: CPT | Performed by: INTERNAL MEDICINE

## 2024-01-04 PROCEDURE — 99215 OFFICE O/P EST HI 40 MIN: CPT | Performed by: INTERNAL MEDICINE

## 2024-01-04 PROCEDURE — 99212 OFFICE O/P EST SF 10 MIN: CPT | Performed by: INTERNAL MEDICINE

## 2024-01-04 RX ORDER — SEMAGLUTIDE 0.25 MG/.5ML
0.25 INJECTION, SOLUTION SUBCUTANEOUS
Qty: 2 ML | Refills: 1 | Status: SHIPPED | OUTPATIENT
Start: 2024-01-04

## 2024-01-04 RX ORDER — METFORMIN HYDROCHLORIDE 500 MG/1
2000 TABLET, EXTENDED RELEASE ORAL DAILY
Qty: 360 TABLET | Refills: 1 | Status: SHIPPED | OUTPATIENT
Start: 2024-01-04

## 2024-01-04 ASSESSMENT — FIBROSIS 4 INDEX: FIB4 SCORE: 0.41

## 2024-01-04 NOTE — PROGRESS NOTES
Chief Complaint: Follow up for history of abnormal thyroid tests, history of PCOS, history of obesity and insulin resistance syndrome    HPI:     Diana Hill is a 28 y.o. female here for follow up of the above medical issue.  Her other comorbid issues include migraines treated with Topamax, depression treated with Zoloft    We initially saw the patient as a telehealth consultation back in November 2020.  She had a normal TSH of 2.2 with a slightly low free T4 of 0.87 with normal total T3 levels on August 31, 2020    She previously complained of fatigue, being easily jolted, having migraines, hair loss and dry skin.   She had seen neurology and had a negative work-up    Repeat serial testing of her thyroid function however has shown no evidence of endogenous thyroid abnormality such as hypothyroidism        We respect her PCOS work-up done showed normal 17 hydroxyprogesterone level and normal prolactin levels.  She had elevated baseline testosterone levels compatible with PCOS  She also completed a MN saliva cortisol x 3 on 7/20223 w/c was normal (0.044, 0.068, 0.074)    She remains on Metformin extended release 1000 mg twice a day   She is on oral contraceptives as well.    She reports regular menstrual cycles and denies excessive facial hair or body hair    Her total testosterone was 31 on 2022  A1c today is 5.5% on 1/4/2023      She is obese with BMI of 38  She used to weight 280 before Metformin  She now weighs 244 after starting metformin  She reports having tried and failed diet and exercise for 6 months  (due to POTS)  She tried and failed Phentermine (insomnia)    Patient's medications, allergies, and social histories were reviewed and updated as appropriate.      ROS:     CONS:     No fever, no chills   EYES:     No diplopia, no blurry vision   CV:           No chest pain, no palpitations   PULM:     No SOB, no cough, no hemoptysis.   GI:            No nausea, no vomiting, no diarrhea, no  constipation   ENDO:     No polyuria, no polydipsia, no heat intolerance, no cold intolerance       Past Medical History:  Problem List:  2023-08: Chronic tonsillitis  2023-02: ADHD (attention deficit hyperactivity disorder), combined   type  2023-02: POTS (postural orthostatic tachycardia syndrome)  2023-02: Sore throat  2023-02: Chronic right flank pain  2022-09: BMI 38.0-38.9,adult  2022-09: Orthostasis  2022-09: Near syncope  2022-07: Heart palpitations  2022-06: Breast lump in female  2022-04: Bilateral flank pain  2021-11: Generalized anxiety disorder  2021-11: Moderate episode of recurrent major depressive disorder (HCC)  2021-10: Plantar wart, left foot  2021-10: Medication management  2021-08: Chronic fatigue syndrome  2021-08: Intractable migraine with aura without status migrainosus  2021-08: Obstructive sleep apnea syndrome  2021-08: Vestibular migraine  2021-04: Otalgia of left ear  2021-04: History of canker sores  2021-04: Dysthymia  2021-03: Polyarthralgia  2021-03: Brittle nails  2020-12: Cervical lymphadenopathy  2020-11: Subclinical hypothyroidism  2020-11: Insulin resistance  2020-11: Anemia  2020-10: Morbid obesity (HCC)  2020-09: Skin rash  2020-09: Administrative encounter  2020-09: Hospital discharge follow-up  2020-08: Rash of hands  2020-08: Snoring  2020-03: Vertigo  2019-11: PCOS (polycystic ovarian syndrome)  2019-11: Vitamin D deficiency  2019-11: High triglycerides  2019-11: Anxiety and depression  2018-09: Biliary colic      Past Surgical History:  Past Surgical History:   Procedure Laterality Date    TONSILLECTOMY Bilateral 8/15/2023    Procedure: TONSILLECTOMY;  Surgeon: Vee Singh M.D.;  [facility]: SURGERY [facility];  Service: Ent    MATTHIAS BY LAPAROSCOPY N/A 9/13/2018    Procedure: MATTHIAS BY LAPAROSCOPY;  Surgeon: Hayden Wilhelm M.D.;  Location: SURGERY [facility];  Service: General        Allergies:  Gluten meal     Social History:  Social History  "    Tobacco Use    Smoking status: Never     Passive exposure: Current (past and Currently)    Smokeless tobacco: Never   Vaping Use    Vaping Use: Never used   Substance Use Topics    Alcohol use: Yes     Comment: once/year    Drug use: No        Family History:   family history includes Alcohol abuse in her father and paternal grandfather; Cancer in her paternal grandmother; Depression in her father; Heart Attack (age of onset: 66) in her maternal grandfather; Lung Disease in her paternal grandfather; No Known Problems in her maternal grandmother; Sleep Apnea in her brother; Stroke (age of onset: 66) in her paternal grandmother; Thyroid in her mother.      PHYSICAL EXAM:   Vital signs: /70 (BP Location: Right arm, Patient Position: Sitting, BP Cuff Size: Large adult)   Pulse 100   Resp 17   Ht 1.702 m (5' 7\")   Wt 114 kg (251 lb)   SpO2 98%   BMI 39.31 kg/m²   GENERAL: Obese female in no apparent distress.   EYE:  No ocular asymmetry, PERRLA  HENT: Pink, moist mucous membranes.    NECK: No thyromegaly.   CARDIOVASCULAR:  No murmurs  LUNGS: Clear breath sounds  ABDOMEN: Soft, nontender   EXTREMITIES: No clubbing, cyanosis, or edema.   NEUROLOGICAL: No gross focal motor abnormalities   LYMPH: No cervical adenopathy palpated.   SKIN: No rashes, lesions.       Labs:  Lab Results   Component Value Date/Time    SODIUM 136 09/02/2022 04:57 AM    POTASSIUM 3.6 09/02/2022 04:57 AM    CHLORIDE 108 09/02/2022 04:57 AM    CO2 19 (L) 09/02/2022 04:57 AM    ANION 9.0 09/02/2022 04:57 AM    GLUCOSE 89 09/02/2022 04:57 AM    BUN 11 09/02/2022 04:57 AM    CREATININE 0.64 09/02/2022 04:57 AM    CALCIUM 8.6 09/02/2022 04:57 AM    ASTSGOT 13 09/01/2022 08:26 PM    ALTSGPT 13 09/01/2022 08:26 PM    TBILIRUBIN 0.2 09/01/2022 08:26 PM    ALBUMIN 3.8 09/01/2022 08:26 PM    TOTPROTEIN 6.7 09/01/2022 08:26 PM    GLOBULIN 2.9 09/01/2022 08:26 PM    AGRATIO 1.3 09/01/2022 08:26 PM       Lab Results   Component Value Date/Time    " SODIUM 137 03/26/2021 0959    POTASSIUM 4.2 03/26/2021 0959    CHLORIDE 102 03/26/2021 0959    CO2 23 03/26/2021 0959    GLUCOSE 89 03/26/2021 0959    BUN 12 03/26/2021 0959    CREATININE 0.58 03/26/2021 0959    CALCIUM 9.2 03/26/2021 0959    ANION 12.0 03/26/2021 0959       Lab Results   Component Value Date/Time    CHOLSTRLTOT 185 11/04/2019 0850    TRIGLYCERIDE 161 (H) 11/04/2019 0850    HDL 67 11/04/2019 0850    LDL 86 11/04/2019 0850       Lab Results   Component Value Date/Time    TSHULTRASEN 2.800 11/24/2020 1049     Lab Results   Component Value Date/Time    FREET4 0.87 (L) 08/31/2020 2133     Lab Results   Component Value Date/Time    FREET3 3.16 08/28/2018 1120     No results found for: THYSTIMIG    Lab Results   Component Value Date/Time    MICROSOMALA <9.0 09/15/2020 1126         Imaging:      ASSESSMENT/PLAN:     1. PCOS (polycystic ovarian syndrome)  Stable  Continue metformin and oral contraceptives   Continue diet and exercise   we will check her testosterone levels with her next labs    2. Pre-diabetes  Controlled   Weight loss noted  A1c is 5.5%   continue metformin  Continue diet and exercise     3. Vitamin D deficiency  Stable   Vitamin D labs were reviewed with patient  Continue current supplements  Continue monitoring levels       4. Obesity (BMI 30-39.9)  Stable   She has tried and failed diet and exercise  She also completed a workup for hypercortisolism and hypothyroidism and this was negative  She has tried and failed phentermine  I will prescribe her Wegovy for weight loss  Reviewed side effects and proper administration of Wegovy  Continue metformin  Start phentermine        Return in about 6 months (around 7/4/2024).      Total time spent on day of service was over 60 minutes which included obtaining a detailed history and physical exam, ordering labs, coordinating care and scheduling future follow-up      Thank you kindly for allowing me to participate in the thyroid care plan for this  patient.  A  Wilman Crow MD, FACE, [facility]      CC:   DANIELE Resendiz

## 2024-01-05 ENCOUNTER — OFFICE VISIT (OUTPATIENT)
Dept: MEDICAL GROUP | Facility: MEDICAL CENTER | Age: 29
End: 2024-01-05

## 2024-01-05 VITALS
TEMPERATURE: 98.5 F | BODY MASS INDEX: 39.39 KG/M2 | HEIGHT: 67 IN | HEART RATE: 75 BPM | SYSTOLIC BLOOD PRESSURE: 116 MMHG | WEIGHT: 250.99 LBS | DIASTOLIC BLOOD PRESSURE: 62 MMHG | OXYGEN SATURATION: 98 %

## 2024-01-05 DIAGNOSIS — F33.1 MODERATE EPISODE OF RECURRENT MAJOR DEPRESSIVE DISORDER (HCC): ICD-10-CM

## 2024-01-05 DIAGNOSIS — F41.1 GENERALIZED ANXIETY DISORDER: ICD-10-CM

## 2024-01-05 DIAGNOSIS — Z11.4 SCREENING FOR HIV (HUMAN IMMUNODEFICIENCY VIRUS): ICD-10-CM

## 2024-01-05 DIAGNOSIS — G90.A POTS (POSTURAL ORTHOSTATIC TACHYCARDIA SYNDROME): ICD-10-CM

## 2024-01-05 DIAGNOSIS — G43.119 INTRACTABLE MIGRAINE WITH AURA WITHOUT STATUS MIGRAINOSUS: ICD-10-CM

## 2024-01-05 DIAGNOSIS — K58.0 IRRITABLE BOWEL SYNDROME WITH DIARRHEA: ICD-10-CM

## 2024-01-05 DIAGNOSIS — Z00.00 PE (PHYSICAL EXAM), ANNUAL: ICD-10-CM

## 2024-01-05 PROBLEM — R21 RASH OF HANDS: Status: RESOLVED | Noted: 2020-08-12 | Resolved: 2024-01-05

## 2024-01-05 PROBLEM — N63.0 BREAST LUMP IN FEMALE: Status: RESOLVED | Noted: 2022-06-16 | Resolved: 2024-01-05

## 2024-01-05 PROBLEM — I95.1 ORTHOSTASIS: Status: RESOLVED | Noted: 2022-09-02 | Resolved: 2024-01-05

## 2024-01-05 PROBLEM — E78.1 HIGH TRIGLYCERIDES: Status: RESOLVED | Noted: 2019-11-19 | Resolved: 2024-01-05

## 2024-01-05 PROBLEM — J35.01 CHRONIC TONSILLITIS: Chronic | Status: RESOLVED | Noted: 2023-08-15 | Resolved: 2024-01-05

## 2024-01-05 PROBLEM — Z79.899 MEDICATION MANAGEMENT: Status: RESOLVED | Noted: 2021-10-21 | Resolved: 2024-01-05

## 2024-01-05 PROBLEM — R21 SKIN RASH: Status: RESOLVED | Noted: 2020-09-15 | Resolved: 2024-01-05

## 2024-01-05 PROBLEM — R10.9 CHRONIC RIGHT FLANK PAIN: Status: RESOLVED | Noted: 2023-02-08 | Resolved: 2024-01-05

## 2024-01-05 PROBLEM — H92.02 OTALGIA OF LEFT EAR: Status: RESOLVED | Noted: 2021-04-27 | Resolved: 2024-01-05

## 2024-01-05 PROBLEM — Z09 HOSPITAL DISCHARGE FOLLOW-UP: Status: RESOLVED | Noted: 2020-09-02 | Resolved: 2024-01-05

## 2024-01-05 PROBLEM — R06.83 SNORING: Status: RESOLVED | Noted: 2020-08-12 | Resolved: 2024-01-05

## 2024-01-05 PROBLEM — R00.2 HEART PALPITATIONS: Status: RESOLVED | Noted: 2022-07-27 | Resolved: 2024-01-05

## 2024-01-05 PROBLEM — R10.9 BILATERAL FLANK PAIN: Status: RESOLVED | Noted: 2022-04-26 | Resolved: 2024-01-05

## 2024-01-05 PROBLEM — R59.0 CERVICAL LYMPHADENOPATHY: Status: RESOLVED | Noted: 2020-12-29 | Resolved: 2024-01-05

## 2024-01-05 PROBLEM — M25.50 POLYARTHRALGIA: Status: RESOLVED | Noted: 2021-03-25 | Resolved: 2024-01-05

## 2024-01-05 PROBLEM — J02.9 SORE THROAT: Status: RESOLVED | Noted: 2023-02-08 | Resolved: 2024-01-05

## 2024-01-05 PROBLEM — G89.29 CHRONIC RIGHT FLANK PAIN: Status: RESOLVED | Noted: 2023-02-08 | Resolved: 2024-01-05

## 2024-01-05 PROBLEM — R55 NEAR SYNCOPE: Status: RESOLVED | Noted: 2022-09-01 | Resolved: 2024-01-05

## 2024-01-05 PROBLEM — R73.03 PRE-DIABETES: Status: RESOLVED | Noted: 2024-01-04 | Resolved: 2024-01-05

## 2024-01-05 PROBLEM — B07.0 PLANTAR WART, LEFT FOOT: Status: RESOLVED | Noted: 2021-10-21 | Resolved: 2024-01-05

## 2024-01-05 PROBLEM — G43.809 VESTIBULAR MIGRAINE: Status: RESOLVED | Noted: 2021-08-10 | Resolved: 2024-01-05

## 2024-01-05 PROBLEM — Z02.9 ADMINISTRATIVE ENCOUNTER: Status: RESOLVED | Noted: 2020-09-15 | Resolved: 2024-01-05

## 2024-01-05 PROBLEM — Z87.19: Status: RESOLVED | Noted: 2021-04-27 | Resolved: 2024-01-05

## 2024-01-05 PROCEDURE — 3078F DIAST BP <80 MM HG: CPT | Performed by: NURSE PRACTITIONER

## 2024-01-05 PROCEDURE — 99214 OFFICE O/P EST MOD 30 MIN: CPT | Mod: 25 | Performed by: NURSE PRACTITIONER

## 2024-01-05 PROCEDURE — 3074F SYST BP LT 130 MM HG: CPT | Performed by: NURSE PRACTITIONER

## 2024-01-05 PROCEDURE — 99395 PREV VISIT EST AGE 18-39: CPT | Performed by: NURSE PRACTITIONER

## 2024-01-05 RX ORDER — SERTRALINE HYDROCHLORIDE 100 MG/1
100 TABLET, FILM COATED ORAL DAILY
Qty: 180 TABLET | Refills: 1 | Status: SHIPPED | OUTPATIENT
Start: 2024-01-05 | End: 2024-01-31 | Stop reason: SDUPTHER

## 2024-01-05 RX ORDER — DICYCLOMINE HYDROCHLORIDE 10 MG/1
10 CAPSULE ORAL
Qty: 120 CAPSULE | Refills: 0 | Status: SHIPPED | OUTPATIENT
Start: 2024-01-05 | End: 2024-01-31

## 2024-01-05 RX ORDER — RIZATRIPTAN BENZOATE 10 MG/1
10 TABLET ORAL
COMMUNITY

## 2024-01-05 ASSESSMENT — FIBROSIS 4 INDEX: FIB4 SCORE: 0.41

## 2024-01-05 ASSESSMENT — PATIENT HEALTH QUESTIONNAIRE - PHQ9: CLINICAL INTERPRETATION OF PHQ2 SCORE: 0

## 2024-01-05 NOTE — ASSESSMENT & PLAN NOTE
New problem.  Predominantly having diarrhea.  Cut out gluten and still having symptoms.  Would like to try prescription today.

## 2024-01-05 NOTE — ASSESSMENT & PLAN NOTE
Chronic condition. S/s since age 18. Still having tachycardia, vision changes , dizziness. Cardiaology evaluated in Nov, was told not a heart issue. Pt went to neurology was referred out ot POTs clinic ([facility] and [facility] in [place]).  Completed the test and following up with POTS clinic for the results review.  She declines the need of clonidine or propranolol for symptom management at this time.

## 2024-01-05 NOTE — PROGRESS NOTES
Chief Complaint   Patient presents with    Medication Refill    Follow-Up       HISTORY OF PRESENT ILLNESS: Patient is a 28 y.o. female, established patient who presents today to discuss medical problems as listed below:    Health Maintenance:  COMPLETED       Allergies: Gluten meal    Current Outpatient Medications   Medication Sig Dispense Refill    sertraline (ZOLOFT) 100 MG Tab Take 1 Tablet by mouth every day. 180 Tablet 1    dicyclomine (BENTYL) 10 MG Cap Take 1 Capsule by mouth 4 Times a Day,Before Meals and at Bedtime. 120 Capsule 0    rizatriptan (MAXALT) 10 MG tablet Take 10 mg by mouth.      Semaglutide (WEGOVY) 0.25 MG/0.5ML Solution Auto-injector Pen-injector Inject 0.5 mL under the skin every 7 days. 2 mL 1    metFORMIN ER (GLUCOPHAGE XR) 500 MG TABLET SR 24 HR Take 4 Tablets by mouth every day. 360 Tablet 1    ondansetron (ZOFRAN ODT) 4 MG TABLET DISPERSIBLE Take 1 Tablet by mouth every 6 hours as needed for Nausea/Vomiting (Nausea/Vomiting). 20 Tablet 3    topiramate (TOPAMAX) 50 MG tablet Take 50 mg by mouth every day.      tretinoin (RETIN-A) 0.025 % gel APPLY 1 APPLICATION TOPICALLY EVERY BEDTIME. START WITH 2 -3 APPS PER WEEK AND WORK UP AS TOLERATED 45 g 0    Norgestim-Eth Estrad Triphasic 0.18/0.215/0.25 MG-25 MCG Tab Take 1 Tablet by mouth every day.      albuterol 108 (90 Base) MCG/ACT Aero Soln inhalation aerosol Inhale 2 Puffs by mouth every four hours as needed for Shortness of Breath. 1 Inhaler 0     No current facility-administered medications for this visit.       Allergies, past medical history, past surgical history, family history, social history reviewed and updated.    Review of Systems:     - Constitutional: Negative for fever, chills, unexpected weight change, and fatigue/generalized weakness.     - HEENT: Negative for headaches, vision changes, hearing changes, ear pain, ear discharge, rhinorrhea, sinus congestion, sore throat, and neck pain.      - Respiratory: Negative for  "cough, sputum production, chest congestion, dyspnea, wheezing, and crackles.      - Cardiovascular: Negative for chest pain, palpitations, orthopnea, and bilateral lower extremity edema.     - Gastrointestinal: diarrhea,Negative for heartburn, nausea, vomiting, abdominal pain, hematochezia, melena,  constipation, and greasy/foul-smelling stools.     - Genitourinary: Negative for dysuria, polyuria, hematuria, pyuria, urinary urgency, and urinary incontinence.    - Musculoskeletal: Negative for myalgias, back pain, and joint pain.     - Skin: Negative for rash, itching, cyanotic skin color change.     - Neurological: Negative for dizziness, tingling, tremors, focal sensory deficit, focal weakness and headaches.     - Endo/Heme/Allergies: Does not bruise/bleed easily.     - Psychiatric/Behavioral: Negative for depression, suicidal/homicidal ideation and memory loss.      All other systems reviewed and are negative    Exam:    /62   Pulse 75   Temp 36.9 °C (98.5 °F)   Ht 1.702 m (5' 7\")   Wt 114 kg (250 lb 15.9 oz)   SpO2 98%   BMI 39.31 kg/m²  Body mass index is 39.31 kg/m².    Physical Exam:  Constitutional: Well-developed and well-nourished. Not diaphoretic. No distress.   Skin: Skin is warm and dry. No rash noted.  Head: Atraumatic without lesions.  Eyes: Conjunctivae and extraocular motions are normal. Pupils are equal, round, and reactive to light. No scleral icterus.   Ears:  External ears unremarkable. Tympanic membranes clear and intact.  Nose: Nares patent. Septum midline. Turbinates without erythema nor edema. No discharge.   Mouth/Throat: Dentition is normal. Tongue normal. Oropharynx is clear and moist. Posterior pharynx without erythema or exudates.  Neck: Supple, trachea midline. Normal range of motion. No thyromegaly present. No lymphadenopathy--cervical or supraclavicular.  Cardiovascular: Regular rate and rhythm, S1 and S2 without murmur, rubs, or gallops.    Chest: Effort normal. Clear to " auscultation throughout. No adventitious sounds. No CVA tenderness.  Abdomen: Soft, non tender, and without distention. Active bowel sounds in all four quadrants. No rebound, guarding, masses or HSM.  : Negative for dysuria, polyuria, hematuria, pyuria, urinary urgency, and urinary incontinence.  Extremities: No cyanosis, clubbing, erythema, nor edema. Distal pulses intact and symmetric.   Musculoskeletal: All major joints AROM full in all directions without pain.  Neurological: Alert and oriented x 3. DTRs 2+/3 and symmetric. No cranial nerve deficit. 5/5 myotomes. Sensation intact. Negative Rhomberg.  Psychiatric:  Behavior, mood, and affect are appropriate.  MA/nursing note and vitals reviewed.    Assessment/Plan:  Generalized anxiety disorder  Chronic condition, stable on Zoloft 100 mg, needs refills today. Is following with psychiatry.     POTS (postural orthostatic tachycardia syndrome)  Chronic condition. S/s since age 18. Still having tachycardia, vision changes , dizziness. Cardiaology evaluated in Nov, was told not a heart issue. Pt went to neurology was referred out ot POT[facility] clinic ([facility] and neurosciences center in [place]).  Completed the test and following up with POTS clinic for the results review.  She declines the need of clonidine or propranolol for symptom management at this time.    Moderate episode of recurrent major depressive disorder (HCC)  Chronic and stable on Zoloft.  Needing refills today.  Following with psychiatry.  Denies SI    Intractable migraine with aura without status migrainosus  Chronic and stable.  This is well-controlled on Topamax, no refills needed today.    Irritable bowel syndrome with diarrhea  New problem.  Predominantly having diarrhea.  Cut out gluten and still having symptoms.  Would like to try prescription today.    1. Generalized anxiety disorder  Stable on current regimen.  Continue.  Refills given   - sertraline (ZOLOFT) 100 MG Tab; Take 1 Tablet by mouth every  day.  Dispense: 180 Tablet; Refill: 1    2. Moderate episode of recurrent major depressive disorder (HCC)  Stable on current regimen.  Continue.  Refills given   - sertraline (ZOLOFT) 100 MG Tab; Take 1 Tablet by mouth every day.  Dispense: 180 Tablet; Refill: 1    3. POTS (postural orthostatic tachycardia syndrome)  Stable, following with POTs clinic    4. PE (physical exam), annual  - CBC WITHOUT DIFFERENTIAL; Future  - Comp Metabolic Panel; Future  - FREE THYROXINE; Future  - HEMOGLOBIN A1C; Future  - Lipid Profile; Future  - T3 FREE; Future  - TSH; Future  - VITAMIN D,25 HYDROXY (DEFICIENCY); Future  - VITAMIN B12; Future    5. Irritable bowel syndrome with diarrhea  Uncontrolled, stable.  Trial of Bentyl.  Will follow-up in 4 to 6 weeks.  - dicyclomine (BENTYL) 10 MG Cap; Take 1 Capsule by mouth 4 Times a Day,Before Meals and at Bedtime.  Dispense: 120 Capsule; Refill: 0    6. Screening for HIV (human immunodeficiency virus)  - HIV AG/AB COMBO ASSAY SCREENING; Future    7. Intractable migraine with aura without status migrainosus  Stable on current regimen.  Continue.     Ob-Gyn/ History:    Last Pap Smear:  needs to schedule     Health Maintenance  Cholesterol Screening: labs  Diabetes Screening: labs  Aspirin Use: no    Diet: regular   Exercise: mild to moderate if able   Substance Abuse: no   Safe in relationship.   Seat belts, bike helmet, gun safety discussed.  Sun protection used.    Infectious disease screening/Immunizations  --STI Screening: no concerns    --Practices safe sex.  --HIV Screening: labs   --Hepatitis C Screening: labs   --Immunizations:      Patient counseled as below:  adult Visits 19- 64 Years and Older  History: Past illnesses, surgeries, medications, allergies, family and social histories, status of chronic conditions  Exam: Blood pressure, height, weight, BMI, hearing screening, depression screening, eyes, ENT, cardiovascular, respiratory, GI, , musculoskeletal, skin,  neurological, psychological, hematological  Counseling/Anticipatory Guidance: Nutrition, physical activity, healthy weight and diet, injury prevention (wear a helmet when riding a bicycle, motocycle, skiing, snowboarding or any other sport where head protecting is advised), skin cancer prevention (use of broad spectrum SPF 30 or higher, stay in the shade, wear sunglasses, wear a hat with wide brim, wear protective clothing covering extremities), misuse of tobacco, alcohol, and drugs, sexual behavior, dental health, mental health, fall prevention immunizations, recommended screenings for age/gender  Screening Services: Cholesterol, diabetes, colorectal cancer age 45 + per USPSTF  For Women: Breast cancer, cervical cancer, osteoporosis beginning at 65  For Men: Abnormal aortic aneurysm (one time for men 65-75 years, hx of smoking), prostate cancer    Discussed with patient possible alternative diagnoses, patient is to take all medications as prescribed.      If symptoms persist FU w/PCP, if symptoms worsen go to emergency room.      If experiencing any side effects from prescribed medications report to the office immediately or go to emergency room.     Reviewed indication, dosage, usage and potential adverse effects of prescribed medications.      Reviewed risks and benefits of treatment plan. Patient verbalizes understanding of all instruction and verbally agrees to plan.     Discussed plan with the patient, and patient agrees to the above.      I personally reviewed prior external notes and test results pertinent to today's visit.      No follow-ups on file. 4-6 wks

## 2024-01-31 ENCOUNTER — PATIENT MESSAGE (OUTPATIENT)
Dept: MEDICAL GROUP | Facility: MEDICAL CENTER | Age: 29
End: 2024-01-31
Payer: MEDICAID

## 2024-01-31 DIAGNOSIS — F33.1 MODERATE EPISODE OF RECURRENT MAJOR DEPRESSIVE DISORDER (HCC): ICD-10-CM

## 2024-01-31 DIAGNOSIS — K58.0 IRRITABLE BOWEL SYNDROME WITH DIARRHEA: ICD-10-CM

## 2024-01-31 DIAGNOSIS — F41.1 GENERALIZED ANXIETY DISORDER: ICD-10-CM

## 2024-01-31 RX ORDER — DICYCLOMINE HYDROCHLORIDE 10 MG/1
10 CAPSULE ORAL
Qty: 360 CAPSULE | Refills: 1 | Status: SHIPPED | OUTPATIENT
Start: 2024-01-31

## 2024-01-31 RX ORDER — SERTRALINE HYDROCHLORIDE 100 MG/1
100 TABLET, FILM COATED ORAL DAILY
Qty: 180 TABLET | Refills: 1 | Status: SHIPPED | OUTPATIENT
Start: 2024-01-31 | End: 2024-02-06 | Stop reason: SDUPTHER

## 2024-01-31 NOTE — TELEPHONE ENCOUNTER
Received request via: Pharmacy    Was the patient seen in the last year in this department? Yes    Does the patient have an active prescription (recently filled or refills available) for medication(s) requested? No    [facility]    Does the patient have [facility] and need 100 day supply (blood pressure, diabetes and cholesterol meds only)? Patient does not have SCP

## 2024-02-02 ENCOUNTER — PATIENT MESSAGE (OUTPATIENT)
Dept: MEDICAL GROUP | Facility: MEDICAL CENTER | Age: 29
End: 2024-02-02
Payer: MEDICAID

## 2024-02-02 DIAGNOSIS — F33.1 MODERATE EPISODE OF RECURRENT MAJOR DEPRESSIVE DISORDER (HCC): ICD-10-CM

## 2024-02-02 DIAGNOSIS — F41.1 GENERALIZED ANXIETY DISORDER: ICD-10-CM

## 2024-02-06 RX ORDER — SERTRALINE HYDROCHLORIDE 100 MG/1
200 TABLET, FILM COATED ORAL DAILY
Qty: 180 TABLET | Refills: 1 | Status: SHIPPED | OUTPATIENT
Start: 2024-02-06

## 2024-02-22 ENCOUNTER — HOSPITAL ENCOUNTER (OUTPATIENT)
Dept: LAB | Facility: MEDICAL CENTER | Age: 29
End: 2024-02-22
Attending: NURSE PRACTITIONER
Payer: MEDICAID

## 2024-02-22 DIAGNOSIS — E55.9 VITAMIN D DEFICIENCY: ICD-10-CM

## 2024-02-22 DIAGNOSIS — E28.2 PCOS (POLYCYSTIC OVARIAN SYNDROME): ICD-10-CM

## 2024-02-22 DIAGNOSIS — Z11.4 SCREENING FOR HIV (HUMAN IMMUNODEFICIENCY VIRUS): ICD-10-CM

## 2024-02-22 DIAGNOSIS — E66.9 OBESITY (BMI 30-39.9): ICD-10-CM

## 2024-02-22 DIAGNOSIS — Z00.00 PE (PHYSICAL EXAM), ANNUAL: ICD-10-CM

## 2024-02-22 DIAGNOSIS — R73.03 PRE-DIABETES: ICD-10-CM

## 2024-02-22 LAB
25(OH)D3 SERPL-MCNC: 28 NG/ML (ref 30–100)
ALBUMIN SERPL BCP-MCNC: 3.7 G/DL (ref 3.2–4.9)
ALBUMIN/GLOB SERPL: 1.2 G/DL
ALP SERPL-CCNC: 73 U/L (ref 30–99)
ALT SERPL-CCNC: 11 U/L (ref 2–50)
ANION GAP SERPL CALC-SCNC: 12 MMOL/L (ref 7–16)
AST SERPL-CCNC: 11 U/L (ref 12–45)
BILIRUB SERPL-MCNC: 0.2 MG/DL (ref 0.1–1.5)
BUN SERPL-MCNC: 14 MG/DL (ref 8–22)
CALCIUM ALBUM COR SERPL-MCNC: 9 MG/DL (ref 8.5–10.5)
CALCIUM SERPL-MCNC: 8.8 MG/DL (ref 8.4–10.2)
CHLORIDE SERPL-SCNC: 106 MMOL/L (ref 96–112)
CHOLEST SERPL-MCNC: 228 MG/DL (ref 100–199)
CO2 SERPL-SCNC: 20 MMOL/L (ref 20–33)
CREAT SERPL-MCNC: 0.71 MG/DL (ref 0.5–1.4)
ERYTHROCYTE [DISTWIDTH] IN BLOOD BY AUTOMATED COUNT: 48.8 FL (ref 35.9–50)
EST. AVERAGE GLUCOSE BLD GHB EST-MCNC: 103 MG/DL
FASTING STATUS PATIENT QL REPORTED: NORMAL
FSH SERPL-ACNC: 4.1 MIU/ML
GFR SERPLBLD CREATININE-BSD FMLA CKD-EPI: 120 ML/MIN/1.73 M 2
GLOBULIN SER CALC-MCNC: 3.2 G/DL (ref 1.9–3.5)
GLUCOSE SERPL-MCNC: 97 MG/DL (ref 65–99)
HBA1C MFR BLD: 5.2 % (ref 4–5.6)
HCT VFR BLD AUTO: 38.4 % (ref 37–47)
HDLC SERPL-MCNC: 82 MG/DL
HGB BLD-MCNC: 12.1 G/DL (ref 12–16)
HIV 1+2 AB+HIV1 P24 AG SERPL QL IA: NORMAL
LDLC SERPL CALC-MCNC: 115 MG/DL
LH SERPL-ACNC: 4.8 IU/L
MCH RBC QN AUTO: 26.5 PG (ref 27–33)
MCHC RBC AUTO-ENTMCNC: 31.5 G/DL (ref 32.2–35.5)
MCV RBC AUTO: 84.2 FL (ref 81.4–97.8)
PLATELET # BLD AUTO: 309 K/UL (ref 164–446)
PMV BLD AUTO: 9.9 FL (ref 9–12.9)
POTASSIUM SERPL-SCNC: 4 MMOL/L (ref 3.6–5.5)
PROT SERPL-MCNC: 6.9 G/DL (ref 6–8.2)
RBC # BLD AUTO: 4.56 M/UL (ref 4.2–5.4)
SODIUM SERPL-SCNC: 138 MMOL/L (ref 135–145)
T3FREE SERPL-MCNC: 2.78 PG/ML (ref 2–4.4)
T4 FREE SERPL-MCNC: 0.8 NG/DL (ref 0.93–1.7)
TRIGL SERPL-MCNC: 156 MG/DL (ref 0–149)
TSH SERPL DL<=0.005 MIU/L-ACNC: 3.45 UIU/ML (ref 0.38–5.33)
VIT B12 SERPL-MCNC: 369 PG/ML (ref 211–911)
WBC # BLD AUTO: 7.1 K/UL (ref 4.8–10.8)

## 2024-02-22 PROCEDURE — 83001 ASSAY OF GONADOTROPIN (FSH): CPT

## 2024-02-22 PROCEDURE — 83036 HEMOGLOBIN GLYCOSYLATED A1C: CPT

## 2024-02-22 PROCEDURE — 87389 HIV-1 AG W/HIV-1&-2 AB AG IA: CPT

## 2024-02-22 PROCEDURE — 84443 ASSAY THYROID STIM HORMONE: CPT

## 2024-02-22 PROCEDURE — 80061 LIPID PANEL: CPT

## 2024-02-22 PROCEDURE — 85027 COMPLETE CBC AUTOMATED: CPT

## 2024-02-22 PROCEDURE — 82607 VITAMIN B-12: CPT

## 2024-02-22 PROCEDURE — 83002 ASSAY OF GONADOTROPIN (LH): CPT

## 2024-02-22 PROCEDURE — 82306 VITAMIN D 25 HYDROXY: CPT

## 2024-02-22 PROCEDURE — 84481 FREE ASSAY (FT-3): CPT

## 2024-02-22 PROCEDURE — 84439 ASSAY OF FREE THYROXINE: CPT

## 2024-02-22 PROCEDURE — 80053 COMPREHEN METABOLIC PANEL: CPT

## 2024-02-22 PROCEDURE — 84402 ASSAY OF FREE TESTOSTERONE: CPT

## 2024-02-22 PROCEDURE — 84270 ASSAY OF SEX HORMONE GLOBUL: CPT

## 2024-02-22 PROCEDURE — 36415 COLL VENOUS BLD VENIPUNCTURE: CPT

## 2024-02-22 PROCEDURE — 84403 ASSAY OF TOTAL TESTOSTERONE: CPT

## 2024-02-23 ENCOUNTER — TELEMEDICINE (OUTPATIENT)
Dept: MEDICAL GROUP | Facility: MEDICAL CENTER | Age: 29
End: 2024-02-23

## 2024-02-23 VITALS — BODY MASS INDEX: 38.45 KG/M2 | WEIGHT: 245 LBS | HEIGHT: 67 IN

## 2024-02-23 DIAGNOSIS — E55.9 VITAMIN D DEFICIENCY: ICD-10-CM

## 2024-02-23 DIAGNOSIS — D64.9 ANEMIA, UNSPECIFIED TYPE: ICD-10-CM

## 2024-02-23 DIAGNOSIS — G90.A POTS (POSTURAL ORTHOSTATIC TACHYCARDIA SYNDROME): ICD-10-CM

## 2024-02-23 PROCEDURE — 99214 OFFICE O/P EST MOD 30 MIN: CPT | Mod: 95 | Performed by: NURSE PRACTITIONER

## 2024-02-23 ASSESSMENT — FIBROSIS 4 INDEX: FIB4 SCORE: 0.28

## 2024-02-23 NOTE — ASSESSMENT & PLAN NOTE
Chronic condition.  Having symptoms 6 age of 18, S/s include lightheadedness and tachycardia.  Has been evaluated by cardiology and currently treated  Propranolol.  Patient was also evaluated by POTS clinic and neurology department at the [facility].

## 2024-02-23 NOTE — PROGRESS NOTES
Virtual Visit: Established Patient   This visit was conducted via Zoom using secure and encrypted videoconferencing technology.   The patient was in their home in the [place] [facility].    The patient's identity was confirmed and verbal consent was obtained for this virtual visit.   This evaluation was conducted via Zoom using secure and encrypted videoconferencing technology. The patient was in their home in the [facility].    The patient's identity was confirmed and verbal consent was obtained for this virtual visit.     Subjective:   CC:   Chief Complaint   Patient presents with    Follow-Up    Lab Results    Other     Care plan information from [facility]        Diana Hill is a 28 y.o. female presenting for evaluation and management of:    Vitamin D deficiency   Latest Reference Range & Units 02/22/24 08:22   25-Hydroxy   Vitamin D 25 30 - 100 ng/mL 28 (L)   (L): Data is abnormally low  Would like to take OTC     Anemia   Latest Reference Range & Units 09/02/22 04:57 02/22/24 08:26   Hemoglobin 12.0 - 16.0 g/dL 11.7 (L) 12.1   Hematocrit 37.0 - 47.0 % 36.7 (L) 38.4   (L): Data is abnormally low  Reports heavy menses, following with OB. On OCP.     POTS (postural orthostatic tachycardia syndrome)  Chronic condition.  Having symptoms 6 age of 18, S/s include lightheadedness and tachycardia.  Has been evaluated by cardiology and currently treated  Propranolol.  Patient was also evaluated by POTS clinic and neurology department at the [facility].     ROS     Current medicines (including changes today)  Current Outpatient Medications   Medication Sig Dispense Refill    sertraline (ZOLOFT) 100 MG Tab Take 2 Tablets by mouth every day. 180 Tablet 1    dicyclomine (BENTYL) 10 MG Cap TAKE 1 CAPSULE BY MOUTH 4 TIMES A DAY,BEFORE MEALS AND AT BEDTIME. 360 Capsule 1    rizatriptan (MAXALT) 10 MG tablet Take 10 mg by mouth.      Semaglutide (WEGOVY) 0.25 MG/0.5ML Solution Auto-injector Pen-injector  "Inject 0.5 mL under the skin every 7 days. 2 mL 1    metFORMIN ER (GLUCOPHAGE XR) 500 MG TABLET SR 24 HR Take 4 Tablets by mouth every day. 360 Tablet 1    ondansetron (ZOFRAN ODT) 4 MG TABLET DISPERSIBLE Take 1 Tablet by mouth every 6 hours as needed for Nausea/Vomiting (Nausea/Vomiting). 20 Tablet 3    topiramate (TOPAMAX) 50 MG tablet Take 50 mg by mouth every day.      tretinoin (RETIN-A) 0.025 % gel APPLY 1 APPLICATION TOPICALLY EVERY BEDTIME. START WITH 2 -3 APPS PER WEEK AND WORK UP AS TOLERATED 45 g 0    Norgestim-Eth Estrad Triphasic 0.18/0.215/0.25 MG-25 MCG Tab Take 1 Tablet by mouth every day.      albuterol 108 (90 Base) MCG/ACT Aero Soln inhalation aerosol Inhale 2 Puffs by mouth every four hours as needed for Shortness of Breath. 1 Inhaler 0     No current facility-administered medications for this visit.       Patient Active Problem List    Diagnosis Date Noted    Irritable bowel syndrome with diarrhea 01/05/2024    ADHD (attention deficit hyperactivity disorder), combined type 02/24/2023    POTS (postural orthostatic tachycardia syndrome) 02/24/2023    BMI 38.0-38.9,adult 09/02/2022    Generalized anxiety disorder 11/17/2021    Moderate episode of recurrent major depressive disorder (HCC) 11/17/2021    Chronic fatigue syndrome 08/17/2021    Intractable migraine with aura without status migrainosus 08/10/2021    Obstructive sleep apnea syndrome 08/10/2021    Brittle nails 03/25/2021    Subclinical hypothyroidism 11/23/2020    Insulin resistance 11/23/2020    Anemia 11/23/2020    Morbid obesity (HCC) 10/07/2020    Vertigo 03/03/2020    PCOS (polycystic ovarian syndrome) 11/19/2019    Vitamin D deficiency 11/19/2019        Objective:   Ht 1.702 m (5' 7\")   Wt 111 kg (245 lb) Comment: pt stated  BMI 38.37 kg/m²     Physical Exam:  Constitutional: Alert, no distress, well-groomed.  Skin: No rashes in visible areas.  Eye: Round. Conjunctiva clear, lids normal. No icterus.   ENMT: Lips pink without " lesions, good dentition, moist mucous membranes. Phonation normal.  Neck: No masses, no thyromegaly. Moves freely without pain.  Respiratory: Unlabored respiratory effort, no cough or audible wheeze  Psych: Alert and oriented x3, normal affect and mood.     Assessment and Plan:   The following treatment plan was discussed:   1. Vitamin D deficiency  Uncontrolled, stable.  Discussed the importance of optimization.  Patient to take OTC vitamin D 5000 IUs daily with food for better absorption.    2. Anemia, unspecified type  Stable.  Discussed importance of optimization with food to avoid taking concurrently with dairy or caffeine, take with vitamin C for better absorption.  Can also try supplementing with over-the-counter iron supplement, take magnesium if experiencing constipation.  Patient will be following up with OB/GYN for menorrhagia symptoms contributing to this issue  - CBC WITHOUT DIFFERENTIAL; Future  - Comp Metabolic Panel; Future  - IRON/TOTAL IRON BIND; Future  - FERRITIN; Future    3. POTS (postural orthostatic tachycardia syndrome)  Stable on current regimen.  Patient is currently following with cardiology locally.     Discussed with patient possible alternative diagnoses, patient is to take all medications as prescribed.      If symptoms persist FU w/PCP, if symptoms worsen go to emergency room.      If experiencing any side effects from prescribed medications report to the office immediately or go to emergency room.     Reviewed indication, dosage, usage and potential adverse effects of prescribed medications.      Reviewed risks and benefits of treatment plan. Patient verbalizes understanding of all instruction and verbally agrees to plan.     Discussed plan with the patient, and patient agrees to the above.      I personally reviewed prior external notes and test results pertinent to today's visit.     Follow-up: 6 mos

## 2024-02-23 NOTE — ASSESSMENT & PLAN NOTE
Latest Reference Range & Units 09/02/22 04:57 02/22/24 08:26   Hemoglobin 12.0 - 16.0 g/dL 11.7 (L) 12.1   Hematocrit 37.0 - 47.0 % 36.7 (L) 38.4   (L): Data is abnormally low  Reports heavy menses, following with OB. On OCP.

## 2024-02-23 NOTE — ASSESSMENT & PLAN NOTE
Latest Reference Range & Units 02/22/24 08:22   25-Hydroxy   Vitamin D 25 30 - 100 ng/mL 28 (L)   (L): Data is abnormally low  Would like to take OTC

## 2024-02-24 LAB
SHBG SERPL-SCNC: 304 NMOL/L (ref 25–122)
TESTOST FREE MFR SERPL: 0.3 %
TESTOST FREE SERPL-MCNC: <1 PG/ML
TESTOST SERPL-MCNC: 12 NG/DL

## 2024-02-27 ENCOUNTER — OFFICE VISIT (OUTPATIENT)
Dept: URGENT CARE | Facility: CLINIC | Age: 29
End: 2024-02-27
Payer: MEDICAID

## 2024-02-27 ENCOUNTER — APPOINTMENT (OUTPATIENT)
Dept: RADIOLOGY | Facility: IMAGING CENTER | Age: 29
End: 2024-02-27
Attending: PHYSICIAN ASSISTANT
Payer: MEDICAID

## 2024-02-27 VITALS
SYSTOLIC BLOOD PRESSURE: 102 MMHG | BODY MASS INDEX: 38.45 KG/M2 | OXYGEN SATURATION: 95 % | WEIGHT: 245 LBS | RESPIRATION RATE: 16 BRPM | HEART RATE: 97 BPM | DIASTOLIC BLOOD PRESSURE: 74 MMHG | TEMPERATURE: 96.9 F | HEIGHT: 67 IN

## 2024-02-27 DIAGNOSIS — R06.02 SHORTNESS OF BREATH: ICD-10-CM

## 2024-02-27 DIAGNOSIS — J22 LRTI (LOWER RESPIRATORY TRACT INFECTION): ICD-10-CM

## 2024-02-27 DIAGNOSIS — J18.9 COMMUNITY ACQUIRED PNEUMONIA OF RIGHT MIDDLE LOBE OF LUNG: ICD-10-CM

## 2024-02-27 PROCEDURE — 3078F DIAST BP <80 MM HG: CPT | Performed by: PHYSICIAN ASSISTANT

## 2024-02-27 PROCEDURE — 71046 X-RAY EXAM CHEST 2 VIEWS: CPT | Mod: TC | Performed by: PHYSICIAN ASSISTANT

## 2024-02-27 PROCEDURE — 3074F SYST BP LT 130 MM HG: CPT | Performed by: PHYSICIAN ASSISTANT

## 2024-02-27 PROCEDURE — 99213 OFFICE O/P EST LOW 20 MIN: CPT | Performed by: PHYSICIAN ASSISTANT

## 2024-02-27 RX ORDER — ALBUTEROL SULFATE 90 UG/1
2 AEROSOL, METERED RESPIRATORY (INHALATION) EVERY 4 HOURS PRN
Qty: 1 EACH | Refills: 0 | Status: SHIPPED | OUTPATIENT
Start: 2024-02-27

## 2024-02-27 RX ORDER — DOXYCYCLINE HYCLATE 100 MG
100 TABLET ORAL 2 TIMES DAILY
Qty: 14 TABLET | Refills: 0 | Status: SHIPPED | OUTPATIENT
Start: 2024-02-27 | End: 2024-03-05

## 2024-02-27 RX ORDER — PROPRANOLOL HYDROCHLORIDE 10 MG/1
10 TABLET ORAL
COMMUNITY
Start: 2024-02-13

## 2024-02-27 ASSESSMENT — ENCOUNTER SYMPTOMS
DIARRHEA: 0
VOMITING: 0
SPUTUM PRODUCTION: 0
WHEEZING: 0
SORE THROAT: 1
FEVER: 1
MYALGIAS: 1
ABDOMINAL PAIN: 0
COUGH: 1
SWEATS: 0
HEADACHES: 0
SHORTNESS OF BREATH: 1
RHINORRHEA: 1
NAUSEA: 0
CHILLS: 1
HEMOPTYSIS: 0

## 2024-02-27 ASSESSMENT — FIBROSIS 4 INDEX: FIB4 SCORE: 0.28

## 2024-02-27 NOTE — PROGRESS NOTES
Subjective     Diana Hill is a 28 y.o. female who presents with Cough (Tightness, sob, sore throat x Friday )            Cough  This is a new problem. Episode onset: 5 days. The problem has been gradually worsening. The cough is Non-productive. Associated symptoms include chills, a fever (last fever was last night 100.1F), myalgias, rhinorrhea, a sore throat and shortness of breath. Pertinent negatives include no ear congestion, ear pain, headaches, hemoptysis, nasal congestion, postnasal drip, rash, sweats or wheezing. Nothing aggravates the symptoms. She has tried nothing for the symptoms.     Patients mother was diagnosed with pneumonia yesterday.  Mom also tested negative for RSV, FLU and COVID.    Past Medical History:   Diagnosis Date    Anemia     hx of borderline anemia    Anxiety     Asthma     Bowel habit changes 07/31/2023    IBS    Breath shortness     Cataract     Celiac disease     Daytime sleepiness     Dental disorder 07/31/2023    Upper flipper    Depression     Diabetes (HCC)     Insomnia     Insulin resistance     Migraine     Morning headache     PCOS (polycystic ovarian syndrome)     POTS (postural orthostatic tachycardia syndrome)     Sleep apnea     Snoring     Vertigo     Vitamin D deficiency        Past Surgical History:   Procedure Laterality Date    TONSILLECTOMY Bilateral 8/15/2023    Procedure: TONSILLECTOMY;  Surgeon: Vee Singh M.D.;  Location: SURGERY SAME DAY [facility];  Service: Ent    MATTHIAS BY LAPAROSCOPY N/A 9/13/2018    Procedure: MATTHIAS BY LAPAROSCOPY;  Surgeon: Hayden Wilhelm M.D.;  Location: SURGERY [facility];  Service: General       Family History   Problem Relation Age of Onset    Thyroid Mother     Depression Father     Alcohol abuse Father     No Known Problems Maternal Grandmother     Heart Attack Maternal Grandfather 66    Cancer Paternal Grandmother         Breast , esophageal    Stroke Paternal Grandmother 66    Alcohol abuse Paternal  "Grandfather     Lung Disease Paternal Grandfather     Sleep Apnea Brother      Allergies:  Gluten meal      Medications, Allergies, and current problem list reviewed today in Epic      Review of Systems   Constitutional:  Positive for chills, fever (last fever was last night 100.1F) and malaise/fatigue.   HENT:  Positive for rhinorrhea and sore throat. Negative for congestion, ear discharge, ear pain and postnasal drip.    Respiratory:  Positive for cough and shortness of breath. Negative for hemoptysis, sputum production and wheezing.    Gastrointestinal:  Negative for abdominal pain, diarrhea, nausea and vomiting.   Musculoskeletal:  Positive for myalgias.   Skin:  Negative for rash.   Neurological:  Negative for headaches.     All other systems reviewed and are negative.            Objective     /74 (BP Location: Right arm, Patient Position: Sitting, BP Cuff Size: Adult)   Pulse 97   Temp 36.1 °C (96.9 °F) (Temporal)   Resp 16   Ht 1.702 m (5' 7\")   Wt 111 kg (245 lb)   SpO2 95%   BMI 38.37 kg/m²      Physical Exam  Constitutional:       General: She is not in acute distress.     Appearance: Normal appearance. She is not ill-appearing.   HENT:      Head: Normocephalic and atraumatic.      Mouth/Throat:      Mouth: Mucous membranes are moist.      Pharynx: No posterior oropharyngeal erythema.   Eyes:      Conjunctiva/sclera: Conjunctivae normal.   Cardiovascular:      Rate and Rhythm: Normal rate and regular rhythm.      Heart sounds: Normal heart sounds.   Pulmonary:      Effort: Pulmonary effort is normal. No respiratory distress.      Breath sounds: Normal breath sounds. No stridor. No wheezing, rhonchi or rales.   Skin:     General: Skin is warm and dry.   Neurological:      General: No focal deficit present.      Mental Status: She is alert and oriented to person, place, and time.   Psychiatric:         Mood and Affect: Mood normal.         Behavior: Behavior normal.         Thought Content: " Thought content normal.         Judgment: Judgment normal.                  2/27/2024 10:47 AM     HISTORY/REASON FOR EXAM:  Cough and congestion for 5 days.     TECHNIQUE/EXAM DESCRIPTION AND NUMBER OF VIEWS:  Two views of the chest.     COMPARISON:  Chest radiography, 9/1/2022.     FINDINGS:  Lungs: Patchy parenchymal consolidation is present in the right middle lobe lateral segment, compatible with right middle lobe pneumonia is less likely atelectasis. There is linear subsegmental atelectasis in the left lung base. No pleural effusion or   visible pneumothorax. Normal lung volumes.     Mediastinum: Normal.     Other: None.     IMPRESSION:     1. Lateral segment right middle lobe pneumonia versus atelectasis. No associated pleural effusion.  2. Subsegmental atelectasis in the left lung base.           Assessment & Plan        1. Community acquired pneumonia of right middle lobe of lung  DX-CHEST-2 VIEWS    doxycycline (VIBRAMYCIN) 100 MG Tab      2. Shortness of breath  albuterol 108 (90 Base) MCG/ACT Aero Soln inhalation aerosol          - DX-CHEST-2 VIEWS; Future        doxycycline (VIBRAMYCIN) 100 MG Tab, Take 1 Tablet by mouth 2 times a day for 7 days., Disp: 14 Tablet, Rfl: 0      Differential diagnoses, Supportive care, and indications for immediate follow-up discussed with patient.   Pathogenesis of diagnosis discussed including typical length and natural progression.   Instructed to return to clinic or nearest emergency department for any change in condition, further concerns, or worsening of symptoms.    The patient demonstrated a good understanding and agreed with the treatment plan.    Verónica Denis P.A.-C.

## 2024-02-28 ENCOUNTER — TELEMEDICINE (OUTPATIENT)
Dept: ENDOCRINOLOGY | Facility: MEDICAL CENTER | Age: 29
End: 2024-02-28
Attending: INTERNAL MEDICINE
Payer: MEDICAID

## 2024-02-28 VITALS — HEIGHT: 67 IN | HEART RATE: 77 BPM | BODY MASS INDEX: 38.45 KG/M2 | OXYGEN SATURATION: 95 % | WEIGHT: 245 LBS

## 2024-02-28 DIAGNOSIS — R73.03 PRE-DIABETES: ICD-10-CM

## 2024-02-28 DIAGNOSIS — E55.9 VITAMIN D DEFICIENCY: ICD-10-CM

## 2024-02-28 DIAGNOSIS — E03.9 PRIMARY HYPOTHYROIDISM: ICD-10-CM

## 2024-02-28 DIAGNOSIS — E28.2 PCOS (POLYCYSTIC OVARIAN SYNDROME): ICD-10-CM

## 2024-02-28 PROCEDURE — 99214 OFFICE O/P EST MOD 30 MIN: CPT | Mod: 95 | Performed by: INTERNAL MEDICINE

## 2024-02-28 RX ORDER — LEVOTHYROXINE SODIUM 0.05 MG/1
50 TABLET ORAL
Qty: 90 TABLET | Refills: 2 | Status: SHIPPED | OUTPATIENT
Start: 2024-02-28

## 2024-02-28 ASSESSMENT — FIBROSIS 4 INDEX: FIB4 SCORE: 0.28

## 2024-02-28 NOTE — PROGRESS NOTES
Chief Complaint: Follow up for history of abnormal thyroid tests, history of PCOS, history of obesity and insulin resistance syndrome.  Patient was presented for a telehealth consultation via secure and encrypted videoconferencing technology.   This encounter was conducted via Zoom . Verbal consent was obtained. Patient's identity was verified.    Virtual visit consent       This evaluation was conducted via Zoom using secure and encrypted videoconferencing technology.   The patient was (home or other private:[id number]) in the [facility].   The patient's identity was confirmed and verbal consent was obtained for this virtual visit.         HPI:     Diana Hill is a 28 y.o. female here for follow up of the above medical issue.  Her other comorbid issues include migraines treated with Topamax, depression treated with Zoloft    We initially saw the patient as a telehealth consultation back in November 2020.  She had a normal TSH of 2.2 with a slightly low free T4 of 0.87 with normal total T3 levels on August 31, 2020    She previously complained of fatigue, being easily jolted, having migraines, hair loss and dry skin.   She had seen neurology and had a negative work-up      I am seeing her today to go over her labs  Her TSH is normal at 3.4 but free T4 is slightly low at 0.8 Free T3 is 2.78 on February 2024      We respect her PCOS work-up done showed normal 17 hydroxyprogesterone level and normal prolactin levels.  She had elevated baseline testosterone levels compatible with PCOS  She also completed a MN saliva cortisol x 3 on 7/20223 w/c was normal (0.044, 0.068, 0.074)    She remains on Metformin extended release 1000 mg twice a day   She is on oral contraceptives as well.    She reports regular menstrual cycles and denies excessive facial hair or body hair    Her A1c is controlled at 5.2% on February 22, 2024  She does have hyperlipidemia total cholesterol is 228 triglycerides 156 HDL 82 and  on  February 2024        Her total testosterone is 12 and free testosterone is less than 1 on February 2024  SHBG is elevated at 304 because of her oral contraceptives        She is obese with BMI of 38  She used to weight 280 before Metformin  She now weighs 244 after starting metformin  She reports having tried and failed diet and exercise for 6 months  (due to POTS)  She tried and failed Phentermine (insomnia)  We tried prescribing Wegovy last time but it was not approved by her insurance  She informed me that she will focus on diet and exercise        Lastly her vitamin D is low at 28 Feb 22 2024      Patient's medications, allergies, and social histories were reviewed and updated as appropriate.      ROS:     CONS:     No fever, no chills   EYES:     No diplopia, no blurry vision   CV:           No chest pain, no palpitations   PULM:     No SOB, no cough, no hemoptysis.   GI:            No nausea, no vomiting, no diarrhea, no constipation   ENDO:     No polyuria, no polydipsia, no heat intolerance, no cold intolerance       Past Medical History:  Problem List:  2024-01: Irritable bowel syndrome with diarrhea  2024-01: Pre-diabetes  2023-08: Chronic tonsillitis  2023-02: ADHD (attention deficit hyperactivity disorder), combined   type  2023-02: POTS (postural orthostatic tachycardia syndrome)  2023-02: Sore throat  2023-02: Chronic right flank pain  2022-09: BMI 38.0-38.9,adult  2022-09: Orthostasis  2022-09: Near syncope  2022-07: Heart palpitations  2022-06: Breast lump in female  2022-04: Bilateral flank pain  2021-11: Generalized anxiety disorder  2021-11: Moderate episode of recurrent major depressive disorder (HCC)  2021-10: Plantar wart, left foot  2021-10: Medication management  2021-08: Chronic fatigue syndrome  2021-08: Intractable migraine with aura without status migrainosus  2021-08: Obstructive sleep apnea syndrome  2021-08: Vestibular migraine  2021-04: Otalgia of left ear  2021-04: History of canker  "sores  2021-04: Dysthymia  2021-03: Polyarthralgia  2021-03: Brittle nails  2020-12: Cervical lymphadenopathy  2020-11: Subclinical hypothyroidism  2020-11: Insulin resistance  2020-11: Anemia  2020-10: Morbid obesity (HCC)  2020-09: Skin rash  2020-09: Administrative encounter  2020-09: Hospital discharge follow-up  2020-08: Rash of hands  2020-08: Snoring  2020-03: Vertigo  2019-11: PCOS (polycystic ovarian syndrome)  2019-11: Vitamin D deficiency  2019-11: High triglycerides  2019-11: Anxiety and depression  2018-09: Biliary colic      Past Surgical History:  Past Surgical History:   Procedure Laterality Date    TONSILLECTOMY Bilateral 8/15/2023    Procedure: TONSILLECTOMY;  Surgeon: Vee Singh M.D.;  Location: SURGERY SAME DAY [facility];  Service: Ent    MATTHIAS BY LAPAROSCOPY N/A 9/13/2018    Procedure: MATTHIAS BY LAPAROSCOPY;  Surgeon: Hayden Wilhelm M.D.;  Location: SURGERY [facility];  Service: General        Allergies:  Gluten meal     Social History:  Social History     Tobacco Use    Smoking status: Never     Passive exposure: Current (past and Currently)    Smokeless tobacco: Never   Vaping Use    Vaping Use: Never used   Substance Use Topics    Alcohol use: Yes     Comment: once/year    Drug use: No        Family History:   family history includes Alcohol abuse in her father and paternal grandfather; Cancer in her paternal grandmother; Depression in her father; Heart Attack (age of onset: 66) in her maternal grandfather; Lung Disease in her paternal grandfather; No Known Problems in her maternal grandmother; Sleep Apnea in her brother; Stroke (age of onset: 66) in her paternal grandmother; Thyroid in her mother.      PHYSICAL EXAM:   Vital signs: Pulse 77   Ht 1.702 m (5' 7\")   Wt 111 kg (245 lb)   SpO2 95%   BMI 38.37 kg/m²   GENERAL: Obese female in no apparent distress.   EYE:  No ocular asymmetry, PERRLA  HENT: Pink, moist mucous membranes.    NECK: No thyromegaly. "   CARDIOVASCULAR:  No murmurs  LUNGS: Clear breath sounds  ABDOMEN: Soft, nontender   EXTREMITIES: No clubbing, cyanosis, or edema.   NEUROLOGICAL: No gross focal motor abnormalities   LYMPH: No cervical adenopathy palpated.   SKIN: No rashes, lesions.       Labs:  Lab Results   Component Value Date/Time    SODIUM 138 02/22/2024 08:22 AM    POTASSIUM 4.0 02/22/2024 08:22 AM    CHLORIDE 106 02/22/2024 08:22 AM    CO2 20 02/22/2024 08:22 AM    ANION 12.0 02/22/2024 08:22 AM    GLUCOSE 97 02/22/2024 08:22 AM    BUN 14 02/22/2024 08:22 AM    CREATININE 0.71 02/22/2024 08:22 AM    CALCIUM 8.8 02/22/2024 08:22 AM    ASTSGOT 11 (L) 02/22/2024 08:22 AM    ALTSGPT 11 02/22/2024 08:22 AM    TBILIRUBIN 0.2 02/22/2024 08:22 AM    ALBUMIN 3.7 02/22/2024 08:22 AM    TOTPROTEIN 6.9 02/22/2024 08:22 AM    GLOBULIN 3.2 02/22/2024 08:22 AM    AGRATIO 1.2 02/22/2024 08:22 AM       Lab Results   Component Value Date/Time    SODIUM 137 03/26/2021 0959    POTASSIUM 4.2 03/26/2021 0959    CHLORIDE 102 03/26/2021 0959    CO2 23 03/26/2021 0959    GLUCOSE 89 03/26/2021 0959    BUN 12 03/26/2021 0959    CREATININE 0.58 03/26/2021 0959    CALCIUM 9.2 03/26/2021 0959    ANION 12.0 03/26/2021 0959       Lab Results   Component Value Date/Time    CHOLSTRLTOT 185 11/04/2019 0850    TRIGLYCERIDE 161 (H) 11/04/2019 0850    HDL 67 11/04/2019 0850    LDL 86 11/04/2019 0850       Lab Results   Component Value Date/Time    TSHULTRASEN 2.800 11/24/2020 1049     Lab Results   Component Value Date/Time    FREET4 0.87 (L) 08/31/2020 2133     Lab Results   Component Value Date/Time    FREET3 3.16 08/28/2018 1120     No results found for: THYSTIMIG    Lab Results   Component Value Date/Time    MICROSOMALA <9.0 09/15/2020 1126         Imaging:      ASSESSMENT/PLAN:     1. Primary hypothyroidism  TSH is high normal free T4 is low compatible with probable primary hypothyroidism.  She reports a family history of this condition.  Recommend starting levothyroxine  50 mcg daily  Reviewed proper administration of thyroid hormone  Patient should take thyroid hormone 30-60 minutes before breakfast on an empty stomach plain water and not take it together with food, iron, calcium, and antacids.  Iron, calcium, and antacids should be taken at least 4 hours apart from thyroid hormone.  Repeat TSH in 2 months and continue follow-up in July    2. PCOS (polycystic ovarian syndrome)  Well-controlled  Her total testosterone is normal Free testosterone is not elevated  I explained to the patient that SHBG is elevated because she is taking estrogen in her oral contraceptives  Estrogen stimulates the liver to increase production of binding proteins  Continue metformin and oral contraceptives for PCOS management      3. Pre-diabetes  Controlled   Weight loss noted  A1c is 5.2%  continue metformin  Continue diet and exercise     4. Vitamin D deficiency  Uncontrolled  Vitamin D is low at 20 I recommend that she take vitamin D3 5000 IU daily we will repeat her vitamin D with her next labs in July      Return in about 5 months (around 7/28/2024).      Total time spent on day of service was over 60 minutes which included obtaining a detailed history and physical exam, ordering labs, coordinating care and scheduling future follow-up  We went over her latest labs and also discussed thyroid replacement therapy and how to properly take levothyroxine    Thank you kindly for allowing me to participate in the thyroid care plan for this patient.    Wilman Crow MD, FACE, [facility]      CC:   DANIELE Resendiz

## 2024-05-20 ENCOUNTER — OFFICE VISIT (OUTPATIENT)
Dept: URGENT CARE | Facility: CLINIC | Age: 29
End: 2024-05-20
Payer: MEDICAID

## 2024-05-20 ENCOUNTER — APPOINTMENT (OUTPATIENT)
Dept: URGENT CARE | Facility: CLINIC | Age: 29
End: 2024-05-20
Payer: MEDICAID

## 2024-05-20 VITALS
HEART RATE: 107 BPM | OXYGEN SATURATION: 98 % | WEIGHT: 252 LBS | RESPIRATION RATE: 18 BRPM | TEMPERATURE: 98.6 F | HEIGHT: 67 IN | DIASTOLIC BLOOD PRESSURE: 86 MMHG | BODY MASS INDEX: 39.55 KG/M2 | SYSTOLIC BLOOD PRESSURE: 122 MMHG

## 2024-05-20 DIAGNOSIS — J45.31 MILD PERSISTENT ASTHMA WITH EXACERBATION: ICD-10-CM

## 2024-05-20 DIAGNOSIS — R05.1 ACUTE COUGH: ICD-10-CM

## 2024-05-20 DIAGNOSIS — J06.9 UPPER RESPIRATORY TRACT INFECTION, UNSPECIFIED TYPE: ICD-10-CM

## 2024-05-20 LAB
FLUAV RNA SPEC QL NAA+PROBE: NEGATIVE
FLUBV RNA SPEC QL NAA+PROBE: NEGATIVE
RSV RNA SPEC QL NAA+PROBE: NEGATIVE
SARS-COV-2 RNA RESP QL NAA+PROBE: NEGATIVE

## 2024-05-20 PROCEDURE — 3074F SYST BP LT 130 MM HG: CPT

## 2024-05-20 PROCEDURE — 3079F DIAST BP 80-89 MM HG: CPT

## 2024-05-20 PROCEDURE — 99214 OFFICE O/P EST MOD 30 MIN: CPT | Mod: 25

## 2024-05-20 PROCEDURE — 94640 AIRWAY INHALATION TREATMENT: CPT | Mod: 59

## 2024-05-20 PROCEDURE — 87637 SARSCOV2&INF A&B&RSV AMP PRB: CPT | Mod: QW

## 2024-05-20 RX ORDER — FLUTICASONE PROPIONATE AND SALMETEROL 100; 50 UG/1; UG/1
1 POWDER RESPIRATORY (INHALATION) EVERY 12 HOURS
Qty: 1 EACH | Refills: 0 | Status: SHIPPED | OUTPATIENT
Start: 2024-05-20

## 2024-05-20 RX ORDER — ALBUTEROL SULFATE 2.5 MG/3ML
2.5 SOLUTION RESPIRATORY (INHALATION) ONCE
Status: COMPLETED | OUTPATIENT
Start: 2024-05-20 | End: 2024-05-20

## 2024-05-20 RX ORDER — METHYLPREDNISOLONE 4 MG/1
TABLET ORAL
Qty: 21 TABLET | Refills: 0 | Status: SHIPPED | OUTPATIENT
Start: 2024-05-20

## 2024-05-20 RX ORDER — IPRATROPIUM BROMIDE AND ALBUTEROL SULFATE 2.5; .5 MG/3ML; MG/3ML
3 SOLUTION RESPIRATORY (INHALATION) ONCE
Status: COMPLETED | OUTPATIENT
Start: 2024-05-20 | End: 2024-05-20

## 2024-05-20 RX ADMIN — ALBUTEROL SULFATE 2.5 MG: 2.5 SOLUTION RESPIRATORY (INHALATION) at 15:33

## 2024-05-20 RX ADMIN — IPRATROPIUM BROMIDE AND ALBUTEROL SULFATE 3 ML: 2.5; .5 SOLUTION RESPIRATORY (INHALATION) at 15:33

## 2024-05-20 ASSESSMENT — FIBROSIS 4 INDEX: FIB4 SCORE: 0.28

## 2024-05-20 NOTE — PROGRESS NOTES
Subjective:   Diana Hill is a 28 y.o. female who presents for Sore Throat (X 7 days) and Cough (X 7 days)      HPI:      Presents to urgent care with concerns of cough x 7 days.   Symptoms started after she returned to [place].  Reports wheezing, sob, chest tightness.   Cough is otherwise nonproductive. But she feels like she has fluid in her lungs.  Has pmh of asthma, mild alleviation with her inhaler  Sore throat, right sided swollen lymph nodes  Denies fevers, chills. Had body aches last night.  Tolerating oral intake. Normal urinary output  Denies rashes  Denies known sick contacts.     ROS As above in HPI    Medications:    Current Outpatient Medications on File Prior to Visit   Medication Sig Dispense Refill    levothyroxine (SYNTHROID) 50 MCG Tab Take 1 Tablet by mouth every morning on an empty stomach. 90 Tablet 2    propranolol (INDERAL) 10 MG Tab Take 10 mg by mouth.      albuterol 108 (90 Base) MCG/ACT Aero Soln inhalation aerosol Inhale 2 Puffs every four hours as needed for Shortness of Breath. 1 Each 0    sertraline (ZOLOFT) 100 MG Tab Take 2 Tablets by mouth every day. 180 Tablet 1    dicyclomine (BENTYL) 10 MG Cap TAKE 1 CAPSULE BY MOUTH 4 TIMES A DAY,BEFORE MEALS AND AT BEDTIME. 360 Capsule 1    rizatriptan (MAXALT) 10 MG tablet Take 10 mg by mouth.      metFORMIN ER (GLUCOPHAGE XR) 500 MG TABLET SR 24 HR Take 4 Tablets by mouth every day. 360 Tablet 1    ondansetron (ZOFRAN ODT) 4 MG TABLET DISPERSIBLE Take 1 Tablet by mouth every 6 hours as needed for Nausea/Vomiting (Nausea/Vomiting). 20 Tablet 3    topiramate (TOPAMAX) 50 MG tablet Take 50 mg by mouth every day.      Norgestim-Eth Estrad Triphasic 0.18/0.215/0.25 MG-25 MCG Tab Take 1 Tablet by mouth every day.      Semaglutide (WEGOVY) 0.25 MG/0.5ML Solution Auto-injector Pen-injector Inject 0.5 mL under the skin every 7 days. (Patient not taking: Reported on 5/20/2024) 2 mL 1    tretinoin (RETIN-A) 0.025 % gel APPLY 1 APPLICATION  "TOPICALLY EVERY BEDTIME. START WITH 2 -3 APPS PER WEEK AND WORK UP AS TOLERATED (Patient not taking: Reported on 5/20/2024) 45 g 0     No current facility-administered medications on file prior to visit.        Allergies:   Gluten meal    Problem List:   Patient Active Problem List   Diagnosis    PCOS (polycystic ovarian syndrome)    Vitamin D deficiency    Vertigo    Morbid obesity (HCC)    Subclinical hypothyroidism    Insulin resistance    Anemia    Brittle nails    Intractable migraine with aura without status migrainosus    Obstructive sleep apnea syndrome    Chronic fatigue syndrome    Generalized anxiety disorder    Moderate episode of recurrent major depressive disorder (HCC)    BMI 38.0-38.9,adult    ADHD (attention deficit hyperactivity disorder), combined type    POTS (postural orthostatic tachycardia syndrome)    Irritable bowel syndrome with diarrhea        Surgical History:  Past Surgical History:   Procedure Laterality Date    TONSILLECTOMY Bilateral 8/15/2023    Procedure: TONSILLECTOMY;  Surgeon: Vee Singh M.D.;  Location: SURGERY SAME DAY [facility];  Service: Ent    MATTHIAS BY LAPAROSCOPY N/A 9/13/2018    Procedure: MATTHIAS BY LAPAROSCOPY;  Surgeon: Hayden Wilhelm M.D.;  Location: SURGERY [facility];  Service: General       Past Social Hx:   Social History     Tobacco Use    Smoking status: Never     Passive exposure: Current (past and Currently)    Smokeless tobacco: Never   Vaping Use    Vaping status: Never Used   Substance Use Topics    Alcohol use: Yes     Comment: once/year    Drug use: No          Problem list, medications, and allergies reviewed by myself today in Epic.     Objective:     /86   Pulse (!) 107   Temp 37 °C (98.6 °F) (Temporal)   Resp 18   Ht 1.702 m (5' 7\")   Wt 114 kg (252 lb)   SpO2 98%   BMI 39.47 kg/m²     Physical Exam  Vitals and nursing note reviewed.   Constitutional:       General: She is not in acute distress.     Appearance: Normal " appearance. She is not ill-appearing.   HENT:      Head: Normocephalic.      Right Ear: Ear canal normal. A middle ear effusion is present.      Left Ear: Tympanic membrane and ear canal normal.      Nose: Congestion and rhinorrhea present. Rhinorrhea is clear.      Right Sinus: No maxillary sinus tenderness or frontal sinus tenderness.      Left Sinus: No maxillary sinus tenderness or frontal sinus tenderness.      Mouth/Throat:      Mouth: Mucous membranes are moist.      Pharynx: Oropharynx is clear. Uvula midline. Posterior oropharyngeal erythema present. No pharyngeal swelling or oropharyngeal exudate.      Tonsils: No tonsillar exudate.   Cardiovascular:      Rate and Rhythm: Regular rhythm. Tachycardia present.      Heart sounds: Normal heart sounds. No murmur heard.     No friction rub. No gallop.   Pulmonary:      Effort: Pulmonary effort is normal. No respiratory distress.      Breath sounds: No stridor. Examination of the right-upper field reveals wheezing. Examination of the left-upper field reveals wheezing. Examination of the right-lower field reveals decreased breath sounds. Examination of the left-lower field reveals decreased breath sounds. Decreased breath sounds and wheezing present. No rhonchi or rales.   Chest:      Chest wall: No tenderness.   Abdominal:      General: Bowel sounds are normal.      Palpations: Abdomen is soft.   Musculoskeletal:      Cervical back: No rigidity or tenderness.   Lymphadenopathy:      Cervical: Cervical adenopathy present.   Skin:     General: Skin is warm and dry.      Capillary Refill: Capillary refill takes less than 2 seconds.      Findings: No rash.   Neurological:      Mental Status: She is alert and oriented to person, place, and time.         Assessment/Plan:       Results for orders placed or performed in visit on 05/20/24   POCT CoV-2, Flu A/B, RSV by PCR   Result Value Ref Range    SARS-CoV-2 by PCR Negative Negative, Invalid    Influenza virus A RNA  Negative Negative, Invalid    Influenza virus B, PCR Negative Negative, Invalid    RSV, PCR Negative Negative, Invalid       Diagnosis and associated orders:   1. Acute cough  - POCT CoV-2, Flu A/B, RSV by PCR    2. Mild persistent asthma with exacerbation  - albuterol (Proventil) 2.5mg/3ml nebulizer solution 2.5 mg  - ipratropium-albuterol (DUONEB) nebulizer solution  - methylPREDNISolone (MEDROL DOSEPAK) 4 MG Tablet Therapy Pack; Follow schedule on package instructions.  Dispense: 21 Tablet; Refill: 0  - fluticasone-salmeterol (ADVAIR) 100-50 MCG/ACT AEROSOL POWDER, BREATH ACTIVATED; Inhale 1 Puff every 12 hours.  Dispense: 1 Each; Refill: 0    3. Upper respiratory tract infection, unspecified type        Comments/MDM:       Negative covid, influenza, rsv  Likely viral etiology causing mild asthma exacerbation  Patient received nebulized albuterol in office, which she reports partial resolution of symptoms. She then received duo neb, after which she states her symptoms resolved. Albuterol, medrol dose pack ordered. Refilled Advair.   Start otc antihistamines, Flonase.   Follow up with PCP advised  Return to ER for worsening symptoms         Return to clinic or go to ED if symptoms worsen or persist. Indications for ED discussed at length. Patient/Parent/Guardian voices understanding. Follow-up with your primary care provider in 3-5 days. Red flag symptoms discussed. All side effects of medication discussed including allergic response, GI upset, tendon injury, rash, sedation etc.    Please note that this dictation was created using voice recognition software. I have made a reasonable attempt to correct obvious errors, but I expect that there are errors of grammar and possibly content that I did not discover before finalizing the note.    This note was electronically signed by JOURDAN Walton

## 2024-06-17 ENCOUNTER — OFFICE VISIT (OUTPATIENT)
Dept: URGENT CARE | Facility: CLINIC | Age: 29
End: 2024-06-17
Payer: MEDICAID

## 2024-06-17 VITALS
TEMPERATURE: 97.5 F | DIASTOLIC BLOOD PRESSURE: 82 MMHG | HEIGHT: 67 IN | HEART RATE: 70 BPM | OXYGEN SATURATION: 95 % | RESPIRATION RATE: 16 BRPM | SYSTOLIC BLOOD PRESSURE: 132 MMHG | WEIGHT: 252 LBS | BODY MASS INDEX: 39.55 KG/M2

## 2024-06-17 DIAGNOSIS — J02.9 SORE THROAT: ICD-10-CM

## 2024-06-17 LAB — S PYO DNA SPEC NAA+PROBE: NOT DETECTED

## 2024-06-17 PROCEDURE — 87651 STREP A DNA AMP PROBE: CPT | Performed by: FAMILY MEDICINE

## 2024-06-17 PROCEDURE — 99213 OFFICE O/P EST LOW 20 MIN: CPT | Performed by: FAMILY MEDICINE

## 2024-06-17 PROCEDURE — 3075F SYST BP GE 130 - 139MM HG: CPT | Performed by: FAMILY MEDICINE

## 2024-06-17 PROCEDURE — 3079F DIAST BP 80-89 MM HG: CPT | Performed by: FAMILY MEDICINE

## 2024-06-17 RX ORDER — LIDOCAINE HYDROCHLORIDE 20 MG/ML
5 SOLUTION OROPHARYNGEAL EVERY 4 HOURS PRN
Qty: 100 ML | Refills: 0 | Status: SHIPPED | OUTPATIENT
Start: 2024-06-17

## 2024-06-17 ASSESSMENT — ENCOUNTER SYMPTOMS
CONSTITUTIONAL NEGATIVE: 1
SORE THROAT: 1
CARDIOVASCULAR NEGATIVE: 1
EYES NEGATIVE: 1
GASTROINTESTINAL NEGATIVE: 1
RESPIRATORY NEGATIVE: 1
MUSCULOSKELETAL NEGATIVE: 1

## 2024-06-17 ASSESSMENT — FIBROSIS 4 INDEX: FIB4 SCORE: 0.28

## 2024-06-17 NOTE — PROGRESS NOTES
"Subjective:   Diana Hill is a 28 y.o. female who presents for Sore Throat (Patient coming in for chest pain, sore throat x 3 days )      HPI    Review of Systems   Constitutional: Negative.    HENT:  Positive for ear pain and sore throat.    Eyes: Negative.    Respiratory: Negative.     Cardiovascular: Negative.    Gastrointestinal: Negative.    Genitourinary: Negative.    Musculoskeletal: Negative.    Skin: Negative.        Medications, Allergies, and current problem list reviewed today in Epic.     Objective:     /82   Pulse 70   Temp 36.4 °C (97.5 °F) (Temporal)   Resp 16   Ht 1.702 m (5' 7\")   Wt 114 kg (252 lb)   SpO2 95%     Physical Exam  Vitals and nursing note reviewed.   Constitutional:       Appearance: Normal appearance.   HENT:      Head: Normocephalic and atraumatic.      Right Ear: Tympanic membrane normal.      Left Ear: Tympanic membrane normal.      Nose: Nose normal.      Mouth/Throat:      Pharynx: Oropharynx is clear.   Cardiovascular:      Rate and Rhythm: Normal rate and regular rhythm.      Pulses: Normal pulses.      Heart sounds: Normal heart sounds.   Pulmonary:      Effort: Pulmonary effort is normal.      Breath sounds: Normal breath sounds.   Abdominal:      General: Abdomen is flat. Bowel sounds are normal.      Palpations: Abdomen is soft.   Musculoskeletal:      Cervical back: Tenderness present.   Lymphadenopathy:      Cervical: No cervical adenopathy.   Neurological:      Mental Status: She is alert.         Assessment/Plan:     Diagnosis and associated orders:     1. Sore throat  POCT Cepheid Group A Strep - PCR    lidocaine (XYLOCAINE) 2 % Solution         Comments/MDM:              Differential diagnosis, natural history, supportive care, and indications for immediate follow-up discussed.    Advised the patient to follow-up with the primary care physician for recheck, reevaluation, and consideration of further management.    Please note that this dictation " was created using voice recognition software. I have made a reasonable attempt to correct obvious errors, but I expect that there are errors of grammar and possibly content that I did not discover before finalizing the note.    This note was electronically signed by Aden Prajapati M.D.

## 2024-07-02 DIAGNOSIS — E28.2 PCOS (POLYCYSTIC OVARIAN SYNDROME): ICD-10-CM

## 2024-07-02 RX ORDER — METFORMIN HYDROCHLORIDE 500 MG/1
2000 TABLET, EXTENDED RELEASE ORAL DAILY
Qty: 360 TABLET | Refills: 1 | Status: SHIPPED | OUTPATIENT
Start: 2024-07-02

## 2024-09-19 NOTE — ED NOTES
ERP at bedside at this time    
Pt provided with discharge paper work and follow up instructions. Pt declines any questions at this time, pt to ambulate out of ER without complication.   
Unknown

## 2024-10-02 ENCOUNTER — HOSPITAL ENCOUNTER (OUTPATIENT)
Dept: LAB | Facility: MEDICAL CENTER | Age: 29
End: 2024-10-02
Attending: INTERNAL MEDICINE
Payer: MEDICAID

## 2024-10-02 DIAGNOSIS — E03.9 PRIMARY HYPOTHYROIDISM: ICD-10-CM

## 2024-10-02 LAB
T4 FREE SERPL-MCNC: 1.04 NG/DL (ref 0.93–1.7)
TSH SERPL DL<=0.005 MIU/L-ACNC: 2.54 UIU/ML (ref 0.38–5.33)

## 2024-10-02 PROCEDURE — 84443 ASSAY THYROID STIM HORMONE: CPT

## 2024-10-02 PROCEDURE — 84439 ASSAY OF FREE THYROXINE: CPT

## 2024-10-02 PROCEDURE — 36415 COLL VENOUS BLD VENIPUNCTURE: CPT

## 2024-10-03 ENCOUNTER — APPOINTMENT (OUTPATIENT)
Dept: ENDOCRINOLOGY | Facility: MEDICAL CENTER | Age: 29
End: 2024-10-03
Attending: INTERNAL MEDICINE
Payer: MEDICAID

## 2024-10-09 ENCOUNTER — OFFICE VISIT (OUTPATIENT)
Dept: ENDOCRINOLOGY | Facility: MEDICAL CENTER | Age: 29
End: 2024-10-09
Attending: INTERNAL MEDICINE
Payer: MEDICAID

## 2024-10-09 VITALS
DIASTOLIC BLOOD PRESSURE: 62 MMHG | BODY MASS INDEX: 40.49 KG/M2 | SYSTOLIC BLOOD PRESSURE: 118 MMHG | HEIGHT: 67 IN | HEART RATE: 99 BPM | OXYGEN SATURATION: 97 % | WEIGHT: 258 LBS

## 2024-10-09 DIAGNOSIS — E66.9 OBESITY (BMI 30-39.9): ICD-10-CM

## 2024-10-09 DIAGNOSIS — E03.9 PRIMARY HYPOTHYROIDISM: ICD-10-CM

## 2024-10-09 DIAGNOSIS — E55.9 VITAMIN D DEFICIENCY: ICD-10-CM

## 2024-10-09 DIAGNOSIS — E28.2 PCOS (POLYCYSTIC OVARIAN SYNDROME): ICD-10-CM

## 2024-10-09 DIAGNOSIS — R73.03 PRE-DIABETES: ICD-10-CM

## 2024-10-09 PROCEDURE — 99211 OFF/OP EST MAY X REQ PHY/QHP: CPT | Performed by: INTERNAL MEDICINE

## 2024-10-09 PROCEDURE — 3078F DIAST BP <80 MM HG: CPT | Performed by: INTERNAL MEDICINE

## 2024-10-09 PROCEDURE — 3074F SYST BP LT 130 MM HG: CPT | Performed by: INTERNAL MEDICINE

## 2024-10-09 PROCEDURE — 99215 OFFICE O/P EST HI 40 MIN: CPT | Performed by: INTERNAL MEDICINE

## 2024-10-09 RX ORDER — LEVOTHYROXINE SODIUM 75 UG/1
75 TABLET ORAL
Qty: 90 TABLET | Refills: 2 | Status: SHIPPED | OUTPATIENT
Start: 2024-10-09

## 2024-10-09 ASSESSMENT — FIBROSIS 4 INDEX: FIB4 SCORE: 0.3

## 2024-10-30 ENCOUNTER — OFFICE VISIT (OUTPATIENT)
Dept: ENDOCRINOLOGY | Facility: MEDICAL CENTER | Age: 29
End: 2024-10-30
Attending: INTERNAL MEDICINE
Payer: MEDICAID

## 2024-10-30 VITALS
WEIGHT: 265 LBS | HEART RATE: 78 BPM | HEIGHT: 67 IN | DIASTOLIC BLOOD PRESSURE: 58 MMHG | BODY MASS INDEX: 41.59 KG/M2 | SYSTOLIC BLOOD PRESSURE: 116 MMHG | OXYGEN SATURATION: 98 %

## 2024-10-30 DIAGNOSIS — R73.03 PRE-DIABETES: ICD-10-CM

## 2024-10-30 DIAGNOSIS — E28.2 PCOS (POLYCYSTIC OVARIAN SYNDROME): ICD-10-CM

## 2024-10-30 DIAGNOSIS — E66.9 OBESITY (BMI 30-39.9): ICD-10-CM

## 2024-10-30 DIAGNOSIS — E03.9 PRIMARY HYPOTHYROIDISM: ICD-10-CM

## 2024-10-30 DIAGNOSIS — E55.9 VITAMIN D DEFICIENCY: ICD-10-CM

## 2024-10-30 PROCEDURE — 99211 OFF/OP EST MAY X REQ PHY/QHP: CPT | Performed by: INTERNAL MEDICINE

## 2024-10-30 RX ORDER — LIRAGLUTIDE 6 MG/ML
1.8 INJECTION SUBCUTANEOUS DAILY
Qty: 9 ML | Refills: 11 | Status: SHIPPED | OUTPATIENT
Start: 2024-10-30

## 2024-10-30 RX ORDER — RIMEGEPANT SULFATE 75 MG/75MG
75 TABLET, ORALLY DISINTEGRATING ORAL
COMMUNITY

## 2024-10-30 ASSESSMENT — FIBROSIS 4 INDEX: FIB4 SCORE: 0.3

## 2024-10-31 DIAGNOSIS — E66.9 OBESITY (BMI 30-39.9): ICD-10-CM

## 2024-10-31 PROCEDURE — RXMED WILLOW AMBULATORY MEDICATION CHARGE: Performed by: INTERNAL MEDICINE

## 2024-10-31 RX ORDER — PEN NEEDLE, DIABETIC 32GX 5/32"
1 NEEDLE, DISPOSABLE MISCELLANEOUS DAILY
Qty: 100 EACH | Refills: 3 | Status: SHIPPED | OUTPATIENT
Start: 2024-10-31

## 2024-11-01 PROCEDURE — RXMED WILLOW AMBULATORY MEDICATION CHARGE: Performed by: INTERNAL MEDICINE

## 2024-11-04 ENCOUNTER — PHARMACY VISIT (OUTPATIENT)
Dept: PHARMACY | Facility: MEDICAL CENTER | Age: 29
End: 2024-11-04
Payer: COMMERCIAL

## 2024-11-29 ENCOUNTER — TELEPHONE (OUTPATIENT)
Dept: PHARMACY | Facility: MEDICAL CENTER | Age: 29
End: 2024-11-29
Payer: MEDICAID

## 2024-11-29 PROCEDURE — RXMED WILLOW AMBULATORY MEDICATION CHARGE: Performed by: INTERNAL MEDICINE

## 2024-11-30 NOTE — TELEPHONE ENCOUNTER
Contact:  Phone number:[phone] (mobile)    Name of person spoken with and relationship to patient: Diana patient   Patient’s Adherence:  How patient is doing on medication: Very Well    How many missed doses and reason: 0 N/A    Any new medications: No    Any new conditions: No    Any new allergies: No    Any new side effects: No    Any new diagnoses: No    How many doses remainin    Did patient want to speak with pharmacist: No   Delivery:  Delivery date and method: 2024 via     Needs by Date: 2024    Signature required: No     Any additional details for : N/A   Teach Appointment Date:  N/A   Shipping Address:  [facility] [address]  [facility] [address]   Medication(name,strength and dose):  VICTOZA SOLUTION PEN-INJECTOR 18 MG/3ML   Copay:  $50.09   Payment Method:  Credit card on file   Supplies:  Alcohol Swabs   Additional Information:  NONE     Serenity Blanton, Pharmacy Liaison/ RX Coordinator

## 2024-12-03 ENCOUNTER — PHARMACY VISIT (OUTPATIENT)
Dept: PHARMACY | Facility: MEDICAL CENTER | Age: 29
End: 2024-12-03
Payer: COMMERCIAL

## 2024-12-27 ENCOUNTER — TELEPHONE (OUTPATIENT)
Dept: PHARMACY | Facility: MEDICAL CENTER | Age: 29
End: 2024-12-27
Payer: MEDICAID

## 2024-12-27 PROCEDURE — RXMED WILLOW AMBULATORY MEDICATION CHARGE: Performed by: INTERNAL MEDICINE

## 2024-12-27 NOTE — TELEPHONE ENCOUNTER
Contact:  Phone number:[phone] (mobile)    Name of person spoken with and relationship to patient: Diana patient   Patient’s Adherence:  How patient is doing on medication: Very Well    How many missed doses and reason: 0 N/A    Any new medications: No    Any new conditions: No    Any new allergies: No    Any new side effects: No    Any new diagnoses: No    How many doses remainin    Did patient want to speak with pharmacist: No   Delivery:  Delivery date and method: 2024 via Mail    Needs by Date: 2024    Signature required: No     Any additional details for : N/A   Teach Appointment Date:  N/A   Shipping Address:  [facility] [address]  [facility] [address]   Medication(name,strength and dose):  VICTOZA SOLUTION PEN-INJECTOR 18 MG/3ML   Copay:  $50.09   Payment Method:  Credit card on file   Supplies:  Alcohol Swabs   Additional Information:  NONE     Serenity Blanton, Pharmacy Liaison/ RX Coordinator

## 2024-12-30 ENCOUNTER — PHARMACY VISIT (OUTPATIENT)
Dept: PHARMACY | Facility: MEDICAL CENTER | Age: 29
End: 2024-12-30
Payer: COMMERCIAL

## 2025-01-05 DIAGNOSIS — E28.2 PCOS (POLYCYSTIC OVARIAN SYNDROME): ICD-10-CM

## 2025-01-05 RX ORDER — METFORMIN HYDROCHLORIDE 500 MG/1
2000 TABLET, EXTENDED RELEASE ORAL DAILY
Qty: 360 TABLET | Refills: 1 | Status: SHIPPED | OUTPATIENT
Start: 2025-01-05

## 2025-01-22 ENCOUNTER — OFFICE VISIT (OUTPATIENT)
Dept: ENDOCRINOLOGY | Facility: MEDICAL CENTER | Age: 30
End: 2025-01-22
Attending: INTERNAL MEDICINE
Payer: MEDICAID

## 2025-01-22 VITALS
OXYGEN SATURATION: 98 % | SYSTOLIC BLOOD PRESSURE: 114 MMHG | HEIGHT: 67 IN | DIASTOLIC BLOOD PRESSURE: 62 MMHG | WEIGHT: 249 LBS | HEART RATE: 97 BPM | BODY MASS INDEX: 39.08 KG/M2

## 2025-01-22 DIAGNOSIS — R73.03 PRE-DIABETES: ICD-10-CM

## 2025-01-22 DIAGNOSIS — E28.2 PCOS (POLYCYSTIC OVARIAN SYNDROME): ICD-10-CM

## 2025-01-22 DIAGNOSIS — E03.9 PRIMARY HYPOTHYROIDISM: ICD-10-CM

## 2025-01-22 DIAGNOSIS — E55.9 VITAMIN D DEFICIENCY: ICD-10-CM

## 2025-01-22 DIAGNOSIS — E66.9 OBESITY (BMI 30-39.9): ICD-10-CM

## 2025-01-22 PROCEDURE — 3078F DIAST BP <80 MM HG: CPT | Performed by: INTERNAL MEDICINE

## 2025-01-22 PROCEDURE — 99211 OFF/OP EST MAY X REQ PHY/QHP: CPT | Performed by: INTERNAL MEDICINE

## 2025-01-22 PROCEDURE — 3074F SYST BP LT 130 MM HG: CPT | Performed by: INTERNAL MEDICINE

## 2025-01-22 PROCEDURE — 99214 OFFICE O/P EST MOD 30 MIN: CPT | Performed by: INTERNAL MEDICINE

## 2025-01-22 RX ORDER — LIRAGLUTIDE 6 MG/ML
3 INJECTION SUBCUTANEOUS DAILY
Qty: 15 ML | Refills: 11 | Status: SHIPPED | OUTPATIENT
Start: 2025-01-22

## 2025-01-22 ASSESSMENT — FIBROSIS 4 INDEX: FIB4 SCORE: 0.33

## 2025-01-23 ENCOUNTER — TELEPHONE (OUTPATIENT)
Dept: PHARMACY | Facility: MEDICAL CENTER | Age: 30
End: 2025-01-23
Payer: MEDICAID

## 2025-01-23 PROCEDURE — RXMED WILLOW AMBULATORY MEDICATION CHARGE: Performed by: INTERNAL MEDICINE

## 2025-01-23 NOTE — PROGRESS NOTES
Chief Complaint: Follow up for history of abnormal thyroid tests, history of PCOS, history of obesity and insulin resistance syndrome.      HPI:     Diana Hill is a 29 y.o. female here for follow up of the above medical issue.  Her other comorbid issues include migraines treated with Topamax, depression treated with Zoloft    We initially saw the patient as a telehealth consultation back in November 2020.  She had a normal TSH of 2.2 with a slightly low free T4 of 0.87 with normal total T3 levels on August 31, 2020    She previously complained of fatigue, being easily jolted, having migraines, hair loss and dry skin.   She had seen neurology and had a negative work-up  Previously Her TSH was normal at 3.4 but free T4 is slightly low at 0.8  on February 2024 thus she was placed on Levothyroxine 50mcg daily      She remains on Levothyroxine 75mcg daily which has been a new   She reports good compliance  She denies missing any daily dose    She is obese with BMI of 38  She used to weight 280 before starting Metformin  She her weight went down to 244 after starting metformin  She reports having tried and failed diet and exercise for 6 months  (due to POTS)  She tried and failed Phentermine (insomnia)  We tried prescribing Wegovy last time but it was not approved by her insurance  She has another appointment with me to go over her thyroid labs.      Since her last visit she [facility] and has [facility]  She is combining this with diet but is currently unable to exercise because of severe anemia she is pending to have iron infusions      She is currently worried that Victoza is becoming less effective over time and is  Proactive about asking possible alternatives and that is why I am seeing her today            We respect her PCOS work-up done showed normal 17 hydroxyprogesterone level and normal prolactin levels.  She had elevated baseline testosterone levels  compatible with PCOS  She also completed a MN saliva cortisol x 3 on 7/20223 w/c was normal (0.044, 0.068, 0.074)    She remains on Metformin extended release 1000 mg twice a day   She is on oral contraceptives as well.    She reports regular menstrual cycles and denies excessive facial hair or body hair    Her A1c is controlled at 5.2% on February 22, 2024  She does have hyperlipidemia total cholesterol is 228 triglycerides 156 HDL 82 and  on February 2024    Her total testosterone is 12 and free testosterone is less than 1 on February 2024  SHBG is elevated at 304 because of her oral contraceptives        Lastly her vitamin D is low at 28 Feb 22 2024      Patient's medications, allergies, and social histories were reviewed and updated as appropriate.      ROS:     CONS:     No fever, no chills   EYES:     No diplopia, no blurry vision   CV:           No chest pain, no palpitations   PULM:     No SOB, no cough, no hemoptysis.   GI:            No nausea, no vomiting, no diarrhea, no constipation   ENDO:     No polyuria, no polydipsia, no heat intolerance, no cold intolerance       Past Medical History:  Problem List:  2024-01: Irritable bowel syndrome with diarrhea  2024-01: Pre-diabetes  2023-08: Chronic tonsillitis  2023-02: ADHD (attention deficit hyperactivity disorder), combined   type  2023-02: POTS (postural orthostatic tachycardia syndrome)  2023-02: Sore throat  2023-02: Chronic right flank pain  2022-09: BMI 38.0-38.9,adult  2022-09: Orthostasis  2022-09: Near syncope  2022-07: Heart palpitations  2022-06: Breast lump in female  2022-04: Bilateral flank pain  2021-11: Generalized anxiety disorder  2021-11: Moderate episode of recurrent major depressive disorder (HCC)  2021-10: Plantar wart, left foot  2021-10: Medication management  2021-08: Chronic fatigue syndrome  2021-08: Intractable migraine with aura without status migrainosus  2021-08: Obstructive sleep apnea syndrome  2021-08: Vestibular  "migraine  2021-04: Otalgia of left ear  2021-04: History of canker sores  2021-04: Dysthymia  2021-03: Polyarthralgia  2021-03: Brittle nails  2020-12: Cervical lymphadenopathy  2020-11: Subclinical hypothyroidism  2020-11: Insulin resistance  2020-11: Anemia  2020-10: Morbid obesity (HCC)  2020-09: Skin rash  2020-09: Administrative encounter  2020-09: Hospital discharge follow-up  2020-08: Rash of hands  2020-08: Snoring  2020-03: Vertigo  2019-11: PCOS (polycystic ovarian syndrome)  2019-11: Vitamin D deficiency  2019-11: High triglycerides  2019-11: Anxiety and depression  2018-09: Biliary colic      Past Surgical History:  Past Surgical History:   Procedure Laterality Date    TONSILLECTOMY Bilateral 8/15/2023    Procedure: TONSILLECTOMY;  Surgeon: Vee Singh M.D.;  Location: SURGERY SAME [facility];  Service: Ent    MATTHIAS BY LAPAROSCOPY N/A 9/13/2018    Procedure: MATTHIAS BY LAPAROSCOPY;  Surgeon: Hayden Wilhelm M.D.;  [facility]GERY [facility];  Service: General        Allergies:  Gluten meal     Social History:  Social History     Tobacco Use    Smoking status: Never     Passive exposure: Current (past and Currently)    Smokeless tobacco: Never   Vaping Use    Vaping status: Never Used   Substance Use Topics    Alcohol use: Yes     Comment: once/year    Drug use: No        Family History:   family history includes Alcohol abuse in her father and paternal grandfather; Cancer in her paternal grandmother; Depression in her father; Heart Attack (age of onset: 66) in her maternal grandfather; Lung Disease in her paternal grandfather; No Known Problems in her maternal grandmother; Sleep Apnea in her brother; Stroke (age of onset: 66) in her paternal grandmother; Thyroid in her mother.      PHYSICAL EXAM:   Vital signs: /62 (BP Location: Right arm, Patient Position: Sitting, BP Cuff Size: Large adult)   Pulse 97   Ht 1.702 m (5' 7\")   Wt 113 kg (249 lb)   SpO2 98%   BMI 39.00 " kg/m²   GENERAL: Obese female in no apparent distress.   EYE:  No ocular asymmetry, PERRLA  HENT: Pink, moist mucous membranes.    NECK: No thyromegaly.   CARDIOVASCULAR:  No murmurs  LUNGS: Clear breath sounds  ABDOMEN: Soft, nontender   EXTREMITIES: No clubbing, cyanosis, or edema.   NEUROLOGICAL: No gross focal motor abnormalities   LYMPH: No cervical adenopathy palpated.   SKIN: No rashes, lesions.       Labs:  Lab Results   Component Value Date/Time    SODIUM 138 02/22/2024 08:22 AM    POTASSIUM 4.0 02/22/2024 08:22 AM    CHLORIDE 106 02/22/2024 08:22 AM    CO2 20 02/22/2024 08:22 AM    ANION 12.0 02/22/2024 08:22 AM    GLUCOSE 97 02/22/2024 08:22 AM    BUN 15 09/20/2024 08:49 AM    BUN 14 02/22/2024 08:22 AM    CREATININE 0.80 09/20/2024 08:49 AM    CREATININE 0.71 02/22/2024 08:22 AM    CALCIUM 8.8 02/22/2024 08:22 AM    ASTSGOT 11 (L) 02/22/2024 08:22 AM    ALTSGPT 11 02/22/2024 08:22 AM    TBILIRUBIN 0.2 02/22/2024 08:22 AM    ALBUMIN 3.7 02/22/2024 08:22 AM    TOTPROTEIN 6.9 02/22/2024 08:22 AM    GLOBULIN 3.2 02/22/2024 08:22 AM    AGRATIO 1.2 02/22/2024 08:22 AM       Lab Results   Component Value Date/Time    SODIUM 137 03/26/2021 0959    POTASSIUM 4.2 03/26/2021 0959    CHLORIDE 102 03/26/2021 0959    CO2 23 03/26/2021 0959    GLUCOSE 89 03/26/2021 0959    BUN 12 03/26/2021 0959    CREATININE 0.58 03/26/2021 0959    CALCIUM 9.2 03/26/2021 0959    ANION 12.0 03/26/2021 0959       Lab Results   Component Value Date/Time    CHOLSTRLTOT 185 11/04/2019 0850    TRIGLYCERIDE 161 (H) 11/04/2019 0850    HDL 67 11/04/2019 0850    LDL 86 11/04/2019 0850       Lab Results   Component Value Date/Time    TSHULTRASEN 2.800 11/24/2020 1049     Lab Results   Component Value Date/Time    FREET4 0.87 (L) 08/31/2020 2133     Lab Results   Component Value Date/Time    FREET3 3.16 08/28/2018 1120     No results found for: THYSTIMIG    Lab Results   Component Value Date/Time    MICROSOMALA <9.0 09/15/2020 1126          Imaging:      ASSESSMENT/PLAN:     1. Obesity (BMI 30-39.9)  Improved she has lost 16 pounds while on Victoza but is worried that it is becoming less effective  I am going to increase Victoza to 3 mg daily she will need 5 pens per month  I recommend that she combine this with diet and exercise and lifestyle changes  We will still use [employer] pricing with [facility]  I will see her again in April as scheduled    2. Primary hypothyroidism  Controlled  Continue levothyroxine 75 mcg daily  We will repeat her thyroid labs in April    3. PCOS (polycystic ovarian syndrome)  Well-controlled  Her total testosterone is normal Free testosterone is not elevated  Continue metformin  continue Victoza  We are increasing the dose of Victoza  Repeat androgen levels in 3  months    4. Pre-diabetes  Controlled   Weight loss noted  A1c is 5.7% from outside labs done at [facility]  continue metformin  Continue diet and exercise     5. Vitamin D deficiency  Uncontrolled  Vitamin D is low at 20 I recommend that she take vitamin D3 5000 IU daily we will repeat her vitamin D with her next labs       Return in about 3 months (around 4/22/2025).      Thank you kindly for allowing me to participate in the thyroid care plan for this patient.    Wilman Crow MD, FACE, [facility]      CC:   MARKY Resendiz.

## 2025-01-23 NOTE — TELEPHONE ENCOUNTER
Contact:  Phone number:[phone] (mobile)    Name of person spoken with and relationship to patient: Diana patient    Patient’s Adherence:  How patient is doing on medication: Very Well    How many missed doses and reason: 0 N/A    Any new medications: No    Any new conditions: No    Any new allergies: No    Any new side effects: No    Any new diagnoses: No    How many doses remainin    Did patient want to speak with pharmacist: No   Delivery:  Delivery date and method: 2025 via Mail    Needs by Date: 2025    Signature required: No     Any additional details for : N/A   Teach Appointment Date:  N/A   Shipping Address:  [address]  [facility] [id number]   Medication(name,strength and dose):  VICTOZA SOLUTION PEN-INJECTOR 18 MG/3ML, BD PEN NEEDLES    Copay:  $50.14   Payment Method:  Credit card on file   Supplies:  Al[employer] Sw[employer]   Additional Information:  NONE     Serenity Blanton, Pharmacy Liaison/ RX Coordinator

## 2025-01-28 ENCOUNTER — PHARMACY VISIT (OUTPATIENT)
Dept: PHARMACY | Facility: MEDICAL CENTER | Age: 30
End: 2025-01-28
Payer: COMMERCIAL

## 2025-03-14 PROCEDURE — RXMED WILLOW AMBULATORY MEDICATION CHARGE: Performed by: INTERNAL MEDICINE

## 2025-03-17 ENCOUNTER — HOSPITAL ENCOUNTER (OUTPATIENT)
Facility: MEDICAL CENTER | Age: 30
End: 2025-03-17
Attending: INTERNAL MEDICINE
Payer: MEDICAID

## 2025-03-17 DIAGNOSIS — R73.03 PRE-DIABETES: ICD-10-CM

## 2025-03-17 DIAGNOSIS — E55.9 VITAMIN D DEFICIENCY: ICD-10-CM

## 2025-03-17 DIAGNOSIS — E03.9 PRIMARY HYPOTHYROIDISM: ICD-10-CM

## 2025-03-17 DIAGNOSIS — E28.2 PCOS (POLYCYSTIC OVARIAN SYNDROME): ICD-10-CM

## 2025-03-17 DIAGNOSIS — E66.9 OBESITY (BMI 30-39.9): ICD-10-CM

## 2025-03-17 LAB
25(OH)D3 SERPL-MCNC: 20 NG/ML (ref 30–100)
ALBUMIN SERPL BCP-MCNC: 4 G/DL (ref 3.2–4.9)
ALBUMIN/GLOB SERPL: 1.6 G/DL
ALP SERPL-CCNC: 57 U/L (ref 30–99)
ALT SERPL-CCNC: 20 U/L (ref 2–50)
ANION GAP SERPL CALC-SCNC: 11 MMOL/L (ref 7–16)
AST SERPL-CCNC: 15 U/L (ref 12–45)
BILIRUB SERPL-MCNC: <0.2 MG/DL (ref 0.1–1.5)
BUN SERPL-MCNC: 12 MG/DL (ref 8–22)
CALCIUM ALBUM COR SERPL-MCNC: 9.1 MG/DL (ref 8.5–10.5)
CALCIUM SERPL-MCNC: 9.1 MG/DL (ref 8.4–10.2)
CHLORIDE SERPL-SCNC: 109 MMOL/L (ref 96–112)
CO2 SERPL-SCNC: 20 MMOL/L (ref 20–33)
CREAT SERPL-MCNC: 0.76 MG/DL (ref 0.5–1.4)
FASTING STATUS PATIENT QL REPORTED: NORMAL
GFR SERPLBLD CREATININE-BSD FMLA CKD-EPI: 110 ML/MIN/1.73 M 2
GLOBULIN SER CALC-MCNC: 2.5 G/DL (ref 1.9–3.5)
GLUCOSE SERPL-MCNC: 82 MG/DL (ref 65–99)
POTASSIUM SERPL-SCNC: 3.9 MMOL/L (ref 3.6–5.5)
PROT SERPL-MCNC: 6.5 G/DL (ref 6–8.2)
SODIUM SERPL-SCNC: 140 MMOL/L (ref 135–145)
T4 FREE SERPL-MCNC: 1.15 NG/DL (ref 0.93–1.7)
TSH SERPL DL<=0.005 MIU/L-ACNC: 1.5 UIU/ML (ref 0.38–5.33)

## 2025-03-17 PROCEDURE — 84402 ASSAY OF FREE TESTOSTERONE: CPT

## 2025-03-17 PROCEDURE — 80053 COMPREHEN METABOLIC PANEL: CPT

## 2025-03-17 PROCEDURE — 84270 ASSAY OF SEX HORMONE GLOBUL: CPT

## 2025-03-17 PROCEDURE — 82306 VITAMIN D 25 HYDROXY: CPT

## 2025-03-17 PROCEDURE — 84403 ASSAY OF TOTAL TESTOSTERONE: CPT

## 2025-03-17 PROCEDURE — 84439 ASSAY OF FREE THYROXINE: CPT

## 2025-03-17 PROCEDURE — 36415 COLL VENOUS BLD VENIPUNCTURE: CPT

## 2025-03-17 PROCEDURE — 84443 ASSAY THYROID STIM HORMONE: CPT

## 2025-03-21 LAB
SHBG SERPL-SCNC: 147 NMOL/L (ref 25–122)
TESTOST FREE SERPL-MCNC: 0.8 PG/ML (ref 0.8–7.4)
TESTOST SERPL-MCNC: 14 NG/DL (ref 9–55)

## 2025-04-09 ENCOUNTER — OFFICE VISIT (OUTPATIENT)
Dept: ENDOCRINOLOGY | Facility: MEDICAL CENTER | Age: 30
End: 2025-04-09
Attending: INTERNAL MEDICINE
Payer: MEDICAID

## 2025-04-09 VITALS
WEIGHT: 248.3 LBS | OXYGEN SATURATION: 97 % | HEIGHT: 67 IN | SYSTOLIC BLOOD PRESSURE: 108 MMHG | BODY MASS INDEX: 38.97 KG/M2 | DIASTOLIC BLOOD PRESSURE: 62 MMHG | HEART RATE: 78 BPM

## 2025-04-09 DIAGNOSIS — E28.2 PCOS (POLYCYSTIC OVARIAN SYNDROME): ICD-10-CM

## 2025-04-09 DIAGNOSIS — E03.9 PRIMARY HYPOTHYROIDISM: ICD-10-CM

## 2025-04-09 DIAGNOSIS — E55.9 VITAMIN D DEFICIENCY: ICD-10-CM

## 2025-04-09 DIAGNOSIS — R73.03 PRE-DIABETES: ICD-10-CM

## 2025-04-09 DIAGNOSIS — E66.9 OBESITY (BMI 30-39.9): ICD-10-CM

## 2025-04-09 LAB
HBA1C MFR BLD: 5.1 % (ref ?–5.8)
POCT INT CON NEG: NEGATIVE
POCT INT CON POS: POSITIVE

## 2025-04-09 PROCEDURE — 3074F SYST BP LT 130 MM HG: CPT | Performed by: INTERNAL MEDICINE

## 2025-04-09 PROCEDURE — 83036 HEMOGLOBIN GLYCOSYLATED A1C: CPT | Performed by: INTERNAL MEDICINE

## 2025-04-09 PROCEDURE — 99211 OFF/OP EST MAY X REQ PHY/QHP: CPT | Performed by: INTERNAL MEDICINE

## 2025-04-09 PROCEDURE — 99215 OFFICE O/P EST HI 40 MIN: CPT | Performed by: INTERNAL MEDICINE

## 2025-04-09 PROCEDURE — 3078F DIAST BP <80 MM HG: CPT | Performed by: INTERNAL MEDICINE

## 2025-04-09 RX ORDER — TIRZEPATIDE 2.5 MG/.5ML
2.5 INJECTION, SOLUTION SUBCUTANEOUS
Qty: 2 ML | Refills: 1 | Status: SHIPPED | OUTPATIENT
Start: 2025-04-09

## 2025-04-09 ASSESSMENT — FIBROSIS 4 INDEX: FIB4 SCORE: 0.34

## 2025-04-09 NOTE — PROGRESS NOTES
Chief Complaint: Follow up for history of abnormal thyroid tests, history of PCOS, history of obesity and insulin resistance syndrome.      HPI:     Diana Hill is a 29 y.o. female here for follow up of the above medical issue.  Her other comorbid issues include migraines treated with Topamax, depression treated with Zoloft    We initially saw the patient as a telehealth consultation back in November 2020.  She had a normal TSH of 2.2 with a slightly low free T4 of 0.87 with normal total T3 levels on August 31, 2020    She previously complained of fatigue, being easily jolted, having migraines, hair loss and dry skin.   She had seen neurology and had a negative work-up  Previously Her TSH was normal at 3.4 but free T4 is slightly low at 0.8  on February 2024 thus she was placed on Levothyroxine 50mcg daily      She remains on Levothyroxine 75mcg daily which has been a new   She reports good compliance  She denies missing any daily dose    Her TSH levels normal   Latest Reference Range & Units 03/17/25 10:35   TSH 0.380 - 5.330 uIU/mL 1.500   Free T-4 0.93 - 1.70 ng/dL 1.15         She is obese with BMI of 38  She used to weight 280 before starting Metformin  She her weight went down to 244 after starting metformin  She reports having tried and failed diet and exercise for 6 months  (due to POTS)  She tried and failed Phentermine (insomnia)  We tried prescribing Wegovy last time but it was not approved by her insurance     Since her last visit she has been taking Victoza 3.0 mg from [facility] pharmacy    Her current weight is 248 lbs  She is combining this with diet but is currently unable to exercise because of severe anemia           We respect her PCOS work-up done showed normal 17 hydroxyprogesterone level and normal prolactin levels.  She had elevated baseline testosterone levels (31) compatible with PCOS  She also completed a MN saliva cortisol x 3 on 7/20223 w/c was normal (0.044, 0.068, 0.074)    She  remains on Metformin extended release 1000 mg twice a day   She is on oral contraceptives as well.    She irregular menstrual cycles and denies excessive facial hair or body hair    Her A1c is controlled at 5.1% on 4/2026  She does have hyperlipidemia total cholesterol is 228 triglycerides 156 HDL 82 and  on February 2024    Her total testosterone is 13 and free testosterone is less than 0.8 on on 3/2025    SHBG is elevated at 147 because of her oral contraceptives        Lastly her vitamin D is low at 20,  3/2025      Patient's medications, allergies, and social histories were reviewed and updated as appropriate.      ROS:     CONS:     No fever, no chills   EYES:     No diplopia, no blurry vision   CV:           No chest pain, no palpitations   PULM:     No SOB, no cough, no hemoptysis.   GI:            No nausea, no vomiting, no diarrhea, no constipation   ENDO:     No polyuria, no polydipsia, no heat intolerance, no cold intolerance       Past Medical History:  Problem List:  2024-01: Irritable bowel syndrome with diarrhea  2024-01: Pre-diabetes  2023-08: Chronic tonsillitis  2023-02: ADHD (attention deficit hyperactivity disorder), combined   type  2023-02: POTS (postural orthostatic tachycardia syndrome)  2023-02: Sore throat  2023-02: Chronic right flank pain  2022-09: BMI 38.0-38.9,adult  2022-09: Orthostasis  2022-09: Near syncope  2022-07: Heart palpitations  2022-06: Breast lump in female  2022-04: Bilateral flank pain  2021-11: Generalized anxiety disorder  2021-11: Moderate episode of recurrent major depressive disorder (HCC)  2021-10: Plantar wart, left foot  2021-10: Medication management  2021-08: Chronic fatigue syndrome  2021-08: Intractable migraine with aura without status migrainosus  2021-08: Obstructive sleep apnea syndrome  2021-08: Vestibular migraine  2021-04: Otalgia of left ear  2021-04: History of canker sores  2021-04: Dysthymia  2021-03: Polyarthralgia  2021-03: Brittle  "nails  2020-12: Cervical lymphadenopathy  2020-11: Subclinical hypothyroidism  2020-11: Insulin resistance  2020-11: Anemia  2020-10: Morbid obesity (HCC)  2020-09: Skin rash  2020-09: Administrative encounter  2020-09: Hospital discharge follow-up  2020-08: Rash of hands  2020-08: Snoring  2020-03: Vertigo  2019-11: PCOS (polycystic ovarian syndrome)  2019-11: Vitamin D deficiency  2019-11: High triglycerides  2019-11: Anxiety and depression  2018-09: Biliary colic      Past Surgical History:  Past Surgical History:   Procedure Laterality Date    TONSILLECTOMY Bilateral 8/15/2023    Procedure: TONSILLECTOMY;  Surgeon: Vee Singh M.D.;  Location: SURGERY SAME [facility];  Service: Ent    MATTHIAS BY LAPAROSCOPY N/A 9/13/2018    Procedure: MATTHIAS BY LAPAROSCOPY;  Surgeon: Hayden Wilhelm M.D.;  Location: SURGERY [facility];  Service: General        Allergies:  Gluten meal     Social History:  Social History     Tobacco Use    Smoking status: Never     Passive exposure: Current (past and Currently)    Smokeless tobacco: Never   Vaping Use    Vaping status: Never Used   Substance Use Topics    Alcohol use: Yes     Comment: once/year    Drug use: No        Family History:   family history includes Alcohol abuse in her father and paternal grandfather; Cancer in her paternal grandmother; Depression in her father; Heart Attack (age of onset: 66) in her maternal grandfather; Lung Disease in her paternal grandfather; No Known Problems in her maternal grandmother; Sleep Apnea in her brother; Stroke (age of onset: 66) in her paternal grandmother; Thyroid in her mother.      PHYSICAL EXAM:   Vital signs: /62   Pulse 78   Ht 1.702 m (5' 7\")   Wt 113 kg (248 lb 4.8 oz)   SpO2 97%   BMI 38.89 kg/m²   GENERAL: Obese female in no apparent distress.   EYE:  No ocular asymmetry, PERRLA  HENT: Pink, moist mucous membranes.    NECK: No thyromegaly.   CARDIOVASCULAR:  No murmurs  LUNGS: Clear breath " sounds  ABDOMEN: Soft, nontender   EXTREMITIES: No clubbing, cyanosis, or edema.   NEUROLOGICAL: No gross focal motor abnormalities   LYMPH: No cervical adenopathy palpated.   SKIN: No rashes, lesions.       Labs:  Lab Results   Component Value Date/Time    SODIUM 140 03/17/2025 10:35 AM    POTASSIUM 3.9 03/17/2025 10:35 AM    CHLORIDE 109 03/17/2025 10:35 AM    CO2 20 03/17/2025 10:35 AM    ANION 11.0 03/17/2025 10:35 AM    GLUCOSE 82 03/17/2025 10:35 AM    BUN 12 03/17/2025 10:35 AM    CREATININE 0.76 03/17/2025 10:35 AM    CALCIUM 9.1 03/17/2025 10:35 AM    ASTSGOT 15 03/17/2025 10:35 AM    ALTSGPT 20 03/17/2025 10:35 AM    TBILIRUBIN <0.2 03/17/2025 10:35 AM    ALBUMIN 4.0 03/17/2025 10:35 AM    TOTPROTEIN 6.5 03/17/2025 10:35 AM    GLOBULIN 2.5 03/17/2025 10:35 AM    AGRATIO 1.6 03/17/2025 10:35 AM       Lab Results   Component Value Date/Time    SODIUM 137 03/26/2021 0959    POTASSIUM 4.2 03/26/2021 0959    CHLORIDE 102 03/26/2021 0959    CO2 23 03/26/2021 0959    GLUCOSE 89 03/26/2021 0959    BUN 12 03/26/2021 0959    CREATININE 0.58 03/26/2021 0959    CALCIUM 9.2 03/26/2021 0959    ANION 12.0 03/26/2021 0959       Lab Results   Component Value Date/Time    CHOLSTRLTOT 185 11/04/2019 0850    TRIGLYCERIDE 161 (H) 11/04/2019 0850    HDL 67 11/04/2019 0850    LDL 86 11/04/2019 0850       Lab Results   Component Value Date/Time    TSHULTRASEN 2.800 11/24/2020 1049     Lab Results   Component Value Date/Time    FREET4 0.87 (L) 08/31/2020 2133     Lab Results   Component Value Date/Time    FREET3 3.16 08/28/2018 1120     No results found for: THYSTIMIG    Lab Results   Component Value Date/Time    MICROSOMALA <9.0 09/15/2020 1126         Imaging:      ASSESSMENT/PLAN:     1. Obesity (BMI 30-39.9)  Stable   She is obese with a BMI greater than 30  She has tried and failed at least 6 months of diet and exercise  She previously lost weight with metformin and Victoza  She is unable to get Victoza at this time  I will  apply for Zepbound  I explained the side effects of this medication and how to inject the medication  Explained to patient that if prior authorization is not approved there is not much we can do  Follow-up in 7 months    2. Primary hypothyroidism  Controlled  Continue levothyroxine 75 mcg daily  Repeat thyroid labs in 7 months    3. PCOS (polycystic ovarian syndrome)  Well-controlled  Her total testosterone is normal   Free testosterone is not elevated  Continue metformin  Continue Victoza  We will apply for Zepbound   if Zepbound is approved she needs to stop Victoza    4. Pre-diabetes  Controlled   Weight is relatively stable  A1c is now normal  continue metformin  Continue diet and exercise     5. Vitamin D deficiency  Uncontrolled  Vitamin D is low at 20   I recommend that she take vitamin D3 5000 IU daily we will repeat her vitamin D with her next labs       Return in about 6 months (around 10/9/2025).      Total time spent on day of service was over 60 minutes which included obtaining a detailed history and physical exam, ordering labs, coordinating care and scheduling future follow-up    Thank you kindly for allowing me to participate in the thyroid care plan for this patient.    Wilman Crow MD, FACE, [facility]      CC:   MARKY Resendiz.

## 2025-05-01 ENCOUNTER — PHARMACY VISIT (OUTPATIENT)
Dept: PHARMACY | Facility: MEDICAL CENTER | Age: 30
End: 2025-05-01
Payer: COMMERCIAL

## 2025-05-01 PROCEDURE — RXMED WILLOW AMBULATORY MEDICATION CHARGE: Performed by: INTERNAL MEDICINE

## 2025-05-08 ENCOUNTER — TELEPHONE (OUTPATIENT)
Dept: ENDOCRINOLOGY | Facility: MEDICAL CENTER | Age: 30
End: 2025-05-08
Payer: MEDICAID

## 2025-05-08 NOTE — TELEPHONE ENCOUNTER
Received New Start PA request via  [employer]   for Zepbound 2.5MG/0.5ML pen-injectors. (Quantity:2ml, Day Supply:28)     Insurance: [employer] [place] [employer]/ [employer]  Member ID:[id number]  BIN: [id number]  PCN: [id number]  [id number]     Ran Test claim via [place] & medication Rejects stating prior authorization is required.    Prior Authorization has been submitted via CMM key: [id number]    Prior Authorization has been denied     Prior authorization was denied per the following: Does not meet criteria: Patient must not be utilizing another GLP-1 therapy.    Prior Authorization denial reference number: [id number]    Next Steps: PA appeal to decide by the provider    Thank you,  Priscila Howard CPhT  Endo RX Coordinator  [phone]

## 2025-05-22 ENCOUNTER — PHARMACY VISIT (OUTPATIENT)
Dept: PHARMACY | Facility: MEDICAL CENTER | Age: 30
End: 2025-05-22
Payer: COMMERCIAL

## 2025-05-27 ENCOUNTER — TELEPHONE (OUTPATIENT)
Dept: ENDOCRINOLOGY | Facility: MEDICAL CENTER | Age: 30
End: 2025-05-27
Payer: MEDICAID

## 2025-05-27 NOTE — TELEPHONE ENCOUNTER
Submitted a General Prior Authorization Form for Zepbound as per patient's insurance requested via fax at [phone].    Thank you,  Priscila Howard, [facility]  Endo RX Coordinator  [phone]

## 2025-05-28 NOTE — TELEPHONE ENCOUNTER
Submitted a Notice of PA Determination for Zepbound via fax at [phone]    Thank you,  Priscila Howard, [facility]  Endo RX Coordinator  [phone]

## 2025-05-29 NOTE — TELEPHONE ENCOUNTER
Prior Authorization Appeal has been denied     Prior authorization was denied per the following: Did not meet clinical criteria. The requirement (s) not met are:  (1)The prescriber did not provide a facility sleep study confirming a diagnosis of moderate to severe obstructive sleep apnea.   (2) The patient must compliant with ongoing treatment(s) for the underlying airway obstruction (e.g., CPAP, BIPAP), unless there is rationale from provider as to why treatment is not indicated.     Prior Authorization denial reference number: [id number]    Next Steps:Called and advised patient on PA Appeal Denial.    Thank you,  Priscila Howard, [facility]  Endo RX Coordinator  [phone]

## 2025-07-07 DIAGNOSIS — E28.2 PCOS (POLYCYSTIC OVARIAN SYNDROME): ICD-10-CM

## 2025-07-07 DIAGNOSIS — E03.9 PRIMARY HYPOTHYROIDISM: ICD-10-CM

## 2025-07-07 RX ORDER — LEVOTHYROXINE SODIUM 75 UG/1
75 TABLET ORAL
Qty: 90 TABLET | Refills: 2 | Status: SHIPPED | OUTPATIENT
Start: 2025-07-07

## 2025-07-07 RX ORDER — METFORMIN HYDROCHLORIDE 500 MG/1
2000 TABLET, EXTENDED RELEASE ORAL DAILY
Qty: 360 TABLET | Refills: 1 | Status: SHIPPED | OUTPATIENT
Start: 2025-07-07

## 2025-08-06 ENCOUNTER — OFFICE VISIT (OUTPATIENT)
Dept: ENDOCRINOLOGY | Facility: MEDICAL CENTER | Age: 30
End: 2025-08-06
Attending: INTERNAL MEDICINE
Payer: MEDICAID

## 2025-08-06 VITALS
WEIGHT: 257 LBS | DIASTOLIC BLOOD PRESSURE: 84 MMHG | SYSTOLIC BLOOD PRESSURE: 127 MMHG | OXYGEN SATURATION: 94 % | HEART RATE: 74 BPM | BODY MASS INDEX: 40.25 KG/M2

## 2025-08-06 DIAGNOSIS — E55.9 VITAMIN D DEFICIENCY: ICD-10-CM

## 2025-08-06 DIAGNOSIS — E66.9 OBESITY (BMI 30-39.9): ICD-10-CM

## 2025-08-06 DIAGNOSIS — E28.2 PCOS (POLYCYSTIC OVARIAN SYNDROME): ICD-10-CM

## 2025-08-06 DIAGNOSIS — E03.9 PRIMARY HYPOTHYROIDISM: Primary | ICD-10-CM

## 2025-08-06 DIAGNOSIS — R73.03 PRE-DIABETES: ICD-10-CM

## 2025-08-06 LAB
HBA1C MFR BLD: 5.4 % (ref ?–5.8)
POCT INT CON NEG: NEGATIVE
POCT INT CON POS: POSITIVE

## 2025-08-06 PROCEDURE — 99213 OFFICE O/P EST LOW 20 MIN: CPT | Performed by: INTERNAL MEDICINE

## 2025-08-06 PROCEDURE — 99212 OFFICE O/P EST SF 10 MIN: CPT | Performed by: INTERNAL MEDICINE

## 2025-08-06 PROCEDURE — 99215 OFFICE O/P EST HI 40 MIN: CPT | Performed by: INTERNAL MEDICINE

## 2025-08-06 PROCEDURE — 83036 HEMOGLOBIN GLYCOSYLATED A1C: CPT | Performed by: INTERNAL MEDICINE

## 2025-08-06 PROCEDURE — 3079F DIAST BP 80-89 MM HG: CPT | Performed by: INTERNAL MEDICINE

## 2025-08-06 PROCEDURE — 3074F SYST BP LT 130 MM HG: CPT | Performed by: INTERNAL MEDICINE

## 2025-08-06 RX ORDER — TIRZEPATIDE 2.5 MG/.5ML
2.5 INJECTION, SOLUTION SUBCUTANEOUS
Qty: 2 ML | Refills: 1 | Status: SHIPPED | OUTPATIENT
Start: 2025-08-06 | End: 2025-09-03

## 2025-08-06 ASSESSMENT — FIBROSIS 4 INDEX: FIB4 SCORE: 0.42

## 2025-08-27 ENCOUNTER — SPECIALTY PHARMACY (OUTPATIENT)
Dept: ENDOCRINOLOGY | Facility: MEDICAL CENTER | Age: 30
End: 2025-08-27
Payer: MEDICAID

## (undated) DEVICE — MASK ANESTHESIA ADULT  - (100/CA)

## (undated) DEVICE — GLOVE BIOGEL INDICATOR SZ 7SURGICAL PF LTX - (50/BX 4BX/CA)

## (undated) DEVICE — SPONGE TONSIL MEDIUM XRAY STERILE 1 - (5/PK 20PK/CA)"

## (undated) DEVICE — TOWEL STOP TIMEOUT SAFETY FLAG (40EA/CA)

## (undated) DEVICE — CANNULA O2 COMFORT SOFT EAR ADULT 7 FT TUBING (50/CA)

## (undated) DEVICE — CANNULA W/SEAL 5X100 Z-THRE - ADED KII (12/BX)

## (undated) DEVICE — SODIUM CHL IRRIGATION 0.9% 1000ML (12EA/CA)

## (undated) DEVICE — KIT ANESTHESIA W/CIRCUIT & 3/LT BAG W/FILTER (20EA/CA)

## (undated) DEVICE — CHLORAPREP 26 ML APPLICATOR - ORANGE TINT(25/CA)

## (undated) DEVICE — CIRCUIT VENTILATOR PEDIATRIC WITH FILTER  (20EA/CS)

## (undated) DEVICE — MASK OXYGEN VNYL ADLT MED CONC WITH 7 FOOT TUBING  - (50EA/CA)

## (undated) DEVICE — ELECTRODE 5MM LHK LAPSCP STERILE DISP- MEGADYNE  (5/CA)

## (undated) DEVICE — GLOVE BIOGEL INDICATOR SZ 8 SURGICAL PF LTX - (50/BX 4BX/CA)

## (undated) DEVICE — GLOVE BIOGEL SZ 7 SURGICAL PF LTX - (50PR/BX 4BX/CA)

## (undated) DEVICE — WATER IRRIGATION STERILE 1000ML (12EA/CA)

## (undated) DEVICE — PROTECTOR ULNA NERVE - (36PR/CA)

## (undated) DEVICE — CLIP MED LG INTNL HRZN TI ESCP - (20/BX)

## (undated) DEVICE — SET CONTINU-FLO SOLN 3 - (48/CA)

## (undated) DEVICE — TUBING CLEARLINK DUO-VENT - C-FLO (48EA/CA)

## (undated) DEVICE — SUTURE GENERAL

## (undated) DEVICE — GLOVE BIOGEL SZ 7.5 SURGICAL PF LTX - (50PR/BX 4BX/CA)

## (undated) DEVICE — DRESSING TRANSPARENT FILM TEGADERM 2.375 X 2.75"  (100EA/BX)"

## (undated) DEVICE — SLEEVE VASO CALF MED - (10PR/CA)

## (undated) DEVICE — TUBING INSUFFLATION - (10/BX)

## (undated) DEVICE — KIT  I.V. START (100EA/CA)

## (undated) DEVICE — TUBE E-T HI-LO CUFF 7.0MM (10EA/PK)

## (undated) DEVICE — SENSOR OXIMETER ADULT SPO2 RD SET (20EA/BX)

## (undated) DEVICE — GOWN WARMING STANDARD FLEX - (30/CA)

## (undated) DEVICE — CANISTER SUCTION 3000ML MECHANICAL FILTER AUTO SHUTOFF MEDI-VAC NONSTERILE LF DISP  (40EA/CA)

## (undated) DEVICE — MASK ANESTHESIA CHILD INFLATABLE CUSHION BUBBLEGUM (50EA/CS)

## (undated) DEVICE — SET LEADWIRE 5 LEAD BEDSIDE DISPOSABLE ECG (1SET OF 5/EA)

## (undated) DEVICE — CANISTER SUCTION RIGID RED 1500CC (40EA/CA)

## (undated) DEVICE — ELECTRODE DUAL RETURN W/ CORD - (50/PK)

## (undated) DEVICE — CATHETER FOLEY ROBINSON 10FR 16IN STRL (12EA/CA)

## (undated) DEVICE — TUBE CONNECTING SUCTION - CLEAR PLASTIC STERILE 72 IN (50EA/CA)

## (undated) DEVICE — HUMID-VENT HEAT AND MOISTURE EXCHANGE- (50/BX)

## (undated) DEVICE — SUTURE 4-0 MONOCRYL PLUS PS-2 - 27 INCH (36/BX)

## (undated) DEVICE — ELECTRODE 850 FOAM ADHESIVE - HYDROGEL RADIOTRNSPRNT (50/PK)

## (undated) DEVICE — TROCAR Z THREAD11MM OPTICAL - NON BLADED(6/BX)

## (undated) DEVICE — LACTATED RINGERS INJ 1000 ML - (14EA/CA 60CA/PF)

## (undated) DEVICE — SUCTION INSTRUMENT YANKAUER BULBOUS TIP W/O VENT (50EA/CA)

## (undated) DEVICE — ANTI-FOG SOLUTION - 60BTL/CA

## (undated) DEVICE — SUTURE 0 COATED VICRYL 6-18IN - (12PK/BX)

## (undated) DEVICE — SET SUCTION/IRRIGATION WITH DISPOSABLE TIP (6/CA )PART #0250-070-520 IS A SUB

## (undated) DEVICE — GLOVE, LITE (PAIR)

## (undated) DEVICE — SENSOR SPO2 NEO LNCS ADHESIVE (20/BX) SEE USER NOTES

## (undated) DEVICE — GLOVE BIOGEL INDICATOR SZ 7.5 SURGICAL PF LTX - (50PR/BX 4BX/CA)

## (undated) DEVICE — PACK ENT OR - (2EA/CA)

## (undated) DEVICE — BAG, SPONGE COUNT 50600

## (undated) DEVICE — TRANSDUCER OXISENSOR PEDS O2 - (20EA/BX)

## (undated) DEVICE — BAG RETRIEVAL 10ML (10EA/BX)

## (undated) DEVICE — GVL 4 STAT DISPOSABLE - (10/BX)

## (undated) DEVICE — TROCAR 5X100 NON BLADED Z-TH - READ KII (6/BX)

## (undated) DEVICE — BOVIE FOOT CONTROL SUCTION - 6IN 10FR (25EA/CA)

## (undated) DEVICE — SPONGE GAUZESTER. 2X2 4-PL - (2/PK 50PK/BX 30BX/CS)

## (undated) DEVICE — GLOVE BIOGEL SZ 6.5 SURGICAL PF LTX (50PR/BX 4BX/CA)

## (undated) DEVICE — HEAD HOLDER JUNIOR/ADULT

## (undated) DEVICE — BLADE ELECTRODE COATED EDGE (50EA/PK)

## (undated) DEVICE — KIT ROOM DECONTAMINATION

## (undated) DEVICE — Device

## (undated) DEVICE — TUBE SHILEY ENDOTRACHEAL ORAL RAE CUFFED 7.0MM WITH TAPERGUARD (10EA/PK)

## (undated) DEVICE — NEPTUNE 4 PORT MANIFOLD - (20/PK)

## (undated) DEVICE — GLOVE BIOGEL SZ 6 PF LATEX - (50EA/BX 4BX/CA)